# Patient Record
Sex: MALE | Race: WHITE | NOT HISPANIC OR LATINO | Employment: OTHER | URBAN - METROPOLITAN AREA
[De-identification: names, ages, dates, MRNs, and addresses within clinical notes are randomized per-mention and may not be internally consistent; named-entity substitution may affect disease eponyms.]

---

## 2023-12-05 ENCOUNTER — HOSPITAL ENCOUNTER (OUTPATIENT)
Dept: RADIOLOGY | Facility: HOSPITAL | Age: 83
Discharge: HOME | End: 2023-12-05
Payer: MEDICARE

## 2023-12-05 ENCOUNTER — OFFICE VISIT (OUTPATIENT)
Dept: ORTHOPEDIC SURGERY | Facility: CLINIC | Age: 83
End: 2023-12-05
Payer: MEDICARE

## 2023-12-05 DIAGNOSIS — M25.562 ACUTE BILATERAL KNEE PAIN: ICD-10-CM

## 2023-12-05 DIAGNOSIS — M25.561 ACUTE BILATERAL KNEE PAIN: ICD-10-CM

## 2023-12-05 DIAGNOSIS — M25.562 ACUTE BILATERAL KNEE PAIN: Primary | ICD-10-CM

## 2023-12-05 DIAGNOSIS — M25.561 ACUTE BILATERAL KNEE PAIN: Primary | ICD-10-CM

## 2023-12-05 PROCEDURE — 73562 X-RAY EXAM OF KNEE 3: CPT | Mod: RT,FY

## 2023-12-05 PROCEDURE — 73562 X-RAY EXAM OF KNEE 3: CPT | Mod: RIGHT SIDE | Performed by: RADIOLOGY

## 2023-12-05 PROCEDURE — 1126F AMNT PAIN NOTED NONE PRSNT: CPT | Performed by: ORTHOPAEDIC SURGERY

## 2023-12-05 PROCEDURE — 1036F TOBACCO NON-USER: CPT | Performed by: ORTHOPAEDIC SURGERY

## 2023-12-05 PROCEDURE — 73560 X-RAY EXAM OF KNEE 1 OR 2: CPT | Mod: RIGHT SIDE | Performed by: RADIOLOGY

## 2023-12-05 PROCEDURE — 1159F MED LIST DOCD IN RCRD: CPT | Performed by: ORTHOPAEDIC SURGERY

## 2023-12-05 PROCEDURE — 20610 DRAIN/INJ JOINT/BURSA W/O US: CPT | Performed by: ORTHOPAEDIC SURGERY

## 2023-12-05 PROCEDURE — 99214 OFFICE O/P EST MOD 30 MIN: CPT | Performed by: ORTHOPAEDIC SURGERY

## 2023-12-05 PROCEDURE — 1160F RVW MEDS BY RX/DR IN RCRD: CPT | Performed by: ORTHOPAEDIC SURGERY

## 2023-12-05 PROCEDURE — 73560 X-RAY EXAM OF KNEE 1 OR 2: CPT | Mod: LT,FY

## 2023-12-05 RX ORDER — TRIAMCINOLONE ACETONIDE 40 MG/ML
1 INJECTION, SUSPENSION INTRA-ARTICULAR; INTRAMUSCULAR
Status: COMPLETED | OUTPATIENT
Start: 2023-12-05 | End: 2023-12-05

## 2023-12-05 RX ADMIN — TRIAMCINOLONE ACETONIDE 1 ML: 40 INJECTION, SUSPENSION INTRA-ARTICULAR; INTRAMUSCULAR at 12:26

## 2023-12-05 NOTE — PROGRESS NOTES
This is a consultation from Dr. Babita Blackburn MD for   Chief Complaint   Patient presents with    Right Knee - Pain    Left Knee - Pain       This is a 83 y.o. male who presents for follow-up for his bilateral knees.  Patient has bilateral knee arthritis, I saw him about a year ago.  At that time to give cortisone injections, he states it worked about 3 to 4 months.  Since then he has had return of his symptoms exacerbation of his pain.  Sharp stabbing pain over the medial knee on both sides worse with walking.  He states he is slow to get around.  No numbness or tingling no fevers or chills no shooting pain down the leg    Physical Exam    There has been no interval change in this patient's past medical, surgical, medications, allergies, family history or social history since the most recent visit to a provider within our department. 14 point review of systems was performed, reviewed, and negative except for pertinent positives documented in the history of present illness.     Constitutional: well developed, well nourished male in no acute distress  Psychiatric: normal mood, appropriate affect  Eyes: sclera anicteric  HENT: normocephalic/atraumatic  CV: regular rate and rhythm   Respiratory: non labored breathing  Integumentary: no rash  Neurological: moves all extremities    Bilateral knee exam: skin intact no lacerations or abrations.  1+ effusion.  Tender medial joint line. negative log roll negative patellar grind. ROM 0-120. stable to varus and valgus stress at 0 and 30 degrees. negative lachman negative posterior drawer negative tevin. 5/5 ehl/fhl/gs/ta. silt s/s/sp/dp/t. 2+ dp/pt        Xrays were ordered by me, they were reviewed and independently interpreted by me today, they show severe degenerative disease in the bilateral patellofemoral compartments, moderate to severe joint space narrowing the medial compartment    L Inj/Asp: bilateral knee on 12/5/2023 12:26 PM  Indications: pain and joint  swelling  Details: 22 G needle, anterolateral approach  Medications (Right): 1 mL triamcinolone acetonide 40 mg/mL  Medications (Left): 1 mL triamcinolone acetonide 40 mg/mL    Discussion:  I discussed the conservative treatment options for knee osteoarthritis including but not limited to physical therapy, oral NSAIDS, activity and lifestyle modification, and corticosteroid injections. Pt has elected to undergo a cortisone injection today. I have explained the risk and benefits of an injection including the possibility of joint infection, bleeding, damage to cartilage, allergic reaction. Patient verbalized understanding and gave verbal consent wishes to proceed with a intra-articular cortisone injection for their knee.    Procedure:  After discussing the risk and benefits of the procedure, we proceeded with an intra-articular bilateral knee injection. We discussed the risks and benefits and potential morbidity related to the treatment, and to the prescription medication administered in the injection    With the patient's informed verbal consent, the bilateral knees were prepped in standard sterile fashion with Chlorhexidine. The skin was then anesthetized with ethyl chloride spray and cleaned again with Chlorhexidine. The bilateral knees were then apirated/injected with a prefilled 20-gauge syringe of 40 mg Kenalog + 4 ml Lidocaine using the lateral approach without complications.  The patient tolerated this well and felt immediate initial relief of symptoms. A bandaid was applied and the patient ambulated out of the clinic on ther own accord without difficulty. Patient was instructed to avoid physical activity for 24-48 hours to prevent the knees from swelling and may ice the knees as tolerated. Patient should contact the office if any signs of of infection appear: redness, fever, chills, drainage, swelling or warmth to the knees.  Pt understands that the injections can be repeated no sooner than 3  "months.      Procedure, treatment alternatives, risks and benefits explained, specific risks discussed. Consent was given by the patient. Immediately prior to procedure a time out was called to verify the correct patient, procedure, equipment, support staff and site/side marked as required. Patient was prepped and draped in the usual sterile fashion.             Impression/Plan: This is a 83 y.o. male with severe bilateral knee arthritis.  I had an in depth discussion with the patient regarding treatment options for arthritis and their relative risks and benefits. We reviewed surgical and nonsurgical option for treatment. Treatments include anti inflammatory medications, physical therapy, weight loss, activity modification, use of assistive devices, injection therapies. We discussed current prescriptions and risks and benefits of continuation of prescription medication as apporpriate. We discussed that arthritis is often progressive over time, an in end stage arthritis surgical interventions can be considered, including arthroplasty. All questions were answered and the patient voiced their understanding.  I will see him back as needed    BMI Readings from Last 1 Encounters:   07/05/22 33.20 kg/m²      Lab Results   Component Value Date    CREATININE 0.99 03/08/2022     Tobacco Use: Medium Risk (12/5/2023)    Patient History     Smoking Tobacco Use: Former     Smokeless Tobacco Use: Never     Passive Exposure: Not on file      Computed MELD 3.0 unavailable. Necessary lab results were not found in the last year.  Computed MELD-Na unavailable. Necessary lab results were not found in the last year.       Lab Results   Component Value Date    HGBA1C 5.9 03/16/2020     No results found for: \"STAPHMRSASCR\"  "

## 2023-12-19 DIAGNOSIS — E78.2 MIXED HYPERLIPIDEMIA: ICD-10-CM

## 2023-12-19 DIAGNOSIS — Z00.00 ENCOUNTER FOR GENERAL ADULT MEDICAL EXAMINATION WITHOUT ABNORMAL FINDINGS: ICD-10-CM

## 2023-12-19 RX ORDER — SIMVASTATIN 40 MG/1
40 TABLET, FILM COATED ORAL EVERY EVENING
Qty: 90 TABLET | Refills: 3 | Status: SHIPPED | OUTPATIENT
Start: 2023-12-19

## 2023-12-19 RX ORDER — METOPROLOL SUCCINATE 50 MG/1
50 TABLET, EXTENDED RELEASE ORAL DAILY
Qty: 90 TABLET | Refills: 3 | Status: SHIPPED | OUTPATIENT
Start: 2023-12-19

## 2023-12-19 RX ORDER — LISINOPRIL 10 MG/1
10 TABLET ORAL DAILY
Qty: 90 TABLET | Refills: 3 | Status: SHIPPED | OUTPATIENT
Start: 2023-12-19 | End: 2024-02-08 | Stop reason: HOSPADM

## 2023-12-20 DIAGNOSIS — R45.4 IRRITABILITY AND ANGER: ICD-10-CM

## 2023-12-20 RX ORDER — DULOXETIN HYDROCHLORIDE 60 MG/1
60 CAPSULE, DELAYED RELEASE ORAL DAILY
Qty: 90 CAPSULE | Refills: 3 | Status: SHIPPED | OUTPATIENT
Start: 2023-12-20

## 2023-12-20 RX ORDER — ESCITALOPRAM OXALATE 5 MG/1
5 TABLET ORAL NIGHTLY
Qty: 90 TABLET | Refills: 3 | Status: SHIPPED | OUTPATIENT
Start: 2023-12-20 | End: 2024-01-31 | Stop reason: SDUPTHER

## 2024-01-07 PROBLEM — H90.3 SENSORINEURAL HEARING LOSS (SNHL) OF BOTH EARS: Status: ACTIVE | Noted: 2024-01-07

## 2024-01-07 PROBLEM — I73.9 PVD (PERIPHERAL VASCULAR DISEASE) (CMS-HCC): Status: ACTIVE | Noted: 2024-01-07

## 2024-01-07 PROBLEM — S32.020A CLOSED COMPRESSION FRACTURE OF SECOND LUMBAR VERTEBRA (MULTI): Status: ACTIVE | Noted: 2024-01-07

## 2024-01-07 PROBLEM — D64.9 ANEMIA: Status: ACTIVE | Noted: 2024-01-07

## 2024-01-07 PROBLEM — E55.9 VITAMIN D DEFICIENCY: Status: ACTIVE | Noted: 2024-01-07

## 2024-01-07 PROBLEM — Z00.00 HEALTH CARE MAINTENANCE: Status: ACTIVE | Noted: 2024-01-07

## 2024-01-07 PROBLEM — E53.8 B12 DEFICIENCY: Status: ACTIVE | Noted: 2024-01-07

## 2024-01-07 PROBLEM — R26.89 IMPAIRMENT OF BALANCE: Status: ACTIVE | Noted: 2024-01-07

## 2024-01-07 PROBLEM — I10 PRIMARY HYPERTENSION: Status: ACTIVE | Noted: 2024-01-07

## 2024-01-07 PROBLEM — R41.89 COGNITIVE IMPAIRMENT: Status: ACTIVE | Noted: 2024-01-07

## 2024-01-07 PROBLEM — M79.609 PAIN IN EXTREMITY AT MULTIPLE SITES: Status: ACTIVE | Noted: 2024-01-07

## 2024-01-07 PROBLEM — I25.110 CORONARY ARTERY DISEASE INVOLVING NATIVE CORONARY ARTERY OF NATIVE HEART WITH UNSTABLE ANGINA PECTORIS (MULTI): Status: ACTIVE | Noted: 2024-01-07

## 2024-01-07 PROBLEM — R45.4 IRRITABILITY AND ANGER: Status: ACTIVE | Noted: 2024-01-07

## 2024-01-07 NOTE — PROGRESS NOTES
"Subjective   Mr. Thao is 83 y.o. year old male and here for f/u of gait abnormality, ostearthritis in knees and hands. here with wife mandy     Last visit 7/5/22  Per pt discussion/summary:   \"1. Dr. Blackburn will order  home care for home physical therapy  - do the exercises regularly   2. see a podiatrist for nail clippings  3. referral to ear nose and throat  - for hearing and ear evaluation   4. follow up in 4 months\"     Saw ortho 10/4/22: had bilateral steroid injection into knees    Saw ortho again 12/5/23  \"Impression/Plan: This is a 83 y.o. male with severe bilateral knee arthritis.  I had an in depth discussion with the patient regarding treatment options for arthritis and their relative risks and benefits. We reviewed surgical and nonsurgical option for treatment. Treatments include anti inflammatory medications, physical therapy, weight loss, activity modification, use of assistive devices, injection therapies. We discussed current prescriptions and risks and benefits of continuation of prescription medication as apporpriate. We discussed that arthritis is often progressive over time, an in end stage arthritis surgical interventions can be considered, including arthroplasty. All questions were answered and the patient voiced their understanding.  I will see him back as needed \"    HPI     Per patient and mandy (obtained in addition due to cognitive impairment)  - both his knees hurt. \"Art has settled in for good\"  - got steroid injections again into knees 12/3/23   - down 4-5 lbs in past 1-2 years. Down 25lbs in almost 3 years.   - using cane. Thinks he is not ready for walker  - has had falls. Last was she was attacked by rototiller. Slit up his leg. It has healed. Went to wound clinic. This was last spring  - pt says no falls since then. But wife says she has heard him thud   - has hard time coming up and down stairs per wife  - stays in his room and doesn't come downstairs often. No exercise.   - " "comes down once every couple of days to have dinner.   - eats cookies and crackers mostly  - taking tylenol 500mg- 5 tabs per day. 3 in morning and 2 at night  - tramadol is gone.   - no dizziness and lightheadedness  - picks a fight with mandy every afternoon. Gets more angry only with mandy.   - pt says he hasn't been taking the escitalopram. Thought I took him off of it  - wife has been taking donepezil. It has given her her life back. Thinking more clearly.  - has some short term forgetfulness. Says he  can't tell me what he ate for breakfast yesterday.   - has bugs upstairs. Pt says they are real. He tries killing them with his cane. Wife says they are not real. That they are not real. Pt insists it is not a hallucination   - 5 months ago did have hallucinations. Saw a woodchuck coming up through the floor. But when went up to it, wasn't real  - no alcohol   - not repeating  - forgets names or words some but not a lot  - not motivated to do things  - no problem with electronics  - sometimes trouble sleeping   - having pain in muscles below both shoulders     Home environment: lives in house in [facility] [place] with wife     Alcohol: no  Smoking: no      Is dependent or requires assistance in the following BADL: none   Is dependent or requires assistance in the following Instrumental ADL: stopped driving. managing own meds. wife does cooking and cleaning and laundry. Wife does bills      History of Abuse/Neglect/exploitation: no      Living Will: will discuss at next visit  Durable Power of  of Health Care: will discuss at next visit    Medications reviewed and reconciled.     Objective   /69   Pulse 83   Temp 36.3 °C (97.4 °F) (Tympanic)   Resp 18   Ht 1.689 m (5' 6.5\")   Wt 88.5 kg (195 lb)   BMI 31.00 kg/m²     Physical Exam  Constitutional: No Acute Distress; hair long and yellow at bottom of strands. Mild body odor. Clothes slightly yellow    Eyes: PERRLA  Ears: clear. Mostly normal TMs  ENT: " Pharynx clear, neck supple  Lymphatic: No anterior cervical, supraclavicular adenopathy  Cardiovascular: RRR, +S1, S2, no murmurs appreciated, physiologic JVD.  Extremities: no cyanosis, clubbing. Trace-1+ edema. Pedal pulses decreased bilaterally. Toes blueish and cold. Skin tight. Nails dystrophic and slightly long  Respiratory: clear without rales, rhonchi or wheezes  Gastrointestinal: +BS, soft, nontender  Genitourinary: not examined  Musculoskeletal: unremarkable  Integumentary: skin warm, no tenting, no remarkable lesions  Neurological: non-focal deficit. Get-up-and-go:  pushed to stand. Somewhat slow gait. Reduced step length Gait: stable with straight cane  Psychiatric: affect full         Component  Ref Range & Units 1 yr ago  (3/8/22) 2 yr ago  (8/13/21) 2 yr ago  (8/3/21) 3 yr ago  (5/26/20) 3 yr ago  (5/8/20) 3 yr ago  (3/16/20) 4 yr ago  (5/7/19)   WBC  4.4 - 11.3 x10E9/L 11.2 10.6 8.7 10.9 13.3 High  12.0 High  8.4   Hemoglobin  13.5 - 17.5 g/dL 13.1 Low  13.8 13.8 11.5 Low  12.2 Low  13.2 Low  14.7   Hematocrit  41.0 - 52.0 % 42.5 41.5 43.6 36.8 Low  38.3 Low  41.4 45.5   MCV  80 - 100 fL 107 High  106 High  108 High  109 High  104 High  103 High  108 High    Platelets  150 - 450 x10E9/L 413 305 353 407 346 397 248     Component  Ref Range & Units 1 yr ago  (3/8/22) 2 yr ago  (8/13/21) 2 yr ago  (8/3/21) 3 yr ago  (9/10/20) 3 yr ago  (5/26/20) 3 yr ago  (5/8/20) 3 yr ago  (3/16/20)   Glucose  74 - 99 mg/dL 89 84 103 High  81 107 High  99 146 High    Sodium  136 - 145 mmol/L 139 138 139 138 134 Low  139 140   Potassium  3.5 - 5.3 mmol/L 4.8 4.8 CM 4.4 4.5 4.3 4.0 3.9   Chloride  98 - 107 mmol/L 106 106 106 104 102 105 107   Bicarbonate  21 - 32 mmol/L 23 24 25 26 23 26 25   Anion Gap  10 - 20 mmol/L 15 13 12 13 13 12 12   Urea Nitrogen  6 - 23 mg/dL 32 High  32 High  25 High  17 21 25 High  21   Creatinine  0.50 - 1.30 mg/dL 0.99 1.27 0.94 0.88 1.04 1.69 High  0.95   GFR MALE  >90 mL/min/1.73m2 77       "   Calcium  8.6 - 10.6 mg/dL 8.8 8.9 R 9.1 9.1 R 9.0 8.5 Low           Lab Results   Component Value Date    TSH 0.93 03/08/2022    TSH 0.86 09/10/2020    TSH 1.01 05/08/2020     No results found for: \"FREET4\"  Lab Results   Component Value Date    DEGXZSMZ13 >2000 (A) 05/07/2019    BRAAXOAA17 1,234 (H) 10/10/2018    ZNEZAJZJ03 285 08/08/2018     Lab Results   Component Value Date    HGBA1C 5.9 03/16/2020     Lab Results   Component Value Date    VITD25 36 08/03/2021     Component  Ref Range & Units 3 yr ago 4 yr ago 5 yr ago   Cholesterol  0 - 199 mg/dL 147 150  CM   HDL  mg/dL 34.5 Abnormal  36.6 Abnormal  CM 30.3 Abnormal  CM   LDL  0 - 99 mg/dL 94 94 CM 86 CM   VLDL  0 - 40 mg/dL 19 20 21   Triglycerides  0 - 149 mg/dL 95 99  CM     Xray knees 12/6/23  \"IMPRESSION:  Advanced osteoarthritis bilateral knees worst medially, slightly progressed from prior study.\"    Xray lumbar spine 6/8/22  \"IMPRESSION:  Compression fracture of L2 appears similar to the prior. Moderate facet disease and mild spondylosis in the lower lumbar spine.\"    ECHO 2020  \"CONCLUSIONS:   1. The left ventricular systolic function is low normal with a 55% estimated ejection fraction.   2. Spectral Doppler shows an impaired relaxation pattern of left ventricular diastolic filling.   3. Mild aortic valve stenosis.   4. There is mild tricuspid regurgitation.   5. The estimated pulmonary artery pressure is mildly elevated with the RVSP at 38.9 mmHg.   6. The inferior vena cava appears to be of normal size.\"    PVRs 2021: CONCLUSIONS:  Right Lower PVR: No evidence of arterial occlusive disease in the right lower extremity at rest. Decreased digital perfusion noted. Biphasic flow is noted in the right posterior tibial artery and right dorsalis pedis artery. Triphasic flow is noted in the right common femoral artery and right popliteal artery. Difference in brachial pressures noted. Toe pressures per Dr. Escobedo.  Left Lower PVR: No " evidence of arterial occlusive disease in the left lower extremity at rest. Left pressures of >220 mmHg suggest no compressibility of vessels and may make absolute Segmental Limb Pressures (SLP) unreliable. Normal digital perfusion noted.    Assessment/Plan   1. irritability and anger  2. cognitive impairment  Pt's wife has been reporting irritability and anger for past few years. She noted improvement when duloxetine was increased to 60mg in may 2021. but reported increased hostility towards her in 7/2022. We added escitalopram 5mg daily. He then didn't return for f/up until now. And his wife notes that pt has continued to be angry and hostile just towards her, usually every evening. Also he stays in his room for few days at a time because its hard for him to come down the stairs. Does have apathy. And they both report short term forgetfulness. He states he is not taking the escitalopram. Will restart this today at 5mg daily. Also of note,  he scored 26/30 on MoCA in 8/2020. will repeat the MoCA at the next visit. And we will do blood work today.     3. Hearing loss  Wife feels pt's hearing loss is playing a role with cognitive and mood issues. hasn't seen audiology for hearing aid adjustment in [facility]. Have referred him multiple times in past. In 7/2022, he said he wanted to see ENT about having surgery done in his left ear.  This didn't happen and he didn't mention it today. of note evaluated in 2020 for cochlear implant and was told not a candidate. 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 also may need left hearing aid adjusted. will refer him back to audiology again for L hearing aid ajustment     4. pain multiple sites  5. balance impairment  6. L2 compression fracture  - pain has been a problem for awhile. He used to be on tramadol up to 4 times per day but has weaned off of this. Pain in his knees has worsened. Likely contributing to and by his decreased mobility. Having trouble going down the stairs in his house and  so is remaining in his room for days at a time. Taking only tylenol 1500mg in morning and 1000mg at night. Advised changing this to 1000mg TID and starting voltaren gel and lidocaine patches to his knees. Also will refer for PT and discussed the importance of increased activity and exercise.      7. obesity   - weight is down 30+ lbs compared to 5 years ago but is stable compared to 1.5 years ago. Often doesn't go downstairs for dinner. Instead eats cookies and crackers all day. Discussed the impact of his weight and also sweets and unbalanced diet on his pain issues. Recommend to try to eat more balanced diet.  Will monitor      8. HTN  9. coronary arteriosclerosis  10. PVD (suspected)   bps have been relatively low in the office in past. denies dizziness, lightheadedness. is on metoprolol ER 50mg and lisinopril 10mg. will continue this regimen for now. also on statin and aspirin. Bp again today is low normal. May need  to reduce the dose of lisinopril. Will check labs today. of note, PVRs in 9/2020 were negative, however last visit and today again his feet were cool and dusky. may need to repeat this test in near future. he does have very mild edema of lower legs. did see Dr. Nash from vascular medicine in 10/2020. was wrapping his legs. but stopped this. Skin today is very dry. Advised applying moisturizing eczema cream to his legs twice daily. will monitor     11. anemia   found to have mild anemia in may/june 2020 with hgb 11.5. hemoccult was negative at that time. it did improve to the 13s in 2021 and early 2022. Will recheck cbc today.       12. B12 deficiency  b12 was low normal in upper 200s in past. then was on 1000mcg daily and eventually was supratherapeutic. And he stopped it for awhile. Now says he is on a low dose. Will recheck b12 level     13. Vitamin D deficiency  - d level in 8/2021 was 36. is on D3 1000 units daily. will recheck D level      14. health maintenance  - both pt and wife expressed  reservation about the covid vaccines in past. We did not discuss vaccines today. Will discuss at next visit.   Of note, pt had notable odor today. It seemed to be coming from his clothes which were slightly yellow in areas. Wife notes that he bathes/showers about 1x per year. Did shower last night in preparation for the visit. Recommended today that he try to bathe at least once weekly.     15. nail problem   toenails are again dystrophic. Have advised him multiple times in past to podiatry but this wasn't done.      f/u visit in 2-3 months with cognitive test           Babita Blackburn MD

## 2024-01-09 ENCOUNTER — OFFICE VISIT (OUTPATIENT)
Dept: GERIATRIC MEDICINE | Facility: CLINIC | Age: 84
End: 2024-01-09
Payer: MEDICARE

## 2024-01-09 VITALS
RESPIRATION RATE: 18 BRPM | SYSTOLIC BLOOD PRESSURE: 101 MMHG | DIASTOLIC BLOOD PRESSURE: 69 MMHG | TEMPERATURE: 97.4 F | BODY MASS INDEX: 30.61 KG/M2 | HEART RATE: 83 BPM | WEIGHT: 195 LBS | HEIGHT: 67 IN

## 2024-01-09 DIAGNOSIS — E55.9 VITAMIN D DEFICIENCY: ICD-10-CM

## 2024-01-09 DIAGNOSIS — M79.609 PAIN IN EXTREMITY AT MULTIPLE SITES: ICD-10-CM

## 2024-01-09 DIAGNOSIS — L85.3 DRY SKIN DERMATITIS: ICD-10-CM

## 2024-01-09 DIAGNOSIS — S32.020D CLOSED COMPRESSION FRACTURE OF L2 LUMBAR VERTEBRA WITH ROUTINE HEALING, SUBSEQUENT ENCOUNTER: ICD-10-CM

## 2024-01-09 DIAGNOSIS — E53.8 B12 DEFICIENCY: ICD-10-CM

## 2024-01-09 DIAGNOSIS — R26.89 IMPAIRMENT OF BALANCE: ICD-10-CM

## 2024-01-09 DIAGNOSIS — I10 PRIMARY HYPERTENSION: ICD-10-CM

## 2024-01-09 DIAGNOSIS — D64.9 ANEMIA, UNSPECIFIED TYPE: ICD-10-CM

## 2024-01-09 DIAGNOSIS — I25.110 CORONARY ARTERY DISEASE INVOLVING NATIVE CORONARY ARTERY OF NATIVE HEART WITH UNSTABLE ANGINA PECTORIS (MULTI): ICD-10-CM

## 2024-01-09 DIAGNOSIS — H90.3 SENSORINEURAL HEARING LOSS (SNHL) OF BOTH EARS: ICD-10-CM

## 2024-01-09 DIAGNOSIS — R73.03 PREDIABETES: ICD-10-CM

## 2024-01-09 DIAGNOSIS — R41.89 COGNITIVE IMPAIRMENT: ICD-10-CM

## 2024-01-09 DIAGNOSIS — L60.9 NAIL PROBLEM: ICD-10-CM

## 2024-01-09 DIAGNOSIS — R45.4 IRRITABILITY AND ANGER: Primary | ICD-10-CM

## 2024-01-09 DIAGNOSIS — Z00.00 HEALTH CARE MAINTENANCE: ICD-10-CM

## 2024-01-09 DIAGNOSIS — I73.9 PVD (PERIPHERAL VASCULAR DISEASE) (CMS-HCC): ICD-10-CM

## 2024-01-09 PROCEDURE — 99215 OFFICE O/P EST HI 40 MIN: CPT | Performed by: INTERNAL MEDICINE

## 2024-01-09 PROCEDURE — 1159F MED LIST DOCD IN RCRD: CPT | Performed by: INTERNAL MEDICINE

## 2024-01-09 PROCEDURE — 1160F RVW MEDS BY RX/DR IN RCRD: CPT | Performed by: INTERNAL MEDICINE

## 2024-01-09 PROCEDURE — 1036F TOBACCO NON-USER: CPT | Performed by: INTERNAL MEDICINE

## 2024-01-09 PROCEDURE — 3074F SYST BP LT 130 MM HG: CPT | Performed by: INTERNAL MEDICINE

## 2024-01-09 PROCEDURE — 3078F DIAST BP <80 MM HG: CPT | Performed by: INTERNAL MEDICINE

## 2024-01-09 PROCEDURE — 1126F AMNT PAIN NOTED NONE PRSNT: CPT | Performed by: INTERNAL MEDICINE

## 2024-01-09 RX ORDER — CYANOCOBALAMIN (VITAMIN B-12) 250 MCG
250 TABLET ORAL DAILY
Status: ON HOLD | COMMUNITY

## 2024-01-09 RX ORDER — ASCORBIC ACID 500 MG
500 TABLET ORAL DAILY
Status: ON HOLD | COMMUNITY

## 2024-01-09 RX ORDER — VIT C/E/ZN/COPPR/LUTEIN/ZEAXAN 250MG-90MG
25 CAPSULE ORAL DAILY
Status: ON HOLD | COMMUNITY

## 2024-01-09 ASSESSMENT — PATIENT HEALTH QUESTIONNAIRE - PHQ9: 2. FEELING DOWN, DEPRESSED OR HOPELESS: NOT AT ALL

## 2024-01-09 ASSESSMENT — PAIN SCALES - GENERAL: PAINLEVEL: 0-NO PAIN

## 2024-01-09 ASSESSMENT — ENCOUNTER SYMPTOMS
LOSS OF SENSATION IN FEET: 0
OCCASIONAL FEELINGS OF UNSTEADINESS: 1
DEPRESSION: 0

## 2024-01-09 NOTE — PATIENT INSTRUCTIONS
Takes tylenol 500mg   - 2 tabs in morning in the morning, 2 tabs in afternoon and 2 tabs at night     2. Try using voltaren gel 1%  - 4 grams to each knee at least twice per day     3. Put lidocaine patch on each knee in the morning, take off at night     4. Increase exercise   - try to do physical therapy    5. Restart the escitalopram (lexapro) 5mg   -1 tab daily  - this is to help with mood   - take at bedtime. If it makes you more awake at night, then take it in morning    6. We justin blood today to check kidneys, blood counts, thyroid, vitamin D, cholesterol, b12     7. Follow up visit with Dr. Blackburn in 6 months     8. Try to bathe once per week     9. Apply moisturizing cream, like eucerin eczema once daily to legs, feet, and hands     10. Regular exercise and physical therapy will help with your shoulders

## 2024-01-09 NOTE — Clinical Note
After thinking more about this case, I think he should come back sooner in 1-2 months to follow up on medication use, his mood, and his arthritis mobility issues. And I would like to repeat a MoCA. Can we schedule this for him with one of the Nps, if I'm not available?

## 2024-01-31 RX ORDER — ACETAMINOPHEN 500 MG
1000 TABLET ORAL EVERY 8 HOURS
Qty: 180 TABLET | Refills: 3 | Status: SHIPPED | OUTPATIENT
Start: 2024-01-31 | End: 2024-02-08 | Stop reason: HOSPADM

## 2024-01-31 RX ORDER — ESCITALOPRAM OXALATE 5 MG/1
5 TABLET ORAL NIGHTLY
Qty: 90 TABLET | Refills: 3 | Status: ON HOLD | OUTPATIENT
Start: 2024-01-31

## 2024-01-31 RX ORDER — COLLOIDAL OATMEAL 1 %
CREAM (GRAM) TOPICAL
Qty: 206 G | Refills: 3 | Status: SHIPPED | OUTPATIENT
Start: 2024-01-31 | End: 2024-02-08 | Stop reason: HOSPADM

## 2024-01-31 RX ORDER — LIDOCAINE 560 MG/1
2 PATCH PERCUTANEOUS; TOPICAL; TRANSDERMAL DAILY
Qty: 60 PATCH | Refills: 3 | Status: SHIPPED | OUTPATIENT
Start: 2024-01-31 | End: 2024-02-08 | Stop reason: HOSPADM

## 2024-02-02 ENCOUNTER — APPOINTMENT (OUTPATIENT)
Dept: CARDIOLOGY | Facility: HOSPITAL | Age: 84
DRG: 564 | End: 2024-02-02
Payer: MEDICARE

## 2024-02-02 ENCOUNTER — APPOINTMENT (OUTPATIENT)
Dept: RADIOLOGY | Facility: HOSPITAL | Age: 84
DRG: 564 | End: 2024-02-02
Payer: MEDICARE

## 2024-02-02 ENCOUNTER — HOSPITAL ENCOUNTER (INPATIENT)
Facility: HOSPITAL | Age: 84
LOS: 6 days | Discharge: NURSING, REHAB OR LONG-TERM CARE | DRG: 564 | End: 2024-02-08
Attending: EMERGENCY MEDICINE | Admitting: FAMILY MEDICINE
Payer: MEDICARE

## 2024-02-02 DIAGNOSIS — R06.02 SHORTNESS OF BREATH: ICD-10-CM

## 2024-02-02 DIAGNOSIS — R79.89 ELEVATED TROPONIN: ICD-10-CM

## 2024-02-02 DIAGNOSIS — M79.609 PAIN IN EXTREMITY AT MULTIPLE SITES: ICD-10-CM

## 2024-02-02 DIAGNOSIS — I48.91 NEW ONSET ATRIAL FIBRILLATION (MULTI): ICD-10-CM

## 2024-02-02 DIAGNOSIS — T79.6XXA TRAUMATIC RHABDOMYOLYSIS, INITIAL ENCOUNTER (CMS-HCC): Primary | ICD-10-CM

## 2024-02-02 DIAGNOSIS — J18.9 COMMUNITY ACQUIRED PNEUMONIA, BILATERAL: ICD-10-CM

## 2024-02-02 DIAGNOSIS — D72.829 LEUKOCYTOSIS, UNSPECIFIED TYPE: ICD-10-CM

## 2024-02-02 DIAGNOSIS — R79.89 LACTATE BLOOD INCREASE: ICD-10-CM

## 2024-02-02 LAB
ALBUMIN SERPL BCP-MCNC: 3.2 G/DL (ref 3.4–5)
ALP SERPL-CCNC: 127 U/L (ref 33–136)
ALT SERPL W P-5'-P-CCNC: 34 U/L (ref 10–52)
ANION GAP BLDV CALCULATED.4IONS-SCNC: 14 MMOL/L (ref 10–25)
ANION GAP SERPL CALC-SCNC: 20 MMOL/L (ref 10–20)
AST SERPL W P-5'-P-CCNC: 89 U/L (ref 9–39)
BASE EXCESS BLDV CALC-SCNC: -1.1 MMOL/L (ref -2–3)
BASOPHILS # BLD AUTO: 0.05 X10*3/UL (ref 0–0.1)
BASOPHILS NFR BLD AUTO: 0.3 %
BILIRUB SERPL-MCNC: 0.5 MG/DL (ref 0–1.2)
BODY TEMPERATURE: ABNORMAL
BUN SERPL-MCNC: 49 MG/DL (ref 6–23)
CA-I BLDV-SCNC: 1.15 MMOL/L (ref 1.1–1.33)
CALCIUM SERPL-MCNC: 8.8 MG/DL (ref 8.6–10.3)
CARDIAC TROPONIN I PNL SERPL HS: 23 NG/L (ref 0–20)
CARDIAC TROPONIN I PNL SERPL HS: 27 NG/L (ref 0–20)
CHLORIDE BLDV-SCNC: 100 MMOL/L (ref 98–107)
CHLORIDE SERPL-SCNC: 99 MMOL/L (ref 98–107)
CK SERPL-CCNC: 3109 U/L (ref 0–325)
CO2 SERPL-SCNC: 23 MMOL/L (ref 21–32)
CREAT SERPL-MCNC: 1.3 MG/DL (ref 0.5–1.3)
EGFRCR SERPLBLD CKD-EPI 2021: 55 ML/MIN/1.73M*2
EOSINOPHIL # BLD AUTO: 0 X10*3/UL (ref 0–0.4)
EOSINOPHIL NFR BLD AUTO: 0 %
ERYTHROCYTE [DISTWIDTH] IN BLOOD BY AUTOMATED COUNT: 12.6 % (ref 11.5–14.5)
FLUAV RNA RESP QL NAA+PROBE: NOT DETECTED
FLUBV RNA RESP QL NAA+PROBE: NOT DETECTED
GLUCOSE BLDV-MCNC: 107 MG/DL (ref 74–99)
GLUCOSE SERPL-MCNC: 101 MG/DL (ref 74–99)
HCO3 BLDV-SCNC: 24.3 MMOL/L (ref 22–26)
HCT VFR BLD AUTO: 40.6 % (ref 41–52)
HCT VFR BLD EST: 55 % (ref 41–52)
HGB BLD-MCNC: 13.6 G/DL (ref 13.5–17.5)
HGB BLDV-MCNC: 18.2 G/DL (ref 13.5–17.5)
IMM GRANULOCYTES # BLD AUTO: 0.23 X10*3/UL (ref 0–0.5)
IMM GRANULOCYTES NFR BLD AUTO: 1.2 % (ref 0–0.9)
INHALED O2 CONCENTRATION: 21 %
LACTATE BLDV-SCNC: 3.9 MMOL/L (ref 0.4–2)
LACTATE SERPL-SCNC: 1 MMOL/L (ref 0.4–2)
LACTATE SERPL-SCNC: 2.6 MMOL/L (ref 0.4–2)
LYMPHOCYTES # BLD AUTO: 1.13 X10*3/UL (ref 0.8–3)
LYMPHOCYTES NFR BLD AUTO: 6 %
MCH RBC QN AUTO: 32.9 PG (ref 26–34)
MCHC RBC AUTO-ENTMCNC: 33.5 G/DL (ref 32–36)
MCV RBC AUTO: 98 FL (ref 80–100)
MONOCYTES # BLD AUTO: 0.98 X10*3/UL (ref 0.05–0.8)
MONOCYTES NFR BLD AUTO: 5.2 %
NEUTROPHILS # BLD AUTO: 16.55 X10*3/UL (ref 1.6–5.5)
NEUTROPHILS NFR BLD AUTO: 87.3 %
NRBC BLD-RTO: 0 /100 WBCS (ref 0–0)
OXYHGB MFR BLDV: 49.3 % (ref 45–75)
PCO2 BLDV: 42 MM HG (ref 41–51)
PH BLDV: 7.37 PH (ref 7.33–7.43)
PLATELET # BLD AUTO: 610 X10*3/UL (ref 150–450)
PO2 BLDV: 40 MM HG (ref 35–45)
POTASSIUM BLDV-SCNC: 4.2 MMOL/L (ref 3.5–5.3)
POTASSIUM SERPL-SCNC: 5.2 MMOL/L (ref 3.5–5.3)
PROT SERPL-MCNC: 7.4 G/DL (ref 6.4–8.2)
RBC # BLD AUTO: 4.14 X10*6/UL (ref 4.5–5.9)
RSV RNA RESP QL NAA+PROBE: NOT DETECTED
SAO2 % BLDV: 51 % (ref 45–75)
SARS-COV-2 RNA RESP QL NAA+PROBE: NOT DETECTED
SODIUM BLDV-SCNC: 134 MMOL/L (ref 136–145)
SODIUM SERPL-SCNC: 137 MMOL/L (ref 136–145)
WBC # BLD AUTO: 18.9 X10*3/UL (ref 4.4–11.3)

## 2024-02-02 PROCEDURE — 2500000004 HC RX 250 GENERAL PHARMACY W/ HCPCS (ALT 636 FOR OP/ED): Performed by: FAMILY MEDICINE

## 2024-02-02 PROCEDURE — 82550 ASSAY OF CK (CPK): CPT | Performed by: NURSE PRACTITIONER

## 2024-02-02 PROCEDURE — 71045 X-RAY EXAM CHEST 1 VIEW: CPT | Mod: FR

## 2024-02-02 PROCEDURE — 96367 TX/PROPH/DG ADDL SEQ IV INF: CPT

## 2024-02-02 PROCEDURE — 36415 COLL VENOUS BLD VENIPUNCTURE: CPT | Performed by: NURSE PRACTITIONER

## 2024-02-02 PROCEDURE — 71045 X-RAY EXAM CHEST 1 VIEW: CPT | Mod: FOREIGN READ | Performed by: RADIOLOGY

## 2024-02-02 PROCEDURE — 83605 ASSAY OF LACTIC ACID: CPT | Performed by: NURSE PRACTITIONER

## 2024-02-02 PROCEDURE — 74177 CT ABD & PELVIS W/CONTRAST: CPT | Mod: FOREIGN READ | Performed by: RADIOLOGY

## 2024-02-02 PROCEDURE — 73564 X-RAY EXAM KNEE 4 OR MORE: CPT | Mod: LT

## 2024-02-02 PROCEDURE — 70450 CT HEAD/BRAIN W/O DYE: CPT

## 2024-02-02 PROCEDURE — 73502 X-RAY EXAM HIP UNI 2-3 VIEWS: CPT | Mod: LEFT SIDE | Performed by: RADIOLOGY

## 2024-02-02 PROCEDURE — 2500000005 HC RX 250 GENERAL PHARMACY W/O HCPCS: Performed by: FAMILY MEDICINE

## 2024-02-02 PROCEDURE — 99223 1ST HOSP IP/OBS HIGH 75: CPT | Performed by: FAMILY MEDICINE

## 2024-02-02 PROCEDURE — 93005 ELECTROCARDIOGRAM TRACING: CPT

## 2024-02-02 PROCEDURE — 2500000002 HC RX 250 W HCPCS SELF ADMINISTERED DRUGS (ALT 637 FOR MEDICARE OP, ALT 636 FOR OP/ED): Mod: MUE | Performed by: FAMILY MEDICINE

## 2024-02-02 PROCEDURE — 70450 CT HEAD/BRAIN W/O DYE: CPT | Performed by: RADIOLOGY

## 2024-02-02 PROCEDURE — 71260 CT THORAX DX C+: CPT | Mod: FOREIGN READ | Performed by: RADIOLOGY

## 2024-02-02 PROCEDURE — 72125 CT NECK SPINE W/O DYE: CPT | Performed by: RADIOLOGY

## 2024-02-02 PROCEDURE — 73564 X-RAY EXAM KNEE 4 OR MORE: CPT | Mod: LEFT SIDE | Performed by: RADIOLOGY

## 2024-02-02 PROCEDURE — 84132 ASSAY OF SERUM POTASSIUM: CPT | Performed by: NURSE PRACTITIONER

## 2024-02-02 PROCEDURE — 94667 MNPJ CHEST WALL 1ST: CPT

## 2024-02-02 PROCEDURE — 96365 THER/PROPH/DIAG IV INF INIT: CPT

## 2024-02-02 PROCEDURE — 94664 DEMO&/EVAL PT USE INHALER: CPT

## 2024-02-02 PROCEDURE — 84484 ASSAY OF TROPONIN QUANT: CPT | Performed by: NURSE PRACTITIONER

## 2024-02-02 PROCEDURE — 2500000004 HC RX 250 GENERAL PHARMACY W/ HCPCS (ALT 636 FOR OP/ED): Performed by: NURSE PRACTITIONER

## 2024-02-02 PROCEDURE — 84145 PROCALCITONIN (PCT): CPT | Mod: GEALAB | Performed by: FAMILY MEDICINE

## 2024-02-02 PROCEDURE — 2550000001 HC RX 255 CONTRASTS: Performed by: NURSE PRACTITIONER

## 2024-02-02 PROCEDURE — 99285 EMERGENCY DEPT VISIT HI MDM: CPT | Performed by: EMERGENCY MEDICINE

## 2024-02-02 PROCEDURE — 84132 ASSAY OF SERUM POTASSIUM: CPT | Mod: 91 | Performed by: NURSE PRACTITIONER

## 2024-02-02 PROCEDURE — 94640 AIRWAY INHALATION TREATMENT: CPT

## 2024-02-02 PROCEDURE — 72125 CT NECK SPINE W/O DYE: CPT

## 2024-02-02 PROCEDURE — 73502 X-RAY EXAM HIP UNI 2-3 VIEWS: CPT | Mod: LT

## 2024-02-02 PROCEDURE — 87040 BLOOD CULTURE FOR BACTERIA: CPT | Mod: GEALAB,91 | Performed by: NURSE PRACTITIONER

## 2024-02-02 PROCEDURE — 71260 CT THORAX DX C+: CPT

## 2024-02-02 PROCEDURE — 2500000004 HC RX 250 GENERAL PHARMACY W/ HCPCS (ALT 636 FOR OP/ED): Performed by: STUDENT IN AN ORGANIZED HEALTH CARE EDUCATION/TRAINING PROGRAM

## 2024-02-02 PROCEDURE — 1200000002 HC GENERAL ROOM WITH TELEMETRY DAILY

## 2024-02-02 PROCEDURE — 85025 COMPLETE CBC W/AUTO DIFF WBC: CPT | Performed by: NURSE PRACTITIONER

## 2024-02-02 PROCEDURE — 87637 SARSCOV2&INF A&B&RSV AMP PRB: CPT | Performed by: NURSE PRACTITIONER

## 2024-02-02 RX ORDER — HEPARIN SODIUM 10000 [USP'U]/100ML
0-4500 INJECTION, SOLUTION INTRAVENOUS CONTINUOUS
Status: DISPENSED | OUTPATIENT
Start: 2024-02-02 | End: 2024-02-05

## 2024-02-02 RX ORDER — HEPARIN SODIUM 5000 [USP'U]/ML
80 INJECTION, SOLUTION INTRAVENOUS; SUBCUTANEOUS ONCE
Status: COMPLETED | OUTPATIENT
Start: 2024-02-02 | End: 2024-02-02

## 2024-02-02 RX ORDER — ALBUTEROL SULFATE 0.83 MG/ML
3 SOLUTION RESPIRATORY (INHALATION) EVERY 4 HOURS PRN
Status: DISCONTINUED | OUTPATIENT
Start: 2024-02-02 | End: 2024-02-02

## 2024-02-02 RX ORDER — ALBUTEROL SULFATE 0.83 MG/ML
3 SOLUTION RESPIRATORY (INHALATION) EVERY 2 HOUR PRN
Status: DISCONTINUED | OUTPATIENT
Start: 2024-02-02 | End: 2024-02-08 | Stop reason: HOSPADM

## 2024-02-02 RX ORDER — IPRATROPIUM BROMIDE AND ALBUTEROL SULFATE 2.5; .5 MG/3ML; MG/3ML
3 SOLUTION RESPIRATORY (INHALATION)
Status: DISCONTINUED | OUTPATIENT
Start: 2024-02-02 | End: 2024-02-02

## 2024-02-02 RX ORDER — CEFTRIAXONE 2 G/50ML
2 INJECTION, SOLUTION INTRAVENOUS EVERY 24 HOURS
Status: DISCONTINUED | OUTPATIENT
Start: 2024-02-03 | End: 2024-02-08 | Stop reason: HOSPADM

## 2024-02-02 RX ORDER — SODIUM CHLORIDE 9 MG/ML
125 INJECTION, SOLUTION INTRAVENOUS CONTINUOUS
Status: DISCONTINUED | OUTPATIENT
Start: 2024-02-02 | End: 2024-02-04

## 2024-02-02 RX ORDER — ESCITALOPRAM OXALATE 10 MG/1
5 TABLET ORAL NIGHTLY
Status: DISCONTINUED | OUTPATIENT
Start: 2024-02-02 | End: 2024-02-08 | Stop reason: HOSPADM

## 2024-02-02 RX ORDER — DULOXETIN HYDROCHLORIDE 60 MG/1
60 CAPSULE, DELAYED RELEASE ORAL DAILY
Status: DISCONTINUED | OUTPATIENT
Start: 2024-02-02 | End: 2024-02-08 | Stop reason: HOSPADM

## 2024-02-02 RX ORDER — ACETAMINOPHEN 325 MG/1
1000 TABLET ORAL EVERY 8 HOURS
Status: DISCONTINUED | OUTPATIENT
Start: 2024-02-02 | End: 2024-02-08 | Stop reason: HOSPADM

## 2024-02-02 RX ORDER — HEPARIN SODIUM 5000 [USP'U]/ML
3000-6000 INJECTION, SOLUTION INTRAVENOUS; SUBCUTANEOUS EVERY 4 HOURS PRN
Status: DISCONTINUED | OUTPATIENT
Start: 2024-02-02 | End: 2024-02-06

## 2024-02-02 RX ORDER — CEFTRIAXONE 1 G/50ML
1 INJECTION, SOLUTION INTRAVENOUS ONCE
Status: COMPLETED | OUTPATIENT
Start: 2024-02-02 | End: 2024-02-02

## 2024-02-02 RX ORDER — IPRATROPIUM BROMIDE AND ALBUTEROL SULFATE 2.5; .5 MG/3ML; MG/3ML
3 SOLUTION RESPIRATORY (INHALATION)
Status: DISCONTINUED | OUTPATIENT
Start: 2024-02-02 | End: 2024-02-08 | Stop reason: HOSPADM

## 2024-02-02 RX ORDER — METOPROLOL SUCCINATE 50 MG/1
50 TABLET, EXTENDED RELEASE ORAL DAILY
Status: DISCONTINUED | OUTPATIENT
Start: 2024-02-02 | End: 2024-02-08 | Stop reason: HOSPADM

## 2024-02-02 RX ADMIN — IPRATROPIUM BROMIDE AND ALBUTEROL SULFATE 3 ML: 2.5; .5 SOLUTION RESPIRATORY (INHALATION) at 21:30

## 2024-02-02 RX ADMIN — Medication 4 L/MIN: at 21:21

## 2024-02-02 RX ADMIN — CEFTRIAXONE SODIUM 1 G: 1 INJECTION, SOLUTION INTRAVENOUS at 17:11

## 2024-02-02 RX ADMIN — IOHEXOL 75 ML: 350 INJECTION, SOLUTION INTRAVENOUS at 15:39

## 2024-02-02 RX ADMIN — SODIUM CHLORIDE, POTASSIUM CHLORIDE, SODIUM LACTATE AND CALCIUM CHLORIDE 1000 ML: 600; 310; 30; 20 INJECTION, SOLUTION INTRAVENOUS at 17:11

## 2024-02-02 RX ADMIN — AZITHROMYCIN MONOHYDRATE 500 MG: 500 INJECTION, POWDER, LYOPHILIZED, FOR SOLUTION INTRAVENOUS at 17:46

## 2024-02-02 RX ADMIN — HEPARIN SODIUM 15 UNITS/HR: 10000 INJECTION, SOLUTION INTRAVENOUS at 21:12

## 2024-02-02 RX ADMIN — SODIUM CHLORIDE 125 ML/HR: 9 INJECTION, SOLUTION INTRAVENOUS at 21:11

## 2024-02-02 RX ADMIN — HEPARIN SODIUM 7000 UNITS: 5000 INJECTION INTRAVENOUS; SUBCUTANEOUS at 21:16

## 2024-02-02 RX ADMIN — VANCOMYCIN HYDROCHLORIDE 1.25 G: 10 INJECTION, POWDER, LYOPHILIZED, FOR SOLUTION INTRAVENOUS at 22:30

## 2024-02-02 RX ADMIN — SODIUM CHLORIDE 1000 ML: 9 INJECTION, SOLUTION INTRAVENOUS at 14:22

## 2024-02-02 SDOH — SOCIAL STABILITY: SOCIAL INSECURITY: WERE YOU ABLE TO COMPLETE ALL THE BEHAVIORAL HEALTH SCREENINGS?: NO

## 2024-02-02 SDOH — SOCIAL STABILITY: SOCIAL INSECURITY: ABUSE: ADULT

## 2024-02-02 SDOH — SOCIAL STABILITY: SOCIAL INSECURITY: HAS ANYONE EVER THREATENED TO HURT YOUR FAMILY OR YOUR PETS?: UNABLE TO ASSESS

## 2024-02-02 SDOH — SOCIAL STABILITY: SOCIAL INSECURITY: DOES ANYONE TRY TO KEEP YOU FROM HAVING/CONTACTING OTHER FRIENDS OR DOING THINGS OUTSIDE YOUR HOME?: UNABLE TO ASSESS

## 2024-02-02 SDOH — SOCIAL STABILITY: SOCIAL INSECURITY: ARE YOU OR HAVE YOU BEEN THREATENED OR ABUSED PHYSICALLY, EMOTIONALLY, OR SEXUALLY BY ANYONE?: UNABLE TO ASSESS

## 2024-02-02 SDOH — SOCIAL STABILITY: SOCIAL INSECURITY: DO YOU FEEL ANYONE HAS EXPLOITED OR TAKEN ADVANTAGE OF YOU FINANCIALLY OR OF YOUR PERSONAL PROPERTY?: UNABLE TO ASSESS

## 2024-02-02 SDOH — SOCIAL STABILITY: SOCIAL INSECURITY: ARE THERE ANY APPARENT SIGNS OF INJURIES/BEHAVIORS THAT COULD BE RELATED TO ABUSE/NEGLECT?: UNABLE TO ASSESS

## 2024-02-02 SDOH — SOCIAL STABILITY: SOCIAL INSECURITY: DO YOU FEEL UNSAFE GOING BACK TO THE PLACE WHERE YOU ARE LIVING?: UNABLE TO ASSESS

## 2024-02-02 SDOH — SOCIAL STABILITY: SOCIAL INSECURITY: HAVE YOU HAD THOUGHTS OF HARMING ANYONE ELSE?: NO

## 2024-02-02 ASSESSMENT — ACTIVITIES OF DAILY LIVING (ADL)
JUDGMENT_ADEQUATE_SAFELY_COMPLETE_DAILY_ACTIVITIES: UNABLE TO ASSESS
HEARING - LEFT EAR: FUNCTIONAL
ADEQUATE_TO_COMPLETE_ADL: UNABLE TO ASSESS
FEEDING YOURSELF: NEEDS ASSISTANCE
GROOMING: NEEDS ASSISTANCE
PATIENT'S MEMORY ADEQUATE TO SAFELY COMPLETE DAILY ACTIVITIES?: UNABLE TO ASSESS
WALKS IN HOME: NEEDS ASSISTANCE
TOILETING: NEEDS ASSISTANCE
DRESSING YOURSELF: NEEDS ASSISTANCE
HEARING - RIGHT EAR: FUNCTIONAL
LACK_OF_TRANSPORTATION: PATIENT DECLINED
BATHING: NEEDS ASSISTANCE

## 2024-02-02 ASSESSMENT — PAIN SCALES - GENERAL
PAINLEVEL_OUTOF10: 0 - NO PAIN
PAINLEVEL_OUTOF10: 0 - NO PAIN

## 2024-02-02 ASSESSMENT — LIFESTYLE VARIABLES
HAVE YOU EVER FELT YOU SHOULD CUT DOWN ON YOUR DRINKING: NO
EVER HAD A DRINK FIRST THING IN THE MORNING TO STEADY YOUR NERVES TO GET RID OF A HANGOVER: NO
HAVE PEOPLE ANNOYED YOU BY CRITICIZING YOUR DRINKING: NO
SKIP TO QUESTIONS 9-10: 1
HOW OFTEN DO YOU HAVE A DRINK CONTAINING ALCOHOL: NEVER
AUDIT-C TOTAL SCORE: 0
HOW MANY STANDARD DRINKS CONTAINING ALCOHOL DO YOU HAVE ON A TYPICAL DAY: PATIENT DOES NOT DRINK
HOW OFTEN DO YOU HAVE 6 OR MORE DRINKS ON ONE OCCASION: NEVER
AUDIT-C TOTAL SCORE: 0
EVER FELT BAD OR GUILTY ABOUT YOUR DRINKING: NO

## 2024-02-02 ASSESSMENT — COLUMBIA-SUICIDE SEVERITY RATING SCALE - C-SSRS
2. HAVE YOU ACTUALLY HAD ANY THOUGHTS OF KILLING YOURSELF?: NO
1. IN THE PAST MONTH, HAVE YOU WISHED YOU WERE DEAD OR WISHED YOU COULD GO TO SLEEP AND NOT WAKE UP?: NO
6. HAVE YOU EVER DONE ANYTHING, STARTED TO DO ANYTHING, OR PREPARED TO DO ANYTHING TO END YOUR LIFE?: NO

## 2024-02-02 ASSESSMENT — PAIN - FUNCTIONAL ASSESSMENT
PAIN_FUNCTIONAL_ASSESSMENT: 0-10
PAIN_FUNCTIONAL_ASSESSMENT: 0-10

## 2024-02-02 ASSESSMENT — PATIENT HEALTH QUESTIONNAIRE - PHQ9
1. LITTLE INTEREST OR PLEASURE IN DOING THINGS: NOT AT ALL
SUM OF ALL RESPONSES TO PHQ9 QUESTIONS 1 & 2: 0
2. FEELING DOWN, DEPRESSED OR HOPELESS: NOT AT ALL

## 2024-02-02 ASSESSMENT — COGNITIVE AND FUNCTIONAL STATUS - GENERAL: PATIENT BASELINE BEDBOUND: UNABLE TO ASSESS AT THIS TIME

## 2024-02-02 NOTE — ED PROVIDER NOTES
HPI   Chief Complaint   Patient presents with   • Weakness, Gen       83-year-old male who has advanced dementia was found down on the ground.  Wife told EMS that he had been on the ground for a week.  He had fallen and he is endorsing left hip and left knee pain.  He endorses weakness.  He can respond to questions and answer questions appropriately but he has difficulty with his hearing.  Wife lives with patient but I do not believe she can properly care for patient.  His vital signs are stable but he is slightly hypotensive and he was hypoxic with a pulse ox of 90% when EMS arrived.  He is denying chest pain.  He is denying dyspnea.  He is denying nausea or vomiting.  Patient appears to be unkempt.  When you lift both his arms they drop immediately to the cot and patient endorses weakness.  Patient is falling asleep during history taking.  He appears to be very weak.      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   • Edema, unspecified 11/26/2019    Dependent edema   • Encounter for immunization 04/08/2016    Need for Zostavax administration   • Pain in left knee 08/08/2018    Left knee pain   • Personal history of other diseases of the nervous system and sense organs 09/18/2019    History of cataract   • Personal history of other drug therapy 04/08/2016    History of pneumococcal vaccination   • Tinea pedis 10/10/2018    Tinea pedis of both feet     Past Surgical History:   Procedure Laterality Date   • CARDIAC CATHETERIZATION  05/14/2018    Cardiac Cath Procedure Summary   • INNER EAR SURGERY  01/06/2016    Inner Ear Surgery   • OTHER SURGICAL HISTORY  03/16/2020    Cataract surgery     No family history on file.  Social History     Tobacco Use   •  Smoking status: Former     Types: Cigarettes   • Smokeless tobacco: Never   Vaping Use   • Vaping Use: Never used   Substance Use Topics   • Alcohol use: Not Currently   • Drug use: Never       Physical Exam   ED Triage Vitals [02/02/24 1351]   Temperature Heart Rate Respirations BP   36.3 °C (97.3 °F) 98 20 107/80      Pulse Ox Temp Source Heart Rate Source Patient Position   (!) 93 % Temporal Monitor Sitting      BP Location FiO2 (%)     Right arm --       Physical Exam  Constitutional:       Appearance: He is ill-appearing.   HENT:      Head: Normocephalic and atraumatic.      Right Ear: Tympanic membrane normal.      Left Ear: Tympanic membrane normal.      Nose: Nose normal.      Mouth/Throat:      Mouth: Mucous membranes are dry.   Eyes:      Extraocular Movements: Extraocular movements intact.      Pupils: Pupils are equal, round, and reactive to light.   Cardiovascular:      Rate and Rhythm: Normal rate and regular rhythm.      Pulses: Normal pulses.      Heart sounds: Normal heart sounds.   Pulmonary:      Effort: Pulmonary effort is normal.      Breath sounds: Normal breath sounds.   Abdominal:      General: Abdomen is flat.      Palpations: Abdomen is soft.   Musculoskeletal:         General: Tenderness present.      Cervical back: Normal range of motion.      Comments: Limited range of motion of left hip and knee and weakness of upper extremities were both arms just dropped to the cot when I lift his arms.   Skin:     General: Skin is warm and dry.      Capillary Refill: Capillary refill takes less than 2 seconds.   Neurological:      Mental Status: He is alert. Mental status is at baseline.      Sensory: No sensory deficit.      Motor: Weakness present.   Psychiatric:      Comments: Patient appears very weak and is falling asleep during history taking and review of systems and physical exam         ED Course & MDM   ED Course as of 02/02/24 1833   Fri Feb 02, 2024   1435 ED resident attestation:  83-year-old male with history of dementia who presents with weakness.  Patient is himself is alert and oriented x 0 at baseline so entire history obtained by wife at bedside.  Patient has reportedly become more weak, he has had several episodes where he has laid down and been unable to get up on his own.  She also reports his mental status has declined.  She denies any other obvious symptoms such as cough, fever, nausea or vomiting.  On exam, patient is satting low 90s on room air otherwise vitals within normal limits, he is moving all 4 extremities symmetrically though appears globally weak, his skin is pale and mottled with some abdominal guarding but he denies pain.  He has a stage I sacral decubitus wound as well as chronic appearing bilateral lower extremity venous stasis ulcers.  Given overall decline in function we will proceed with CT pan scan imaging in addition to basic labs, troponin, EKG.  Anticipate admission for PT OT at the least as well as likely placement given wife unable to care for patient. [LINDA]   1500 WBC(!): 18.9 [LINDA]   1500 Lactate(!): 2.6 [LINDA]   1500 Troponin I, High Sensitivity(!): 27 [LINDA]   1501 Blood Urea Nitrogen(!): 49  Prerenal EMILIANO [LINDA]   1511 Creatine Kinase(!): 3,109  IVF administered [LINDA]   1557 XR hip left with pelvis when performed 2 or 3 views [WS]   1604 XR hip left with pelvis when performed 2 or 3 views  Atraumatic left hip, left knee, pelvis [LINDA]   1607 CT head wo IV contrast  General atrophy [LINDA]   1618 CT cervical spine wo IV contrast  Atraumatic c-spine [LINDA]   1753 Troponin I, High Sensitivity(!): 23  Downtrending [LINDA]      ED Course User Index  [LINDA] Bobby Duncan DO  [WS] Fredi Cavazos, APRN-CNP         Diagnoses as of 02/02/24 1833   Traumatic rhabdomyolysis, initial encounter (CMS/HCC)   Elevated troponin   Lactate blood increase   Leukocytosis, unspecified type   Community acquired pneumonia, bilateral   New onset atrial fibrillation (CMS/HCC)       Medical Decision  Making  A CT was ordered because patient was on the ground for a week.  A CT head and neck was ordered with patient being on ground.  Due to the limited range of motion and pain on the left hip hip and knee were ordered.  Chest x-ray was ordered.  EKG was ordered with concern for weakness and this could be a myocardial event.  Basic labs were ordered as well.  I staffed with senior resident and attending.  EKG from EMS looks like possible atrial for but it could be artifact.  Present EKG is atrial for but I looked at his prior 3 EKGs all the way back to 2007 and patient has never had atrial fibs.  He has had sinus bradycardia before this was discussed with hospital service at this time due to the fall and it being a slow A-fib which could be due to patient being dehydrated in rhabdomyolysis, and on the ground for a week and it could clear with fluids.  I did not start patient on anticoagulant medication.  We have normal blood pressure now 123/82 when he was 97/52.  This is after only 1 L normal saline.  We are awaiting a follow-up lactate and follow-up troponin which were both ordered.  He received a second liter of fluid as well.  His CK was 3109 which confirmed a concern for rhabdomyolysis.  Flu was negative.  COVID was negative.  RSV was negative.  Metabolic panel showed dehydration with a BUN of 49 and a GFR 55 with a creatinine was only 1.30.  CBC had severe leukocytosis with white count of 18.9 this could be from laying on the ground for a week but also concerning for pneumonia on chest x-ray.  Venous blood gas had a normal pH and a normal bicarb.  Troponin follow-up was 27.  Lactate was 2.6.  CT head showed generalized cerebral and cerebellar atrophy but no acute findings.  CT chest abdomen pelvis was concerning for bilateral infiltrate.  This was reported to hospital service.  The x-ray of the hip showed no acute osseous abnormality.  X-ray of the chest showed right upper lobe segmental atelectasis and  questionable mediastinal adenopathy.  The x-ray of the knee showed no acute osseous abnormality.  Patient was admitted to hospital service on telemetry and seen and staffed with attending.  He appears to be improving with fluid resuscitation.    Amount and/or Complexity of Data Reviewed  Labs: ordered.  Radiology: ordered.  ECG/medicine tests: ordered and independent interpretation performed.     Details: EKG is atrial fibs with premature ventricular aberrantly conducted complexes.  80 bpm.  Normal axis.  No ST elevation or depression.  Interpreted by attending and myself.        Procedure  Procedures     Fredi Cavazos, MOSHE-CNP  02/02/24 2486

## 2024-02-02 NOTE — H&P
"History Of Present Illness  Chao Thao is a 83 y.o. male with a history of hypertension and coronary artery disease presents to the emergency room because he could not get off the floor.  The patient is a poor historian and only follows some commands.  History was obtained in discussion with the ER provider as well as in further review of the chart.  As recorded the patient is brought in by ambulance due to generalized weakness.  Per EMS the patient fell at home 1 week prior to arrival to the emergency room and has been on the ground since.  The EMS reported \"the wife figured he to get up on his own eventually \".  The ER reports the patient wife reported \"he slid off and falls asleep on the floor and wherever he is, he is… This time he just could not get up and with the confusion I could not change him… And I was like that that, I cannot care for him anymore\"         Past Medical History  He has a past medical history of Alcohol abuse, in remission (04/08/2016), Drug abuse (CMS/[facility]), Edema, unspecified (11/26/2019), Encounter for immunization (04/08/2016), Pain in left knee (08/08/2018), Personal history of other diseases of the nervous system and sense organs (09/18/2019), Personal history of other drug therapy (04/08/2016), and Tinea pedis (10/10/2018).    Surgical History  He has a past surgical history that includes Inner ear surgery (01/06/2016); Other surgical history (03/16/2020); and Cardiac catheterization (05/14/2018).     Social History  He reports that he has quit smoking. His smoking use included cigarettes. He has never used smokeless tobacco. He reports that he does not currently use alcohol. He reports that he does not use drugs.    Family History  No family history on file.     Allergies  Patient has no known allergies.    Review of Systems   Reason unable to perform ROS: Impaired mental status, no family present.        Physical Exam  Constitutional:       General: He is not in acute " distress.     Appearance: He is ill-appearing. He is not toxic-appearing or diaphoretic.   HENT:      Head: Normocephalic.      Nose: Nose normal.      Mouth/Throat:      Mouth: Mucous membranes are dry.      Pharynx: Oropharynx is clear.   Eyes:      Conjunctiva/sclera: Conjunctivae normal.      Pupils: Pupils are equal, round, and reactive to light.   Cardiovascular:      Rate and Rhythm: Normal rate. Rhythm irregular.      Pulses: Normal pulses.      Heart sounds: Normal heart sounds.   Pulmonary:      Effort: Pulmonary effort is normal.      Breath sounds: Rhonchi present.   Abdominal:      General: Abdomen is flat. There is no distension.      Palpations: Abdomen is soft. There is no mass.      Tenderness: There is no abdominal tenderness. There is no guarding.      Hernia: No hernia is present.   Musculoskeletal:      Cervical back: Normal range of motion.   Skin:     Comments: Erythema/stasis dermatitis changes present bilateral lower extremities, right lower extremity with increasing erythema and redness from mid calf to ankle   Neurological:      Mental Status: He is alert.      Comments: Follows some commands, moves all extremities, confused, alert   Psychiatric:         Mood and Affect: Mood normal.          Last Recorded Vitals  /75   Pulse 72   Temp 36.3 °C (97.3 °F) (Temporal)   Resp 20   Wt 88.5 kg (195 lb)   SpO2 94%        Assessment/Plan   Principal Problem:    Traumatic rhabdomyolysis, initial encounter (CMS/[facility])      Chao Thao is a 83 y.o. male with a history of hypertension and coronary artery disease presents to the emergency room because he could not get off the floor and is found to have pneumonia, new onset atrial fibrillation, rhabdomyolysis and acute kidney injury.    Acute metabolic encephalopathy due to pneumonia, dehydration  CT with right basilar infiltrates  Continue Rocephin azithromycin started in the emergency room  Follow-up with blood cultures  Continue Anita  every 6 hours and albuterol as needed  Continue Tylenol as needed for fever    New onset atrial fibrillation  Rate controlled, continue home dose of metoprolol  Start heparin drip  Follow-up with TSH  Consult cardiology  May not be a candidate for anticoagulation    Rhabdomyolysis  Continue normal saline at 125 cc an hour  Repeat creatinine kinase in a.m.    Elevated transaminases  Suspect is related to above  Treat as mentioned above  Repeat level in a.m.    Elevated troponin due to demand ischemia  Likely due to above  EKG shows no concerns for ischemia or infarction    Acute kidney injury  Serum current baseline appears to be around 1  Continue IV fluids as above  Suspect prerenal azotemia  Repeat level in a.m.    Age-indeterminate L2 compression fracture  Patient denies any pain    Fall  Will consult physical therapy and Occupational Therapy    Hypertension  Holding lisinopril due to acute kidney injury  Continue metoprolol  Monitor vitals    Mood disorder  Depression versus anxiety  Continue Cymbalta and Lexapro as prescribed    Hyperlipidemia  Holding Zocor due to elevated transaminases    DVT prophylaxis  With heparin drip    CODE STATUS  Will assume full code  Patient is currently confused and I was unable  get of hold of family despite multiple attempts      Zack Rao, DO

## 2024-02-02 NOTE — ED NOTES
"Pt's spouse reports pt did not actually fall. Pt's spouse states \"he slithers off and falls asleep on the floor and wherever he is, is where he is... This time he just couldn't get up and with the confusion I couldn't change him... I was like that's it, I can't care for him anymore.\"      Davi Warren RN  02/02/24 0264    "

## 2024-02-02 NOTE — ED TRIAGE NOTES
"Pt BIBA from home with c/o generalized weakness. Per EMS, pt fell at home 1 week PTA today and has been on the ground since. EMS reports \"the wife figured he'd get up on his own eventually\". Pt has hx of dementia and is a poor historian. Pt unable to answer questioning. Pt does respond to verbal stimuli, however responses are inappropriate for questions asked. Pt with discoloration to bilateral lower legs. Vitals braydon at this time. Pt in no obvious distress.   "

## 2024-02-03 ENCOUNTER — APPOINTMENT (OUTPATIENT)
Dept: CARDIOLOGY | Facility: HOSPITAL | Age: 84
DRG: 564 | End: 2024-02-03
Payer: MEDICARE

## 2024-02-03 LAB
ALBUMIN SERPL BCP-MCNC: 2.6 G/DL (ref 3.4–5)
ALP SERPL-CCNC: 101 U/L (ref 33–136)
ALT SERPL W P-5'-P-CCNC: 33 U/L (ref 10–52)
ANION GAP SERPL CALC-SCNC: 11 MMOL/L (ref 10–20)
AST SERPL W P-5'-P-CCNC: 60 U/L (ref 9–39)
BILIRUB SERPL-MCNC: 0.3 MG/DL (ref 0–1.2)
BNP SERPL-MCNC: 56 PG/ML (ref 0–99)
BUN SERPL-MCNC: 37 MG/DL (ref 6–23)
CALCIUM SERPL-MCNC: 7.9 MG/DL (ref 8.6–10.3)
CHLORIDE SERPL-SCNC: 104 MMOL/L (ref 98–107)
CK SERPL-CCNC: 1217 U/L (ref 0–325)
CO2 SERPL-SCNC: 26 MMOL/L (ref 21–32)
CREAT SERPL-MCNC: 0.95 MG/DL (ref 0.5–1.3)
EGFRCR SERPLBLD CKD-EPI 2021: 80 ML/MIN/1.73M*2
ERYTHROCYTE [DISTWIDTH] IN BLOOD BY AUTOMATED COUNT: 12.8 % (ref 11.5–14.5)
GLUCOSE SERPL-MCNC: 99 MG/DL (ref 74–99)
HCT VFR BLD AUTO: 33.9 % (ref 41–52)
HGB BLD-MCNC: 11.1 G/DL (ref 13.5–17.5)
MCH RBC QN AUTO: 32.7 PG (ref 26–34)
MCHC RBC AUTO-ENTMCNC: 32.7 G/DL (ref 32–36)
MCV RBC AUTO: 100 FL (ref 80–100)
NRBC BLD-RTO: 0 /100 WBCS (ref 0–0)
PLATELET # BLD AUTO: 453 X10*3/UL (ref 150–450)
POTASSIUM SERPL-SCNC: 3.9 MMOL/L (ref 3.5–5.3)
PROCALCITONIN SERPL-MCNC: 0.35 NG/ML
PROT SERPL-MCNC: 5.6 G/DL (ref 6.4–8.2)
RBC # BLD AUTO: 3.39 X10*6/UL (ref 4.5–5.9)
SODIUM SERPL-SCNC: 137 MMOL/L (ref 136–145)
TSH SERPL-ACNC: 0.67 MIU/L (ref 0.44–3.98)
UFH PPP CHRO-ACNC: 0.5 IU/ML
UFH PPP CHRO-ACNC: 0.5 IU/ML
UFH PPP CHRO-ACNC: 0.8 IU/ML
WBC # BLD AUTO: 13 X10*3/UL (ref 4.4–11.3)

## 2024-02-03 PROCEDURE — 93005 ELECTROCARDIOGRAM TRACING: CPT

## 2024-02-03 PROCEDURE — 36415 COLL VENOUS BLD VENIPUNCTURE: CPT | Performed by: FAMILY MEDICINE

## 2024-02-03 PROCEDURE — 99233 SBSQ HOSP IP/OBS HIGH 50: CPT | Performed by: FAMILY MEDICINE

## 2024-02-03 PROCEDURE — 94668 MNPJ CHEST WALL SBSQ: CPT

## 2024-02-03 PROCEDURE — 82550 ASSAY OF CK (CPK): CPT | Performed by: FAMILY MEDICINE

## 2024-02-03 PROCEDURE — 83880 ASSAY OF NATRIURETIC PEPTIDE: CPT

## 2024-02-03 PROCEDURE — 2500000005 HC RX 250 GENERAL PHARMACY W/O HCPCS: Performed by: FAMILY MEDICINE

## 2024-02-03 PROCEDURE — 84443 ASSAY THYROID STIM HORMONE: CPT | Performed by: FAMILY MEDICINE

## 2024-02-03 PROCEDURE — 80053 COMPREHEN METABOLIC PANEL: CPT | Performed by: FAMILY MEDICINE

## 2024-02-03 PROCEDURE — 2500000001 HC RX 250 WO HCPCS SELF ADMINISTERED DRUGS (ALT 637 FOR MEDICARE OP): Performed by: FAMILY MEDICINE

## 2024-02-03 PROCEDURE — 85027 COMPLETE CBC AUTOMATED: CPT | Performed by: FAMILY MEDICINE

## 2024-02-03 PROCEDURE — 2500000002 HC RX 250 W HCPCS SELF ADMINISTERED DRUGS (ALT 637 FOR MEDICARE OP, ALT 636 FOR OP/ED): Mod: MUE | Performed by: FAMILY MEDICINE

## 2024-02-03 PROCEDURE — 97165 OT EVAL LOW COMPLEX 30 MIN: CPT | Mod: GO

## 2024-02-03 PROCEDURE — 2500000004 HC RX 250 GENERAL PHARMACY W/ HCPCS (ALT 636 FOR OP/ED): Performed by: FAMILY MEDICINE

## 2024-02-03 PROCEDURE — 99222 1ST HOSP IP/OBS MODERATE 55: CPT

## 2024-02-03 PROCEDURE — 94640 AIRWAY INHALATION TREATMENT: CPT | Mod: MUE

## 2024-02-03 PROCEDURE — 85520 HEPARIN ASSAY: CPT | Performed by: FAMILY MEDICINE

## 2024-02-03 PROCEDURE — 1200000002 HC GENERAL ROOM WITH TELEMETRY DAILY

## 2024-02-03 RX ADMIN — SODIUM CHLORIDE 125 ML/HR: 9 INJECTION, SOLUTION INTRAVENOUS at 06:05

## 2024-02-03 RX ADMIN — Medication 3 L/MIN: at 19:59

## 2024-02-03 RX ADMIN — AZITHROMYCIN MONOHYDRATE 500 MG: 500 INJECTION, POWDER, LYOPHILIZED, FOR SOLUTION INTRAVENOUS at 17:51

## 2024-02-03 RX ADMIN — CEFTRIAXONE SODIUM 2 G: 2 INJECTION, SOLUTION INTRAVENOUS at 16:49

## 2024-02-03 RX ADMIN — IPRATROPIUM BROMIDE AND ALBUTEROL SULFATE 3 ML: 2.5; .5 SOLUTION RESPIRATORY (INHALATION) at 19:59

## 2024-02-03 RX ADMIN — IPRATROPIUM BROMIDE AND ALBUTEROL SULFATE 3 ML: 2.5; .5 SOLUTION RESPIRATORY (INHALATION) at 08:04

## 2024-02-03 RX ADMIN — IPRATROPIUM BROMIDE AND ALBUTEROL SULFATE 3 ML: 2.5; .5 SOLUTION RESPIRATORY (INHALATION) at 14:09

## 2024-02-03 RX ADMIN — Medication 4 L/MIN: at 08:04

## 2024-02-03 RX ADMIN — ACETAMINOPHEN 975 MG: 325 TABLET ORAL at 12:44

## 2024-02-03 RX ADMIN — DULOXETINE HYDROCHLORIDE 60 MG: 60 CAPSULE, DELAYED RELEASE ORAL at 10:23

## 2024-02-03 RX ADMIN — ACETAMINOPHEN 975 MG: 325 TABLET ORAL at 20:48

## 2024-02-03 RX ADMIN — HEPARIN SODIUM 1300 UNITS/HR: 10000 INJECTION, SOLUTION INTRAVENOUS at 14:03

## 2024-02-03 RX ADMIN — Medication 3 L/MIN: at 14:09

## 2024-02-03 RX ADMIN — VANCOMYCIN HYDROCHLORIDE 1.25 G: 10 INJECTION, POWDER, LYOPHILIZED, FOR SOLUTION INTRAVENOUS at 22:46

## 2024-02-03 RX ADMIN — ESCITALOPRAM OXALATE 5 MG: 10 TABLET ORAL at 20:48

## 2024-02-03 RX ADMIN — SODIUM CHLORIDE 125 ML/HR: 9 INJECTION, SOLUTION INTRAVENOUS at 14:03

## 2024-02-03 ASSESSMENT — COGNITIVE AND FUNCTIONAL STATUS - GENERAL
TOILETING: TOTAL
PERSONAL GROOMING: A LOT
STANDING UP FROM CHAIR USING ARMS: A LOT
MOVING FROM LYING ON BACK TO SITTING ON SIDE OF FLAT BED WITH BEDRAILS: A LOT
TURNING FROM BACK TO SIDE WHILE IN FLAT BAD: A LOT
DAILY ACTIVITIY SCORE: 12
EATING MEALS: A LITTLE
MOVING TO AND FROM BED TO CHAIR: A LOT
WALKING IN HOSPITAL ROOM: TOTAL
MOVING TO AND FROM BED TO CHAIR: TOTAL
HELP NEEDED FOR BATHING: A LOT
HELP NEEDED FOR BATHING: A LOT
TURNING FROM BACK TO SIDE WHILE IN FLAT BAD: A LOT
PERSONAL GROOMING: A LITTLE
HELP NEEDED FOR BATHING: A LOT
EATING MEALS: A LOT
DRESSING REGULAR LOWER BODY CLOTHING: A LOT
MOBILITY SCORE: 8
DAILY ACTIVITIY SCORE: 14
WALKING IN HOSPITAL ROOM: TOTAL
TOILETING: A LOT
DRESSING REGULAR LOWER BODY CLOTHING: TOTAL
MOBILITY SCORE: 12
PERSONAL GROOMING: A LOT
MOVING TO AND FROM BED TO CHAIR: A LOT
DAILY ACTIVITIY SCORE: 14
DRESSING REGULAR UPPER BODY CLOTHING: A LOT
CLIMB 3 TO 5 STEPS WITH RAILING: TOTAL
TURNING FROM BACK TO SIDE WHILE IN FLAT BAD: A LOT
DRESSING REGULAR UPPER BODY CLOTHING: A LITTLE
DRESSING REGULAR LOWER BODY CLOTHING: TOTAL
WALKING IN HOSPITAL ROOM: TOTAL
DRESSING REGULAR UPPER BODY CLOTHING: A LITTLE
HELP NEEDED FOR BATHING: A LOT
MOBILITY SCORE: 10
DAILY ACTIVITIY SCORE: 13
CLIMB 3 TO 5 STEPS WITH RAILING: TOTAL
TOILETING: TOTAL
DRESSING REGULAR UPPER BODY CLOTHING: A LITTLE
CLIMB 3 TO 5 STEPS WITH RAILING: TOTAL
STANDING UP FROM CHAIR USING ARMS: TOTAL
DRESSING REGULAR LOWER BODY CLOTHING: TOTAL
PERSONAL GROOMING: A LITTLE
MOVING FROM LYING ON BACK TO SITTING ON SIDE OF FLAT BED WITH BEDRAILS: A LOT
TOILETING: A LOT
STANDING UP FROM CHAIR USING ARMS: A LOT

## 2024-02-03 ASSESSMENT — ACTIVITIES OF DAILY LIVING (ADL)
ADL_ASSISTANCE: INDEPENDENT
BATHING_ASSISTANCE: MODERATE

## 2024-02-03 ASSESSMENT — PAIN - FUNCTIONAL ASSESSMENT
PAIN_FUNCTIONAL_ASSESSMENT: 0-10

## 2024-02-03 ASSESSMENT — PAIN SCALES - GENERAL
PAINLEVEL_OUTOF10: 0 - NO PAIN

## 2024-02-03 NOTE — SIGNIFICANT EVENT
Able to contact the wife later in the evening.  Despite what is recorded in the H&P the wife states she has been caring for him.  He was not on the ground for an entire week.  She reports on Sunday he was in his usual state of health was able to come downstairs and visit with visitors.  However Monday until day of admission the patient has remained in his room.  The wife has been providing him 3 mL of day.  Reports his physical activity is limited but does report that he was transferring from bed to chair and working with his electronics.  She reports that she has a pact with her  that they would not bring each other to the hospital which is why she delayed bringing him to the hospital.  She reports over the past 6 months the patient has worn a diaper however is able to change it himself.  On Wednesday and Thursday the wife had to change his diaper.  The wife also reports hallucinations seeing bugs and go present do not exist.  The wife advised he is DNAR/DNI

## 2024-02-03 NOTE — PROGRESS NOTES
Occupational Therapy    Evaluation    Patient Name: Chao Thao  MRN: [id number]  Today's Date: 2/3/2024  Time Calculation  Start Time: 0946  Stop Time: 1010  Time Calculation (min): 24 min    Assessment  IP OT Assessment  OT Assessment: Pt presents with impaired cognition, decreased endurance, decreased ADL performance, decreased functional mobility. Continued skilled OT recommended to maximize pt safety and independence to return to PLOF  Prognosis: Good  Barriers to Discharge: None  Evaluation/Treatment Tolerance: Patient limited by fatigue  Medical Staff Made Aware: Yes  End of Session Communication: Bedside nurse  End of Session Patient Position: Bed, 3 rail up, Alarm on  Plan:  Treatment Interventions: ADL retraining, Functional transfer training, UE strengthening/ROM, Endurance training, Cognitive reorientation  OT Frequency: 3 times per week  OT Discharge Recommendations: Moderate intensity level of continued care  OT Recommended Transfer Status: Assist of 1  OT - OK to Discharge: Yes (per OT POC)    Subjective   Current Problem:  1. Traumatic rhabdomyolysis, initial encounter (CMS/HCC)        2. Elevated troponin        3. Lactate blood increase        4. Leukocytosis, unspecified type        5. Community acquired pneumonia, bilateral        6. New onset atrial fibrillation (CMS/HCC)          General:  General  Reason for Referral: 84 y/o male referred to OT for AMS, inability to get off floor, fall, impaired mobility, impaired, ADL  Referred By: Zack Rao DO  Past Medical History Relevant to Rehab: hypertension and coronary artery disease  Prior to Session Communication: Bedside nurse  Patient Position Received: Bed, 3 rail up, Alarm on  General Comment: Pt pleasantly confused but friendly, cooperative with therapy. Pt very [place] and is poor historian. Ext catheter, 4L O2 in place  Precautions:  Medical Precautions: Fall precautions, Oxygen therapy device and L/min (4L O2)    Pain:  Pain  "Assessment  Pain Assessment: 0-10  Pain Score: 0 - No pain    Objective   Cognition:  Overall Cognitive Status: Impaired (Pt is able to follow simple directions)  Orientation Level: Disoriented to time, Disoriented to situation, Disoriented to place  Memory: Exceptions to WFL    Home Living:  Type of Home: House  Lives With: Spouse  Home Adaptive Equipment: Walker rolling or standard  Home Layout: Two level, Bed/bath upstairs  Home Living Comments: Pt poor historian, unable to obtain all information d/t current cognitive limitations   Prior Function:  Level of [place]: Independent with ADLs and functional transfers, Independent with homemaking with ambulation  Receives Help From: Family (spouse)  ADL Assistance: Independent  Ambulatory Assistance: Independent (with WW)  Prior Function Comments: Pt poor historian, unable to obtain all information d/t current cognitive limitations    ADL:  Eating Assistance: Independent  Grooming Assistance: Minimal  Bathing Assistance: Moderate  UE Dressing Assistance: Minimal  LE Dressing Assistance: Maximal  Toileting Assistance with Device: Total  Functional Assistance: Maximal  ADL Comments: ADL performance anticipated d/t current clinical presentation  Activity Tolerance:  Endurance: Tolerates less than 10 min exercise, no significant change in vital signs  Bed Mobility/Transfers: Bed Mobility  Bed Mobility: Yes  Bed Mobility 1  Bed Mobility 1: Supine to sitting  Level of Assistance 1: Minimum assistance  Bed Mobility Comments 1: elevated HOB, assist with BLE and trunk, increased time and exertion as pt SOB after completing  Bed Mobility 2  Bed Mobility  2: Sitting to supine  Level of Assistance 2: Moderate assistance    Transfers  Transfer: No (Pt refused, stating \"my legs haven't woken up enough for that\")    Sitting Balance:  Static Sitting Balance  Static Sitting-Balance Support: Feet supported, Bilateral upper extremity supported  Static Sitting-Level of Assistance: " Contact guard  Dynamic Sitting Balance  Dynamic Sitting-Balance Support: Feet supported, No upper extremity supported  Dynamic Sitting-Balance: Lateral lean, Forward lean  Dynamic Sitting-Comments: Min A during UE ROM, LOB x1    Strength:  Strength Comments: RUE grossly 4/5, LUE grossly 4+/5    Hand Function:  Hand Function  Gross Grasp: Functional  Extremities: RUE   RUE : Within Functional Limits and LUE   LUE: Within Functional Limits      Outcome Measures: [facility] Daily Activity  Putting on and taking off regular lower body clothing: Total  Bathing (including washing, rinsing, drying): A lot  Putting on and taking off regular upper body clothing: A little  Toileting, which includes using toilet, bedpan or urinal: Total  Taking care of personal grooming such as brushing teeth: A little  Eating Meals: None  Daily Activity - Total Score: 14      Education Documentation  Body Mechanics, taught by Bobby Ramirez OT at 2/3/2024 10:35 AM.  Learner: Patient  Readiness: Acceptance  Method: Explanation  Response: Needs Reinforcement    Precautions, taught by Bobby Ramirez OT at 2/3/2024 10:35 AM.  Learner: Patient  Readiness: Acceptance  Method: Explanation  Response: Needs Reinforcement    ADL Training, taught by Bobby Ramirez OT at 2/3/2024 10:35 AM.  Learner: Patient  Readiness: Acceptance  Method: Explanation  Response: Needs Reinforcement    Education Comments  No comments found.      Goals:   Encounter Problems       Encounter Problems (Active)       OT Goals       Pt will demo ADL routine and meaningful daily activities using modifications as needed  (Progressing)       Start:  02/03/24    Expected End:  02/17/24            Pt will increase endurance to tolerate 15min of OOB activity with no more than 1 rest break in order to increase ability to engage in ADL completion.  (Progressing)       Start:  02/03/24    Expected End:  02/17/24            Pt will tolerate 10min stand during functional task  completion with no more than 1 rest break in order to increase endurance for functional task completion.  (Progressing)       Start:  02/03/24    Expected End:  02/17/24            Pt will demo increased functional mobility to tolerate tasks necessary to complete ADL routine.  (Progressing)       Start:  02/03/24    Expected End:  02/17/24

## 2024-02-03 NOTE — CONSULTS
Consults  History Of Present Illness:    Chao Thao is a 83 y.o. male hypertension and coronary artery disease presents to the emergency room because he could not get off the floor.    History was obtained from patient who is a poor historian and EMR .  As noted the patient was brought in by ambulance due to generalized weakness. Wife called 911 because she was unable to get him off the floor. When EMS arrived he was on the floor, unsure about the amount of time he was on the floor for. It was recorded patient was on the floor for one week, in a later note they spoke with the wife who says it was not a week. She reports on Sunday he was in his usual state of health was able to come downstairs and visit with visitors. However Monday until day of admission the patient has remained in his room.     Admits to some shortness of breath, denies ever having chest pain, heart palpations, dizziness or orthopnea.     Review of Systems   Constitutional: Negative.   HENT: Negative.     Eyes: Negative.    Cardiovascular: Negative.    Respiratory: SOB  Endocrine: Negative.    Skin: Negative.    Musculoskeletal: Negative.    Neurological: Negative.    Psychiatric/Behavioral: Negative.     Allergic/Immunologic: Negative.    All other systems reviewed and are negative.      ED course:   EKG aFib   IV antibiotics started fir pneumonia, IV fluids ordered for rhabdomyolysis   1500 WBC(!): 18.9 [LINDA]   1500 Lactate(!): 2.6 [LINDA]   1500 Troponin I, High Sensitivity(!): 27 [LINDA]   1501 Blood Urea Nitrogen(!): 49  Prerenal EMILIANO [LINDA]   1511 Creatine Kinase(!): 3,109  IVF administered [LINDA]   1557 XR hip left with pelvis when performed 2 or 3 views [WS]   1604 XR hip left with pelvis when performed 2 or 3 views  Atraumatic left hip, left knee, pelvis [LINDA]   1607 CT head wo IV contrast  General atrophy [LINDA]   1618 CT cervical spine wo IV contrast  Atraumatic c-spine [LINDA]   1753 Troponin I, High Sensitivity(!): 23  Downtrending [LINDA]              Last Recorded Vitals:  Vitals:    02/02/24 2130 02/03/24 0517 02/03/24 0804 02/03/24 1023   BP:  101/56  99/53   BP Location:  Right arm     Patient Position:  Lying     Pulse:  76  87   Resp:  20     Temp:  36.2 °C (97.2 °F)     TempSrc:  Tympanic     SpO2: 96% 97% 90%    Weight:       Height:           Last Labs:  CBC - 2/3/2024:  6:44 AM  13.0 11.1 453    33.9      CMP - 2/3/2024:  8:43 AM  7.9 5.6 60 --- 0.3   _ 2.6 33 101      PTT - No results in last year.  _   _ _     Troponin I, High Sensitivity   Date/Time Value Ref Range Status   02/02/2024 04:55 PM 23 (H) 0 - 20 ng/L Final   02/02/2024 02:19 PM 27 (H) 0 - 20 ng/L Final     BNP   Date/Time Value Ref Range Status   02/03/2024 06:44 AM 56 0 - 99 pg/mL Final   09/10/2020 11:33 AM 46 0 - 99 pg/mL Final     Comment:     .  <100 pg/mL - Heart failure unlikely  100-299 pg/mL - Intermediate probability of acute heart  .               failure exacerbation. Correlate with clinical  .               context and patient history.    >=300 pg/mL - Heart Failure likely. Correlate with clinical  .               context and patient history.  BNP testing is performed using different testing   methodology at [facility] than at other   [facility] hospitals. Direct result comparisons should   only be made within the same method.     03/16/2020 12:08 PM 37 0 - 99 pg/mL Final     Comment:     .  <100 pg/mL - Heart failure unlikely  100-299 pg/mL - Intermediate probability of acute heart  .               failure exacerbation. Correlate with clinical  .               context and patient history.    >=300 pg/mL - Heart Failure likely. Correlate with clinical  .               context and patient history.   Biotin interference may cause falsely decreased results.   Patients taking a Biotin dose of up to 5 mg/day should   refrain from taking Biotin for 24 hours before sample   collection. Providers may contact their local laboratory   for further information.        Hemoglobin A1C   Date/Time Value Ref Range Status   03/16/2020 12:08 PM 5.9 % Final     Comment:          Diagnosis of Diabetes-Adults   Non-Diabetic: < or = 5.6%   Increased risk for developing diabetes: 5.7-6.4%   Diagnostic of diabetes: > or = 6.5%  .       Monitoring of Diabetes                Age (y)     Therapeutic Goal (%)   Adults:          >18           <7.0   Pediatrics:    13-18           <7.5                   7-12           <8.0                   0- 6            7.5-8.5   American Diabetes Association. Diabetes Care 33(S1), Jan 2010.       VLDL   Date/Time Value Ref Range Status   03/16/2020 12:08 PM 19 0 - 40 mg/dL Final   05/07/2019 11:06 AM 20 0 - 40 mg/dL Final   08/08/2018 11:42 AM 21 0 - 40 mg/dL Final      Last I/O:  I/O last 3 completed shifts:  In: 2677.6 (31.7 mL/kg) [I.V.:1177.6 (13.9 mL/kg); IV Piggyback:1500]  Out: 350 (4.1 mL/kg) [Urine:350 (0.1 mL/kg/hr)]  Weight: 84.5 kg     Past Cardiology Tests (Last 3 Years):  EKG:  No results found for this or any previous visit from the past 1095 days.    Echo: 10/6/2020   1. The left ventricular systolic function is low normal with a 55% estimated ejection fraction.   2. Spectral Doppler shows an impaired relaxation pattern of left ventricular diastolic filling.   3. Mild aortic valve stenosis.   4. There is mild tricuspid regurgitation.   5. The estimated pulmonary artery pressure is mildly elevated with the RVSP at 38.9 mmHg.   6. The inferior vena cava appears to be of normal size.    Cath:  No results found for this or any previous visit from the past 1095 days.    Stress Test:  No results found for this or any previous visit from the past 1095 days.    Cardiac Imaging:  No results found for this or any previous visit from the past 1095 days.      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    Past Surgical History:  He has a past surgical history that includes Inner ear surgery (01/06/2016); Other surgical history (03/16/2020); and Cardiac catheterization (05/14/2018).      Social History:  He reports that he has quit smoking. His smoking use included cigarettes. He has never used smokeless tobacco. He reports that he does not currently use alcohol. He reports that he does not use drugs.    Family History:  No family history on file.     Allergies:  Patient has no known allergies.    Inpatient Medications:  Scheduled medications   Medication Dose Route Frequency    acetaminophen  975 mg oral q8h    azithromycin  500 mg intravenous q24h    cefTRIAXone  2 g intravenous q24h    DULoxetine  60 mg oral Daily    escitalopram  5 mg oral Nightly    ipratropium-albuteroL  3 mL nebulization q6h while awake    metoprolol succinate XL  50 mg oral Daily    vancomycin  1,250 mg intravenous q24h     PRN medications   Medication    albuterol    heparin    oxygen     Continuous Medications   Medication Dose Last Rate    heparin  0-4,500 Units/hr 1,300 Units/hr (02/03/24 0626)    sodium chloride 0.9%  125 mL/hr 125 mL/hr (02/03/24 0610)     Outpatient Medications:  Current Outpatient Medications   Medication Instructions    acetaminophen (TYLENOL EXTRA STRENGTH) 1,000 mg, oral, Every 8 hours    ascorbic acid (VITAMIN C) 500 mg, oral, Daily    cholecalciferol (VITAMIN D-3) 25 mcg, oral, Daily    colloidal oatmeaL (Eucerin Eczema Relief) 1 % cream Apply to legs twice daily    cyanocobalamin (VITAMIN B-12) 250 mcg, oral, Daily    DULoxetine (CYMBALTA) 60 mg, oral, Daily    escitalopram (LEXAPRO) 5 mg, oral, Nightly    lidocaine (Salonpas, lidocaine,) 4 % patch 2 patches, transdermal, Daily, Remove & discard patch within 12  hours or as directed by MD.    lisinopril 10 mg, oral, Daily    metoprolol succinate XL (TOPROL-XL) 50 mg, oral, Daily    simvastatin (ZOCOR) 40 mg, oral, Every evening    zinc acetate 50 mg (zinc) capsule 1 capsule, oral, Daily       Physical Exam  Constitutional:       General: He is not in acute distress.  HENT:      Head: Normocephalic.      Nose: Nose normal.      Mouth/Throat:      Mouth: Mucous membranes are dry.      Pharynx: Oropharynx is clear.   Eyes:      Conjunctiva/sclera: Conjunctivae normal.      Pupils: Pupils are equal, round, and reactive to light.   Cardiovascular:      Rate and Rhythm: Normal rate.      Pulses: Normal pulses.      Heart sounds: Normal heart sounds.   Pulmonary:      Effort: Pulmonary effort is normal.      Breath sounds: Rhonchi present.   Abdominal:      General: Abdomen is flat. There is no distension.      Palpations: Abdomen is soft. There is no mass.      Tenderness: There is no abdominal tenderness. There is no guarding.      Hernia: No hernia is present.   Musculoskeletal:      Cervical back: Normal range of motion.   Skin:     Comments: Erythema/stasis dermatitis changes present bilateral lower extremities, right lower extremity with increasing erythema and redness from mid calf to ankle   Neurological:     General: No focal deficit present.      Mental Status: He is alert and oriented to person, place, and time. He was able to tell me his name, where he was, year and month.   Psychiatric:         Mood and Affect: Mood normal.         Behavior: Behavior normal.     Echo 10/6/2020   1. The left ventricular systolic function is low normal with a 55% estimated ejection fraction.   2. Spectral Doppler shows an impaired relaxation pattern of left ventricular diastolic filling.   3. Mild aortic valve stenosis.   4. There is mild tricuspid regurgitation.   5. The estimated pulmonary artery pressure is mildly elevated with the RVSP at 38.9 mmHg.   6. The inferior vena cava  appears to be of normal size.    [id number] Score  Age >= 75: 2   Sex Male: 0   CHF History No: 0   HTN Yes: 1   Stroke/TIA/Thromboembolism No: 0   Vascular Dz: CAD/PAD/Aortic Plaque Yes: 1   DM No: 0   Total Score 4         Assessment/Plan   Chao Thao is a 83 y.o. male who is a poor historian, he has a past medical history of hypertension, HLD and CAD? noted but he does not recall ever having a cath or stress test. He was admitted for pneumonia and rhabdomyolysis.    The patient appears euvolemic and hemodynamically stable on exam. I do not see any concerning signs of acute decompensated heart failure, BNP 56, no history of heart failure noted in EMR. Blood pressure and heart rate are well controlled. Troponin slightly elevated 27, 23, which is not a cause for concern as it is likely related to elevated CK and a prolonged period of time spent on the ground. EKG this morning showing aFib, rate controlled, he is asymptomatic, could be from acute illness but unknown time in aFib (EKG in ED showed afib), regardless discussion about anticoagulation with patient and wife should happen.     #new aFib, rate controlled     Plan:  -Echocardiogram to evaluate for structural and functional abnormalities  -Continue heparin drip for now    --Will need to discuss risks and benefits with patient and wife regarding anticoagulation given notes stating she finds him on the ground a lot      --[id number] Score 4  -Ok to continue home medications      --Home meds per EMR are: Metoprolol 50mg, 10mg Lisinopril and 40mg Simvastatin     --I see Lisinopril is not ordered, ok to hold off for now, assuming holding because BP is currently well      controlled, will defer to the primary team  -Orthostatic blood pressure    --Treat with fluids if indicated  -D-dimer added on, unknown time on the floor    --If elevated, consider r/o DVT in RLE and CT PE if indicated   -Consider social work consult        MOSHE Woodard-CNP

## 2024-02-03 NOTE — PROGRESS NOTES
Chao Thao is a 83 y.o. male on day 1 of admission presenting with Traumatic rhabdomyolysis, initial encounter (CMS/LTAC, located within [facility]).      Subjective   Patient has significantly improved.  The patient is found resting comfortably with no acute complaints.       Objective     Last Recorded Vitals  /56 (BP Location: Right arm, Patient Position: Lying)   Pulse 76   Temp 36.2 °C (97.2 °F) (Tympanic)   Resp 20   Wt 84.5 kg (186 lb 4.6 oz)   SpO2 90%   Intake/Output last 3 Shifts:    Intake/Output Summary (Last 24 hours) at 2/3/2024 0814  Last data filed at 2/3/2024 0610  Gross per 24 hour   Intake 2677.62 ml   Output 350 ml   Net 2327.62 ml       Admission Weight  Weight: 88.5 kg (195 lb) (02/02/24 1351)    Daily Weight  02/02/24 : 84.5 kg (186 lb 4.6 oz)    Image Results  CT chest abdomen pelvis w IV contrast  Narrative: STUDY:  CT Chest, Abdomen, and Pelvis with IV Contrast; 2/2/2024 at 3:45 PM.  INDICATION:  Found on the ground for one full week.  Skin is mottled and patient  appears very weak.  COMPARISON:  XR chest 2/2/2024, 5/8/2020; XR left hip 2/2/2024.  ACCESSION NUMBER(S):  [id number]  ORDERING CLINICIAN:  RASHAD HARP  TECHNIQUE:  CT of the chest, abdomen, and pelvis was performed.  Contiguous axial  images were obtained at 3 mm slice thickness through the chest,  abdomen, and pelvis.  Coronal and sagittal reconstructions at 3 mm  slice thickness were performed.  Omnipaque 350 75 mL was administered  intravenously.    FINDINGS:  CHEST:  MEDIASTINUM:  The heart is normal in size without pericardial effusion.  Central  vascular structures opacify normally.  The thoracic aorta is mildly  ectatic with mural calcifications but there is no aneurysm or  dissection.  LUNGS/PLEURA:  There is no significant pleural effusion.  There is no evidence of  pneumothorax.  Small cystic changes are seen in both lower lobes and  there is some patchy interstitial infiltrate in the posterior right  lung base.  There is a  relatively thick band of atelectasis or  scarring in the posterior right upper lobe..  The airways are patent.  LYMPH NODES:  Thoracic lymph nodes are not enlarged.  ABDOMEN:     LIVER:  No hepatomegaly.  Smooth surface contour.  Normal attenuation.     BILE DUCTS:  No intrahepatic or extrahepatic biliary ductal dilatation.     GALLBLADDER:  The gallbladder shows dependent sludge or small stones but there is no  evidence of cholecystitis .  STOMACH:  No abnormalities identified.     PANCREAS:  No masses or ductal dilatation.     SPLEEN:  No splenomegaly or focal splenic lesion.     ADRENAL GLANDS:  No thickening or nodules.     KIDNEYS AND URETERS:  Kidneys are normal in size and location.  No renal or ureteral  calculi.  There are small cysts in the kidneys bilaterally but they  are otherwise unremarkable.     PELVIS:     BLADDER:  No abnormalities identified.     REPRODUCTIVE ORGANS:  No abnormalities identified.     BOWEL:  The small bowel is unremarkable and the appendix is normal.  There are  a few diverticula in the left colon but there is no evidence of  diverticulitis.     VESSELS:  No abnormalities identified.  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  There is no evidence of  stenosis.      PERITONEUM/RETROPERITONEUM/LYMPH NODES:  No free fluid.  No pneumoperitoneum.  No lymphadenopathy.     ABDOMINAL WALL:  No abnormalities identified.  SOFT TISSUES:   There is a small amount of subcutaneous edema lateral and posterior to  the right hip but no abscess is seen here and there are no additional  soft tissue abnormalities.     BONES:  There is about 40% loss of height in the superior endplate of L2.  The  age of this compression is uncertain but there are no CT findings to  suggest that this is definitely acute.  Disc space narrowing and a  vacuum disc is seen at L5-S1.  Impression: In the chest there  are small cystic changes in both lower lobes, some  patchy interstitial infiltrate in the posterior right base and  relatively thick band of atelectasis or scarring in the posterior  right upper lobe.  No significant alveolar consolidation is seen and  there is no evidence of pneumothorax.  No rib or thoracic vertebral  fractures are visible..  In the abdomen and pelvis there is sludge or small stones in the  gallbladder but there is no evidence of cholecystitis.  There is an approximately 30% loss of height in the superior endplate  of L2 of uncertain age.  There is some subcutaneous edema posterior lateral to the right hip  but no additional additional subcutaneous abnormalities are  appreciated.  Signed by Bobby Benton MD  CT cervical spine wo IV contrast  Narrative: Interpreted By:  Fidelina Horowitz,   STUDY:  CT CERVICAL SPINE WO IV CONTRAST; ;  2/2/2024 3:45 pm      INDICATION:  Signs/Symptoms:found on the ground.      COMPARISON:  09/07/2015      ACCESSION NUMBER(S):  [id number]      ORDERING CLINICIAN:  RASHAD HARP      TECHNIQUE:  0 axial images of the cervical spine were obtained. Sagittal and  coronal reconstructions were generated.      FINDINGS:  The patient is scoliotic. There is straightening of the normal  cervical lordosis.      There is no fracture or bony destruction. The prevertebral soft  tissues are unremarkable.      There is facet hypertrophy. There is vertebral body endplate spurring  C6-C7. There is disc space narrowing at C6-C7.      The spine is similar to the prior exam.      Impression: No obvious fracture. Moderate degenerative change.      Carotid artery calcifications.          MACRO:  None      Signed by: Fidelina Horowitz 2/2/2024 4:06 PM  Dictation workstation:   [id number]  CT head wo IV contrast  Narrative: Interpreted By:  Fidelina Horowitz,   STUDY:  CT HEAD WO IV CONTRAST; ;  2/2/2024 3:45 pm      INDICATION:  Signs/Symptoms:found on the ground.      COMPARISON:  05/08/2020       ACCESSION NUMBER(S):  [id number]      ORDERING CLINICIAN:  RASHAD HARP      TECHNIQUE:  Serial axial images of the head were obtained without intravenous  contrast. Sagittal and coronal reconstructions were generated      FINDINGS:  The ventricles are midline and minimally prominent.      There is suggestion of bilateral hygromas.      There are no acute parenchymal abnormalities.      There is no hemorrhage or extra-axial fluid.      There is a small left scalp hematoma. There is no obvious skull  fracture.      The visualized portions of the paranasal sinuses are unremarkable.  Mastoid air cells are not well pneumatized.      COMPARISON OF FINDINGS:  The brain is similar to the prior exam.      Impression: Generalized cerebral and cerebellar atrophy. No acute findings.          MACRO:  none      Signed by: Fidelina Horowitz 2/2/2024 4:04 PM  Dictation workstation:   [id number]  XR knee left 4+ views  Narrative: STUDY:  Knee Radiographs; 2/2/24 at 3:04 PM  INDICATION:  Left knee pain, found on ground.  COMPARISON:  Left knee XR 12/5/23.  ACCESSION NUMBER(S):  [id number]  ORDERING CLINICIAN:  RASHAD HARP  TECHNIQUE:  Four view(s) of the left knee.  FINDINGS:    There is no displaced fracture.  There are degenerative changes most  pronounced in the lateral and patellofemoral compartment with joint  space loss and osteophytes.  There is fullness in the suprapatellar  bursa consistent with a joint effusion.  Impression: No acute osseous abnormalities.  Degenerative changes of the left knee with joint effusion.  Signed by Andre Emmanuel MD  XR chest 1 view  Narrative: STUDY:  Chest Radiograph;  2/2/24 at 3:04 PM  INDICATION:  Weakness.  COMPARISON:  Chest XR 5/8/20  ACCESSION NUMBER(S):  [id number]  ORDERING CLINICIAN:  RASHAD HARP  TECHNIQUE:  Frontal chest was obtained at 1501 hours.  FINDINGS:  CARDIOMEDIASTINAL SILHOUETTE:  The heart is normal for technique.  There is widening of the  mediastinum.   This may be related to technique but adenopathy cannot  be excluded.     LUNGS:  There is linear infiltrate along the minor fissure consistent with  subsegmental atelectasis in the right upper lobe.     ABDOMEN:  No remarkable upper abdominal findings.     BONES:  No acute osseous changes.  Impression: Right upper lobe subsegmental atelectasis.  Questionable mediastinal adenopathy.  Signed by Andre Emmanuel MD  XR hip left with pelvis when performed 2 or 3 views  Narrative: STUDY:  Hip Radiographs; 2/2/2024 3:04 PM  INDICATION:  Left hip pain post fall.  Found on ground.  COMPARISON:  None Available.  ACCESSION NUMBER(S):  [id number]  ORDERING CLINICIAN:  RASHAD HARP  TECHNIQUE:  Three view(s) of the left hip.  FINDINGS:    There are degenerative changes lower lumbosacral spine.  There is no  displaced fracture.  The alignment is anatomic.  There is a foreign  body overlying the right lower pelvis.  This is most likely on the  patient.  Impression: No acute osseous abnormalities.  Signed by Andre Emmanuel MD      Physical Exam  Constitutional:       Appearance: Normal appearance.   HENT:      Head: Normocephalic.      Nose: Nose normal.      Mouth/Throat:      Mouth: Mucous membranes are moist.   Eyes:      Pupils: Pupils are equal, round, and reactive to light.   Cardiovascular:      Rate and Rhythm: Normal rate and regular rhythm.      Pulses: Normal pulses.   Pulmonary:      Effort: Pulmonary effort is normal.   Abdominal:      General: Abdomen is flat.   Musculoskeletal:         General: Normal range of motion.   Skin:     General: Skin is warm.   Neurological:      Mental Status: He is alert and oriented to person, place, and time.                Assessment/Plan      Chao Thao is a 83 y.o. male with a history of hypertension and coronary artery disease presents to the emergency room due to weakness is found to have pneumonia, new onset atrial fibrillation, rhabdomyolysis and acute kidney injury.     Acute  metabolic encephalopathy due to pneumonia and dehydration  Flu A, B, RSV and corona not detected  CT with right basilar infiltrates  Encephalopathy has resolved  Continue Rocephin (Day2) and azithromycin (day 2/3)  Blood cultures in progress  Continue DuoNebs every 6 hours and albuterol as needed  Continue Tylenol as needed for fever     New onset atrial fibrillation  Rate controlled, continue home dose of metoprolol  Continue heparin drip  Follow-up with TSH  Consult cardiology  May not be a candidate for anticoagulation     Rhabdomyolysis  Continue normal saline at 125 cc an hour  Repeat creatinine kinase improving  Repeat CK in AM      Elevated transaminases  Suspect is related to above  improving  Repeat level in a.m.     Elevated troponin due to demand ischemia  Likely due to above  EKG shows no concerns for ischemia or infarction     Acute kidney injury  Serum current baseline appears to be around 1  Continue IV fluids as above  Suspect prerenal azotemia  Repeat level in a.m.     Age-indeterminate L2 compression fracture  Patient denies any pain     Fall  Will consult physical therapy and Occupational Therapy     Hypertension  Holding lisinopril due to acute kidney injury  Continue metoprolol  Monitor vitals     Mood disorder  Depression versus anxiety  Continue Cymbalta and Lexapro as prescribed     Hyperlipidemia  Holding Zocor due to elevated transaminases     DVT prophylaxis  With heparin drip     CODE STATUS  DNAR/DNI as discussed with his wife      Zack RAMANDEEP Rao, DO

## 2024-02-03 NOTE — PROGRESS NOTES
02/03/24 0845   Discharge Planning   Living Arrangements Spouse/significant other   Support Systems Spouse/significant other   Assistance Needed A&Ox2, uses a walker or cane, frequent falls at home   Type of Residence Private residence   Home or Post Acute Services Post acute facilities (Rehab/SNF/etc)   Type of Post Acute Facility Services Skilled nursing   Patient expects to be discharged to: new SNF   Does the patient need discharge transport arranged? Yes   RoundTrip coordination needed? Yes   Has discharge transport been arranged? No   Patient Choice   Provider Choice list and CMS website (https://medicare.gov/care-compare#search) for post-acute Quality and Resource Measure Data were provided and reviewed with: Family     02/03/2024 0846: PT/OT evals pending. Anticipate patient will need SNF. Call placed to patient's wife to discuss. Patient's wife stated he has to go to [facility] if insurance will cover it. Explained that [facility] will send referral to see if [facility] can accept and that he would need a precert.

## 2024-02-03 NOTE — CARE PLAN
The patient's goals for the shift include  patient will be more alert by the end of this shift.     The clinical goals for the shift include to rest comfortably      Problem: Pain  Goal: My pain/discomfort is manageable  Outcome: Progressing     Problem: Safety  Goal: Patient will be injury free during hospitalization  Outcome: Progressing     Problem: Safety  Goal: I will remain free of falls  Outcome: Progressing     Problem: Skin  Goal: Decreased wound size/increased tissue granulation at next dressing change  Outcome: Progressing  Flowsheets (Taken 2/2/2024 2356)  Decreased wound size/increased tissue granulation at next dressing change: Promote sleep for wound healing     Problem: Skin  Goal: Participates in plan/prevention/treatment measures  Outcome: Progressing  Flowsheets (Taken 2/2/2024 2350)  Participates in plan/prevention/treatment measures:   Elevate heels   Increase activity/out of bed for meals

## 2024-02-03 NOTE — PROGRESS NOTES
"Vancomycin Dosing by Pharmacy- INITIAL    Chao Thao is a 83 y.o. year old male who Pharmacy has been consulted for vancomycin dosing for cellulitis, skin and soft tissue. Based on the patient's indication and renal status this patient will be dosed based on a goal AUC of 400-600.     Renal function is currently stable.    Visit Vitals  /75 (BP Location: Left arm, Patient Position: Lying)   Pulse 86   Temp 36.2 °C (97.2 °F) (Tympanic)   Resp 25        Lab Results   Component Value Date    CREATININE 1.30 02/02/2024    CREATININE 0.99 03/08/2022    CREATININE 1.27 08/13/2021    CREATININE 0.94 08/03/2021    CREATININE 0.88 09/10/2020        Patient weight is No results found for: \"PTWEIGHT\"    No results found for: \"CULTURE\"     No intake/output data recorded.  [unfilled]    Lab Results   Component Value Date    PATIENTTEMP  02/02/2024      Comment:      NOTE: Patient Results are Not Corrected for Temperature          Assessment/Plan     Patient will not be given a loading dose.  Will initiate vancomycin maintenance,  1.25g  mg every 24 hours.    This dosing regimen is predicted by [employer] to result in the following pharmacokinetic parameters:  currently not working.  1.25g q24h. Based on experience    Follow-up level will be ordered on 2/4 at am labs unless clinically indicated sooner.  Will continue to monitor renal function daily while on vancomycin and order serum creatinine at least every 48 hours if not already ordered.  Follow for continued vancomycin needs, clinical response, and signs/symptoms of toxicity.       William Fong, PharmD       "

## 2024-02-04 LAB
ALBUMIN SERPL BCP-MCNC: 2.5 G/DL (ref 3.4–5)
ALP SERPL-CCNC: 102 U/L (ref 33–136)
ALT SERPL W P-5'-P-CCNC: 33 U/L (ref 10–52)
ANION GAP SERPL CALC-SCNC: 14 MMOL/L (ref 10–20)
AST SERPL W P-5'-P-CCNC: 39 U/L (ref 9–39)
BILIRUB SERPL-MCNC: 0.3 MG/DL (ref 0–1.2)
BUN SERPL-MCNC: 27 MG/DL (ref 6–23)
CALCIUM SERPL-MCNC: 7.5 MG/DL (ref 8.6–10.3)
CHLORIDE SERPL-SCNC: 108 MMOL/L (ref 98–107)
CK SERPL-CCNC: 514 U/L (ref 0–325)
CO2 SERPL-SCNC: 21 MMOL/L (ref 21–32)
CREAT SERPL-MCNC: 0.79 MG/DL (ref 0.5–1.3)
D DIMER PPP FEU-MCNC: 721 NG/ML FEU
EGFRCR SERPLBLD CKD-EPI 2021: 89 ML/MIN/1.73M*2
ERYTHROCYTE [DISTWIDTH] IN BLOOD BY AUTOMATED COUNT: 12.8 % (ref 11.5–14.5)
GLUCOSE SERPL-MCNC: 91 MG/DL (ref 74–99)
HCT VFR BLD AUTO: 35 % (ref 41–52)
HGB BLD-MCNC: 11.1 G/DL (ref 13.5–17.5)
MCH RBC QN AUTO: 32.9 PG (ref 26–34)
MCHC RBC AUTO-ENTMCNC: 31.7 G/DL (ref 32–36)
MCV RBC AUTO: 104 FL (ref 80–100)
NRBC BLD-RTO: 0 /100 WBCS (ref 0–0)
PLATELET # BLD AUTO: 417 X10*3/UL (ref 150–450)
POTASSIUM SERPL-SCNC: 3.6 MMOL/L (ref 3.5–5.3)
PROT SERPL-MCNC: 5.5 G/DL (ref 6.4–8.2)
RBC # BLD AUTO: 3.37 X10*6/UL (ref 4.5–5.9)
SODIUM SERPL-SCNC: 139 MMOL/L (ref 136–145)
UFH PPP CHRO-ACNC: 0.5 IU/ML
VANCOMYCIN SERPL-MCNC: 14.5 UG/ML (ref 5–20)
WBC # BLD AUTO: 15.3 X10*3/UL (ref 4.4–11.3)

## 2024-02-04 PROCEDURE — 80053 COMPREHEN METABOLIC PANEL: CPT | Performed by: FAMILY MEDICINE

## 2024-02-04 PROCEDURE — 1200000002 HC GENERAL ROOM WITH TELEMETRY DAILY

## 2024-02-04 PROCEDURE — 2500000001 HC RX 250 WO HCPCS SELF ADMINISTERED DRUGS (ALT 637 FOR MEDICARE OP): Performed by: FAMILY MEDICINE

## 2024-02-04 PROCEDURE — 85027 COMPLETE CBC AUTOMATED: CPT | Performed by: FAMILY MEDICINE

## 2024-02-04 PROCEDURE — 2500000002 HC RX 250 W HCPCS SELF ADMINISTERED DRUGS (ALT 637 FOR MEDICARE OP, ALT 636 FOR OP/ED): Mod: MUE | Performed by: FAMILY MEDICINE

## 2024-02-04 PROCEDURE — 36415 COLL VENOUS BLD VENIPUNCTURE: CPT

## 2024-02-04 PROCEDURE — 85379 FIBRIN DEGRADATION QUANT: CPT

## 2024-02-04 PROCEDURE — 2500000004 HC RX 250 GENERAL PHARMACY W/ HCPCS (ALT 636 FOR OP/ED): Performed by: FAMILY MEDICINE

## 2024-02-04 PROCEDURE — 97530 THERAPEUTIC ACTIVITIES: CPT | Mod: GP

## 2024-02-04 PROCEDURE — 99233 SBSQ HOSP IP/OBS HIGH 50: CPT | Performed by: FAMILY MEDICINE

## 2024-02-04 PROCEDURE — 94668 MNPJ CHEST WALL SBSQ: CPT

## 2024-02-04 PROCEDURE — 97162 PT EVAL MOD COMPLEX 30 MIN: CPT | Mod: GP

## 2024-02-04 PROCEDURE — 85520 HEPARIN ASSAY: CPT | Performed by: FAMILY MEDICINE

## 2024-02-04 PROCEDURE — 94640 AIRWAY INHALATION TREATMENT: CPT | Mod: MUE

## 2024-02-04 PROCEDURE — 80202 ASSAY OF VANCOMYCIN: CPT | Performed by: FAMILY MEDICINE

## 2024-02-04 PROCEDURE — 2500000005 HC RX 250 GENERAL PHARMACY W/O HCPCS: Performed by: FAMILY MEDICINE

## 2024-02-04 PROCEDURE — 82550 ASSAY OF CK (CPK): CPT | Performed by: FAMILY MEDICINE

## 2024-02-04 PROCEDURE — 99233 SBSQ HOSP IP/OBS HIGH 50: CPT

## 2024-02-04 RX ORDER — SIMVASTATIN 20 MG/1
20 TABLET, FILM COATED ORAL NIGHTLY
Status: DISCONTINUED | OUTPATIENT
Start: 2024-02-04 | End: 2024-02-08 | Stop reason: HOSPADM

## 2024-02-04 RX ADMIN — CEFTRIAXONE SODIUM 2 G: 2 INJECTION, SOLUTION INTRAVENOUS at 16:22

## 2024-02-04 RX ADMIN — SIMVASTATIN 20 MG: 20 TABLET, FILM COATED ORAL at 22:42

## 2024-02-04 RX ADMIN — AZITHROMYCIN MONOHYDRATE 500 MG: 500 INJECTION, POWDER, LYOPHILIZED, FOR SOLUTION INTRAVENOUS at 16:36

## 2024-02-04 RX ADMIN — HEPARIN SODIUM 1300 UNITS/HR: 10000 INJECTION, SOLUTION INTRAVENOUS at 10:43

## 2024-02-04 RX ADMIN — ACETAMINOPHEN 975 MG: 325 TABLET ORAL at 22:43

## 2024-02-04 RX ADMIN — DULOXETINE HYDROCHLORIDE 60 MG: 60 CAPSULE, DELAYED RELEASE ORAL at 09:18

## 2024-02-04 RX ADMIN — SODIUM CHLORIDE 125 ML/HR: 9 INJECTION, SOLUTION INTRAVENOUS at 01:59

## 2024-02-04 RX ADMIN — IPRATROPIUM BROMIDE AND ALBUTEROL SULFATE 3 ML: 2.5; .5 SOLUTION RESPIRATORY (INHALATION) at 19:24

## 2024-02-04 RX ADMIN — Medication 2 L/MIN: at 19:24

## 2024-02-04 RX ADMIN — VANCOMYCIN HYDROCHLORIDE 1500 MG: 10 INJECTION, POWDER, LYOPHILIZED, FOR SOLUTION INTRAVENOUS at 17:01

## 2024-02-04 RX ADMIN — Medication 2 L/MIN: at 07:55

## 2024-02-04 RX ADMIN — ESCITALOPRAM OXALATE 5 MG: 10 TABLET ORAL at 22:42

## 2024-02-04 RX ADMIN — IPRATROPIUM BROMIDE AND ALBUTEROL SULFATE 3 ML: 2.5; .5 SOLUTION RESPIRATORY (INHALATION) at 07:55

## 2024-02-04 RX ADMIN — IPRATROPIUM BROMIDE AND ALBUTEROL SULFATE 3 ML: 2.5; .5 SOLUTION RESPIRATORY (INHALATION) at 13:59

## 2024-02-04 ASSESSMENT — COGNITIVE AND FUNCTIONAL STATUS - GENERAL
MOVING TO AND FROM BED TO CHAIR: A LOT
DAILY ACTIVITIY SCORE: 14
MOBILITY SCORE: 10
MOVING TO AND FROM BED TO CHAIR: TOTAL
CLIMB 3 TO 5 STEPS WITH RAILING: TOTAL
STANDING UP FROM CHAIR USING ARMS: A LOT
MOBILITY SCORE: 9
DRESSING REGULAR LOWER BODY CLOTHING: TOTAL
STANDING UP FROM CHAIR USING ARMS: A LOT
TOILETING: A LOT
WALKING IN HOSPITAL ROOM: TOTAL
TURNING FROM BACK TO SIDE WHILE IN FLAT BAD: A LOT
HELP NEEDED FOR BATHING: A LOT
WALKING IN HOSPITAL ROOM: TOTAL
MOVING FROM LYING ON BACK TO SITTING ON SIDE OF FLAT BED WITH BEDRAILS: A LOT
PERSONAL GROOMING: A LITTLE
TURNING FROM BACK TO SIDE WHILE IN FLAT BAD: A LOT
CLIMB 3 TO 5 STEPS WITH RAILING: TOTAL
MOVING FROM LYING ON BACK TO SITTING ON SIDE OF FLAT BED WITH BEDRAILS: A LOT
DRESSING REGULAR UPPER BODY CLOTHING: A LOT

## 2024-02-04 ASSESSMENT — PAIN - FUNCTIONAL ASSESSMENT
PAIN_FUNCTIONAL_ASSESSMENT: 0-10
PAIN_FUNCTIONAL_ASSESSMENT: 0-10

## 2024-02-04 ASSESSMENT — ACTIVITIES OF DAILY LIVING (ADL): ADL_ASSISTANCE: INDEPENDENT

## 2024-02-04 ASSESSMENT — PAIN SCALES - GENERAL
PAINLEVEL_OUTOF10: 0 - NO PAIN
PAINLEVEL_OUTOF10: 0 - NO PAIN

## 2024-02-04 NOTE — PROGRESS NOTES
"Vancomycin Dosing by Pharmacy- FOLLOW UP    Chao Thao is a 83 y.o. year old male who Pharmacy has been consulted for vancomycin dosing for cellulitis, skin and soft tissue. Based on the patient's indication and renal status this patient is being dosed based on a goal AUC of 400-600.     Renal function is currently improving.    Current vancomycin dose: 1250 mg given every 24 hours    Estimated vancomycin AUC on current dose: 366 mg/L.hr     Visit Vitals  BP 99/66   Pulse 78   Temp 36.7 °C (98.1 °F) (Temporal)   Resp 20        Lab Results   Component Value Date    CREATININE 0.79 02/04/2024    CREATININE 0.95 02/03/2024    CREATININE 1.30 02/02/2024    CREATININE 0.99 03/08/2022    CREATININE 1.27 08/13/2021    CREATININE 0.94 08/03/2021    CREATININE 0.88 09/10/2020        Patient weight is 84.5 kg    No results found for: \"CULTURE\"     I/O last 3 completed shifts:  In: 4060.1 (48 mL/kg) [P.O.:480; I.V.:2780.1 (32.9 mL/kg); IV Piggyback:800]  Out: 1250 (14.8 mL/kg) [Urine:1250 (0.4 mL/kg/hr)]  Weight: 84.5 kg   [unfilled]    Lab Results   Component Value Date    PATIENTTEMP  02/02/2024      Comment:      NOTE: Patient Results are Not Corrected for Temperature        Assessment/Plan    Below goal AUC. Orders placed for new vancomcyin regimen of 1500 every 24 hours to begin at 2/4.     This dosing regimen is predicted by [employer] to result in the following pharmacokinetic parameters:  Regimen: 1500 mg IV every 24 hours.  Start time: 22:46 on 02/04/2024  Exposure target: AUC24 (range)400-600 mg/L.hr   AUC24,ss: 436 mg/L.hr  Probability of AUC24 > 400: 67 %  Ctrough,ss: 11.7 mg/L  Probability of Ctrough,ss > 20: 5 %  Probability of nephrotoxicity (Lodise VITO 2009): 7 %      The next level will be obtained on 2/6 at 600. May be obtained sooner if clinically indicated.   Will continue to monitor renal function daily while on vancomycin and order serum creatinine at least every 48 hours if not already " ordered.  Follow for continued vancomycin needs, clinical response, and signs/symptoms of toxicity.       Scarlet Helms, RPh

## 2024-02-04 NOTE — PROGRESS NOTES
Chao Thao is a 83 y.o. male on day 2 of admission presenting with Traumatic rhabdomyolysis, initial encounter (CMS/[facility]).      Subjective   No acute events overnight.       Objective     Last Recorded Vitals  /64 (BP Location: Left arm, Patient Position: Sitting)   Pulse 74   Temp 36.7 °C (98.1 °F) (Temporal)   Resp 20   Wt 84.5 kg (186 lb 4.6 oz)   SpO2 93%   Intake/Output last 3 Shifts:    Intake/Output Summary (Last 24 hours) at 2/4/2024 0714  Last data filed at 2/4/2024 0500  Gross per 24 hour   Intake 2632.48 ml   Output 900 ml   Net 1732.48 ml         Admission Weight  Weight: 88.5 kg (195 lb) (02/02/24 1351)    Daily Weight  02/02/24 : 84.5 kg (186 lb 4.6 oz)    Image Results  CT chest abdomen pelvis w IV contrast  Narrative: STUDY:  CT Chest, Abdomen, and Pelvis with IV Contrast; 2/2/2024 at 3:45 PM.  INDICATION:  Found on the ground for one full week.  Skin is mottled and patient  appears very weak.  COMPARISON:  XR chest 2/2/2024, 5/8/2020; XR left hip 2/2/2024.  ACCESSION NUMBER(S):  [id number]  ORDERING CLINICIAN:  RASHAD HARP  TECHNIQUE:  CT of the chest, abdomen, and pelvis was performed.  Contiguous axial  images were obtained at 3 mm slice thickness through the chest,  abdomen, and pelvis.  Coronal and sagittal reconstructions at 3 mm  slice thickness were performed.  Omnipaque 350 75 mL was administered  intravenously.    FINDINGS:  CHEST:  MEDIASTINUM:  The heart is normal in size without pericardial effusion.  Central  vascular structures opacify normally.  The thoracic aorta is mildly  ectatic with mural calcifications but there is no aneurysm or  dissection.  LUNGS/PLEURA:  There is no significant pleural effusion.  There is no evidence of  pneumothorax.  Small cystic changes are seen in both lower lobes and  there is some patchy interstitial infiltrate in the posterior right  lung base.  There is a relatively thick band of atelectasis or  scarring in the posterior right  upper lobe..  The airways are patent.  LYMPH NODES:  Thoracic lymph nodes are not enlarged.  ABDOMEN:     LIVER:  No hepatomegaly.  Smooth surface contour.  Normal attenuation.     BILE DUCTS:  No intrahepatic or extrahepatic biliary ductal dilatation.     GALLBLADDER:  The gallbladder shows dependent sludge or small stones but there is no  evidence of cholecystitis .  STOMACH:  No abnormalities identified.     PANCREAS:  No masses or ductal dilatation.     SPLEEN:  No splenomegaly or focal splenic lesion.     ADRENAL GLANDS:  No thickening or nodules.     KIDNEYS AND URETERS:  Kidneys are normal in size and location.  No renal or ureteral  calculi.  There are small cysts in the kidneys bilaterally but they  are otherwise unremarkable.     PELVIS:     BLADDER:  No abnormalities identified.     REPRODUCTIVE ORGANS:  No abnormalities identified.     BOWEL:  The small bowel is unremarkable and the appendix is normal.  There are  a few diverticula in the left colon but there is no evidence of  diverticulitis.     VESSELS:  No abnormalities identified.  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  There is no evidence of  stenosis.      PERITONEUM/RETROPERITONEUM/LYMPH NODES:  No free fluid.  No pneumoperitoneum.  No lymphadenopathy.     ABDOMINAL WALL:  No abnormalities identified.  SOFT TISSUES:   There is a small amount of subcutaneous edema lateral and posterior to  the right hip but no abscess is seen here and there are no additional  soft tissue abnormalities.     BONES:  There is about 40% loss of height in the superior endplate of L2.  The  age of this compression is uncertain but there are no CT findings to  suggest that this is definitely acute.  Disc space narrowing and a  vacuum disc is seen at L5-S1.  Impression: In the chest there are small cystic changes in both lower lobes, some  patchy interstitial  infiltrate in the posterior right base and  relatively thick band of atelectasis or scarring in the posterior  right upper lobe.  No significant alveolar consolidation is seen and  there is no evidence of pneumothorax.  No rib or thoracic vertebral  fractures are visible..  In the abdomen and pelvis there is sludge or small stones in the  gallbladder but there is no evidence of cholecystitis.  There is an approximately 30% loss of height in the superior endplate  of L2 of uncertain age.  There is some subcutaneous edema posterior lateral to the right hip  but no additional additional subcutaneous abnormalities are  appreciated.  Signed by Bobby Benton MD  CT cervical spine wo IV contrast  Narrative: Interpreted By:  Fidelina Horowitz,   STUDY:  CT CERVICAL SPINE WO IV CONTRAST; ;  2/2/2024 3:45 pm      INDICATION:  Signs/Symptoms:found on the ground.      COMPARISON:  09/07/2015      ACCESSION NUMBER(S):  [id number]      ORDERING CLINICIAN:  RASHAD HARP      TECHNIQUE:  0 axial images of the cervical spine were obtained. Sagittal and  coronal reconstructions were generated.      FINDINGS:  The patient is scoliotic. There is straightening of the normal  cervical lordosis.      There is no fracture or bony destruction. The prevertebral soft  tissues are unremarkable.      There is facet hypertrophy. There is vertebral body endplate spurring  C6-C7. There is disc space narrowing at C6-C7.      The spine is similar to the prior exam.      Impression: No obvious fracture. Moderate degenerative change.      Carotid artery calcifications.          MACRO:  None      Signed by: Fidelina Horowitz 2/2/2024 4:06 PM  Dictation workstation:   [id number]  CT head wo IV contrast  Narrative: Interpreted By:  Fidelina Horowitz,   STUDY:  CT HEAD WO IV CONTRAST; ;  2/2/2024 3:45 pm      INDICATION:  Signs/Symptoms:found on the ground.      COMPARISON:  05/08/2020      ACCESSION NUMBER(S):  [id number]      ORDERING CLINICIAN:  RASHAD  KATIUSKA      TECHNIQUE:  Serial axial images of the head were obtained without intravenous  contrast. Sagittal and coronal reconstructions were generated      FINDINGS:  The ventricles are midline and minimally prominent.      There is suggestion of bilateral hygromas.      There are no acute parenchymal abnormalities.      There is no hemorrhage or extra-axial fluid.      There is a small left scalp hematoma. There is no obvious skull  fracture.      The visualized portions of the paranasal sinuses are unremarkable.  Mastoid air cells are not well pneumatized.      COMPARISON OF FINDINGS:  The brain is similar to the prior exam.      Impression: Generalized cerebral and cerebellar atrophy. No acute findings.          MACRO:  none      Signed by: Fidelina Horowitz 2/2/2024 4:04 PM  Dictation workstation:   [id number]  XR knee left 4+ views  Narrative: STUDY:  Knee Radiographs; 2/2/24 at 3:04 PM  INDICATION:  Left knee pain, found on ground.  COMPARISON:  Left knee XR 12/5/23.  ACCESSION NUMBER(S):  [id number]  ORDERING CLINICIAN:  RASHAD HARP  TECHNIQUE:  Four view(s) of the left knee.  FINDINGS:    There is no displaced fracture.  There are degenerative changes most  pronounced in the lateral and patellofemoral compartment with joint  space loss and osteophytes.  There is fullness in the suprapatellar  bursa consistent with a joint effusion.  Impression: No acute osseous abnormalities.  Degenerative changes of the left knee with joint effusion.  Signed by Andre Emmanuel MD  XR chest 1 view  Narrative: STUDY:  Chest Radiograph;  2/2/24 at 3:04 PM  INDICATION:  Weakness.  COMPARISON:  Chest XR 5/8/20  ACCESSION NUMBER(S):  [id number]  ORDERING CLINICIAN:  RASHAD HARP  TECHNIQUE:  Frontal chest was obtained at 1501 hours.  FINDINGS:  CARDIOMEDIASTINAL SILHOUETTE:  The heart is normal for technique.  There is widening of the  mediastinum.  This may be related to technique but adenopathy cannot  be excluded.      LUNGS:  There is linear infiltrate along the minor fissure consistent with  subsegmental atelectasis in the right upper lobe.     ABDOMEN:  No remarkable upper abdominal findings.     BONES:  No acute osseous changes.  Impression: Right upper lobe subsegmental atelectasis.  Questionable mediastinal adenopathy.  Signed by Andre Emmanuel MD  XR hip left with pelvis when performed 2 or 3 views  Narrative: STUDY:  Hip Radiographs; 2/2/2024 3:04 PM  INDICATION:  Left hip pain post fall.  Found on ground.  COMPARISON:  None Available.  ACCESSION NUMBER(S):  [id number]  ORDERING CLINICIAN:  RASHAD HARP  TECHNIQUE:  Three view(s) of the left hip.  FINDINGS:    There are degenerative changes lower lumbosacral spine.  There is no  displaced fracture.  The alignment is anatomic.  There is a foreign  body overlying the right lower pelvis.  This is most likely on the  patient.  Impression: No acute osseous abnormalities.  Signed by Andre Emmanuel MD      Physical Exam  Constitutional:       Appearance: Normal appearance.   HENT:      Head: Normocephalic.      Nose: Nose normal.      Mouth/Throat:      Mouth: Mucous membranes are moist.   Eyes:      Pupils: Pupils are equal, round, and reactive to light.   Cardiovascular:      Rate and Rhythm: Normal rate and regular rhythm.      Pulses: Normal pulses.   Pulmonary:      Effort: Pulmonary effort is normal.   Abdominal:      General: Abdomen is flat.   Musculoskeletal:         General: Normal range of motion.   Skin:     General: Skin is warm.   Neurological:      Mental Status: He is alert and oriented to person, place, and time.              Assessment/Plan      Chao Thao is a 83 y.o. male with a history of hypertension and coronary artery disease presents to the emergency room due to weakness is found to have pneumonia, new onset atrial fibrillation, rhabdomyolysis and acute kidney injury.     Acute metabolic encephalopathy due to pneumonia and dehydration  Flu A, B, RSV  and corona not detected  CT with right basilar infiltrates  Encephalopathy has resolved  Procalcitonin 0.35, Continue Rocephin (Day3) and azithromycin (day 3/3)  Blood cultures shows no growth to date  Continue DuoNebs every 6 hours and albuterol as needed  Continue Tylenol as needed for fever     New onset atrial fibrillation  Rate controlled, continue home dose of metoprolol  Continue heparin drip  TSH 0.67  Cardiology following  May not be a candidate for anticoagulation     Rhabdomyolysis  DC IVF  Creatinine kinase improving    Elevated transaminases  Suspect is related to above  improving  Repeat level in a.m.     Elevated troponin due to demand ischemia  Likely due to above  EKG shows no concerns for ischemia or infarction     Acute kidney injury  Serum current baseline appears to be around 1  resolved     Age-indeterminate L2 compression fracture  Patient denies any pain     Fall  Will consult physical therapy and Occupational Therapy     Hypertension  Holding lisinopril due to acute kidney injury  Blood pressure remains low  Will continue to hold lisinopril     Mood disorder  Depression versus anxiety  Continue Cymbalta and Lexapro as prescribed     Hyperlipidemia  Transaminases have improved we will restart Zocor     DVT prophylaxis  With heparin drip     CODE STATUS  Full code as discussed with patient    With patient's permission I further discussed his case with his wife         Zack SABILLON Rao, DO

## 2024-02-04 NOTE — PROGRESS NOTES
"Physical Therapy    Physical Therapy Evaluation & Treatment    Patient Name: Chao Thao  MRN: [id number]  Today's Date: 2/4/2024   Time Calculation  Start Time: 0910  Stop Time: 1000  Time Calculation (min): 50 min    Assessment/Plan   PT Assessment  PT Assessment Results: Decreased strength, Decreased range of motion, Decreased endurance, Impaired balance, Decreased mobility, Decreased cognition, Impaired judgement, Impaired hearing, Decreased skin integrity  Rehab Prognosis: Fair  Evaluation/Treatment Tolerance: Patient limited by fatigue  Medical Staff Made Aware: Yes  Barriers to Participation: Ability to acquire knowledge, Comorbidities, Housing layout  End of Session Communication: Bedside nurse, PCT/NA/CTA  End of Session Patient Position: Bed, 3 rail up, Alarm on   IP OR SWING BED PT PLAN  Inpatient or Swing Bed: Inpatient  PT Plan  Treatment/Interventions: Bed mobility, Transfer training, Gait training, Balance training, Neuromuscular re-education, Strengthening, Endurance training, Range of motion, Therapeutic exercise, Therapeutic activity, Positioning, Postural re-education  PT Plan: Skilled PT  PT Frequency: 3 times per week  PT Discharge Recommendations: Moderate intensity level of continued care, 24 hr supervision due to cognition  Equipment Recommended upon Discharge: Wheeled walker  PT - OK to Discharge: Yes (Per PT POC)      Subjective     General Visit Information:  General  Reason for Referral: Impaired mobility; 82 y/o male admitted for AMS, generalized weakness and fall at home.  Past Medical History Relevant to Rehab: Advanced dementia, [place], HTN, CAD, tinea pedis B feet, cardiac cath.  Prior to Session Communication: Bedside nurse, PCT/NA/CTA  Patient Position Received: Bed, 3 rail up, Alarm on  General Comment: Cleared per nursing this date.   Pt pleasantly confused, cooperative and agreeable to participate in therapy.  Pt is a poor historian and very [place].   Pt reporting \"I need cleaned up\" " at onset of session.  Assisted nursing staff with mobility for clean up and bed linen change. (C)  Home Living:  Home Living  Type of Home: House  Lives With: Spouse  Home Adaptive Equipment: Walker rolling or standard  Home Layout: Two level, Bed/bath upstairs  Home Living Comments:  (Pt is a poor historian.   Unabel to obtain all information regarding home set up and PLOF secondary to cognitive impairments.)  Prior Level of Function:  Prior Function Per Pt/Caregiver Report  Level of [place]: Independent with ADLs and functional transfers, Needs assistance with ADLs  Receives Help From: Family (spouse)  ADL Assistance: Independent  Homemaking Assistance: Needs assistance  Prior Function Comments: Pt is a poor historian, limited information obtained.  Precautions:  Precautions  Medical Precautions: Fall precautions, Oxygen therapy device and L/min ((2 L O2 via NC))       Objective   Pain:  Pain Assessment  Pain Assessment: 0-10  Pain Score: 0 - No pain  Cognition:  Cognition  Overall Cognitive Status: Impaired at baseline  Orientation Level: Disoriented to situation (Able to report location and year.)  Following Commands: Follows one step commands with increased time (Able to follow simple commands, increased time needed for processing and increased cues.)    General Assessments:  General Observation  General Observation:  (B waffle boots and bandages intact B lower legs/ feet secondary to wounds.)    Activity Tolerance  Endurance: Tolerates less than 10 min exercise, no significant change in vital signs     Strength  Strength Comments:  (Generalized weakness B LE evident with mobility performance.  Grossly >=3-/5 B LE.)  Functional Assessments:  Bed Mobility  Bed Mobility: Yes  Bed Mobility 1  Bed Mobility 1: Rolling right, Rolling left  Level of Assistance 1: Maximum assistance, Moderate verbal cues  Bed Mobility Comments 1:  (Completed multiple times for completion of self care and linen change as pt was  very soiled and had had a BM.  Use of bedrail with cues for sequencing and technique to increase active participation.)  Bed Mobility 2  Bed Mobility  2: Scooting  Level of Assistance 2: Maximum assistance (x 2)  Bed Mobility Comments 2:  (Completed for improved positioning in bed for pressure relief and comfort.)  Bed Mobility 3  Bed Mobility 3:  (Supine <> partial long sitting)  Level of Assistance 3: Dependent  Bed Mobility Comments 3: Attempted once pt was cleaned up; however, pt too fatigued and unable to tolerate at this time.    Transfers  Transfer: No (Pt unable to safely tolerate at this time due to increased fatigue and generalized weakness.)     Extremity/Trunk Assessments:  RLE   RLE : Within Functional Limits  LLE   LLE : Within Functional Limits  Treatments:  Bed Mobility  Bed Mobility: Yes  Bed Mobility 1  Bed Mobility 1: Rolling right, Rolling left  Level of Assistance 1: Maximum assistance, Moderate verbal cues  Bed Mobility Comments 1:  (Completed multiple times for completion of self care and linen change as pt was very soiled and had had a BM.  Use of bedrail with cues for sequencing and technique to increase active participation.)  Bed Mobility 2  Bed Mobility  2: Scooting  Level of Assistance 2: Maximum assistance (x 2)  Bed Mobility Comments 2:  (Completed for improved positioning in bed for pressure relief and comfort.)  Bed Mobility 3  Bed Mobility 3:  (Supine <> partial long sitting)  Level of Assistance 3: Dependent  Bed Mobility Comments 3: Attempted once pt was cleaned up; however, pt too fatigued and unable to tolerate at this time.    Transfers  Transfer: No (Pt unable to safely tolerate at this time due to increased fatigue and generalized weakness.)  Outcome Measures:  [facility] Basic Mobility  Turning from your back to your side while in a flat bed without using bedrails: A lot  Moving from lying on your back to sitting on the side of a flat bed without using bedrails: A lot  Moving to  and from bed to chair (including a wheelchair): Total  Standing up from a chair using your arms (e.g. wheelchair or bedside chair): A lot  To walk in hospital room: Total  Climbing 3-5 steps with railing: Total  Basic Mobility - Total Score: 9    Encounter Problems       Encounter Problems (Active)       PT Problem       Pt will demonstrate min A x 1 with completion of bed mobility activities.   (Progressing)       Start:  02/04/24    Expected End:  02/18/24            Pt will complete sit <> stand transfers and stand pivot with bed <> chair with LRD at mod A x 1.   (Progressing)       Start:  02/04/24    Expected End:  02/18/24            Pt will tolerate 20 reps of appropriate therapeutic exercises for general ROM, LE strength and endurance, balance and postural stability to return to baseline LOF with mobility.   (Progressing)       Start:  02/04/24    Expected End:  02/18/24            Pt will demonstrate >= FAIR static/ dynamic sitting and standing balance with LRD in order to reduce falls risk and increase safety with functional activities.   (Progressing)       Start:  02/04/24    Expected End:  02/18/24            Pt will tolerate 15 minutes of light PT activity with maintaining SpO2 >90% and without increased complaints of fatigue/ SOB in order to return to baseline activity level.   (Progressing)       Start:  02/04/24    Expected End:  02/18/24                   Education Documentation  Precautions, taught by Yudi Mott, PT at 2/4/2024 11:39 AM.  Learner: Patient  Readiness: Acceptance  Method: Explanation, Demonstration  Response: Needs Reinforcement    Body Mechanics, taught by Yudi Mott, PT at 2/4/2024 11:39 AM.  Learner: Patient  Readiness: Acceptance  Method: Explanation, Demonstration  Response: Needs Reinforcement    Mobility Training, taught by Yudi Mott, PT at 2/4/2024 11:39 AM.  Learner: Patient  Readiness: Acceptance  Method: Explanation, Demonstration  Response: Needs  Reinforcement    Education Comments  Patient educated and instructed on importance of continued mobilization as tolerated with staff assistance, up with staff assistance, use of call bell and general LE exercises while in bed to prevent further deconditioning and weakness.

## 2024-02-04 NOTE — PROGRESS NOTES
Subjective Data:  Sitting up in bed, wife at bedside. States feeling well this morning. Denies chest pain, shortness of breath, heart palpations, dizziness or orthopnea.          Objective Data:  Last Recorded Vitals:  Vitals:    02/03/24 1944 02/04/24 0519 02/04/24 0755 02/04/24 0922   BP: 103/58 109/64  99/66   BP Location: Left arm Left arm     Patient Position: Sitting Sitting     Pulse: 89 74  78   Resp: 20 20     Temp: 36.6 °C (97.9 °F) 36.7 °C (98.1 °F)     TempSrc: Tympanic Temporal     SpO2: 93% 93% 96%    Weight:       Height:           Last Labs:  CBC - 2/4/2024:  7:39 AM  15.3 11.1 417    35.0      CMP - 2/4/2024:  7:39 AM  7.5 5.5 39 --- 0.3   _ 2.5 33 102      PTT - No results in last year.  _   _ _     TROPHS   Date/Time Value Ref Range Status   02/02/2024 04:55 PM 23 0 - 20 ng/L Final   02/02/2024 02:19 PM 27 0 - 20 ng/L Final     BNP   Date/Time Value Ref Range Status   02/03/2024 06:44 AM 56 0 - 99 pg/mL Final   09/10/2020 11:33 AM 46 0 - 99 pg/mL Final     Comment:     .  <100 pg/mL - Heart failure unlikely  100-299 pg/mL - Intermediate probability of acute heart  .               failure exacerbation. Correlate with clinical  .               context and patient history.    >=300 pg/mL - Heart Failure likely. Correlate with clinical  .               context and patient history.  BNP testing is performed using different testing   methodology at [facility] than at other   [facility]. Direct result comparisons should   only be made within the same method.     03/16/2020 12:08 PM 37 0 - 99 pg/mL Final     Comment:     .  <100 pg/mL - Heart failure unlikely  100-299 pg/mL - Intermediate probability of acute heart  .               failure exacerbation. Correlate with clinical  .               context and patient history.    >=300 pg/mL - Heart Failure likely. Correlate with clinical  .               context and patient history.   Biotin interference may cause falsely decreased  results.   Patients taking a Biotin dose of up to 5 mg/day should   refrain from taking Biotin for 24 hours before sample   collection. Providers may contact their local laboratory   for further information.       HGBA1C   Date/Time Value Ref Range Status   03/16/2020 12:08 PM 5.9 % Final     Comment:          Diagnosis of Diabetes-Adults   Non-Diabetic: < or = 5.6%   Increased risk for developing diabetes: 5.7-6.4%   Diagnostic of diabetes: > or = 6.5%  .       Monitoring of Diabetes                Age (y)     Therapeutic Goal (%)   Adults:          >18           <7.0   Pediatrics:    13-18           <7.5                   7-12           <8.0                   0- 6            7.5-8.5   American Diabetes Association. Diabetes Care 33(S1), Jan 2010.       VLDL   Date/Time Value Ref Range Status   03/16/2020 12:08 PM 19 0 - 40 mg/dL Final   05/07/2019 11:06 AM 20 0 - 40 mg/dL Final   08/08/2018 11:42 AM 21 0 - 40 mg/dL Final      Last I/O:  I/O last 3 completed shifts:  In: 4060.1 (48 mL/kg) [P.O.:480; I.V.:2780.1 (32.9 mL/kg); IV Piggyback:800]  Out: 1250 (14.8 mL/kg) [Urine:1250 (0.4 mL/kg/hr)]  Weight: 84.5 kg     Past Cardiology Tests (Last 3 Years):  EKG:  No results found for this or any previous visit from the past 1095 days.      Echo: 10/6/2020   1. The left ventricular systolic function is low normal with a 55% estimated ejection fraction.   2. Spectral Doppler shows an impaired relaxation pattern of left ventricular diastolic filling.   3. Mild aortic valve stenosis.   4. There is mild tricuspid regurgitation.   5. The estimated pulmonary artery pressure is mildly elevated with the RVSP at 38.9 mmHg.   6. The inferior vena cava appears to be of normal size.    Cath:  No results found for this or any previous visit from the past 1095 days.    Stress Test:  No results found for this or any previous visit from the past 1095 days.    Cardiac Imaging:  No results found for this or any previous visit from the  past 1095 days.      Inpatient Medications:  Scheduled medications   Medication Dose Route Frequency    acetaminophen  975 mg oral q8h    azithromycin  500 mg intravenous q24h    cefTRIAXone  2 g intravenous q24h    DULoxetine  60 mg oral Daily    escitalopram  5 mg oral Nightly    ipratropium-albuteroL  3 mL nebulization q6h while awake    metoprolol succinate XL  50 mg oral Daily    vancomycin  1,500 mg intravenous q24h     PRN medications   Medication    albuterol    heparin    oxygen     Continuous Medications   Medication Dose Last Rate    heparin  0-4,500 Units/hr 1,300 Units/hr (02/04/24 1043)    sodium chloride 0.9%  125 mL/hr 125 mL/hr (02/04/24 0159)       Physical Exam  Vitals reviewed.   HENT:      Head: Normocephalic.      Nose: Nose normal.   Eyes:      Pupils: Pupils are equal, round, and reactive to light.   Cardiovascular:      Rate and Rhythm: Normal rate and regular rhythm.      Tele aFib, 70s-80s  Pulmonary:      Effort: Pulmonary effort is normal.      Breath sounds: diminished    Abdominal:      General: Abdomen is flat.      Palpations: Abdomen is soft.   Musculoskeletal:         General: Normal range of motion.      Cervical back: Normal range of motion.   Skin:     General: Skin is warm and dry.   Neurological:      General: No focal deficit present.      Mental Status: He is alert and oriented to person, place, and time.   Psychiatric:         Mood and Affect: Mood normal.         Behavior: Behavior normal.     Echo 10/6/2020   1. The left ventricular systolic function is low normal with a 55% estimated ejection fraction.   2. Spectral Doppler shows an impaired relaxation pattern of left ventricular diastolic filling.   3. Mild aortic valve stenosis.   4. There is mild tricuspid regurgitation.   5. The estimated pulmonary artery pressure is mildly elevated with the RVSP at 38.9 mmHg.   6. The inferior vena cava appears to be of normal size.     [id number] Score  Age >= 75: 2   Sex Male: 0    CHF History No: 0   HTN Yes: 1   Stroke/TIA/Thromboembolism No: 0   Vascular Dz: CAD/PAD/Aortic Plaque Yes: 1   DM No: 0   Total Score 4        Assessment/Plan   Chao Thao is a 83 y.o. male who is a poor historian, he has a past medical history of hypertension, HLD and CAD (per wife he had a POBA 10+ years ago and follows with a cardiologist in [place]). He was admitted for pneumonia and rhabdomyolysis.     The patient appears dry and hemodynamically stable on exam. I do not see any concerning signs of acute decompensated heart failure, BNP 56, no history of heart failure noted in EMR. Blood pressure and heart rate are well controlled. Troponin slightly elevated 27, 23, which is not a cause for concern as it is likely related to elevated CK and a prolonged period of time spent on the ground. EKG showing aFib, rate controlled, he is asymptomatic, could be from acute illness but unknown time in aFib (EKG in ED showed afib).     Patient and wife are agreeable for anticoagulation, I went over his high risk for bleeding with frequent falls and they wanted to proceed. He says he doesn't fall often and gets around with a cane and walker, up and down stairs, he is A&O x4 for me, wife states other wise and says he falls once a week. Dr. Mahoney would like the final decision to be made after a PT eval and another discussion, and deferring cardioversion seeing he is asymptomatic and rate controlled.      #new aFib, rate controlled      Plan:  -Echocardiogram to evaluate for structural and functional abnormalities  -Continue heparin drip for now    --Will need to re-discuss risks and benefits with patient and wife regarding anticoagulation after PT   eval    --[id number] Score 4  -Ok to continue home medications      --Home meds per EMR are: Metoprolol 50mg, 10mg Lisinopril and 40mg Simvastatin     --I see Lisinopril is not ordered, ok to hold off for now, assuming holding because BP is currently well       controlled, will defer to the primary team  -Orthostatic blood pressure done yesterday, lying 113/60 HR 74, sitting 100/61 HR 89    --Consider treating with fluids considering inability to get BP and HR with standing   -Consider social work consult       MOSHE Woodard-CNP

## 2024-02-04 NOTE — CARE PLAN
The patient's goals for the shift include  no complain of SOB during this shift.    The clinical goals for the shift include to rest comfortably      Problem: Pain  Goal: My pain/discomfort is manageable  Outcome: Progressing     Problem: Safety  Goal: Patient will be injury free during hospitalization  Outcome: Progressing     Problem: Safety  Goal: I will remain free of falls  Outcome: Progressing     Problem: Daily Care  Goal: Daily care needs are met  Outcome: Progressing     Problem: Psychosocial Needs  Goal: Demonstrates ability to cope with hospitalization/illness  Outcome: Progressing     Problem: Skin  Goal: Decreased wound size/increased tissue granulation at next dressing change  Outcome: Progressing  Flowsheets (Taken 2/3/2024 1948)  Decreased wound size/increased tissue granulation at next dressing change: Promote sleep for wound healing     Problem: Skin  Goal: Participates in plan/prevention/treatment measures  Outcome: Progressing  Flowsheets (Taken 2/3/2024 1948)  Participates in plan/prevention/treatment measures:   Elevate heels   Increase activity/out of bed for meals     Problem: Skin  Goal: Prevent/manage excess moisture  Outcome: Progressing

## 2024-02-05 ENCOUNTER — APPOINTMENT (OUTPATIENT)
Dept: CARDIOLOGY | Facility: HOSPITAL | Age: 84
DRG: 564 | End: 2024-02-05
Payer: MEDICARE

## 2024-02-05 DIAGNOSIS — I73.9 PAD (PERIPHERAL ARTERY DISEASE) (CMS-HCC): Primary | ICD-10-CM

## 2024-02-05 PROBLEM — T79.6XXA TRAUMATIC RHABDOMYOLYSIS, INITIAL ENCOUNTER (CMS-HCC): Status: RESOLVED | Noted: 2024-02-02 | Resolved: 2024-02-05

## 2024-02-05 LAB
ALBUMIN SERPL BCP-MCNC: 2.3 G/DL (ref 3.4–5)
ANION GAP SERPL CALC-SCNC: 13 MMOL/L (ref 10–20)
BUN SERPL-MCNC: 19 MG/DL (ref 6–23)
CALCIUM SERPL-MCNC: 7.5 MG/DL (ref 8.6–10.3)
CHLORIDE SERPL-SCNC: 104 MMOL/L (ref 98–107)
CO2 SERPL-SCNC: 23 MMOL/L (ref 21–32)
CREAT SERPL-MCNC: 0.8 MG/DL (ref 0.5–1.3)
EGFRCR SERPLBLD CKD-EPI 2021: 89 ML/MIN/1.73M*2
GLUCOSE SERPL-MCNC: 95 MG/DL (ref 74–99)
PHOSPHATE SERPL-MCNC: 3.1 MG/DL (ref 2.5–4.9)
POTASSIUM SERPL-SCNC: 3.6 MMOL/L (ref 3.5–5.3)
SODIUM SERPL-SCNC: 136 MMOL/L (ref 136–145)

## 2024-02-05 PROCEDURE — 1200000002 HC GENERAL ROOM WITH TELEMETRY DAILY

## 2024-02-05 PROCEDURE — 94640 AIRWAY INHALATION TREATMENT: CPT

## 2024-02-05 PROCEDURE — 93306 TTE W/DOPPLER COMPLETE: CPT

## 2024-02-05 PROCEDURE — 2500000001 HC RX 250 WO HCPCS SELF ADMINISTERED DRUGS (ALT 637 FOR MEDICARE OP): Performed by: FAMILY MEDICINE

## 2024-02-05 PROCEDURE — 2500000004 HC RX 250 GENERAL PHARMACY W/ HCPCS (ALT 636 FOR OP/ED): Mod: MUE | Performed by: FAMILY MEDICINE

## 2024-02-05 PROCEDURE — 80069 RENAL FUNCTION PANEL: CPT | Performed by: FAMILY MEDICINE

## 2024-02-05 PROCEDURE — 2500000002 HC RX 250 W HCPCS SELF ADMINISTERED DRUGS (ALT 637 FOR MEDICARE OP, ALT 636 FOR OP/ED): Performed by: FAMILY MEDICINE

## 2024-02-05 PROCEDURE — 99232 SBSQ HOSP IP/OBS MODERATE 35: CPT | Performed by: INTERNAL MEDICINE

## 2024-02-05 PROCEDURE — 99233 SBSQ HOSP IP/OBS HIGH 50: CPT | Performed by: FAMILY MEDICINE

## 2024-02-05 PROCEDURE — 99232 SBSQ HOSP IP/OBS MODERATE 35: CPT | Performed by: PHYSICIAN ASSISTANT

## 2024-02-05 PROCEDURE — 93306 TTE W/DOPPLER COMPLETE: CPT | Performed by: INTERNAL MEDICINE

## 2024-02-05 PROCEDURE — 97535 SELF CARE MNGMENT TRAINING: CPT | Mod: GO,CO

## 2024-02-05 PROCEDURE — 2500000005 HC RX 250 GENERAL PHARMACY W/O HCPCS: Performed by: FAMILY MEDICINE

## 2024-02-05 PROCEDURE — 2500000004 HC RX 250 GENERAL PHARMACY W/ HCPCS (ALT 636 FOR OP/ED)

## 2024-02-05 RX ORDER — EAR PLUGS
1 EACH OTIC (EAR) 2 TIMES DAILY
Status: DISCONTINUED | OUTPATIENT
Start: 2024-02-05 | End: 2024-02-08 | Stop reason: HOSPADM

## 2024-02-05 RX ADMIN — APIXABAN 5 MG: 5 TABLET, FILM COATED ORAL at 17:36

## 2024-02-05 RX ADMIN — CEFTRIAXONE SODIUM 2 G: 2 INJECTION, SOLUTION INTRAVENOUS at 17:36

## 2024-02-05 RX ADMIN — IPRATROPIUM BROMIDE AND ALBUTEROL SULFATE 3 ML: 2.5; .5 SOLUTION RESPIRATORY (INHALATION) at 09:05

## 2024-02-05 RX ADMIN — METOPROLOL SUCCINATE 50 MG: 50 TABLET, EXTENDED RELEASE ORAL at 09:53

## 2024-02-05 RX ADMIN — Medication 2 L/MIN: at 09:05

## 2024-02-05 RX ADMIN — SIMVASTATIN 20 MG: 20 TABLET, FILM COATED ORAL at 20:41

## 2024-02-05 RX ADMIN — DULOXETINE HYDROCHLORIDE 60 MG: 60 CAPSULE, DELAYED RELEASE ORAL at 09:53

## 2024-02-05 RX ADMIN — ACETAMINOPHEN 975 MG: 325 TABLET ORAL at 20:41

## 2024-02-05 RX ADMIN — VANCOMYCIN HYDROCHLORIDE 1500 MG: 10 INJECTION, POWDER, LYOPHILIZED, FOR SOLUTION INTRAVENOUS at 12:11

## 2024-02-05 RX ADMIN — Medication 1 APPLICATION: at 20:42

## 2024-02-05 RX ADMIN — ESCITALOPRAM OXALATE 5 MG: 10 TABLET ORAL at 20:41

## 2024-02-05 RX ADMIN — HEPARIN SODIUM 1300 UNITS/HR: 10000 INJECTION, SOLUTION INTRAVENOUS at 06:50

## 2024-02-05 RX ADMIN — PERFLUTREN 2 ML OF DILUTION: 6.52 INJECTION, SUSPENSION INTRAVENOUS at 15:05

## 2024-02-05 ASSESSMENT — PAIN SCALES - GENERAL
PAINLEVEL_OUTOF10: 0 - NO PAIN

## 2024-02-05 ASSESSMENT — COGNITIVE AND FUNCTIONAL STATUS - GENERAL
DRESSING REGULAR UPPER BODY CLOTHING: A LOT
WALKING IN HOSPITAL ROOM: TOTAL
TURNING FROM BACK TO SIDE WHILE IN FLAT BAD: A LOT
TOILETING: A LOT
DRESSING REGULAR LOWER BODY CLOTHING: TOTAL
MOVING FROM LYING ON BACK TO SITTING ON SIDE OF FLAT BED WITH BEDRAILS: A LOT
MOVING TO AND FROM BED TO CHAIR: A LOT
TOILETING: TOTAL
DAILY ACTIVITIY SCORE: 12
TURNING FROM BACK TO SIDE WHILE IN FLAT BAD: A LOT
MOVING FROM LYING ON BACK TO SITTING ON SIDE OF FLAT BED WITH BEDRAILS: A LOT
DRESSING REGULAR LOWER BODY CLOTHING: TOTAL
PERSONAL GROOMING: A LOT
DAILY ACTIVITIY SCORE: 13
PERSONAL GROOMING: A LOT
WALKING IN HOSPITAL ROOM: TOTAL
DRESSING REGULAR LOWER BODY CLOTHING: TOTAL
MOBILITY SCORE: 10
PERSONAL GROOMING: A LOT
TOILETING: TOTAL
HELP NEEDED FOR BATHING: A LOT
HELP NEEDED FOR BATHING: A LOT
CLIMB 3 TO 5 STEPS WITH RAILING: TOTAL
STANDING UP FROM CHAIR USING ARMS: A LOT
DRESSING REGULAR UPPER BODY CLOTHING: TOTAL
DRESSING REGULAR UPPER BODY CLOTHING: A LOT
MOVING TO AND FROM BED TO CHAIR: A LOT
STANDING UP FROM CHAIR USING ARMS: A LOT
HELP NEEDED FOR BATHING: A LOT
DAILY ACTIVITIY SCORE: 11

## 2024-02-05 ASSESSMENT — PAIN - FUNCTIONAL ASSESSMENT
PAIN_FUNCTIONAL_ASSESSMENT: 0-10
PAIN_FUNCTIONAL_ASSESSMENT: 0-10

## 2024-02-05 ASSESSMENT — ACTIVITIES OF DAILY LIVING (ADL): HOME_MANAGEMENT_TIME_ENTRY: 17

## 2024-02-05 NOTE — PROGRESS NOTES
02/05/24 1355   Discharge Planning   Living Arrangements Spouse/significant other   Support Systems Spouse/significant other   Assistance Needed A&Ox2, uses a walker or cane, frequent falls at home   Type of Residence Private residence   Home or Post Acute Services Post acute facilities (Rehab/SNF/etc)   Type of Post Acute Facility Services Skilled nursing   Patient expects to be discharged to: [facility], [place] pending   Does the patient need discharge transport arranged? Yes   RoundTrip coordination needed? Yes   Has discharge transport been arranged? No     02/05/2024 1356: [facility] is able to accept the patient. Precert team started precert today. Call placed to patient's wife, no answer,  left.     02/05/2024 1426: Patient's wife updated at the bedside.     02/06/2024 0826: Precert obtained yesterday evening, auth good till 0057 on 2/8.

## 2024-02-05 NOTE — PROGRESS NOTES
Chao Thao is a 83 y.o. male on day 3 of admission presenting with Traumatic rhabdomyolysis, initial encounter (CMS/[facility]).      Subjective   No acute events overnight.       Objective     Last Recorded Vitals  /70   Pulse 70   Temp 36.2 °C (97.2 °F) (Temporal)   Resp 18   Wt 84.5 kg (186 lb 4.6 oz)   SpO2 92%   Intake/Output last 3 Shifts:    Intake/Output Summary (Last 24 hours) at 2/5/2024 1426  Last data filed at 2/5/2024 0820  Gross per 24 hour   Intake 2620.43 ml   Output 400 ml   Net 2220.43 ml         Admission Weight  Weight: 88.5 kg (195 lb) (02/02/24 1351)    Daily Weight  02/02/24 : 84.5 kg (186 lb 4.6 oz)    Image Results  CT chest abdomen pelvis w IV contrast  Narrative: STUDY:  CT Chest, Abdomen, and Pelvis with IV Contrast; 2/2/2024 at 3:45 PM.  INDICATION:  Found on the ground for one full week.  Skin is mottled and patient  appears very weak.  COMPARISON:  XR chest 2/2/2024, 5/8/2020; XR left hip 2/2/2024.  ACCESSION NUMBER(S):  [id number]  ORDERING CLINICIAN:  RASHAD HARP  TECHNIQUE:  CT of the chest, abdomen, and pelvis was performed.  Contiguous axial  images were obtained at 3 mm slice thickness through the chest,  abdomen, and pelvis.  Coronal and sagittal reconstructions at 3 mm  slice thickness were performed.  Omnipaque 350 75 mL was administered  intravenously.    FINDINGS:  CHEST:  MEDIASTINUM:  The heart is normal in size without pericardial effusion.  Central  vascular structures opacify normally.  The thoracic aorta is mildly  ectatic with mural calcifications but there is no aneurysm or  dissection.  LUNGS/PLEURA:  There is no significant pleural effusion.  There is no evidence of  pneumothorax.  Small cystic changes are seen in both lower lobes and  there is some patchy interstitial infiltrate in the posterior right  lung base.  There is a relatively thick band of atelectasis or  scarring in the posterior right upper lobe..  The airways are patent.  LYMPH  NODES:  Thoracic lymph nodes are not enlarged.  ABDOMEN:     LIVER:  No hepatomegaly.  Smooth surface contour.  Normal attenuation.     BILE DUCTS:  No intrahepatic or extrahepatic biliary ductal dilatation.     GALLBLADDER:  The gallbladder shows dependent sludge or small stones but there is no  evidence of cholecystitis .  STOMACH:  No abnormalities identified.     PANCREAS:  No masses or ductal dilatation.     SPLEEN:  No splenomegaly or focal splenic lesion.     ADRENAL GLANDS:  No thickening or nodules.     KIDNEYS AND URETERS:  Kidneys are normal in size and location.  No renal or ureteral  calculi.  There are small cysts in the kidneys bilaterally but they  are otherwise unremarkable.     PELVIS:     BLADDER:  No abnormalities identified.     REPRODUCTIVE ORGANS:  No abnormalities identified.     BOWEL:  The small bowel is unremarkable and the appendix is normal.  There are  a few diverticula in the left colon but there is no evidence of  diverticulitis.     VESSELS:  No abnormalities identified.  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  There is no evidence of  stenosis.      PERITONEUM/RETROPERITONEUM/LYMPH NODES:  No free fluid.  No pneumoperitoneum.  No lymphadenopathy.     ABDOMINAL WALL:  No abnormalities identified.  SOFT TISSUES:   There is a small amount of subcutaneous edema lateral and posterior to  the right hip but no abscess is seen here and there are no additional  soft tissue abnormalities.     BONES:  There is about 40% loss of height in the superior endplate of L2.  The  age of this compression is uncertain but there are no CT findings to  suggest that this is definitely acute.  Disc space narrowing and a  vacuum disc is seen at L5-S1.  Impression: In the chest there are small cystic changes in both lower lobes, some  patchy interstitial infiltrate in the posterior right base  and  relatively thick band of atelectasis or scarring in the posterior  right upper lobe.  No significant alveolar consolidation is seen and  there is no evidence of pneumothorax.  No rib or thoracic vertebral  fractures are visible..  In the abdomen and pelvis there is sludge or small stones in the  gallbladder but there is no evidence of cholecystitis.  There is an approximately 30% loss of height in the superior endplate  of L2 of uncertain age.  There is some subcutaneous edema posterior lateral to the right hip  but no additional additional subcutaneous abnormalities are  appreciated.  Signed by Bobby Benton MD  CT cervical spine wo IV contrast  Narrative: Interpreted By:  Fidelina Horowitz,   STUDY:  CT CERVICAL SPINE WO IV CONTRAST; ;  2/2/2024 3:45 pm      INDICATION:  Signs/Symptoms:found on the ground.      COMPARISON:  09/07/2015      ACCESSION NUMBER(S):  [id number]      ORDERING CLINICIAN:  RASHAD HARP      TECHNIQUE:  0 axial images of the cervical spine were obtained. Sagittal and  coronal reconstructions were generated.      FINDINGS:  The patient is scoliotic. There is straightening of the normal  cervical lordosis.      There is no fracture or bony destruction. The prevertebral soft  tissues are unremarkable.      There is facet hypertrophy. There is vertebral body endplate spurring  C6-C7. There is disc space narrowing at C6-C7.      The spine is similar to the prior exam.      Impression: No obvious fracture. Moderate degenerative change.      Carotid artery calcifications.          MACRO:  None      Signed by: Fidelina Horowitz 2/2/2024 4:06 PM  Dictation workstation:   [id number]  CT head wo IV contrast  Narrative: Interpreted By:  Fidelina Horowitz,   STUDY:  CT HEAD WO IV CONTRAST; ;  2/2/2024 3:45 pm      INDICATION:  Signs/Symptoms:found on the ground.      COMPARISON:  05/08/2020      ACCESSION NUMBER(S):  [id number]      ORDERING CLINICIAN:  RASHAD HARP      TECHNIQUE:  Serial axial images  of the head were obtained without intravenous  contrast. Sagittal and coronal reconstructions were generated      FINDINGS:  The ventricles are midline and minimally prominent.      There is suggestion of bilateral hygromas.      There are no acute parenchymal abnormalities.      There is no hemorrhage or extra-axial fluid.      There is a small left scalp hematoma. There is no obvious skull  fracture.      The visualized portions of the paranasal sinuses are unremarkable.  Mastoid air cells are not well pneumatized.      COMPARISON OF FINDINGS:  The brain is similar to the prior exam.      Impression: Generalized cerebral and cerebellar atrophy. No acute findings.          MACRO:  none      Signed by: Fidelina Horowitz 2/2/2024 4:04 PM  Dictation workstation:   [id number]  XR knee left 4+ views  Narrative: STUDY:  Knee Radiographs; 2/2/24 at 3:04 PM  INDICATION:  Left knee pain, found on ground.  COMPARISON:  Left knee XR 12/5/23.  ACCESSION NUMBER(S):  [id number]  ORDERING CLINICIAN:  RASHAD HARP  TECHNIQUE:  Four view(s) of the left knee.  FINDINGS:    There is no displaced fracture.  There are degenerative changes most  pronounced in the lateral and patellofemoral compartment with joint  space loss and osteophytes.  There is fullness in the suprapatellar  bursa consistent with a joint effusion.  Impression: No acute osseous abnormalities.  Degenerative changes of the left knee with joint effusion.  Signed by Andre Emmanuel MD  XR chest 1 view  Narrative: STUDY:  Chest Radiograph;  2/2/24 at 3:04 PM  INDICATION:  Weakness.  COMPARISON:  Chest XR 5/8/20  ACCESSION NUMBER(S):  [id number]  ORDERING CLINICIAN:  RASHAD HARP  TECHNIQUE:  Frontal chest was obtained at 1501 hours.  FINDINGS:  CARDIOMEDIASTINAL SILHOUETTE:  The heart is normal for technique.  There is widening of the  mediastinum.  This may be related to technique but adenopathy cannot  be excluded.     LUNGS:  There is linear infiltrate along the  minor fissure consistent with  subsegmental atelectasis in the right upper lobe.     ABDOMEN:  No remarkable upper abdominal findings.     BONES:  No acute osseous changes.  Impression: Right upper lobe subsegmental atelectasis.  Questionable mediastinal adenopathy.  Signed by Andre Emmanuel MD  XR hip left with pelvis when performed 2 or 3 views  Narrative: STUDY:  Hip Radiographs; 2/2/2024 3:04 PM  INDICATION:  Left hip pain post fall.  Found on ground.  COMPARISON:  None Available.  ACCESSION NUMBER(S):  [id number]  ORDERING CLINICIAN:  RASHAD HARP  TECHNIQUE:  Three view(s) of the left hip.  FINDINGS:    There are degenerative changes lower lumbosacral spine.  There is no  displaced fracture.  The alignment is anatomic.  There is a foreign  body overlying the right lower pelvis.  This is most likely on the  patient.  Impression: No acute osseous abnormalities.  Signed by Andre Emmanuel MD      Physical Exam  Constitutional:       Appearance: Normal appearance.   HENT:      Head: Normocephalic.      Nose: Nose normal.      Mouth/Throat:      Mouth: Mucous membranes are moist.   Eyes:      Pupils: Pupils are equal, round, and reactive to light.   Cardiovascular:      Rate and Rhythm: Normal rate and regular rhythm.      Pulses: Normal pulses.   Pulmonary:      Effort: Pulmonary effort is normal.   Abdominal:      General: Abdomen is flat.   Musculoskeletal:         General: Normal range of motion.   Skin:     General: Skin is warm.   Neurological:      Mental Status: He is alert and oriented to person, place, and time.                Assessment/Plan      Chao Thao is a 83 y.o. male with a history of hypertension and coronary artery disease presents to the emergency room due to weakness is found to have pneumonia, new onset atrial fibrillation, rhabdomyolysis and acute kidney injury.     Acute metabolic encephalopathy due to pneumonia and dehydration  Flu A, B, RSV and corona not detected  CT with right  basilar infiltrates  Encephalopathy has resolved  Procalcitonin 0.35, Continue Rocephin (Day 4) and completed azithromycin (Day 4/7 of anitbiotics)  Blood cultures shows no growth to date  Continue DuoNebs every 6 hours and albuterol as needed  Continue Tylenol as needed for fever     New onset atrial fibrillation  Rate controlled, continue home dose of metoprolol  TSH 0.67  Cardiology  advised to transition to DOAC  Will discontinue heparin 1800 and start Eliquis     Rhabdomyolysis  DC IVF  Creatinine kinase improved    Elevated transaminases  Suspect is related to above  improving  Repeat level in a.m.     Elevated troponin due to demand ischemia  Likely due to above  EKG shows no concerns for ischemia or infarction     Peripheral arterial disease  To see vascular surgery as an outpatient    Acute kidney injury  Serum current baseline appears to be around 1  resolved     Age-indeterminate L2 compression fracture  Patient denies any pain     Fall  Will consult physical therapy and Occupational Therapy     Hypertension  Holding lisinopril due to acute kidney injury  Blood pressure remains low  Will continue to hold lisinopril     Mood disorder  Depression versus anxiety  Continue Cymbalta and Lexapro as prescribed     Hyperlipidemia  Transaminases have improved we will restart Zocor     DVT prophylaxis  With heparin drip     CODE STATUS  Full code as discussed with patient    Disposition  Patient is medically stable for discharge awaiting pre-CERT from [facility]        Zack Rao DO

## 2024-02-05 NOTE — PROGRESS NOTES
"Subjective Data:  Patient is resting in bed comfortably. States that he feels \"great\".  Denies any chest pain, chest pressure, palpitations, dizziness, cough, shortness of breath, orthopnea, edema.      Overnight Events:    No acute events reported overnight.     Objective Data:  Last Recorded Vitals:  Vitals:    02/04/24 1300 02/04/24 1359 02/04/24 2249 02/05/24 0549   BP: 101/54  110/59 120/59   BP Location: Left arm  Left arm Left arm   Patient Position: Sitting  Sitting Lying   Pulse: 106  85 70   Resp: 16  16 18   Temp: 36.1 °C (97 °F)  36.3 °C (97.3 °F) 36.2 °C (97.2 °F)   TempSrc: Temporal  Temporal Temporal   SpO2: 97% 90% 92% 92%   Weight:       Height:           Last Labs:  CBC - 2/4/2024:  7:39 AM  15.3 11.1 417    35.0      CMP - 2/5/2024:  6:11 AM  7.5 5.5 39 --- 0.3   3.1 2.3 33 102      PTT - No results in last year.  _   _ _     TROPHS   Date/Time Value Ref Range Status   02/02/2024 04:55 PM 23 0 - 20 ng/L Final   02/02/2024 02:19 PM 27 0 - 20 ng/L Final     BNP   Date/Time Value Ref Range Status   02/03/2024 06:44 AM 56 0 - 99 pg/mL Final   09/10/2020 11:33 AM 46 0 - 99 pg/mL Final     Comment:     .  <100 pg/mL - Heart failure unlikely  100-299 pg/mL - Intermediate probability of acute heart  .               failure exacerbation. Correlate with clinical  .               context and patient history.    >=300 pg/mL - Heart Failure likely. Correlate with clinical  .               context and patient history.  BNP testing is performed using different testing   methodology at [facility] than at other   [facility] hospitals. Direct result comparisons should   only be made within the same method.     03/16/2020 12:08 PM 37 0 - 99 pg/mL Final     Comment:     .  <100 pg/mL - Heart failure unlikely  100-299 pg/mL - Intermediate probability of acute heart  .               failure exacerbation. Correlate with clinical  .               context and patient history.    >=300 pg/mL - Heart Failure " "likely. Correlate with clinical  .               context and patient history.   Biotin interference may cause falsely decreased results.   Patients taking a Biotin dose of up to 5 mg/day should   refrain from taking Biotin for 24 hours before sample   collection. Providers may contact their local laboratory   for further information.       HGBA1C   Date/Time Value Ref Range Status   2020 12:08 PM 5.9 % Final     Comment:          Diagnosis of Diabetes-Adults   Non-Diabetic: < or = 5.6%   Increased risk for developing diabetes: 5.7-6.4%   Diagnostic of diabetes: > or = 6.5%  .       Monitoring of Diabetes                Age (y)     Therapeutic Goal (%)   Adults:          >18           <7.0   Pediatrics:    13-18           <7.5                   7-12           <8.0                   0- 6            7.5-8.5   American Diabetes Association. Diabetes Care 33(S1), 2010.       VLDL   Date/Time Value Ref Range Status   2020 12:08 PM 19 0 - 40 mg/dL Final   2019 11:06 AM 20 0 - 40 mg/dL Final   2018 11:42 AM 21 0 - 40 mg/dL Final      Last I/O:  I/O last 3 completed shifts:  In: 4939.1 (58.5 mL/kg) [P.O.:660; I.V.:3249.1 (38.5 mL/kg); IV Piggyback:1030]  Out: 1900 (22.5 mL/kg) [Urine:1900 (0.6 mL/kg/hr)]  Weight: 84.5 kg     Past Cardiology Tests (Last 3 Years):  EK2024: Afib, 80 bpm. With single PVC.     Echo:  Transthoracic Echocardiogram (10/06/2020):   CONCLUSIONS:   1. The left ventricular systolic function is low normal with a 55% estimated ejection fraction.   2. Spectral Doppler shows an impaired relaxation pattern of left ventricular diastolic filling.   3. Mild aortic valve stenosis.   4. There is mild tricuspid regurgitation.   5. The estimated pulmonary artery pressure is mildly elevated with the RVSP at 38.9 mmHg.   6. The inferior vena cava appears to be of normal size.    Ejection Fractions:  No results found for: \"EF\"    Cath:  No results found for this or any previous " visit from the past 1095 days.    Stress Test:  No results found for this or any previous visit from the past 1095 days.    Imaging:  Xray L Hip w/ Pelvis (02/02/2024):   FINDINGS:  There are degenerative changes lower lumbosacral spine.  There is no displaced fracture.  The alignment is anatomic.  There is a foreign body overlying the right lower pelvis.  This is most likely on the patient.    CXR (02/02/2024):   IMPRESSION: Right upper lobe subsegmental atelectasis. Questionable mediastinal adenopathy.    L Knee 4 View (02/02/2024):   IMPRESSION: No acute osseous abnormalities. Degenerative changes of the left knee with joint effusion.    CT Head w/o IV Contrast (02/02/2024):   IMPRESSION: Generalized cerebral and cerebellar atrophy. No acute findings.    CT Cervical Spine w/o contrast (02/02/2024):   IMPRESSION: No obvious fracture. Moderate degenerative change. Carotid artery calcifications.    CT Chest, Abd, Pelvis (02/02/2024):   IMPRESSION:  -In the chest there are small cystic changes in both lower lobes, some patchy interstitial infiltrate in the posterior right base and relatively thick band of atelectasis or scarring in the posterior right upper lobe.  No significant alveolar consolidation is seen and there is no evidence of pneumothorax.  No rib or thoracic vertebral fractures are visible.  -In the abdomen and pelvis there is sludge or small stones in the gallbladder but there is no evidence of cholecystitis.  -There is an approximately 30% loss of height in the superior endplate of L2 of uncertain age.  -There is some subcutaneous edema posterior lateral to the right hip but no additional additional subcutaneous abnormalities are appreciated.    Inpatient Medications:  Scheduled medications   Medication Dose Route Frequency    acetaminophen  975 mg oral q8h    cefTRIAXone  2 g intravenous q24h    DULoxetine  60 mg oral Daily    escitalopram  5 mg oral Nightly    ipratropium-albuteroL  3 mL nebulization  q6h while awake    metoprolol succinate XL  50 mg oral Daily    simvastatin  20 mg oral Nightly    vancomycin  1,500 mg intravenous q24h     PRN medications   Medication    albuterol    heparin    oxygen     Continuous Medications   Medication Dose Last Rate    heparin  0-4,500 Units/hr 1,300 Units/hr (02/05/24 0650)       Physical Exam:  Physical Exam  Constitutional:       Appearance: Normal appearance.   HENT:      Head: Normocephalic.      Nose: Nose normal.   Neck:      Comments: No JVD  Cardiovascular:      Rate and Rhythm: Normal rate. Rhythm irregular.      Comments: Tele: Afib (99 bpm) singe episode of VT (6 beats nonsustained).     Pulmonary:      Effort: Pulmonary effort is normal.      Breath sounds: Normal breath sounds.   Abdominal:      Palpations: Abdomen is soft.   Musculoskeletal:         General: Normal range of motion.      Comments: B/l LE with bandages/wraps d/t ulceration. Thick, flaky toe nails, unmaintained. Warm.   Skin:     General: Skin is warm.   Neurological:      General: No focal deficit present.      Mental Status: He is alert.   Psychiatric:         Mood and Affect: Mood normal.         Behavior: Behavior normal.       [id number] Score  Age >= 75: 2   Sex Male: 0   CHF History No: 0   HTN Yes: 1   Stroke/TIA/Thromboembolism No: 0   Vascular Dz: CAD/PAD/Aortic Plaque Yes: 1   DM No: 0   Total Score 4          Assessment/Plan   Chao quiñonez is an 82 yo male with a PMH of HTN, HLD, CAD s/p PTCA 10y ago (per wife w/ cardiologist in [place], have been unable to verify). Who was found down for unknown time, May have been up to 7 days and was admitted for Pneumonia and Rhabdomyolysis. Cardiology consulted for new onset atrial fibrillation. On admission patient appeared clinically dry, EKG with rate controlled Afib. Extensive discussion by previous cardiology provide regarding anticoagulation given high fall risk. Patient would prefer to be anticoagulated. Cardioversion deferred as  patient is asymptomatic and rate controlled. Additionally patient with hx of PAD, frequent falls. Unable to further describe events leading to falls, may have some claudication?     #Atrial fibrillation (new onset), rate controlled   #HTN  #HLD  #Hx of CAD w/ possible PTCA >10y ago    -Patient would ideally be anticoagulated given [id number] 4, however significant falls history with concern for increased bleeding risk  -Patient currently slated to go to skilled nursing at discharge therefore would transition heparin gtt for DOAC with repeat discussion at follow up for ongoing anticoagulation risk management  -Continue Metoprolol, Simvastatin   -Resume ASA 81mg daily  -Echo recommended  -Will refer patient to vascular outpatient for ongoing mgmt of PAD w/ wounds.   -Will follow         Peripheral IV 02/02/24 20 G Left Antecubital (Active)   Site Assessment Clean;Dry;Intact 02/04/24 2100   Dressing Status Clean;Dry 02/04/24 2100   Number of days: 3       Peripheral IV 02/02/24 22 G Left Forearm (Active)   Site Assessment Clean;Dry;Intact 02/04/24 2100   Dressing Status Clean;Dry 02/04/24 2100   Number of days: 3       Code Status:  Full Code    I spent  minutes in the professional and overall care of this patient.        RIO Paredes-DAVONTE    Patient seen, discussed and examined with RIO Wilkerson, above is representative of my medical decision making.    Magdaleno Yan MD

## 2024-02-05 NOTE — PROGRESS NOTES
"Occupational Therapy    Occupational Therapy Treatment    Name: Chao Thao  MRN: [id number]  : 1940  Date: 24  Time Calculation  Start Time: 1248  Stop Time: 1305  Time Calculation (min): 17 min    Assessment:  OT Assessment: Pt presents with impaired cognition, decreased endurance, decreased ADL performance, decreased functional mobility. Continued skilled OT recommended to maximize pt safety and independence to return to PLOF  Prognosis: Good  Barriers to Discharge: None  Evaluation/Treatment Tolerance: Patient limited by fatigue  Medical Staff Made Aware: Yes  End of Session Communication: Bedside nurse, PCT/NA/CTA  End of Session Patient Position: Bed, 3 rail up, Alarm on  Plan:  Treatment Interventions: ADL retraining, Functional transfer training, Endurance training, Patient/family training, Compensatory technique education  OT Frequency: 3 times per week  OT Discharge Recommendations: Moderate intensity level of continued care  Equipment Recommended upon Discharge: Wheeled walker  OT Recommended Transfer Status: Assist of 1  OT - OK to Discharge: Yes (As per OT POC)    Subjective   Previous Visit Info:  OT Last Visit  OT Received On: 24  General:  General  Reason for Referral: Impaired mobility; 82 y/o male admitted for AMS, generalized weakness and fall at home.  Referred By: Zack Rao DO  Past Medical History Relevant to Rehab: Advanced dementia, [place], HTN, CAD, tinea pedis B feet, cardiac cath.  Family/Caregiver Present: No  Prior to Session Communication: Bedside nurse, PCT/NA/CTA  Patient Position Received: Bed, 3 rail up, Alarm on  Preferred Learning Style: verbal  General Comment: Cleared per nursing this date.   Pt remains pleasantly confused, cooperative and agreeable to participate in therapy.  Pt very [place].   When pt initially adressed reported, \"I need cleaned up\" found to be sitting in large loose stool. Pt engaged in bed mobility and toileting clean-up with " sgnificantly increased time required to complete tasks.  Precautions:     Vitals:     Pain Assessment:  Pain Assessment  Pain Assessment: 0-10  Pain Score: 0 - No pain     Objective   Activities of Daily Living: Toileting  Toileting Level of Assistance: Dependent  Where Assessed: Bed level  Toileting Comments:  (Pt performed less than 15% of effort required in course of task execution for clean-up from incontinent BM. Single step cues required for patient participation.)     Bed Mobility/Transfers: Bed Mobility  Bed Mobility: Yes  Bed Mobility 1  Bed Mobility 1: Rolling right, Rolling left  Level of Assistance 1: Maximum assistance  Bed Mobility Comments 1: Significantly increased time on task with processing latency and need for repeated directions.  Bed Mobility 2  Bed Mobility  2: Scooting  Level of Assistance 2: Dependent (With patient gripping siderails and use of bed tilt function)  Bed Mobility 3  Bed Mobility 3: Supine sitting, Sitting to supine  Level of Assistance 3: Dependent (Assist of 2 including assist with legs and trunk.)    Outcome Measures:  [facility] Daily Activity  Putting on and taking off regular lower body clothing: Total  Bathing (including washing, rinsing, drying): A lot  Putting on and taking off regular upper body clothing: A lot  Toileting, which includes using toilet, bedpan or urinal: Total  Taking care of personal grooming such as brushing teeth: A lot  Eating Meals: None  Daily Activity - Total Score: 12    Education Documentation  Body Mechanics, taught by JADA Stephens at 2/5/2024  1:26 PM.  Learner: Patient  Readiness: Acceptance  Method: Explanation, Demonstration  Response: No Evidence of Learning, Needs Reinforcement  Comment: Pt compliant to instruction, though does not evedence ability to carry over instruction at this time.    Precautions, taught by JADA Stephens at 2/5/2024  1:26 PM.  Learner: Patient  Readiness: Acceptance  Method: Explanation, Demonstration  Response:  No Evidence of Learning, Needs Reinforcement  Comment: Pt compliant to instruction, though does not evedence ability to carry over instruction at this time.    ADL Training, taught by JADA Stephens at 2/5/2024  1:26 PM.  Learner: Patient  Readiness: Acceptance  Method: Explanation, Demonstration  Response: No Evidence of Learning, Needs Reinforcement  Comment: Pt compliant to instruction, though does not evedence ability to carry over instruction at this time.    Education Comments  Repetition of instruction required.      Goals:  Encounter Problems       Encounter Problems (Active)       OT Goals       Pt will demo ADL routine and meaningful daily activities using modifications as needed  (Progressing)       Start:  02/03/24    Expected End:  02/17/24            Pt will increase endurance to tolerate 15min of OOB activity with no more than 1 rest break in order to increase ability to engage in ADL completion.  (Progressing)       Start:  02/03/24    Expected End:  02/17/24            Pt will tolerate 10min stand during functional task completion with no more than 1 rest break in order to increase endurance for functional task completion.  (Progressing)       Start:  02/03/24    Expected End:  02/17/24            Pt will demo increased functional mobility to tolerate tasks necessary to complete ADL routine.  (Progressing)       Start:  02/03/24    Expected End:  02/17/24

## 2024-02-06 LAB
AORTIC VALVE MEAN GRADIENT: 4.9 MMHG
AORTIC VALVE PEAK VELOCITY: 1.64 M/S
AV PEAK GRADIENT: 10.7 MMHG
AVA (PEAK VEL): 1.35 CM2
AVA (VTI): 1.51 CM2
EJECTION FRACTION APICAL 4 CHAMBER: 56.9
ERYTHROCYTE [DISTWIDTH] IN BLOOD BY AUTOMATED COUNT: 12.6 % (ref 11.5–14.5)
HCT VFR BLD AUTO: 32.4 % (ref 41–52)
HGB BLD-MCNC: 10.5 G/DL (ref 13.5–17.5)
LEFT VENTRICULAR OUTFLOW TRACT DIAMETER: 1.97 CM
MCH RBC QN AUTO: 32.8 PG (ref 26–34)
MCHC RBC AUTO-ENTMCNC: 32.4 G/DL (ref 32–36)
MCV RBC AUTO: 101 FL (ref 80–100)
MITRAL VALVE E/A RATIO: 0.73
NRBC BLD-RTO: 0 /100 WBCS (ref 0–0)
PLATELET # BLD AUTO: 420 X10*3/UL (ref 150–450)
RBC # BLD AUTO: 3.2 X10*6/UL (ref 4.5–5.9)
RIGHT VENTRICLE PEAK SYSTOLIC PRESSURE: 41.4 MMHG
UFH PPP CHRO-ACNC: 0.7 IU/ML
VANCOMYCIN SERPL-MCNC: 13.5 UG/ML (ref 5–20)
WBC # BLD AUTO: 11.5 X10*3/UL (ref 4.4–11.3)

## 2024-02-06 PROCEDURE — 2500000002 HC RX 250 W HCPCS SELF ADMINISTERED DRUGS (ALT 637 FOR MEDICARE OP, ALT 636 FOR OP/ED): Performed by: FAMILY MEDICINE

## 2024-02-06 PROCEDURE — 1200000002 HC GENERAL ROOM WITH TELEMETRY DAILY

## 2024-02-06 PROCEDURE — 85520 HEPARIN ASSAY: CPT | Performed by: FAMILY MEDICINE

## 2024-02-06 PROCEDURE — 2500000004 HC RX 250 GENERAL PHARMACY W/ HCPCS (ALT 636 FOR OP/ED): Performed by: FAMILY MEDICINE

## 2024-02-06 PROCEDURE — 2500000005 HC RX 250 GENERAL PHARMACY W/O HCPCS: Performed by: FAMILY MEDICINE

## 2024-02-06 PROCEDURE — 36415 COLL VENOUS BLD VENIPUNCTURE: CPT | Performed by: FAMILY MEDICINE

## 2024-02-06 PROCEDURE — 85027 COMPLETE CBC AUTOMATED: CPT | Performed by: FAMILY MEDICINE

## 2024-02-06 PROCEDURE — 99232 SBSQ HOSP IP/OBS MODERATE 35: CPT | Performed by: INTERNAL MEDICINE

## 2024-02-06 PROCEDURE — 97110 THERAPEUTIC EXERCISES: CPT | Mod: GP

## 2024-02-06 PROCEDURE — 97116 GAIT TRAINING THERAPY: CPT | Mod: GP

## 2024-02-06 PROCEDURE — 2500000001 HC RX 250 WO HCPCS SELF ADMINISTERED DRUGS (ALT 637 FOR MEDICARE OP): Performed by: FAMILY MEDICINE

## 2024-02-06 PROCEDURE — 80202 ASSAY OF VANCOMYCIN: CPT | Performed by: FAMILY MEDICINE

## 2024-02-06 PROCEDURE — 94640 AIRWAY INHALATION TREATMENT: CPT | Mod: MUE

## 2024-02-06 PROCEDURE — 99233 SBSQ HOSP IP/OBS HIGH 50: CPT | Performed by: INTERNAL MEDICINE

## 2024-02-06 RX ADMIN — IPRATROPIUM BROMIDE AND ALBUTEROL SULFATE 3 ML: 2.5; .5 SOLUTION RESPIRATORY (INHALATION) at 19:55

## 2024-02-06 RX ADMIN — CEFTRIAXONE SODIUM 2 G: 2 INJECTION, SOLUTION INTRAVENOUS at 17:26

## 2024-02-06 RX ADMIN — IPRATROPIUM BROMIDE AND ALBUTEROL SULFATE 3 ML: 2.5; .5 SOLUTION RESPIRATORY (INHALATION) at 13:48

## 2024-02-06 RX ADMIN — ESCITALOPRAM OXALATE 5 MG: 10 TABLET ORAL at 20:40

## 2024-02-06 RX ADMIN — DULOXETINE HYDROCHLORIDE 60 MG: 60 CAPSULE, DELAYED RELEASE ORAL at 09:08

## 2024-02-06 RX ADMIN — METOPROLOL SUCCINATE 50 MG: 50 TABLET, EXTENDED RELEASE ORAL at 09:07

## 2024-02-06 RX ADMIN — APIXABAN 5 MG: 5 TABLET, FILM COATED ORAL at 17:26

## 2024-02-06 RX ADMIN — Medication 1 APPLICATION: at 09:10

## 2024-02-06 RX ADMIN — SIMVASTATIN 20 MG: 20 TABLET, FILM COATED ORAL at 20:39

## 2024-02-06 RX ADMIN — VANCOMYCIN HYDROCHLORIDE 1500 MG: 10 INJECTION, POWDER, LYOPHILIZED, FOR SOLUTION INTRAVENOUS at 12:11

## 2024-02-06 RX ADMIN — ACETAMINOPHEN 975 MG: 325 TABLET ORAL at 04:22

## 2024-02-06 RX ADMIN — ACETAMINOPHEN 975 MG: 325 TABLET ORAL at 12:10

## 2024-02-06 RX ADMIN — ACETAMINOPHEN 975 MG: 325 TABLET ORAL at 20:39

## 2024-02-06 RX ADMIN — Medication 21 PERCENT: at 07:33

## 2024-02-06 RX ADMIN — APIXABAN 5 MG: 5 TABLET, FILM COATED ORAL at 05:10

## 2024-02-06 RX ADMIN — Medication 1 APPLICATION: at 20:41

## 2024-02-06 RX ADMIN — IPRATROPIUM BROMIDE AND ALBUTEROL SULFATE 3 ML: 2.5; .5 SOLUTION RESPIRATORY (INHALATION) at 07:30

## 2024-02-06 ASSESSMENT — COGNITIVE AND FUNCTIONAL STATUS - GENERAL
DRESSING REGULAR LOWER BODY CLOTHING: A LOT
DRESSING REGULAR UPPER BODY CLOTHING: A LITTLE
CLIMB 3 TO 5 STEPS WITH RAILING: A LOT
HELP NEEDED FOR BATHING: A LOT
DAILY ACTIVITIY SCORE: 16
WALKING IN HOSPITAL ROOM: A LOT
MOBILITY SCORE: 14
MOVING TO AND FROM BED TO CHAIR: A LOT
DAILY ACTIVITIY SCORE: 16
MOVING FROM LYING ON BACK TO SITTING ON SIDE OF FLAT BED WITH BEDRAILS: A LITTLE
HELP NEEDED FOR BATHING: A LOT
TURNING FROM BACK TO SIDE WHILE IN FLAT BAD: A LITTLE
CLIMB 3 TO 5 STEPS WITH RAILING: A LOT
TURNING FROM BACK TO SIDE WHILE IN FLAT BAD: A LITTLE
MOBILITY SCORE: 14
MOVING FROM LYING ON BACK TO SITTING ON SIDE OF FLAT BED WITH BEDRAILS: A LITTLE
STANDING UP FROM CHAIR USING ARMS: A LOT
DRESSING REGULAR UPPER BODY CLOTHING: A LITTLE
MOVING TO AND FROM BED TO CHAIR: A LOT
STANDING UP FROM CHAIR USING ARMS: A LOT
TURNING FROM BACK TO SIDE WHILE IN FLAT BAD: A LITTLE
TOILETING: A LOT
WALKING IN HOSPITAL ROOM: A LOT
STANDING UP FROM CHAIR USING ARMS: A LOT
MOVING TO AND FROM BED TO CHAIR: A LOT
PERSONAL GROOMING: A LITTLE
MOVING FROM LYING ON BACK TO SITTING ON SIDE OF FLAT BED WITH BEDRAILS: A LITTLE
CLIMB 3 TO 5 STEPS WITH RAILING: A LOT
MOBILITY SCORE: 14
PERSONAL GROOMING: A LITTLE
TOILETING: A LOT
DRESSING REGULAR LOWER BODY CLOTHING: A LOT
WALKING IN HOSPITAL ROOM: A LOT

## 2024-02-06 ASSESSMENT — PAIN SCALES - GENERAL
PAINLEVEL_OUTOF10: 0 - NO PAIN
PAINLEVEL_OUTOF10: 0 - NO PAIN
PAINLEVEL_OUTOF10: 3

## 2024-02-06 ASSESSMENT — PAIN - FUNCTIONAL ASSESSMENT
PAIN_FUNCTIONAL_ASSESSMENT: 0-10

## 2024-02-06 NOTE — NURSING NOTE
Patient seen today for concerns of wound on scrotum, wife at bedside.  Scrotum red with epidermal erosion from possible scratch, unsure of etiology. Redness noted between legs rt incontinence of B & B.  Bed linen and gown change, stool soft brown, external catheter replaced.  Updated wife, answered questions. Obtained orders.

## 2024-02-06 NOTE — PROGRESS NOTES
Subjective Data:  Patient reports that he does not feel short of breath at all today for the first day. He states that he is hopeful to get up and moving with therapy today. Denies any chest pain or pressure. States that he is eating/drinking well. Denies any palpitations.     Overnight Events:    No acute events overnight.      Objective Data:  Last Recorded Vitals:  Vitals:    02/05/24 0953 02/05/24 2120 02/06/24 0418 02/06/24 0733   BP: 129/70 127/76 132/69    BP Location:  Left arm Left arm    Patient Position:  Lying Lying    Pulse:  75 62    Resp:  18 18    Temp:  36.7 °C (98.1 °F) 36 °C (96.8 °F)    TempSrc:  Temporal Temporal    SpO2:  97% 94% 94%   Weight:       Height:           Last Labs:  CBC - 2/6/2024:  5:34 AM  11.5 10.5 420    32.4      CMP - 2/5/2024:  6:11 AM  7.5 5.5 39 --- 0.3   3.1 2.3 33 102      PTT - No results in last year.  _   _ _     TROPHS   Date/Time Value Ref Range Status   02/02/2024 04:55 PM 23 0 - 20 ng/L Final   02/02/2024 02:19 PM 27 0 - 20 ng/L Final     BNP   Date/Time Value Ref Range Status   02/03/2024 06:44 AM 56 0 - 99 pg/mL Final   09/10/2020 11:33 AM 46 0 - 99 pg/mL Final     Comment:     .  <100 pg/mL - Heart failure unlikely  100-299 pg/mL - Intermediate probability of acute heart  .               failure exacerbation. Correlate with clinical  .               context and patient history.    >=300 pg/mL - Heart Failure likely. Correlate with clinical  .               context and patient history.  BNP testing is performed using different testing   methodology at [facility] than at other   [facility] hospitals. Direct result comparisons should   only be made within the same method.     03/16/2020 12:08 PM 37 0 - 99 pg/mL Final     Comment:     .  <100 pg/mL - Heart failure unlikely  100-299 pg/mL - Intermediate probability of acute heart  .               failure exacerbation. Correlate with clinical  .               context and patient history.    >=300 pg/mL -  Heart Failure likely. Correlate with clinical  .               context and patient history.   Biotin interference may cause falsely decreased results.   Patients taking a Biotin dose of up to 5 mg/day should   refrain from taking Biotin for 24 hours before sample   collection. Providers may contact their local laboratory   for further information.       HGBA1C   Date/Time Value Ref Range Status   03/16/2020 12:08 PM 5.9 % Final     Comment:          Diagnosis of Diabetes-Adults   Non-Diabetic: < or = 5.6%   Increased risk for developing diabetes: 5.7-6.4%   Diagnostic of diabetes: > or = 6.5%  .       Monitoring of Diabetes                Age (y)     Therapeutic Goal (%)   Adults:          >18           <7.0   Pediatrics:    13-18           <7.5                   7-12           <8.0                   0- 6            7.5-8.5   American Diabetes Association. Diabetes Care 33(S1), Jan 2010.       VLDL   Date/Time Value Ref Range Status   03/16/2020 12:08 PM 19 0 - 40 mg/dL Final   05/07/2019 11:06 AM 20 0 - 40 mg/dL Final   08/08/2018 11:42 AM 21 0 - 40 mg/dL Final      Last I/O:  I/O last 3 completed shifts:  In: 180 (2.1 mL/kg) [P.O.:180]  Out: 700 (8.3 mL/kg) [Urine:700 (0.2 mL/kg/hr)]  Weight: 84.5 kg     Past Cardiology Tests (Last 3 Years):  EKG:  EKG 02/02/2024 (14:00) Afib, 80 bpm with PVC  EKG 02/03/2024 (12:53) Afib, 100 bpm w/ PVC     Echo:  Transthoracic Echo (TTE) Complete 02/05/2024:  CONCLUSIONS:   1. Left ventricular systolic function is mildly decreased with a 50% estimated ejection fraction.   2. Spectral Doppler shows an impaired relaxation pattern of left ventricular diastolic filling.   3. Moderately enlarged right ventricle.   4. The estimated pulmonary artery pressure is mildly elevated with the RVSP at 41.4 mmHg.   5. Technical difficulty of study prohibits definitive assessment of valvular structures.    Transthoracic Echocardiogram (10/06/2020):   CONCLUSIONS:   1. The left ventricular  "systolic function is low normal with a 55% estimated ejection fraction.   2. Spectral Doppler shows an impaired relaxation pattern of left ventricular diastolic filling.   3. Mild aortic valve stenosis.   4. There is mild tricuspid regurgitation.   5. The estimated pulmonary artery pressure is mildly elevated with the RVSP at 38.9 mmHg.   6. The inferior vena cava appears to be of normal size.    Ejection Fractions:  No results found for: \"EF\"  Cath:  No results found for this or any previous visit from the past 1095 days.    Stress Test:  No results found for this or any previous visit from the past 1095 days.    Cardiac Imaging:  No results found for this or any previous visit from the past 1095 days.      Inpatient Medications:  Scheduled medications   Medication Dose Route Frequency    acetaminophen  975 mg oral q8h    apixaban  5 mg oral q12h    cefTRIAXone  2 g intravenous q24h    DULoxetine  60 mg oral Daily    escitalopram  5 mg oral Nightly    ipratropium-albuteroL  3 mL nebulization q6h while awake    metoprolol succinate XL  50 mg oral Daily    simvastatin  20 mg oral Nightly    vancomycin  1,500 mg intravenous q24h    zinc oxide  1 Application Topical BID     PRN medications   Medication    albuterol    heparin    oxygen     Continuous Medications   Medication Dose Last Rate       Physical Exam:  Physical Exam  Constitutional:       Appearance: Normal appearance.   HENT:      Head: Normocephalic.      Nose: Nose normal.      Mouth/Throat:      Mouth: Mucous membranes are moist.   Eyes:      Conjunctiva/sclera: Conjunctivae normal.   Neck:      Comments: No JVD  Cardiovascular:      Rate and Rhythm: Normal rate. Rhythm irregular.      Heart sounds: No murmur heard.     Comments: Tele: Afib 75 bpm.     Pulmonary:      Effort: Pulmonary effort is normal.      Breath sounds: Normal breath sounds.   Abdominal:      Palpations: Abdomen is soft.   Skin:     General: Skin is warm and dry.   Neurological:      " General: No focal deficit present.      Mental Status: He is alert. Mental status is at baseline.   Psychiatric:         Mood and Affect: Mood normal.         Behavior: Behavior normal.            Assessment/Plan   Chao quiñonez is an 82 yo male with a PMH of HTN, HLD, CAD s/p PTCA 10y ago (per wife w/ cardiologist in [place], have been unable to verify). Who was found down for unknown time, May have been up to 7 days and was admitted for Pneumonia and Rhabdomyolysis. Cardiology consulted for new onset atrial fibrillation. On admission patient appeared clinically dry, EKG with rate controlled Afib. Extensive discussion by previous cardiology provide regarding anticoagulation given high fall risk. Patient would prefer to be anticoagulated. Cardioversion deferred as patient is asymptomatic and rate controlled. Additionally patient with hx of PAD, frequent falls. Unable to further describe events leading to falls, may have some claudication?      #Atrial fibrillation (new onset), rate controlled   #HTN  #HLD  #Hx of CAD w/ possible PTCA >10y ago     -Patient would ideally be anticoagulated given [id number], however significant falls history with concern for increased bleeding risk. Patient currently slated to go to skilled nursing at discharge therefore would transition heparin gtt for DOAC with repeat discussion at follow up for ongoing anticoagulation risk management, ASA  -Continue Metoprolol, Simvastatin, ASA  -Echo reviewed with low normal EF (EF 50%), impaired diastolic dysfunction.   -Will refer patient to vascular outpatient for ongoing mgmt of PAD w/ wounds.   -Ok to d/c per CV standpoint, has follow up scheduled.     Peripheral IV 02/02/24 20 G Left Antecubital (Active)   Site Assessment Clean;Dry;Intact 02/05/24 2041   Dressing Status Clean;Dry 02/05/24 2041   Number of days: 4       Peripheral IV 02/02/24 22 G Left Forearm (Active)   Site Assessment Clean;Dry;Intact 02/05/24 2041   Dressing Status  Clean;Dry 02/05/24 2041   Number of days: 4       Code Status:  Full Code    I spent  minutes in the professional and overall care of this patient.        RIO Paredes-DAVONTE    Patient seen, discussed and examined with RIO Wilkerson, above is representative of my medical decision making.    Magdaleno Yan MD

## 2024-02-06 NOTE — NURSING NOTE
0732 Assumed care of pt.     0907 Medications given and assessment completed on pt. Stable condition. No complaints of pain or discomfort.     1149 Dressings completed on bilateral legs as ordered. Zinc cream applied to sacrum and scrotum.     1752 Pt remains free of pain or discomfort. Stable condition.    negative detailed exam

## 2024-02-06 NOTE — CONSULTS
Vancomycin Dosing by Pharmacy- Cessation of Therapy    Consult to pharmacy for vancomycin dosing has been discontinued by the prescriber, pharmacy will sign off at this time.    Please call pharmacy if there are further questions or re-enter a consult if vancomycin is resumed.     Jayce Ross, JerryD

## 2024-02-06 NOTE — PROGRESS NOTES
Krishna Thao is a 83 y.o. male on day 4 of admission presenting with Traumatic rhabdomyolysis, initial encounter (CMS/[facility]).      Subjective   Doing ok. Enjoying the candy that was brought for him. Sleeping ok.        Objective     Last Recorded Vitals  /65 (BP Location: Right arm, Patient Position: Lying)   Pulse 63   Temp 36.4 °C (97.5 °F) (Temporal)   Resp 18   Wt 84.5 kg (186 lb 4.6 oz)   SpO2 94%   Intake/Output last 3 Shifts:    Intake/Output Summary (Last 24 hours) at 2/6/2024 1417  Last data filed at 2/6/2024 0947  Gross per 24 hour   Intake 360 ml   Output 300 ml   Net 60 ml       Admission Weight  Weight: 88.5 kg (195 lb) (02/02/24 1351)    Daily Weight  02/02/24 : 84.5 kg (186 lb 4.6 oz)    Image Results  Transthoracic Echo (TTE) [facility], [address] [place] [address]                Tel [phone] and Fax [phone]    TRANSTHORACIC ECHOCARDIOGRAM REPORT       Patient Name:      KRISHNA THAO       Reading Physician:    [id number] Magdaleno Yan MD  Study Date:        2/5/2024             Ordering Provider:    [id number] ARGELIA PEREZ  MRN/PID:           [id number]             Fellow:  Accession#:        [id number]         Nurse:                Kathi Turner RN  Date of Birth/Age: 1940 / 83 years Sonographer:          Kathi Thomas RDCS  Gender:            M                    Additional Staff:  Height:            167.64 cm            Admit Date:           2/2/2024  Weight:            84.37 kg             Admission Status:     Inpatient -                                                                Routine  BSA:               1.94 m2              Encounter#:           [id number]                                           Department Location:  [facility] Non                                                                Invasive  Blood Pressure: 120 /59 mmHg    Study Type:    TRANSTHORACIC ECHO (TTE) COMPLETE  Diagnosis/ICD: Elevated Troponin-R79.89; Shortness of breath-R06.02; Unspecified                 atrial fibrillation-I48.91  Indication:    elevated troponin, , Atrial Fibrillation, Dyspnea  CPT Code:      Echo Complete w Full Doppler-[id number]    Patient History:  Pertinent History: HTN and CAD.    Study Detail: The following Echo studies were performed: 2D, M-Mode, Doppler and                color flow. Technically challenging study due to poor acoustic                windows and prominent lung artifact. Definity used as a contrast                agent for endocardial border definition. Total contrast used for                this procedure was 1 mL via IV push.       PHYSICIAN INTERPRETATION:  Left Ventricle: The left ventricular systolic function is mildly decreased, with an estimated ejection fraction of 50%. There are no regional wall motion abnormalities. The left ventricular cavity size is normal. Spectral Doppler shows an impaired relaxation pattern of left ventricular diastolic filling.  Left Atrium: The left atrium was not well visualized.  Right Ventricle: The right ventricle is moderately enlarged. There is normal right ventricular global systolic function.  Right Atrium: The right atrium was not well visualized.  Aortic Valve: The aortic valve was not well visualized. Aortic valve regurgitation was not assessed. The peak instantaneous gradient of the aortic valve is 10.7 mmHg. The mean gradient of the aortic valve is 4.9 mmHg.  Mitral Valve: The mitral valve was not well visualized. Mitral valve regurgitation was not assessed.  Tricuspid Valve: The tricuspid valve is structurally normal. There is mild tricuspid regurgitation.  Pulmonic Valve: The pulmonic valve is not well visualized. The  pulmonic valve regurgitation was not well visualized.  Pericardium: There is no pericardial effusion noted.  Aorta: The aortic root is normal.  Pulmonary Artery: The tricuspid regurgitant velocity is 2.80 m/s, and with an estimated right atrial pressure of 10 mmHg, the estimated pulmonary artery pressure is mildly elevated with the RVSP at 41.4 mmHg.  Systemic Veins: The inferior vena cava was not well visualized.       CONCLUSIONS:   1. Left ventricular systolic function is mildly decreased with a 50% estimated ejection fraction.   2. Spectral Doppler shows an impaired relaxation pattern of left ventricular diastolic filling.   3. Moderately enlarged right ventricle.   4. The estimated pulmonary artery pressure is mildly elevated with the RVSP at 41.4 mmHg.   5. Technical difficulty of study prohibits definitive assessment of valvular structures.    QUANTITATIVE DATA SUMMARY:  LV SYSTOLIC FUNCTION BY 2D PLANIMETRY (MOD):                      Normal Ranges:  EF-A4C View: 56.9 % (>=55%)    LV DIASTOLIC FUNCTION:                         Normal Ranges:  MV Peak E: 0.77 m/s    (0.7-1.2 m/s)  MV Peak A: 1.06 m/s    (0.42-0.7 m/s)  E/A Ratio: 0.73        (1.0-2.2)  MV A Dur:  126.87 msec    MITRAL VALVE:                  Normal Ranges:  MV DT: 233 msec (150-240msec)    AORTIC VALVE:                                     Normal Ranges:  AoV Vmax:                1.64 m/s  (<=1.7m/s)  AoV Peak PG:             10.7 mmHg (<20mmHg)  AoV Mean P.9 mmHg  (1.7-11.5mmHg)  LVOT Max [place]ner:            0.73 m/s  (<=1.1m/s)  AoV VTI:                 31.98 cm  (18-25cm)  LVOT VTI:                15.89 cm  LVOT Diameter:           1.97 cm   (1.8-2.4cm)  AoV Area, VTI:           1.51 cm2  (2.5-5.5cm2)  AoV Area,Vmax:           1.35 cm2  (2.5-4.5cm2)  AoV Dimensionless Index: 0.50    TRICUSPID VALVE/RVSP:                              Normal Ranges:  Peak TR Velocity: 2.80 m/s  RV Syst Pressure: 41.4 mmHg (< 30mmHg)       [id number]  Magdaleno Yan MD  Electronically signed on 2/6/2024 at 8:31:48 AM       ** Final **    No current facility-administered medications on file prior to encounter.     Current Outpatient Medications on File Prior to Encounter   Medication Sig Dispense Refill    acetaminophen (Tylenol Extra Strength) 500 mg tablet Take 2 tablets (1,000 mg) by mouth every 8 hours. 180 tablet 3    ascorbic acid (Vitamin C) 500 mg tablet Take 1 tablet (500 mg) by mouth once daily.      cholecalciferol (Vitamin D-3) 25 MCG (1000 UT) capsule Take 1 capsule (25 mcg) by mouth once daily.      colloidal oatmeaL (Eucerin Eczema Relief) 1 % cream Apply to legs twice daily 206 g 3    cyanocobalamin (Vitamin B-12) 250 mcg tablet Take 1 tablet (250 mcg) by mouth once daily.      DULoxetine (Cymbalta) 60 mg DR capsule TAKE 1 CAPSULE BY MOUTH EVERY DAY 90 capsule 3    escitalopram (Lexapro) 5 mg tablet Take 1 tablet (5 mg) by mouth once daily at bedtime. 90 tablet 3    lidocaine (Salonpas, lidocaine,) 4 % patch Place 2 patches over 12 hours on the skin once daily. Remove & discard patch within 12 hours or as directed by MD. 60 patch 3    lisinopril 10 mg tablet TAKE 1 TABLET BY MOUTH EVERY DAY 90 tablet 3    metoprolol succinate XL (Toprol-XL) 50 mg 24 hr tablet TAKE 1 TABLET BY MOUTH EVERY DAY 90 tablet 3    simvastatin (Zocor) 40 mg tablet TAKE 1 TABLET BY MOUTH EVERY DAY IN THE EVENING 90 tablet 3    zinc acetate 50 mg (zinc) capsule Take 1 capsule by mouth once daily.      [DISCONTINUED] escitalopram (Lexapro) 5 mg tablet TAKE 1 TABLET BY MOUTH EVERYDAY AT BEDTIME 90 tablet 3      Results for orders placed or performed during the hospital encounter of 02/02/24 (from the past 24 hour(s))   Transthoracic Echo (TTE) Complete   Result Value Ref Range    AV pk esteban 1.64 m/s    AV mn grad 4.9 mmHg    LVOT diam 1.97 cm    MV E/A ratio 0.73     RVSP 41.4 mmHg    Aortic Valve Area by Continuity of VTI 1.51 cm2    Aortic Valve Area by Continuity of Peak  Velocity 1.35 cm2    AV pk grad 10.7 mmHg    LV A4C EF 56.9    Vancomycin   Result Value Ref Range    Vancomycin 13.5 5.0 - 20.0 ug/mL   Heparin Assay   Result Value Ref Range    Heparin Unfractionated 0.7 See Comment Below for Therapeutic Ranges IU/mL   CBC   Result Value Ref Range    WBC 11.5 (H) 4.4 - 11.3 x10*3/uL    nRBC 0.0 0.0 - 0.0 /100 WBCs    RBC 3.20 (L) 4.50 - 5.90 x10*6/uL    Hemoglobin 10.5 (L) 13.5 - 17.5 g/dL    Hematocrit 32.4 (L) 41.0 - 52.0 %     (H) 80 - 100 fL    MCH 32.8 26.0 - 34.0 pg    MCHC 32.4 32.0 - 36.0 g/dL    RDW 12.6 11.5 - 14.5 %    Platelets 420 150 - 450 x10*3/uL        Physical Exam  Constitutional:       Appearance: Normal appearance.   HENT:      Head: Normocephalic.      Comments: Very hard of hearing     Nose: Nose normal.      Mouth/Throat:      Mouth: Mucous membranes are moist.   Eyes:      Extraocular Movements: Extraocular movements intact.      Pupils: Pupils are equal, round, and reactive to light.   Neck:      Comments: No jvd  Cardiovascular:      Comments: Irregular, controlled rate  Faint distal pulses  Pulmonary:      Effort: Pulmonary effort is normal.      Breath sounds: Normal breath sounds.   Abdominal:      General: Bowel sounds are normal.      Palpations: Abdomen is soft.   Musculoskeletal:      Comments: Both legs are wrapped.   Skin:     General: Skin is warm and dry.   Neurological:      Mental Status: He is alert.      Comments: Alert, [place]  Cranial nerves otherwise intact  Weakness, legs   Psychiatric:         Mood and Affect: Mood normal.         Behavior: Behavior normal.       Relevant Results      Assessment/Plan        Active Problems:  There are no active Hospital Problems.    Rhabdomyolysis, traumatic in nature. His cpk levels declined quickly. Will recheck in am.   Fall. He will require therapy, has been accepted at [facility]-stable, rate controlled. Also having fairly frequent PVC's. He has been cleared by cards to go   EMILIANO,  resolved  Encephalopathy, resolved  Hypertension, somewhat labile. Adjust meds as indicated  Tentative plan on dc tomorrow              Naima Wallace MD

## 2024-02-06 NOTE — PROGRESS NOTES
Physical Therapy    Physical Therapy Treatment    Patient Name: Chao Thao  MRN: [id number]  Today's Date: 2/6/2024  Time Calculation  Start Time: 1307  Stop Time: 1335  Time Calculation (min): 28 min       Assessment/Plan   PT Assessment  PT Assessment Results: Decreased strength, Decreased mobility, Decreased cognition (deonditioning)  Rehab Prognosis: Good  Medical Staff Made Aware: Yes  End of Session Communication: Bedside nurse  End of Session Patient Position: Bed, 3 rail up, Alarm on (Pt as visitors present with him)  PT Plan  Inpatient/Swing Bed or Outpatient: Inpatient  PT Plan  Treatment/Interventions: Bed mobility, Transfer training, Gait training, Balance training, Neuromuscular re-education, Strengthening, Endurance training, Range of motion, Therapeutic exercise, Therapeutic activity, Positioning, Postural re-education  PT Plan: Skilled PT  PT Frequency: 3 times per week  PT Discharge Recommendations: Moderate intensity level of continued care, 24 hr supervision due to cognition  Equipment Recommended upon Discharge: Wheeled walker  PT - OK to Discharge: Yes (per PT POC)      General Visit Information:   PT  Visit  PT Received On: 02/06/24  Response to Previous Treatment: Patient with no complaints from previous session.  General  Reason for Referral:  (82 yo male admitted 2' to [facility], generalized weakness, fall, impaired mobility)  Referred By:  (Dr. LETICIA Rao)  Prior to Session Communication: Bedside nurse  Patient Position Received: Bed, 3 rail up, Alarm on  General Comment:  (Pt cleared fro Pt. Pt is pleasant and agreeable to work with PT. external catheter, waffle boots, IV. Cntinue to recommend MODERATE follow up services)    Subjective   Precautions:     Vital Signs:       Objective   Pain:  Pain Assessment  Pain Assessment: 0-10  Pain Score: 0 - No pain  Cognition:  Cognition  Overall Cognitive Status: Impaired  Postural Control:  Static Sitting Balance  Static Sitting-Balance Support:  Bilateral upper extremity supported  Static Sitting-Level of Assistance: Close supervision  Static Standing Balance  Static Standing-Balance Support: Bilateral upper extremity supported (wheeled walker)  Static Standing-Level of Assistance: Minimum assistance, Moderate assistance (1 person)  Dynamic Standing Balance  Dynamic Standing-Balance Support: Bilateral upper extremity supported (wheeled walker)  Dynamic Standing-Comments:  (Moderate assist)  Extremity/Trunk Assessments:  RLE   RLE : Within Functional Limits  LLE   LLE : Within Functional Limits  Activity Tolerance:  Activity Tolerance  Endurance: Tolerates 10 - 20 min exercise with multiple rests  Treatments:  Therapeutic Exercise  Therapeutic Exercise Performed: Yes  Therapeutic Exercise Activity 1:  (Pt performed 5 sit<>stand fuctional strengthening trials from EOB to wheeled walker. Sitting; B heel/toe raises, B LAQ. B hip flexion and B resisted hip abd/add each x 20 reps with rest periods between sets)    Bed Mobility  Bed Mobility: Yes  Bed Mobility 1  Bed Mobility 1: Supine to sitting, Sitting to supine  Level of Assistance 1: Minimum assistance (1 person)    Ambulation/Gait Training  Ambulation/Gait Training Performed: Yes  Ambulation/Gait Training 1  Surface 1: Level tile  Device 1: Rolling walker  Assistance 1: Moderate assistance  Quality of Gait 1: Decreased step length  Comments/Distance (ft) 1:  (side stepping up and down the EOB)  Transfers  Transfer: Yes  Transfer 1  Transfer From 1: Bed to  Transfer to 1: Stand  Technique 1: Sit to stand, Stand to sit  Transfer Device 1:  (wheeled walker)  Transfer Level of Assistance 1: Minimum assistance, Moderate assistance (1 person)    Outcome Measures:  [facility] Basic Mobility  Turning from your back to your side while in a flat bed without using bedrails: A little  Moving from lying on your back to sitting on the side of a flat bed without using bedrails: A little  Moving to and from bed to chair  (including a wheelchair): A lot  Standing up from a chair using your arms (e.g. wheelchair or bedside chair): A lot  To walk in hospital room: A lot  Climbing 3-5 steps with railing: A lot  Basic Mobility - Total Score: 14    Education Documentation  No documentation found.  Education Comments  No comments found.        OP EDUCATION:       Encounter Problems       Encounter Problems (Active)       PT Problem       Pt will demonstrate min A x 1 with completion of bed mobility activities.   (Progressing)       Start:  02/04/24    Expected End:  02/18/24            Pt will complete sit <> stand transfers and stand pivot with bed <> chair with LRD at mod A x 1.   (Progressing)       Start:  02/04/24    Expected End:  02/18/24            Pt will tolerate 20 reps of appropriate therapeutic exercises for general ROM, LE strength and endurance, balance and postural stability to return to baseline LOF with mobility.   (Progressing)       Start:  02/04/24    Expected End:  02/18/24            Pt will demonstrate >= FAIR static/ dynamic sitting and standing balance with LRD in order to reduce falls risk and increase safety with functional activities.   (Progressing)       Start:  02/04/24    Expected End:  02/18/24            Pt will tolerate 15 minutes of light PT activity with maintaining SpO2 >90% and without increased complaints of fatigue/ SOB in order to return to baseline activity level.   (Progressing)       Start:  02/04/24    Expected End:  02/18/24

## 2024-02-06 NOTE — PROGRESS NOTES
"Vancomycin Dosing by Pharmacy- FOLLOW UP    Chao Thao is a 83 y.o. year old male who Pharmacy has been consulted for vancomycin dosing for cellulitis, skin and soft tissue. Based on the patient's indication and renal status this patient is being dosed based on a goal AUC of 400-600.     Renal function is currently stable.    Current vancomycin dose: 1500 mg given every 24 hours    Most recent random level: 13.5 mcg/mL on 2/6 at 0534hrs.     Visit Vitals  /69 (BP Location: Left arm, Patient Position: Lying)   Pulse 62   Temp 36 °C (96.8 °F) (Temporal)   Resp 18        Lab Results   Component Value Date    CREATININE 0.80 02/05/2024    CREATININE 0.79 02/04/2024    CREATININE 0.95 02/03/2024    CREATININE 1.30 02/02/2024        Patient weight is No results found for: \"PTWEIGHT\"    No results found for: \"CULTURE\"     I/O last 3 completed shifts:  In: 180 (2.1 mL/kg) [P.O.:180]  Out: 700 (8.3 mL/kg) [Urine:700 (0.2 mL/kg/hr)]  Weight: 84.5 kg   [unfilled]    Lab Results   Component Value Date    PATIENTTEMP  02/02/2024      Comment:      NOTE: Patient Results are Not Corrected for Temperature        Assessment/Plan    Within goal AUC range. Continue current vancomycin regimen.    This dosing regimen is predicted by [employer] to result in the following pharmacokinetic parameters:    \"Loading dose: N/A  Regimen: 1500 mg IV every 24 hours.  Start time: 12:11 on 02/06/2024  Exposure target: AUC24 (range)400-600 mg/L.hr   AUC24,ss: 410 mg/L.hr  Probability of AUC24 > 400: 58 %  Ctrough,ss: 10.2 mg/L  Probability of Ctrough,ss > 20: 0 %  Probability of nephrotoxicity (Lodise VITO 2009): 6 %\"      The next level will be obtained on 2/8/24 at 0500hrs. May be obtained sooner if clinically indicated.   Will continue to monitor renal function daily while on vancomycin and order serum creatinine at least every 48 hours if not already ordered.  Follow for continued vancomycin needs, clinical response, and signs/symptoms " of toxicity.       Jayce Ross, PharmD

## 2024-02-07 PROBLEM — R45.4 IRRITABILITY AND ANGER: Status: RESOLVED | Noted: 2024-01-07 | Resolved: 2024-02-07

## 2024-02-07 LAB
ANION GAP SERPL CALC-SCNC: 12 MMOL/L (ref 10–20)
APPEARANCE UR: ABNORMAL
BACTERIA BLD CULT: NORMAL
BACTERIA BLD CULT: NORMAL
BILIRUB UR STRIP.AUTO-MCNC: NEGATIVE MG/DL
BUN SERPL-MCNC: 18 MG/DL (ref 6–23)
CALCIUM SERPL-MCNC: 7.6 MG/DL (ref 8.6–10.3)
CHLORIDE SERPL-SCNC: 103 MMOL/L (ref 98–107)
CK SERPL-CCNC: 85 U/L (ref 0–325)
CO2 SERPL-SCNC: 26 MMOL/L (ref 21–32)
COLOR UR: YELLOW
CREAT SERPL-MCNC: 0.9 MG/DL (ref 0.5–1.3)
EGFRCR SERPLBLD CKD-EPI 2021: 86 ML/MIN/1.73M*2
ERYTHROCYTE [DISTWIDTH] IN BLOOD BY AUTOMATED COUNT: 12.7 % (ref 11.5–14.5)
GLUCOSE SERPL-MCNC: 96 MG/DL (ref 74–99)
GLUCOSE UR STRIP.AUTO-MCNC: NEGATIVE MG/DL
HCT VFR BLD AUTO: 31.9 % (ref 41–52)
HGB BLD-MCNC: 10.4 G/DL (ref 13.5–17.5)
KETONES UR STRIP.AUTO-MCNC: NEGATIVE MG/DL
LEUKOCYTE ESTERASE UR QL STRIP.AUTO: NEGATIVE
MCH RBC QN AUTO: 33.3 PG (ref 26–34)
MCHC RBC AUTO-ENTMCNC: 32.6 G/DL (ref 32–36)
MCV RBC AUTO: 102 FL (ref 80–100)
NITRITE UR QL STRIP.AUTO: NEGATIVE
NRBC BLD-RTO: 0 /100 WBCS (ref 0–0)
PH UR STRIP.AUTO: 5 [PH]
PLATELET # BLD AUTO: 417 X10*3/UL (ref 150–450)
POTASSIUM SERPL-SCNC: 3.7 MMOL/L (ref 3.5–5.3)
PROT UR STRIP.AUTO-MCNC: NEGATIVE MG/DL
Q ONSET: 222 MS
QRS COUNT: 13 BEATS
QRS DURATION: 92 MS
QT INTERVAL: 366 MS
QTC CALCULATION(BAZETT): 422 MS
QTC FREDERICIA: 403 MS
R AXIS: 58 DEGREES
RBC # BLD AUTO: 3.12 X10*6/UL (ref 4.5–5.9)
RBC # UR STRIP.AUTO: NEGATIVE /UL
SODIUM SERPL-SCNC: 137 MMOL/L (ref 136–145)
SP GR UR STRIP.AUTO: 1.02
T AXIS: 246 DEGREES
T OFFSET: 405 MS
UROBILINOGEN UR STRIP.AUTO-MCNC: <2 MG/DL
VENTRICULAR RATE: 80 BPM
WBC # BLD AUTO: 13 X10*3/UL (ref 4.4–11.3)

## 2024-02-07 PROCEDURE — 1200000002 HC GENERAL ROOM WITH TELEMETRY DAILY

## 2024-02-07 PROCEDURE — 94760 N-INVAS EAR/PLS OXIMETRY 1: CPT

## 2024-02-07 PROCEDURE — 99238 HOSP IP/OBS DSCHRG MGMT 30/<: CPT | Performed by: INTERNAL MEDICINE

## 2024-02-07 PROCEDURE — 2500000001 HC RX 250 WO HCPCS SELF ADMINISTERED DRUGS (ALT 637 FOR MEDICARE OP): Performed by: FAMILY MEDICINE

## 2024-02-07 PROCEDURE — 94668 MNPJ CHEST WALL SBSQ: CPT

## 2024-02-07 PROCEDURE — 97530 THERAPEUTIC ACTIVITIES: CPT | Mod: GO,CO

## 2024-02-07 PROCEDURE — 97535 SELF CARE MNGMENT TRAINING: CPT | Mod: GO,CO

## 2024-02-07 PROCEDURE — 36415 COLL VENOUS BLD VENIPUNCTURE: CPT | Performed by: FAMILY MEDICINE

## 2024-02-07 PROCEDURE — 82550 ASSAY OF CK (CPK): CPT | Performed by: INTERNAL MEDICINE

## 2024-02-07 PROCEDURE — 81003 URINALYSIS AUTO W/O SCOPE: CPT | Performed by: INTERNAL MEDICINE

## 2024-02-07 PROCEDURE — 2500000002 HC RX 250 W HCPCS SELF ADMINISTERED DRUGS (ALT 637 FOR MEDICARE OP, ALT 636 FOR OP/ED): Performed by: FAMILY MEDICINE

## 2024-02-07 PROCEDURE — 80048 BASIC METABOLIC PNL TOTAL CA: CPT | Performed by: INTERNAL MEDICINE

## 2024-02-07 PROCEDURE — 94640 AIRWAY INHALATION TREATMENT: CPT | Mod: MUE

## 2024-02-07 PROCEDURE — 2500000004 HC RX 250 GENERAL PHARMACY W/ HCPCS (ALT 636 FOR OP/ED): Performed by: FAMILY MEDICINE

## 2024-02-07 PROCEDURE — 85027 COMPLETE CBC AUTOMATED: CPT | Performed by: FAMILY MEDICINE

## 2024-02-07 RX ORDER — ACETAMINOPHEN 500 MG
1000 TABLET ORAL EVERY 8 HOURS
Status: ON HOLD
Start: 2024-02-07

## 2024-02-07 RX ORDER — EAR PLUGS
1 EACH OTIC (EAR) 2 TIMES DAILY
Status: ON HOLD
Start: 2024-02-07

## 2024-02-07 RX ADMIN — METOPROLOL SUCCINATE 50 MG: 50 TABLET, EXTENDED RELEASE ORAL at 09:00

## 2024-02-07 RX ADMIN — Medication 1 APPLICATION: at 09:26

## 2024-02-07 RX ADMIN — IPRATROPIUM BROMIDE AND ALBUTEROL SULFATE 3 ML: 2.5; .5 SOLUTION RESPIRATORY (INHALATION) at 19:56

## 2024-02-07 RX ADMIN — IPRATROPIUM BROMIDE AND ALBUTEROL SULFATE 3 ML: 2.5; .5 SOLUTION RESPIRATORY (INHALATION) at 07:24

## 2024-02-07 RX ADMIN — APIXABAN 5 MG: 5 TABLET, FILM COATED ORAL at 18:10

## 2024-02-07 RX ADMIN — SIMVASTATIN 20 MG: 20 TABLET, FILM COATED ORAL at 21:34

## 2024-02-07 RX ADMIN — DULOXETINE HYDROCHLORIDE 60 MG: 60 CAPSULE, DELAYED RELEASE ORAL at 08:33

## 2024-02-07 RX ADMIN — CEFTRIAXONE SODIUM 2 G: 2 INJECTION, SOLUTION INTRAVENOUS at 17:05

## 2024-02-07 RX ADMIN — APIXABAN 5 MG: 5 TABLET, FILM COATED ORAL at 05:19

## 2024-02-07 RX ADMIN — IPRATROPIUM BROMIDE AND ALBUTEROL SULFATE 3 ML: 2.5; .5 SOLUTION RESPIRATORY (INHALATION) at 14:07

## 2024-02-07 RX ADMIN — ACETAMINOPHEN 975 MG: 325 TABLET ORAL at 21:34

## 2024-02-07 RX ADMIN — ESCITALOPRAM OXALATE 5 MG: 10 TABLET ORAL at 21:34

## 2024-02-07 RX ADMIN — ACETAMINOPHEN 975 MG: 325 TABLET ORAL at 04:50

## 2024-02-07 RX ADMIN — Medication 1 APPLICATION: at 21:00

## 2024-02-07 ASSESSMENT — COGNITIVE AND FUNCTIONAL STATUS - GENERAL
DAILY ACTIVITIY SCORE: 16
TOILETING: A LOT
PERSONAL GROOMING: A LITTLE
DRESSING REGULAR UPPER BODY CLOTHING: A LITTLE
DRESSING REGULAR LOWER BODY CLOTHING: A LOT
HELP NEEDED FOR BATHING: A LOT

## 2024-02-07 ASSESSMENT — ACTIVITIES OF DAILY LIVING (ADL): HOME_MANAGEMENT_TIME_ENTRY: 14

## 2024-02-07 ASSESSMENT — PAIN - FUNCTIONAL ASSESSMENT: PAIN_FUNCTIONAL_ASSESSMENT: 0-10

## 2024-02-07 ASSESSMENT — PAIN SCALES - GENERAL: PAINLEVEL_OUTOF10: 0 - NO PAIN

## 2024-02-07 NOTE — DISCHARGE SUMMARY
Discharge Diagnosis  Traumatic rhabdomyolysis, initial encounter (CMS/[facility])    Issues Requiring Follow-Up  Discussion regarding long term anticoagulation    Discharge Meds     Your medication list        START taking these medications        Instructions Last Dose Given Next Dose Due   apixaban 5 mg tablet  Commonly known as: Eliquis      Take 1 tablet (5 mg) by mouth every 12 hours.       zinc oxide 40 % ointment ointment      Apply 1 Application topically 2 times a day.              CONTINUE taking these medications        Instructions Last Dose Given Next Dose Due   acetaminophen 500 mg tablet  Commonly known as: Tylenol      Take 2 tablets (1,000 mg) by mouth every 8 hours.       ascorbic acid 500 mg tablet  Commonly known as: Vitamin C           cholecalciferol 25 MCG (1000 UT) capsule  Commonly known as: Vitamin D-3           cyanocobalamin 250 mcg tablet  Commonly known as: Vitamin B-12           DULoxetine 60 mg DR capsule  Commonly known as: Cymbalta      TAKE 1 CAPSULE BY MOUTH EVERY DAY       escitalopram 5 mg tablet  Commonly known as: Lexapro      Take 1 tablet (5 mg) by mouth once daily at bedtime.       metoprolol succinate XL 50 mg 24 hr tablet  Commonly known as: Toprol-XL      TAKE 1 TABLET BY MOUTH EVERY DAY       simvastatin 40 mg tablet  Commonly known as: Zocor      TAKE 1 TABLET BY MOUTH EVERY DAY IN THE EVENING       zinc acetate 50 mg (zinc) capsule                  STOP taking these medications      Eucerin Eczema Relief 1 % cream  Generic drug: colloidal oatmeaL        lidocaine 4 % patch  Commonly known as: Salonpas (lidocaine)        lisinopril 10 mg tablet                  Where to Get Your Medications        Information about where to get these medications is not yet available    Ask your nurse or doctor about these medications  acetaminophen 500 mg tablet  apixaban 5 mg tablet  zinc oxide 40 % ointment ointment         Test Results Pending At Discharge  Pending Labs       Order Current  Status    Basic Metabolic Panel In process    Creatine Kinase In process            Hospital Course   Mr Thao is a very pleasant man, quite [place], who was admitted after being found on the floor by family, had been down for an unknown time. On evaluation he was found to be in rhabdo, have jong, and also encephalopathic. He was hydrated, renal function returned to normal, and cpk trended down quickly. He also was in a fib, prompting a cardiology consult. Rate is controlled, he was already on metop, which has been continued. Echo shows EF 50%, DD, enlarged RV with mild pulm hypertension. CT were negative for any fractures. He did have some RUL atelectasis vs infiltrate on cxr, and was treated with antibiotics for 5 days. His mentation cleared, returned to baseline. After calculating his CHADS VASC score to be 4, cards discussed anticoagulation, risk of falling etc, and he did want to be started on medication, thus eliquis started. Further discussions will need to be had. He is quite weak still, unable to ambulate well, and will be going to SNF from here. He is medically stable for discharge today    Pertinent Physical Exam At Time of Discharge  Physical Exam  He is alert, oriented. Very [place]. No jvd. Lungs are clear. Heart irregular, controlled rate. No edema. Abdomen is soft , no tenderness, bowel sounds present  Outpatient Follow-Up  Future Appointments   Date Time Provider Department Center   2/16/2024  1:00 PM [facility] RN 1 [id number] [facility]   2/16/2024  1:30 PM Milady Collins, APRN-CNP [id number] [facility]   2/27/2024  1:20 PM MD KYLER Trent1 [facility]   3/5/2024 11:00 AM Wilmer Rivas MD [id number] [facility]   3/8/2024  1:00 PM GEA VASC 2 GEAVSC GEKRYSTAL [place]    3/8/2024  2:20 PM Lavelle Hartmann MD [id number] [facility]   7/2/2024 11:00 AM Babita Blackburn MD [id number] [place]         Naima Wallace MD

## 2024-02-07 NOTE — NURSING NOTE
Late entry:  Patient seen in bed, [employer]  hat at bedside, discussed service, patient put hat on and saluted.  Denies pain, boosted up in bed, received visitor.  Message if needed.

## 2024-02-07 NOTE — PROGRESS NOTES
02/07/24 1102   Discharge Planning   Living Arrangements Spouse/significant other   Support Systems Spouse/significant other   Assistance Needed A&Ox2, uses a walker or cane, frequent falls at home   Type of Residence Private residence   Home or Post Acute Services Post acute facilities (Rehab/SNF/etc)   Type of Post Acute Facility Services Skilled nursing   Patient expects to be discharged to: [facility]   Does the patient need discharge transport arranged? Yes   RoundTrip coordination needed? Yes   Has discharge transport been arranged? No   What day is the transport expected? 02/07/24 2/7/2024 1048: Call placed to spouse Karissa to inform her of discharge arrangements to [facility]- Karissa concerned his mentation is not improved and she is refusing to allow him to be discharged until she speaks to a provider. Attending provider made aware. Will cancel transport until conflicts are able to be resolved.     2/7/2024 1435: Spoke to spouse at bedside to discuss discharge. Agreeable to discharge, explained that transport times are late at this point in the day- patient and spouse agreeable to early morning discharge on 2/8/2024. Provider aware.     2/7/2024 1511: Transport arranged for 2/8/2024 1000 with [facility].

## 2024-02-07 NOTE — PROGRESS NOTES
Occupational Therapy    Occupational Therapy Treatment    Name: Chao Thao  MRN: [id number]  : 1940  Date: 24  Time Calculation  Start Time: 933  Stop Time: 958  Time Calculation (min): 25 min    Assessment:  OT Assessment: Pt demonstrating improved though still impaired cognition, decreased endurance, decreased ADL performance, decreased functional mobility. Continued skilled OT recommended to maximize pt safety and independence to return to PLOF  Prognosis: Good  Barriers to Discharge: None  Evaluation/Treatment Tolerance: Patient limited by fatigue  Medical Staff Made Aware: Yes  End of Session Communication: Bedside nurse (Discussed improved activity tolerance and mentation.)  End of Session Patient Position: Bed, 3 rail up, Alarm on (Pt seated at EOB with all items including call bed in reach and bedside table positioned for reading magazines pt requested.)  Plan:  Treatment Interventions: ADL retraining, Functional transfer training, Endurance training, Patient/family training, Equipment evaluation/education, Compensatory technique education  OT Frequency: 3 times per week  OT Discharge Recommendations: Moderate intensity level of continued care  Equipment Recommended upon Discharge: Wheeled walker  OT Recommended Transfer Status: Assist of 1  OT - OK to Discharge: Yes (In accord with OT POC)    Subjective   Previous Visit Info:  OT Last Visit  OT Received On: 24  General:  General  Reason for Referral: Impaired mobility; 84 y/o male admitted for AMS, generalized weakness and fall at home.  Referred By: Zack Rao DO  Past Medical History Relevant to Rehab: Advanced dementia, [place], HTN, CAD, tinea pedis B feet, cardiac cath.  Family/Caregiver Present: No  Prior to Session Communication: Bedside nurse, PCT/NA/CTA  Patient Position Received: Bed, 3 rail up, Alarm on  Preferred Learning Style: verbal  General Comment: Cleared per nursing this date.   Pt remains pleasantly confused,  cooperative and agreeable to participate in therapy.  Pt very [place].   Pt more easily directed and engaged this am. Improved direction following.  Precautions:  Falls Risk     Pain Assessment:  Pain Assessment  Pain Assessment: 0-10  Pain Score: 0 - No pain     Objective   Activities of Daily Living: UE Dressing  UE Dressing Level of Assistance: Minimum assistance  UE Dressing Where Assessed: Edge of bed  UE Dressing Comments: Pt able to follow multistep direction and assist with threadeding arms into sleeves of garment this am.    LE Dressing  LE Dressing: Yes  LE Dressing Adaptive Equipment: Reacher, Sock aide  Sock Level of Assistance: Maximum assistance  LE Dressing Where Assessed: Edge of bed  LE Dressing Comments: Single step cues for sequence, technique and problem solving required.     Bed Mobility/Transfers: Bed Mobility  Bed Mobility: Yes  Bed Mobility 1  Bed Mobility 1: Rolling right, Rolling left  Level of Assistance 1: Moderate verbal cues  Bed Mobility 2  Bed Mobility  2: Scooting  Level of Assistance 2: Moderate assistance (With patient gripping siderails and use of bed tilt function)  Bed Mobility 3  Bed Mobility 3: Supine sitting  Level of Assistance 3: Moderate assistance (Assist of 2 including assist with legs and trunk.)  Bed Mobility Comments 3: USing bed rail, HOB ~30 degrees.    Transfers  Transfer: Yes  Transfer 1  Transfer From 1: Bed to  Transfer to 1: Stand  Technique 1: Sit to stand, Stand to sit  Transfer Device 1: Walker  Transfer Level of Assistance 1: Minimum assistance, Moderate assistance  Trials/Comments 1: x3 Verbal and tactile cues for safe hand placement in transitions required.    Ambulation/Gait Training:  Ambulation/Gait Training  Ambulation/Gait Training Performed: Yes  Ambulation/Gait Training 1  Surface 1: Level tile  Device 1: Rolling walker  Assistance 1: Minimum assistance  Quality of Gait 1: Narrow base of support  Comments/Distance (ft) 1: Sidestepping left and right  along EOB. Single step cues for device and limb advancement.    Outcome Measures:  [facility] Daily Activity  Putting on and taking off regular lower body clothing: A lot  Bathing (including washing, rinsing, drying): A lot  Putting on and taking off regular upper body clothing: A little  Toileting, which includes using toilet, bedpan or urinal: A lot  Taking care of personal grooming such as brushing teeth: A little  Eating Meals: None  Daily Activity - Total Score: 16    Education Documentation  Body Mechanics, taught by JADA Stephens at 2/7/2024 10:39 AM.  Learner: Patient  Readiness: Acceptance  Method: Explanation, Demonstration  Response: Verbalizes Understanding, Demonstrated Understanding, Needs Reinforcement  Comment: Pt requires repetition d/t dementia for basic education uptake.    Precautions, taught by JADA Stephens at 2/7/2024 10:39 AM.  Learner: Patient  Readiness: Acceptance  Method: Explanation, Demonstration  Response: Verbalizes Understanding, Demonstrated Understanding, Needs Reinforcement  Comment: Pt requires repetition d/t dementia for basic education uptake.    ADL Training, taught by JADA Stephens at 2/7/2024 10:39 AM.  Learner: Patient  Readiness: Acceptance  Method: Explanation, Demonstration  Response: Verbalizes Understanding, Demonstrated Understanding, Needs Reinforcement  Comment: Pt requires repetition d/t dementia for basic education uptake.    Education Comments  Pt compliant to education provided over course of session..    Goals:  Encounter Problems       Encounter Problems (Active)       OT Goals       Pt will demo ADL routine and meaningful daily activities using modifications as needed  (Progressing)       Start:  02/03/24    Expected End:  02/17/24            Pt will increase endurance to tolerate 15min of OOB activity with no more than 1 rest break in order to increase ability to engage in ADL completion.  (Progressing)       Start:  02/03/24    Expected End:  02/17/24             Pt will tolerate 10min stand during functional task completion with no more than 1 rest break in order to increase endurance for functional task completion.  (Progressing)       Start:  02/03/24    Expected End:  02/17/24            Pt will demo increased functional mobility to tolerate tasks necessary to complete ADL routine.  (Progressing)       Start:  02/03/24    Expected End:  02/17/24

## 2024-02-08 VITALS
RESPIRATION RATE: 18 BRPM | WEIGHT: 186.29 LBS | HEIGHT: 67 IN | SYSTOLIC BLOOD PRESSURE: 116 MMHG | OXYGEN SATURATION: 94 % | HEART RATE: 68 BPM | BODY MASS INDEX: 29.24 KG/M2 | TEMPERATURE: 97.3 F | DIASTOLIC BLOOD PRESSURE: 69 MMHG

## 2024-02-08 LAB
ERYTHROCYTE [DISTWIDTH] IN BLOOD BY AUTOMATED COUNT: 12.8 % (ref 11.5–14.5)
HCT VFR BLD AUTO: 32.5 % (ref 41–52)
HGB BLD-MCNC: 10.3 G/DL (ref 13.5–17.5)
MCH RBC QN AUTO: 33 PG (ref 26–34)
MCHC RBC AUTO-ENTMCNC: 31.7 G/DL (ref 32–36)
MCV RBC AUTO: 104 FL (ref 80–100)
NRBC BLD-RTO: 0 /100 WBCS (ref 0–0)
PLATELET # BLD AUTO: 411 X10*3/UL (ref 150–450)
RBC # BLD AUTO: 3.12 X10*6/UL (ref 4.5–5.9)
VANCOMYCIN SERPL-MCNC: 6.5 UG/ML (ref 5–20)
WBC # BLD AUTO: 13.5 X10*3/UL (ref 4.4–11.3)

## 2024-02-08 PROCEDURE — 85027 COMPLETE CBC AUTOMATED: CPT | Performed by: FAMILY MEDICINE

## 2024-02-08 PROCEDURE — 97530 THERAPEUTIC ACTIVITIES: CPT | Mod: GO,CO

## 2024-02-08 PROCEDURE — 2500000001 HC RX 250 WO HCPCS SELF ADMINISTERED DRUGS (ALT 637 FOR MEDICARE OP): Performed by: FAMILY MEDICINE

## 2024-02-08 PROCEDURE — 94640 AIRWAY INHALATION TREATMENT: CPT

## 2024-02-08 PROCEDURE — 36415 COLL VENOUS BLD VENIPUNCTURE: CPT | Performed by: FAMILY MEDICINE

## 2024-02-08 PROCEDURE — 80202 ASSAY OF VANCOMYCIN: CPT | Performed by: FAMILY MEDICINE

## 2024-02-08 PROCEDURE — 97535 SELF CARE MNGMENT TRAINING: CPT | Mod: GO,CO

## 2024-02-08 PROCEDURE — 2500000002 HC RX 250 W HCPCS SELF ADMINISTERED DRUGS (ALT 637 FOR MEDICARE OP, ALT 636 FOR OP/ED): Performed by: FAMILY MEDICINE

## 2024-02-08 RX ADMIN — METOPROLOL SUCCINATE 50 MG: 50 TABLET, EXTENDED RELEASE ORAL at 09:30

## 2024-02-08 RX ADMIN — Medication 1 APPLICATION: at 09:31

## 2024-02-08 RX ADMIN — IPRATROPIUM BROMIDE AND ALBUTEROL SULFATE 3 ML: 2.5; .5 SOLUTION RESPIRATORY (INHALATION) at 07:50

## 2024-02-08 RX ADMIN — APIXABAN 5 MG: 5 TABLET, FILM COATED ORAL at 06:39

## 2024-02-08 RX ADMIN — DULOXETINE HYDROCHLORIDE 60 MG: 60 CAPSULE, DELAYED RELEASE ORAL at 09:30

## 2024-02-08 ASSESSMENT — PAIN SCALES - GENERAL: PAINLEVEL_OUTOF10: 0 - NO PAIN

## 2024-02-08 ASSESSMENT — COGNITIVE AND FUNCTIONAL STATUS - GENERAL
STANDING UP FROM CHAIR USING ARMS: A LITTLE
WALKING IN HOSPITAL ROOM: A LOT
MOBILITY SCORE: 16
TURNING FROM BACK TO SIDE WHILE IN FLAT BAD: A LITTLE
TOILETING: A LOT
MOVING TO AND FROM BED TO CHAIR: A LITTLE
DRESSING REGULAR LOWER BODY CLOTHING: A LOT
DAILY ACTIVITIY SCORE: 16
MOVING FROM LYING ON BACK TO SITTING ON SIDE OF FLAT BED WITH BEDRAILS: A LITTLE
DRESSING REGULAR UPPER BODY CLOTHING: A LITTLE
HELP NEEDED FOR BATHING: A LOT
PERSONAL GROOMING: A LITTLE
CLIMB 3 TO 5 STEPS WITH RAILING: A LOT

## 2024-02-08 ASSESSMENT — PAIN - FUNCTIONAL ASSESSMENT: PAIN_FUNCTIONAL_ASSESSMENT: 0-10

## 2024-02-08 ASSESSMENT — ACTIVITIES OF DAILY LIVING (ADL): HOME_MANAGEMENT_TIME_ENTRY: 14

## 2024-02-08 NOTE — PROGRESS NOTES
Occupational Therapy    Occupational Therapy Treatment    Name: Chao Thao  MRN: [id number]  : 1940  Date: 24  Time Calculation  Start Time: 933  Stop Time: 958  Time Calculation (min): 25 min    Assessment:  OT Assessment: Pt demonstrating improved though still impaired cognition, decreased endurance, decreased ADL performance, decreased functional mobility. Continued skilled OT recommended to maximize pt safety and independence to return to PLOF  Prognosis: Good  Barriers to Discharge: None  Evaluation/Treatment Tolerance: Patient tolerated treatment well  Medical Staff Made Aware: Yes  End of Session Communication: Bedside nurse (Discussed improved activity tolerance and mentation.)  End of Session Patient Position: Up in chair, Alarm on (Pt seated at EOB with all items including call bed in reach and bedside table positioned for reading magazines pt requested.)  Plan:  Treatment Interventions: ADL retraining, Endurance training, Functional transfer training, Patient/family training, Compensatory technique education, Equipment evaluation/education  OT Frequency: 3 times per week  OT Discharge Recommendations: Moderate intensity level of continued care  Equipment Recommended upon Discharge: Wheeled walker  OT Recommended Transfer Status: Assist of 1  OT - OK to Discharge: Yes (In accord with OT POC.)    Subjective   Previous Visit Info:  OT Last Visit  OT Received On: 24  General:  General  Reason for Referral: Impaired mobility; 82 y/o male admitted for AMS, generalized weakness and fall at home.  Referred By: Zack Rao DO  Past Medical History Relevant to Rehab: Advanced dementia, [place], HTN, CAD, tinea pedis B feet, cardiac cath.  Family/Caregiver Present: No  Prior to Session Communication: Bedside nurse, PCT/NA/CTA  Patient Position Received: Bed, 3 rail up, Alarm on  Preferred Learning Style: verbal  General Comment: Cleared per nursing this date.   Pt pleasantly confused,  cooperative and agreeable to participate in therapy.  Pt very [place] without hearing aides available.  Precautions:  Medical Precautions: Fall precautions  Vitals:     Pain Assessment:  Pain Assessment  Pain Assessment: 0-10  Pain Score: 0 - No pain     Objective   Activities of Daily Living: UE Dressing  UE Dressing Level of Assistance: Minimum assistance  UE Dressing Where Assessed: Edge of bed  UE Dressing Comments: Pt able to follow multistep direction and assist with threadeding arms into sleeves of garment this am.    LE Dressing  LE Dressing: Yes  LE Dressing Adaptive Equipment: Reacher, Sock aide  Sock Level of Assistance: Maximum assistance  LE Dressing Where Assessed: Edge of bed  LE Dressing Comments: Pants, brief and socks donned with single step cues and direction for technique and problem solving task.    Functional Standing Tolerance:     Bed Mobility/Transfers: Bed Mobility  Bed Mobility: Yes  Bed Mobility 1  Bed Mobility 1: Rolling right, Rolling left  Level of Assistance 1: Moderate assistance, Moderate verbal cues  Bed Mobility 2  Bed Mobility  2: Scooting  Level of Assistance 2: Moderate assistance (With patient gripping siderails and use of bed tilt function)  Bed Mobility 3  Bed Mobility 3: Supine sitting  Level of Assistance 3: Moderate assistance (Assist of 1 with legs and trunk.)  Bed Mobility Comments 3: Using bedrail and draw.    Transfers  Transfer: Yes  Transfer 1  Transfer From 1: Bed to  Transfer to 1: Stand  Technique 1: Sit to stand, Stand to sit  Transfer Device 1: Walker  Transfer Level of Assistance 1: Minimum assistance  Trials/Comments 1: Verbal and tactile cues for safe hand placement.  Transfers 2  Transfer From 2: Bed to  Transfer to 2: Chair with arms  Technique 2: Stand pivot  Transfer Device 2: Walker  Transfer Level of Assistance 2: Minimum assistance  Trials/Comments 2: Tactile and visual cues for lining up with surface and safe hand placement required.      Ambulation/Gait  Training:  Ambulation/Gait Training  Ambulation/Gait Training Performed: Yes  Ambulation/Gait Training 1  Surface 1: Level tile  Device 1: Rolling walker  Assistance 1: Minimum assistance  Quality of Gait 1: Narrow base of support  Comments/Distance (ft) 1: Simple step around pivot `8 steps to bedside recliner    Outcome Measures:  [facility] Daily Activity  Putting on and taking off regular lower body clothing: A lot  Bathing (including washing, rinsing, drying): A lot  Putting on and taking off regular upper body clothing: A little  Toileting, which includes using toilet, bedpan or urinal: A lot  Taking care of personal grooming such as brushing teeth: A little  Eating Meals: None  Daily Activity - Total Score: 16    Education Documentation  Body Mechanics, taught by JADA Stephens at 2/8/2024  9:25 AM.  Learner: Patient  Readiness: Acceptance  Method: Explanation, Demonstration  Response: Verbalizes Understanding, Demonstrated Understanding, Needs Reinforcement  Comment: Reinforcement and repetition needed for uptake d/t dementia and [place]    Precautions, taught by JADA Stephens at 2/8/2024  9:25 AM.  Learner: Patient  Readiness: Acceptance  Method: Explanation, Demonstration  Response: Verbalizes Understanding, Demonstrated Understanding, Needs Reinforcement  Comment: Reinforcement and repetition needed for uptake d/t dementia and [place]    ADL Training, taught by JADA Stephens at 2/8/2024  9:25 AM.  Learner: Patient  Readiness: Acceptance  Method: Explanation, Demonstration  Response: Verbalizes Understanding, Demonstrated Understanding, Needs Reinforcement  Comment: Reinforcement and repetition needed for uptake d/t dementia and [place]    Education Comments  Pt requires repetition of activity and repeated instruction for uptake d/t dementia and communication difficulties.      Goals:  Encounter Problems       Encounter Problems (Active)       OT Goals       Pt will demo ADL routine and meaningful daily activities  using modifications as needed  (Progressing)       Start:  02/03/24    Expected End:  02/17/24            Pt will increase endurance to tolerate 15min of OOB activity with no more than 1 rest break in order to increase ability to engage in ADL completion.  (Progressing)       Start:  02/03/24    Expected End:  02/17/24            Pt will tolerate 10min stand during functional task completion with no more than 1 rest break in order to increase endurance for functional task completion.  (Progressing)       Start:  02/03/24    Expected End:  02/17/24            Pt will demo increased functional mobility to tolerate tasks necessary to complete ADL routine.  (Progressing)       Start:  02/03/24    Expected End:  02/17/24

## 2024-02-08 NOTE — NURSING NOTE
0732 Pt resting in bed eating breakfast    0935 Pt up to chair watching tv. POX is 88% on RA, RN placed 2L O2 back on and pt now at 94%     1111 pt's dressings were changed per orders    1115 Report called to [facility] to Luciana ORO    1202 pt left in stable condition to facility, tele removed and IV taken out

## 2024-02-08 NOTE — PROGRESS NOTES
02/08/24 0958   Discharge Planning   Living Arrangements Spouse/significant other   Support Systems Spouse/significant other   Assistance Needed A&Ox2, uses a walker or cane, frequent falls at home   Type of Residence Private residence   Home or Post Acute Services Post acute facilities (Rehab/SNF/etc)   Type of Post Acute Facility Services Skilled nursing   Patient expects to be discharged to: [facility]   Does the patient need discharge transport arranged? Yes   RoundTrip coordination needed? Yes   Has discharge transport been arranged? Yes   What day is the transport expected? 02/08/24   What time is the transport expected? 1000     2/8/2024 0830: Spouse Karissa and bedside nurse made aware of discharge arrangements for 10am to [facility]. Report number [phone] given to nurse Bray for report.

## 2024-02-12 LAB
ATRIAL RATE: 72 BPM
P AXIS: 83 DEGREES
Q ONSET: 217 MS
QRS COUNT: 16 BEATS
QRS DURATION: 96 MS
QT INTERVAL: 388 MS
QTC CALCULATION(BAZETT): 500 MS
QTC FREDERICIA: 460 MS
R AXIS: 40 DEGREES
T AXIS: -88 DEGREES
T OFFSET: 411 MS
VENTRICULAR RATE: 100 BPM

## 2024-02-12 NOTE — DOCUMENTATION CLARIFICATION NOTE
PATIENT:               KRISHNA CHEN  ACCT #:                  [id number]  MRN:                       [id number]  :                       1940  ADMIT DATE:       2024 1:32 PM  DISCH DATE:        2024 12:12 PM  RESPONDING PROVIDER #:        [id number]          PROVIDER RESPONSE TEXT:    Acute Kidney Injury is ruled out    CDI QUERY TEXT:    UH_CV EMILIANO      Instruction:    Based on your assessment of the patient and the clinical information, please provide the requested documentation by clicking on the appropriate radio button and enter any additional information if prompted.    Question: Please clarify the diagnosis of Acute Kidney Injury    When answering this query, please exercise your independent professional judgment. The fact that a question is being asked, does not imply that any particular answer is desired or expected.    The patient's clinical indicators include:  Clinical Information: 84 y/o male with a BMI of 39.693 with dementia was found on the ground. Per wife the patient was down for a week. He had fallen and says he has  left hip and left knee pain. Of note he also has a history of HTN and CAD    Documented Diagnosis:  Acute kidney injury is note in the H and P 24 Rao    Clinical Indicators and Documentation:  -Vital Signs:  Per VS 24  /80, 97/52, and 123/82  Pulse 98, 76, and 72  RR 20, 20, and 18    -Baseline Creatinine: Assumed current low of 0.80  -SCr lab values: Per Labs 24 through 24  Creatinine 1.30, 0.95, 0.79, and 0.80    -Urine Output: Urine output2/2/24 x 2, 2/3/24 x2, 2/4/24 x2, 2/5/24 x2, and 2/6/24 x3    -Electrolyte lab values: Per Labs 24 through 24  Chloride 99, 104, 108, and 104  Calcium 8.8, 7.9, 7.5, and 7.5    -Nephrology consult: NA    Treatment: Repeat labs and supportive care    Risk Factors: 84 y/o who was found down and now has Rhabdomyolysis  Options provided:  -- Acute Kidney Injury is ruled out  -- Acute Kidney Injury ruled  in as evidenced by, Please specify additional information below  -- Other - I will add my own diagnosis  -- Refer to Clinical Documentation Reviewer    Query created by: Minh Mcconnell on 2/7/2024 7:16 AM      Electronically signed by:  BHANU FELICIANO MD 2/12/2024 12:37 PM

## 2024-02-16 ENCOUNTER — APPOINTMENT (OUTPATIENT)
Dept: GERIATRIC MEDICINE | Facility: CLINIC | Age: 84
End: 2024-02-16
Payer: MEDICARE

## 2024-02-21 ENCOUNTER — NURSING HOME VISIT (OUTPATIENT)
Dept: POST ACUTE CARE | Facility: EXTERNAL LOCATION | Age: 84
End: 2024-02-21
Payer: MEDICARE

## 2024-02-21 DIAGNOSIS — D64.9 ANEMIA, UNSPECIFIED TYPE: ICD-10-CM

## 2024-02-21 DIAGNOSIS — R41.89 COGNITIVE IMPAIRMENT: Primary | ICD-10-CM

## 2024-02-21 DIAGNOSIS — I25.110 CORONARY ARTERY DISEASE INVOLVING NATIVE CORONARY ARTERY OF NATIVE HEART WITH UNSTABLE ANGINA PECTORIS (MULTI): ICD-10-CM

## 2024-02-21 DIAGNOSIS — I73.9 PVD (PERIPHERAL VASCULAR DISEASE) (CMS-HCC): ICD-10-CM

## 2024-02-21 DIAGNOSIS — I10 PRIMARY HYPERTENSION: ICD-10-CM

## 2024-02-21 PROCEDURE — 99305 1ST NF CARE MODERATE MDM 35: CPT | Performed by: FAMILY MEDICINE

## 2024-02-21 ASSESSMENT — ENCOUNTER SYMPTOMS
NAUSEA: 0
DIARRHEA: 0
CONSTIPATION: 0
FREQUENCY: 0
CHEST TIGHTNESS: 0
COUGH: 0
PALPITATIONS: 0
SORE THROAT: 0
DYSURIA: 0
ABDOMINAL PAIN: 0
VOMITING: 0
ARTHRALGIAS: 0
NERVOUS/ANXIOUS: 0
DIZZINESS: 0
FATIGUE: 1
DYSPHORIC MOOD: 0
TREMORS: 0
FEVER: 0
HEADACHES: 0
SHORTNESS OF BREATH: 0

## 2024-02-21 NOTE — PROGRESS NOTES
Subjective   Patient ID: Chao Thao is a 83 y.o. male who is acute skilled care being seen and evaluated for multiple medical problems.    HPI Mr Thao is a very pleasant man, quite [place], who was admitted to hospital after being found on the floor by family, had been down for an unknown time. On evaluation he was found to be in rhabdo, have jong, and also encephalopathic. He was hydrated, renal function returned to normal, and cpk trended down quickly. He also was in a fib, prompting a cardiology consult. Rate is controlled, he was already on metop, which has been continued. Echo shows EF 50%, DD, enlarged RV with mild pulm hypertension. CT were negative for any fractures. He did have some RUL atelectasis vs infiltrate on cxr, and was treated with antibiotics for 5 days. His mentation cleared, returned to baseline. After calculating his CHADS VASC score to be 4, cards discussed anticoagulation, risk of falling etc, and he did want to be started on medication, thus eliquis started.  Labs done during his stay here showed initially a drop in his hemoglobin to 10.3 which then subsequently improved to 11.7.  Recheck CBC is pending today.  White count has been trending downward from 14 to 13-10 with normal kidney function and liver function noted.       Review of Systems   Constitutional:  Positive for fatigue. Negative for fever.   HENT:  Negative for congestion and sore throat.    Eyes:  Negative for visual disturbance.   Respiratory:  Negative for cough, chest tightness and shortness of breath.    Cardiovascular:  Negative for chest pain, palpitations and leg swelling.   Gastrointestinal:  Negative for abdominal pain, constipation, diarrhea, nausea and vomiting.   Genitourinary:  Negative for dysuria, frequency and urgency.   Musculoskeletal:  Positive for gait problem. Negative for arthralgias.   Skin:  Negative for rash.   Neurological:  Negative for dizziness, tremors, syncope and headaches.    Psychiatric/Behavioral:  Negative for dysphoric mood. The patient is not nervous/anxious.        Objective   There were no vitals taken for this visit.    Physical Exam  Vitals (Weight 189 blood pressure 100/70 temp 97.1 pulse 90 pulse ox 96% on room air) reviewed.   Constitutional:       General: He is not in acute distress.     Appearance: Normal appearance.   HENT:      Head: Atraumatic.      Nose: Nose normal.      Mouth/Throat:      Mouth: Mucous membranes are dry.      Pharynx: Oropharynx is clear.   Eyes:      General: No scleral icterus.     Conjunctiva/sclera: Conjunctivae normal.   Cardiovascular:      Rate and Rhythm: Normal rate and regular rhythm.      Heart sounds:      No gallop.   Pulmonary:      Effort: Pulmonary effort is normal.      Breath sounds: Normal breath sounds. No wheezing.   Abdominal:      General: There is no distension.      Palpations: There is no mass.      Tenderness: There is no abdominal tenderness. There is no rebound.   Musculoskeletal:         General: Swelling present.      Cervical back: Normal range of motion.   Lymphadenopathy:      Cervical: No cervical adenopathy.   Skin:     General: Skin is warm.      Coloration: Skin is not jaundiced.   Neurological:      General: No focal deficit present.         Assessment/Plan   Problem List Items Addressed This Visit             ICD-10-CM    Cognitive impairment - Primary R41.89    Primary hypertension I10    Coronary artery disease involving native coronary artery of native heart with unstable angina pectoris (CMS/HCC) I25.110    PVD (peripheral vascular disease) (CMS/HCC) I73.9    Anemia D64.9   Recheck CBC next week, may need to be done with primary care as he is set to discharge here shortly     Goals    None

## 2024-02-21 NOTE — LETTER
Patient: Chao Thao  : 1940    Encounter Date: 2024    Subjective  Patient ID: Chao Thao is a 83 y.o. male who is acute skilled care being seen and evaluated for multiple medical problems.    HPI Mr Thao is a very pleasant man, quite [place], who was admitted to hospital after being found on the floor by family, had been down for an unknown time. On evaluation he was found to be in rhabdo, have jong, and also encephalopathic. He was hydrated, renal function returned to normal, and cpk trended down quickly. He also was in a fib, prompting a cardiology consult. Rate is controlled, he was already on metop, which has been continued. Echo shows EF 50%, DD, enlarged RV with mild pulm hypertension. CT were negative for any fractures. He did have some RUL atelectasis vs infiltrate on cxr, and was treated with antibiotics for 5 days. His mentation cleared, returned to baseline. After calculating his CHADS VASC score to be 4, cards discussed anticoagulation, risk of falling etc, and he did want to be started on medication, thus eliquis started.  Labs done during his stay here showed initially a drop in his hemoglobin to 10.3 which then subsequently improved to 11.7.  Recheck CBC is pending today.  White count has been trending downward from 14 to 13-10 with normal kidney function and liver function noted.       Review of Systems   Constitutional:  Positive for fatigue. Negative for fever.   HENT:  Negative for congestion and sore throat.    Eyes:  Negative for visual disturbance.   Respiratory:  Negative for cough, chest tightness and shortness of breath.    Cardiovascular:  Negative for chest pain, palpitations and leg swelling.   Gastrointestinal:  Negative for abdominal pain, constipation, diarrhea, nausea and vomiting.   Genitourinary:  Negative for dysuria, frequency and urgency.   Musculoskeletal:  Positive for gait problem. Negative for arthralgias.   Skin:  Negative for rash.   Neurological:   Negative for dizziness, tremors, syncope and headaches.   Psychiatric/Behavioral:  Negative for dysphoric mood. The patient is not nervous/anxious.        Objective  There were no vitals taken for this visit.    Physical Exam  Vitals (Weight 189 blood pressure 100/70 temp 97.1 pulse 90 pulse ox 96% on room air) reviewed.   Constitutional:       General: He is not in acute distress.     Appearance: Normal appearance.   HENT:      Head: Atraumatic.      Nose: Nose normal.      Mouth/Throat:      Mouth: Mucous membranes are dry.      Pharynx: Oropharynx is clear.   Eyes:      General: No scleral icterus.     Conjunctiva/sclera: Conjunctivae normal.   Cardiovascular:      Rate and Rhythm: Normal rate and regular rhythm.      Heart sounds:      No gallop.   Pulmonary:      Effort: Pulmonary effort is normal.      Breath sounds: Normal breath sounds. No wheezing.   Abdominal:      General: There is no distension.      Palpations: There is no mass.      Tenderness: There is no abdominal tenderness. There is no rebound.   Musculoskeletal:         General: Swelling present.      Cervical back: Normal range of motion.   Lymphadenopathy:      Cervical: No cervical adenopathy.   Skin:     General: Skin is warm.      Coloration: Skin is not jaundiced.   Neurological:      General: No focal deficit present.         Assessment/Plan  Problem List Items Addressed This Visit             ICD-10-CM    Cognitive impairment - Primary R41.89    Primary hypertension I10    Coronary artery disease involving native coronary artery of native heart with unstable angina pectoris (CMS/HCC) I25.110    PVD (peripheral vascular disease) (CMS/HCC) I73.9    Anemia D64.9   Recheck CBC next week, may need to be done with primary care as he is set to discharge here shortly     Goals    None           Electronically Signed By: Beverly Torres MD   2/21/24  3:46 PM

## 2024-02-23 ENCOUNTER — APPOINTMENT (OUTPATIENT)
Dept: GERIATRIC MEDICINE | Facility: CLINIC | Age: 84
End: 2024-02-23
Payer: MEDICARE

## 2024-02-27 ENCOUNTER — APPOINTMENT (OUTPATIENT)
Dept: CARDIOLOGY | Facility: HOSPITAL | Age: 84
End: 2024-02-27
Payer: MEDICARE

## 2024-02-29 ENCOUNTER — APPOINTMENT (OUTPATIENT)
Dept: VASCULAR MEDICINE | Facility: HOSPITAL | Age: 84
End: 2024-02-29
Payer: MEDICARE

## 2024-03-05 ENCOUNTER — OFFICE VISIT (OUTPATIENT)
Dept: ORTHOPEDIC SURGERY | Facility: CLINIC | Age: 84
End: 2024-03-05
Payer: MEDICARE

## 2024-03-05 ENCOUNTER — OFFICE VISIT (OUTPATIENT)
Dept: CARDIOLOGY | Facility: HOSPITAL | Age: 84
End: 2024-03-05
Payer: MEDICARE

## 2024-03-05 VITALS — SYSTOLIC BLOOD PRESSURE: 60 MMHG | DIASTOLIC BLOOD PRESSURE: 39 MMHG | OXYGEN SATURATION: 94 % | HEART RATE: 81 BPM

## 2024-03-05 DIAGNOSIS — M25.561 ACUTE BILATERAL KNEE PAIN: Primary | ICD-10-CM

## 2024-03-05 DIAGNOSIS — M17.12 ARTHRITIS OF LEFT KNEE: ICD-10-CM

## 2024-03-05 DIAGNOSIS — I48.20 CHRONIC ATRIAL FIBRILLATION (MULTI): ICD-10-CM

## 2024-03-05 DIAGNOSIS — I10 PRIMARY HYPERTENSION: ICD-10-CM

## 2024-03-05 DIAGNOSIS — I25.110 CORONARY ARTERY DISEASE INVOLVING NATIVE CORONARY ARTERY OF NATIVE HEART WITH UNSTABLE ANGINA PECTORIS (MULTI): Primary | ICD-10-CM

## 2024-03-05 DIAGNOSIS — M25.562 ACUTE BILATERAL KNEE PAIN: Primary | ICD-10-CM

## 2024-03-05 PROCEDURE — 1160F RVW MEDS BY RX/DR IN RCRD: CPT | Performed by: STUDENT IN AN ORGANIZED HEALTH CARE EDUCATION/TRAINING PROGRAM

## 2024-03-05 PROCEDURE — 1159F MED LIST DOCD IN RCRD: CPT | Performed by: ORTHOPAEDIC SURGERY

## 2024-03-05 PROCEDURE — 1111F DSCHRG MED/CURRENT MED MERGE: CPT | Performed by: STUDENT IN AN ORGANIZED HEALTH CARE EDUCATION/TRAINING PROGRAM

## 2024-03-05 PROCEDURE — 1126F AMNT PAIN NOTED NONE PRSNT: CPT | Performed by: STUDENT IN AN ORGANIZED HEALTH CARE EDUCATION/TRAINING PROGRAM

## 2024-03-05 PROCEDURE — 1036F TOBACCO NON-USER: CPT | Performed by: ORTHOPAEDIC SURGERY

## 2024-03-05 PROCEDURE — 3074F SYST BP LT 130 MM HG: CPT | Performed by: STUDENT IN AN ORGANIZED HEALTH CARE EDUCATION/TRAINING PROGRAM

## 2024-03-05 PROCEDURE — 1111F DSCHRG MED/CURRENT MED MERGE: CPT | Performed by: ORTHOPAEDIC SURGERY

## 2024-03-05 PROCEDURE — 1160F RVW MEDS BY RX/DR IN RCRD: CPT | Performed by: ORTHOPAEDIC SURGERY

## 2024-03-05 PROCEDURE — 20610 DRAIN/INJ JOINT/BURSA W/O US: CPT | Performed by: ORTHOPAEDIC SURGERY

## 2024-03-05 PROCEDURE — 1159F MED LIST DOCD IN RCRD: CPT | Performed by: STUDENT IN AN ORGANIZED HEALTH CARE EDUCATION/TRAINING PROGRAM

## 2024-03-05 PROCEDURE — 1126F AMNT PAIN NOTED NONE PRSNT: CPT | Performed by: ORTHOPAEDIC SURGERY

## 2024-03-05 PROCEDURE — 99214 OFFICE O/P EST MOD 30 MIN: CPT | Performed by: ORTHOPAEDIC SURGERY

## 2024-03-05 PROCEDURE — 99215 OFFICE O/P EST HI 40 MIN: CPT | Performed by: STUDENT IN AN ORGANIZED HEALTH CARE EDUCATION/TRAINING PROGRAM

## 2024-03-05 PROCEDURE — 3078F DIAST BP <80 MM HG: CPT | Performed by: STUDENT IN AN ORGANIZED HEALTH CARE EDUCATION/TRAINING PROGRAM

## 2024-03-05 PROCEDURE — 1036F TOBACCO NON-USER: CPT | Performed by: STUDENT IN AN ORGANIZED HEALTH CARE EDUCATION/TRAINING PROGRAM

## 2024-03-05 RX ORDER — TRIAMCINOLONE ACETONIDE 40 MG/ML
1 INJECTION, SUSPENSION INTRA-ARTICULAR; INTRAMUSCULAR
Status: COMPLETED | OUTPATIENT
Start: 2024-03-05 | End: 2024-03-05

## 2024-03-05 RX ORDER — HYALURONATE SODIUM, STABILIZED 60 MG/3 ML
SYRINGE (ML) INTRAARTICULAR
Qty: 6 ML | Refills: 0 | Status: ON HOLD | OUTPATIENT
Start: 2024-03-05 | End: 2024-06-11 | Stop reason: CLARIF

## 2024-03-05 RX ORDER — LISINOPRIL 10 MG/1
5 TABLET ORAL DAILY
Status: ON HOLD | COMMUNITY

## 2024-03-05 RX ADMIN — TRIAMCINOLONE ACETONIDE 1 ML: 40 INJECTION, SUSPENSION INTRA-ARTICULAR; INTRAMUSCULAR at 12:26

## 2024-03-05 NOTE — PROGRESS NOTES
Primary Care Physician: Babita Blackburn MD   Date of Visit: 03/05/2024  2:00 PM EST  Type of Visit: post dc follow up       Chief Complaint:  No chief complaint on file.       HPI  Chao Thao 84 y.o. male with a PMH of HTN, HLD, CAD s/p PTCA 10y, recurrent falls. Who was found down for unknown time, May have been up to 7 days and was admitted for Pneumonia and Rhabdomyolysis, newly diagnosed afib, rate controlled and was started on eliqius. TTE with low normal EF, imparted relaxation and RVSP 41 mmHg here today for post DC follow up     He feels good  No chest pain   No dizziness or syncope  He is back to his normal  He is able to walk better now but limited due to arthritis     He has chronic afib but has not been on a blood thinner prior to hospitalization     Not on aspirin as well only baby aspirin  BP usually good, takes metoprolol and lisinopril     He follows with Dr. Murcia    They want to switch care       Review of Systems   Review of Systems   12 points review of systems are negative expect for the above    Social History:  Social History     Socioeconomic History    Marital status:      Spouse name: Not on file    Number of children: Not on file    Years of education: Not on file    Highest education level: Not on file   Occupational History    Not on file   Tobacco Use    Smoking status: Former     Types: Cigarettes    Smokeless tobacco: Never   Vaping Use    Vaping Use: Never used   Substance and Sexual Activity    Alcohol use: Not Currently    Drug use: Never    Sexual activity: Not on file   Other Topics Concern    Not on file   Social History Narrative    Not on file     Social Determinants of Health     Financial Resource Strain: Patient Declined (2/2/2024)    Overall Financial Resource Strain (CARDIA)     Difficulty of Paying Living Expenses: Patient declined   Food Insecurity: Not on file   Transportation Needs: Patient Declined (2/2/2024)    PRAPARE - Transportation     Lack of  Transportation (Medical): Patient declined     Lack of Transportation (Non-Medical): Patient declined   Physical Activity: Not on file   Stress: Not on file   Social Connections: Not on file   Intimate Partner Violence: Not on file   Housing Stability: Patient Declined (2/2/2024)    Housing Stability Vital Sign     Unable to Pay for Housing in the Last Year: Patient declined     Number of Places Lived in the Last Year: 1     Unstable Housing in the Last Year: Patient declined        Past Medical History:  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    History of alcohol abuse    Drug abuse (CMS/HCC)     in remission    Edema, unspecified 11/26/2019    Dependent edema    Encounter for immunization 04/08/2016    Need for Zostavax administration    Pain in left knee 08/08/2018    Left knee pain    Personal history of other diseases of the nervous system and sense organs 09/18/2019    History of cataract    Personal history of other drug therapy 04/08/2016    History of pneumococcal vaccination    Tinea pedis 10/10/2018    Tinea pedis of both feet       Past Surgical History:  Past Surgical History:   Procedure Laterality Date    CARDIAC CATHETERIZATION  05/14/2018    Cardiac Cath Procedure Summary    INNER EAR SURGERY  01/06/2016    Inner Ear Surgery    OTHER SURGICAL HISTORY  03/16/2020    Cataract surgery       Family History:  No family history on file.     Objective:       2/6/2024     7:55 PM 2/7/2024     4:55 AM 2/7/2024     9:00 AM 2/7/2024     3:08 PM 2/7/2024     9:00 PM 2/8/2024     5:58 AM 2/8/2024     9:29 AM   Vitals   Systolic 107 117 113 114 124 110 116   Diastolic 63 66 58 66 65 61 69   Heart Rate 77 92 68 85 65 67 68   Temp 36.9 °C (98.4 °F) 36.9 °C (98.4 °F)  36.4 °C (97.5 °F) 37.3 °C (99.1 °F) 36.8 °C (98.2 °F) 36.3 °C (97.3 °F)   Resp 20 22    20 18      Constitutional:       Appearance: Healthy appearance. Not in distress.   Neck:      Vascular: No JVR. JVD  normal.   Pulmonary:      Effort: Pulmonary effort is normal.      Breath sounds: Normal breath sounds. No wheezing. No rhonchi. No rales.   Chest:      Chest wall: Not tender to palpatation.   Cardiovascular:      PMI at left midclavicular line. Normal rate. Regular rhythm. Normal S1. Normal S2.       Murmurs: There is no murmur.      No gallop.  No click. No rub.   Pulses:     Intact distal pulses.   Edema:     Peripheral edema absent.   Abdominal:      General: Bowel sounds are normal.      Palpations: Abdomen is soft.      Tenderness: There is no abdominal tenderness.   Musculoskeletal: Normal range of motion.         General: No tenderness.   Skin:     General: Skin is warm and dry.   Neurological:      General: No focal deficit present.      Mental Status: Alert and oriented to person, place and time.     Allergies:  No Known Allergies    Medications:  Current Outpatient Medications   Medication Instructions    acetaminophen (TYLENOL) 1,000 mg, oral, Every 8 hours    apixaban (ELIQUIS) 5 mg, oral, Every 12 hours    ascorbic acid (VITAMIN C) 500 mg, oral, Daily    cholecalciferol (VITAMIN D-3) 25 mcg, oral, Daily    cyanocobalamin (VITAMIN B-12) 250 mcg, oral, Daily    DULoxetine (CYMBALTA) 60 mg, oral, Daily    escitalopram (LEXAPRO) 5 mg, oral, Nightly    metoprolol succinate XL (TOPROL-XL) 50 mg, oral, Daily    simvastatin (ZOCOR) 40 mg, oral, Every evening    sodium hyaluronate (Durolane) 60 mg/3 mL injection INJECTION 3ML, ONE DOSE INTO BOTH JOINTS ONE TIME    zinc acetate 50 mg (zinc) capsule 1 capsule, oral, Daily    zinc oxide 40 % ointment ointment 1 Application, Topical, 2 times daily        Labs and Imaging:     Lab Results   Component Value Date    WBC 10.6 02/21/2024    HGB 11.0 (L) 02/21/2024    HCT 34.1 (L) 02/21/2024     (H) 02/21/2024    CHOL 147 03/16/2020    TRIG 95 03/16/2020    HDL 34.5 (A) 03/16/2020    ALT 33 02/14/2024    AST 36 02/14/2024     02/14/2024    K 4.5 02/14/2024      02/14/2024    CREATININE 0.70 02/14/2024    BUN 17 02/14/2024    CO2 26 02/14/2024    TSH 0.67 02/03/2024    INR 1.2 (H) 05/08/2020    HGBA1C 5.9 03/16/2020         Echocardiogram:   Echocardiogram     [facility], [address] [address]  Tel [phone] and Fax [phone]    TRANSTHORACIC ECHOCARDIOGRAM REPORT      Patient Name:     KRISHNA CHEN Reading Physician:   [id number] Magdaleno Yan MD  Study Date:       10/6/2020      Referring Physician: Rachel Nash MD  MRN/PID:          [id number]       PCP:  Accession/Order#: [id number]   Department Location: [facility]  YOB: 1940      Fellow:  Gender:           M              Nurse:  Admit Date:                      Sonographer:         Chani Livingston  Admission Status: Outpatient     Additional Staff:  Height:           170.18 cm      CC Report to:  Weight:           93.90 kg       Study Type:          Echocardiogram  BSA:              2.05 m2  Blood Pressure: 127 /70 mmHg    Diagnosis/ICD: R60.1-Generalized edema  Indication:    Leg swelling  Procedure/CPT: Echo Complete w Full [id number]    Patient History:  Smoker:            Former.  Pertinent History: LE Edema, HTN, CAD and Hyperlipidemia. H/O Cardiac cath.    Study Detail: The following Echo studies were performed: 2D, M-Mode, Doppler and  color flow. Technically challenging study due to prominent lung  artifact. The patient was awake.      PHYSICIAN INTERPRETATION:  Left Ventricle: The left ventricular systolic function is low normal, with an estimated ejection fraction of 55%. There are no regional wall motion abnormalities. The left ventricular cavity size is normal. Spectral Doppler shows an impaired relaxation pattern of left ventricular diastolic filling.  Left Atrium: The left atrium is mildly dilated.  Right Ventricle: The right ventricle is normal in size. There is normal right ventricular global systolic  function.  Right Atrium: The right atrium is normal in size.  Aortic Valve: The aortic valve is trileaflet. There is evidence of mild aortic valve stenosis.  There is no evidence of aortic valve regurgitation. The peak instantaneous gradient of the aortic valve is 11.2 mmHg. The mean gradient of the aortic valve is 6.0 mmHg.  Mitral Valve: The mitral valve is normal in structure. There is trace mitral valve regurgitation.  Tricuspid Valve: The tricuspid valve is structurally normal. There is mild tricuspid regurgitation.  Pulmonic Valve: The pulmonic valve is not well visualized. The pulmonic valve regurgitation was not well visualized.  Pericardium: There is no pericardial effusion noted.  Aorta: The aortic root is normal.  Pulmonary Artery: The tricuspid regurgitant velocity is 2.69 m/s, and with an estimated right atrial pressure of 10 mmHg, the estimated pulmonary artery pressure is mildly elevated with the RVSP at 38.9 mmHg.  Systemic Veins: The inferior vena cava appears to be of normal size.      CONCLUSIONS:  1. The left ventricular systolic function is low normal with a 55% estimated ejection fraction.  2. Spectral Doppler shows an impaired relaxation pattern of left ventricular diastolic filling.  3. Mild aortic valve stenosis.  4. There is mild tricuspid regurgitation.  5. The estimated pulmonary artery pressure is mildly elevated with the RVSP at 38.9 mmHg.  6. The inferior vena cava appears to be of normal size.    QUANTITATIVE DATA SUMMARY:  2D MEASUREMENTS:  Normal Ranges:  Ao Root d:     3.20 cm   (2.0-3.7cm)  IVSd:          0.94 cm   (0.6-1.1cm)  LVPWd:         0.85 cm   (0.6-1.1cm)  LVIDd:         5.76 cm   (3.9-5.9cm)  LVIDs:         3.65 cm  LV Mass Index: 96.7 g/m2  LV % FS        36.6 %    LA VOLUME:  Normal Ranges:  LA Vol A4C:        59.8 ml    (22+/-6mL/m2)  LA Vol A2C:        6.1 ml  LA Vol BP:         58.6 ml  LA Vol Index A4C:  29.1ml/m2  LA Vol Index A2C:  2.9 ml/m2  LA Vol Index BP:    28.5 ml/m2  LA Area A4C:       19.4 cm2  LA Area A2C:       19.0 cm2  LA Major Axis A4C: 5.4 cm  LA Major Axis A2C: 50.7 cm  LA Volume Index:   26.0 ml/m2  LA Vol A4C:        53.7 ml  LA Vol A2C:        50.7 ml    RA VOLUME BY A/L METHOD:  Normal Ranges:  RA Area A4C: 15.7 cm2    M-MODE MEASUREMENTS:  Normal Ranges:  Ao Root: 2.90 cm (2.0-3.7cm)  LAs:     4.00 cm (2.7-4.0cm)    AORTA MEASUREMENTS:  Normal Ranges:  Ao Sinus, d: 3.20 cm (2.1-3.5cm)  Asc Ao, d:   3.70 cm (2.1-3.4cm)    LV SYSTOLIC FUNCTION BY 2D PLANIMETRY (MOD):  Normal Ranges:  EF-A4C View: 59.3 % (>55%)  EF-A2C View: 62.0 %  EF-Biplane:  59.6 %    LV DIASTOLIC FUNCTION:  Normal Ranges:  MV Peak E:        0.82 m/s    (0.7-1.2 m/s)  MV Peak A:        1.05 m/s    (0.42-0.7 m/s)  E/A Ratio:        0.78        (1.0-2.2)  MV e'             0.10 m/s    (>8.0)  MV lateral e'     0.10 m/s  MV medial e'      0.08 m/s  MV A Dur:         116.00 msec  E/e' Ratio:       8.17        (<8.0)  PulmV Sys Abner:    48.50 cm/s  PulmV Carrasco Abner:   35.20 cm/s  PulmV S/D Abner:    1.40  PulmV A Revs Abner: 31.50 cm/s  PulmV A Revs Dur: 114.00 msec    MITRAL VALVE:  Normal Ranges:  MV DT: 214 msec (150-240msec)    AORTIC VALVE:  Normal Ranges:  AoV Vmax:                1.67 m/s  (<1.7m/s)  AoV Peak P.2 mmHg (<20mmHg)  AoV Mean P.0 mmHg  (1.7-11.5mmHg)  LVOT Max Abner:            1.06 m/s  (<1.1m/s)  AoV VTI:                 37.40 cm  (18-25cm)  LVOT VTI:                26.30 cm  LVOT Diameter:           2.10 cm   (1.8-2.4cm)  AoV Area, VTI:           2.44 cm2  (2.5-5.5cm2)  AoV Area,Vmax:           2.20 cm2  (2.5-4.5cm2)  AoV Dimensionless Index: 0.70    RIGHT VENTRICLE:  RV 1   3.86 cm  RV 2   2.65 cm  RV 3   8.02 cm  TAPSE: 25.9 mm  RV s'  0.13 m/s    TRICUSPID VALVE/RVSP:  Normal Ranges:  Peak TR Velocity: 2.69 m/s  RV Syst Pressure: 38.9 mmHg (< 30mmHg)  IVC Diam:         1.85 cm    PULMONIC VALVE:  Normal Ranges:  PV Max Abner: 1.1 m/s   (0.6-0.9m/s)  PV Max P.0 mmHg    Pulmonary Veins:  PulmV A Revs Dur: 114.00 msec  PulmV A Revs Abner: 31.50 cm/s  PulmV Carrasco Abner:   35.20 cm/s  PulmV S/D Abner:    1.40  PulmV Sys Abner:    48.50 cm/s      [id number] Magdaleno Yan MD  Electronically signed on 10/7/2020 at 1:18:05 PM         Final     Stress Testing: No results found for this or any previous visit from the past 1825 days.    Cardiac Catheterization: No results found for this or any previous visit from the past 1825 days.    Cardiac Scoring: No results found for this or any previous visit from the past 1825 days.    AAA : No results found for this or any previous visit from the past 5 days.    OTHER: No results found for this or any previous visit from the past 1825 days.      The ASCVD Risk score (Erica RASHEED, et al., 2019) failed to calculate for the following reasons:    The 2019 ASCVD risk score is only valid for ages 40 to 79    The patient has a prior MI or stroke diagnosis     Assessment/Plan:   No diagnosis found.   Chao Thao 84 y.o. male with a PMH of HTN, HLD, CAD s/p PTCA 10y, recurrent falls. Who was found down for unknown time, May have been up to 7 days and was admitted for Pneumonia and Rhabdomyolysis, chronic long standing afib, rate controlled and was started on eliqius. TTE with low normal EF, imparted relaxation and RVSP 41 mmHg    He is asymptomatic   Limited functional status     Plan  Continue statin  Continue eliquis   Continue metoprolol   I asked him to cut lisinopril into half   Follow up with vascular as prior scheduled     Time Spent: I spent 40 minutes reviewing medical testing, obtaining medical history and counselling and educating on diagnosis and documenting clinical encounter.         ____________________________________________________________  Stephen Mahoney MD   of Medicine  Division of Cardiovascular Medicine   [facility]  [facility]

## 2024-03-05 NOTE — PROGRESS NOTES
This is a consultation from Dr. Babita Blackburn MD for   Chief Complaint   Patient presents with    Left Knee - Pain    Right Knee - Pain       This is a 84 y.o. male who presents for follow-up for his bilateral knees.  Patient has bilateral knee arthritis, had cortisone injections about 3 months back.  They gave him good relief and significant improved the symptoms.  Over the last few weeks has had return of his symptoms exacerbation of his pain.  Sharp pain over the anterior knee on both sides worse with walking.  He had some difficulty lately and was admitted to the hospital for weakness and muscle wasting.  He is doing therapy for that now.    Physical Exam    There has been no interval change in this patient's past medical, surgical, medications, allergies, family history or social history since the most recent visit to a provider within our department. 14 point review of systems was performed, reviewed, and negative except for pertinent positives documented in the history of present illness.     Constitutional: well developed, well nourished male in no acute distress  Psychiatric: normal mood, appropriate affect  Eyes: sclera anicteric  HENT: normocephalic/atraumatic  CV: regular rate and rhythm   Respiratory: non labored breathing  Integumentary: no rash  Neurological: moves all extremities    Bilateral knee exam: skin intact no lacerations or abrations.  1+ effusion.  Tender lateral joint line. negative log roll negative patellar grind. ROM 0-120. stable to varus and valgus stress at 0 and 30 degrees. negative lachman negative posterior drawer negative tevin. 5/5 ehl/fhl/gs/ta. silt s/s/sp/dp/t. 2+ dp/pt            L Inj/Asp: bilateral knee on 3/5/2024 12:26 PM  Indications: pain and joint swelling  Details: 22 G needle, anterolateral approach  Medications (Right): 1 mL triamcinolone acetonide 40 mg/mL  Medications (Left): 1 mL triamcinolone acetonide 40 mg/mL    Discussion:  I discussed the conservative  treatment options for knee osteoarthritis including but not limited to physical therapy, oral NSAIDS, activity and lifestyle modification, and corticosteroid injections. Pt has elected to undergo a cortisone injection today. I have explained the risk and benefits of an injection including the possibility of joint infection, bleeding, damage to cartilage, allergic reaction. Patient verbalized understanding and gave verbal consent wishes to proceed with a intra-articular cortisone injection for their knee.    Procedure:  After discussing the risk and benefits of the procedure, we proceeded with an intra-articular bilateral knee injection. We discussed the risks and benefits and potential morbidity related to the treatment, and to the prescription medication administered in the injection    With the patient's informed verbal consent, the bilateral knees were prepped in standard sterile fashion with Chlorhexidine. The skin was then anesthetized with ethyl chloride spray and cleaned again with Chlorhexidine. The bilateral knees were then apirated/injected with a prefilled 20-gauge syringe of 40 mg Kenalog + 4 ml Lidocaine using the lateral approach without complications.  The patient tolerated this well and felt immediate initial relief of symptoms. A bandaid was applied and the patient ambulated out of the clinic on ther own accord without difficulty. Patient was instructed to avoid physical activity for 24-48 hours to prevent the knees from swelling and may ice the knees as tolerated. Patient should contact the office if any signs of of infection appear: redness, fever, chills, drainage, swelling or warmth to the knees.  Pt understands that the injections can be repeated no sooner than 3 months.      Procedure, treatment alternatives, risks and benefits explained, specific risks discussed. Consent was given by the patient. Immediately prior to procedure a time out was called to verify the correct patient, procedure,  "equipment, support staff and site/side marked as required. Patient was prepped and draped in the usual sterile fashion.             Impression/Plan: This is a 84 y.o. male with bilateral knee arthritis.  I had an in depth discussion with the patient regarding treatment options for arthritis and their relative risks and benefits. We reviewed surgical and nonsurgical option for treatment. Treatments include anti inflammatory medications, physical therapy, weight loss, activity modification, use of assistive devices, injection therapies. We discussed current prescriptions and risks and benefits of continuation of prescription medication as apporpriate. We discussed that arthritis is often progressive over time, an in end stage arthritis surgical interventions can be considered, including arthroplasty. All questions were answered and the patient voiced their understanding.  Will continue nonsurgical treatment, we will also get him set up with gel I will see him back on its available    BMI Readings from Last 1 Encounters:   02/02/24 29.62 kg/m²      Lab Results   Component Value Date    CREATININE 0.70 02/14/2024     Tobacco Use: Medium Risk (2/2/2024)    Patient History     Smoking Tobacco Use: Former     Smokeless Tobacco Use: Never     Passive Exposure: Not on file      Computed MELD 3.0 unavailable. Necessary lab results were not found in the last year.  Computed MELD-Na unavailable. Necessary lab results were not found in the last year.       Lab Results   Component Value Date    HGBA1C 5.9 03/16/2020     No results found for: \"STAPHMRSASCR\"  "

## 2024-03-07 ENCOUNTER — SPECIALTY PHARMACY (OUTPATIENT)
Dept: PHARMACY | Facility: CLINIC | Age: 84
End: 2024-03-07

## 2024-03-08 ENCOUNTER — APPOINTMENT (OUTPATIENT)
Dept: CARDIOLOGY | Facility: HOSPITAL | Age: 84
End: 2024-03-08
Payer: MEDICARE

## 2024-03-08 ENCOUNTER — APPOINTMENT (OUTPATIENT)
Dept: VASCULAR MEDICINE | Facility: HOSPITAL | Age: 84
End: 2024-03-08
Payer: MEDICARE

## 2024-04-02 ENCOUNTER — HOSPITAL ENCOUNTER (OUTPATIENT)
Dept: VASCULAR MEDICINE | Facility: HOSPITAL | Age: 84
Discharge: HOME | End: 2024-04-02
Payer: MEDICARE

## 2024-04-02 DIAGNOSIS — I73.9 PAD (PERIPHERAL ARTERY DISEASE) (CMS-HCC): ICD-10-CM

## 2024-04-02 PROCEDURE — 93922 UPR/L XTREMITY ART 2 LEVELS: CPT | Performed by: INTERNAL MEDICINE

## 2024-04-02 PROCEDURE — 93922 UPR/L XTREMITY ART 2 LEVELS: CPT

## 2024-04-05 ENCOUNTER — OFFICE VISIT (OUTPATIENT)
Dept: CARDIOLOGY | Facility: HOSPITAL | Age: 84
End: 2024-04-05
Payer: MEDICARE

## 2024-04-05 VITALS — DIASTOLIC BLOOD PRESSURE: 60 MMHG | SYSTOLIC BLOOD PRESSURE: 94 MMHG | HEART RATE: 80 BPM

## 2024-04-05 DIAGNOSIS — I73.9 PVD (PERIPHERAL VASCULAR DISEASE) (CMS-HCC): Primary | ICD-10-CM

## 2024-04-05 PROCEDURE — 3074F SYST BP LT 130 MM HG: CPT | Performed by: INTERNAL MEDICINE

## 2024-04-05 PROCEDURE — 1160F RVW MEDS BY RX/DR IN RCRD: CPT | Performed by: INTERNAL MEDICINE

## 2024-04-05 PROCEDURE — 3078F DIAST BP <80 MM HG: CPT | Performed by: INTERNAL MEDICINE

## 2024-04-05 PROCEDURE — 99213 OFFICE O/P EST LOW 20 MIN: CPT | Performed by: INTERNAL MEDICINE

## 2024-04-05 PROCEDURE — 1159F MED LIST DOCD IN RCRD: CPT | Performed by: INTERNAL MEDICINE

## 2024-04-11 ENCOUNTER — TELEPHONE (OUTPATIENT)
Dept: ORTHOPEDIC SURGERY | Facility: CLINIC | Age: 84
End: 2024-04-11
Payer: MEDICARE

## 2024-04-11 NOTE — TELEPHONE ENCOUNTER
Tried contacting patient. Memory full on voicemail. Patient approved for Durolane bilateral knee. Auth#[id number] effective 04/03/24-04/03/25

## 2024-04-12 DIAGNOSIS — I48.91 NEW ONSET ATRIAL FIBRILLATION (MULTI): ICD-10-CM

## 2024-04-14 DIAGNOSIS — I48.91 NEW ONSET ATRIAL FIBRILLATION (MULTI): ICD-10-CM

## 2024-04-14 RX ORDER — APIXABAN 5 MG/1
5 TABLET, FILM COATED ORAL 2 TIMES DAILY
Qty: 30 TABLET | Refills: 1 | OUTPATIENT
Start: 2024-04-14

## 2024-05-01 ENCOUNTER — OFFICE VISIT (OUTPATIENT)
Dept: ORTHOPEDIC SURGERY | Facility: CLINIC | Age: 84
End: 2024-05-01
Payer: MEDICARE

## 2024-05-01 DIAGNOSIS — M25.562 ACUTE BILATERAL KNEE PAIN: Primary | ICD-10-CM

## 2024-05-01 DIAGNOSIS — M17.0 ARTHRITIS OF BOTH KNEES: ICD-10-CM

## 2024-05-01 DIAGNOSIS — M25.561 ACUTE BILATERAL KNEE PAIN: Primary | ICD-10-CM

## 2024-05-01 PROCEDURE — 20610 DRAIN/INJ JOINT/BURSA W/O US: CPT

## 2024-05-01 PROCEDURE — 1160F RVW MEDS BY RX/DR IN RCRD: CPT

## 2024-05-01 PROCEDURE — 1159F MED LIST DOCD IN RCRD: CPT

## 2024-05-01 PROCEDURE — 1036F TOBACCO NON-USER: CPT

## 2024-05-01 NOTE — PROGRESS NOTES
HPI  Chao Thao is a 84 y.o. male  in office today for   Chief Complaint   Patient presents with    Left Knee - Arthritis     bilat knee durolane buy&bill    Right Knee - Arthritis   .  This is his first time trying gel injections.  Reports continued pain in both knees.    Physical Exam  Constitutional: well developed, well nourished male in no acute distress  Psychiatric: normal mood, appropriate affect  Eyes: sclera anicteric  HENT: normocephalic/atraumatic  CV: regular rate and rhythm   Respiratory: non labored breathing  Integumentary: no rash  Neurological: moves all extremities    The side: bilateral knee is without erythema, warmth, infection or rash.  No edema about the knee.    L Inj/Asp: bilateral knee on 5/1/2024 1:53 PM  Indications: pain  Details: 18 G needle, anterolateral approach  Medications (Right): 60 mg sodium hyaluronate 60 mg/3 mL  Medications (Left): 60 mg sodium hyaluronate 60 mg/3 mL  Outcome: tolerated well, no immediate complications  Procedure, treatment alternatives, risks and benefits explained, specific risks discussed. Consent was given by the patient. Immediately prior to procedure a time out was called to verify the correct patient, procedure, equipment, support staff and site/side marked as required. Patient was prepped and draped in the usual sterile fashion.           Assessment  Assessment: side: bilateral knee osteoarthritis    Plan  Plan:  The side: bilateral knee was injected per procedure note above.  Follow Up: Patient to follow up as needed if pain persists or gets worse.      All questions were answered for the patient prior to end of exam and patient addressed their understanding.    Susana Castelan PA-C  05/01/24

## 2024-05-30 NOTE — PROGRESS NOTES
Primary Care Physician: Babita Blackburn MD   Date of Visit: 04/05/2024  3:40 PM EDT  Type of Visit: New patient visit    Chief Complaint:  No chief complaint on file.       HPI  Chao A Thao 84 y.o. male who presents to [facility].    It appears he was diagnosed with peripheral arterial disease on a PVR the, at this point, reports that he is asymptomatic: Denies any claudication, rest pain, or nonhealing wounds.    Review of Systems   12 point review of systems was obtained in detail and is negative other than that noted above or below.     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    Chao has a past surgical history that includes Inner ear surgery (01/06/2016); Other surgical history (03/16/2020); and Cardiac catheterization (05/14/2018).    Social/Family History:  Chao reports that he has quit smoking. His smoking use included cigarettes. He has never used smokeless tobacco. He reports that he does not currently use alcohol. He reports that he does not use drugs.    Chao's family history is not on file.    Allergies:  No Known Allergies    Medications:  Current Outpatient Medications on File Prior to Visit   Medication Sig    acetaminophen (Tylenol) 500 mg tablet Take 2 tablets (1,000 mg) by mouth every 8 hours.    ascorbic acid (Vitamin C) 500 mg tablet Take 1 tablet (500 mg) by mouth once daily.    cholecalciferol (Vitamin D-3) 25 MCG (1000 UT) capsule Take 1 capsule (25 mcg) by mouth once daily.    cyanocobalamin (Vitamin B-12) 250 mcg tablet Take 1 tablet (250 mcg) by mouth once daily.    DULoxetine (Cymbalta) 60 mg DR capsule TAKE 1 CAPSULE  BY MOUTH EVERY DAY    escitalopram (Lexapro) 5 mg tablet Take 1 tablet (5 mg) by mouth once daily at bedtime.    lisinopril 10 mg tablet Take 0.5 tablets (5 mg) by mouth once daily.    metoprolol succinate XL (Toprol-XL) 50 mg 24 hr tablet TAKE 1 TABLET BY MOUTH EVERY DAY    simvastatin (Zocor) 40 mg tablet TAKE 1 TABLET BY MOUTH EVERY DAY IN THE EVENING    sodium hyaluronate (Durolane) 60 mg/3 mL injection INJECTION 3ML, ONE DOSE INTO BOTH JOINTS ONE TIME    zinc acetate 50 mg (zinc) capsule Take 1 capsule by mouth once daily.    zinc oxide 40 % ointment ointment Apply 1 Application topically 2 times a day.     No current facility-administered medications on file prior to visit.       Objective:   Vitals:    04/05/24 1547   BP: 94/60   Pulse: 80       S1-S2 normal, regular rate and rhythm  Clear to auscultation bilaterally    Labs and Imaging:      Latest Ref Rng & Units 3/8/2022    12:02 PM 2/2/2024     2:19 PM 2/3/2024     8:43 AM 2/4/2024     7:39 AM 2/5/2024     6:11 AM 2/7/2024     7:33 AM 2/14/2024     8:37 AM   Electrolytes   Na 136 - 145 mmol/L 139  137  137  139  136  137  137    K 3.5 - 5.3 mmol/L 4.8  5.2  3.9  3.6  3.6  3.7  4.5    Cl 98 - 107 mmol/L 106  99  104  108  104  103  103    CO2 21 - 32 mmol/L 23  23  26  21  23  26  26    Cr 0.50 - 1.30 mg/dL 0.99  1.30  0.95  0.79  0.80  0.90  0.70    Ca 8.6 - 10.3 mg/dL 8.8  8.8  7.9  7.5  7.5  7.6  7.9    Phos 2.5 - 4.9 mg/dL     3.1            Latest Ref Rng & Units 2/3/2024     6:44 AM 2/4/2024     7:39 AM 2/6/2024     5:34 AM 2/7/2024     7:33 AM 2/8/2024     7:00 AM 2/14/2024     8:37 AM 2/21/2024     5:45 AM   CBC   Hb 13.5 - 17.5 g/dL 11.1  11.1  10.5  10.4  10.3  11.7  11.0    Hct 41.0 - 52.0 % 33.9  35.0  32.4  31.9  32.5  38.7  34.1    MCV 80 - 100 fL 100  104  101  102  104  109  101    RDW 11.5 - 14.5 % 12.8  12.8  12.6  12.7  12.8  13.2  13.3          Latest Ref Rng & Units 5/26/2020     1:48 PM 9/10/2020    11:33 AM 8/13/2021    10:34 PM  2/2/2024     2:19 PM 2/3/2024     8:43 AM 2/4/2024     7:39 AM 2/14/2024     8:37 AM   Lipids   ALT 10 - 52 U/L 15  12  19  34  33  33  33    AST 9 - 39 U/L 15  18  17  89  60  39  36          Latest Ref Rng & Units 8/8/2018    11:42 AM 11/26/2019    11:17 AM 5/8/2020    12:57 PM 9/10/2020    11:33 AM 3/8/2022    12:02 PM 2/3/2024     8:43 AM   Thyroid   TSH 0.44 - 3.98 mIU/L 1.21  0.51  1.01  0.86  0.93  0.67           No data to display                 Lab Results   Component Value Date    HGBA1C 5.9 03/16/2020        The ASCVD Risk score (Erica RASHEED, et al., 2019) failed to calculate for the following reasons:    The 2019 ASCVD risk score is only valid for ages 40 to 79    The patient has a prior MI or stroke diagnosis     Echocardiogram: No results found for this or any previous visit.     Echocardiogram:   PHYSICIAN INTERPRETATION:  Left Ventricle: The left ventricular systolic function is low normal, with an estimated ejection fraction of 55%. There are no regional wall motion abnormalities. The left ventricular cavity size is normal. Spectral Doppler shows an impaired relaxation pattern of left ventricular diastolic filling.  Left Atrium: The left atrium is mildly dilated.  Right Ventricle: The right ventricle is normal in size. There is normal right ventricular global systolic function.  Right Atrium: The right atrium is normal in size.  Aortic Valve: The aortic valve is trileaflet. There is evidence of mild aortic valve stenosis.  There is no evidence of aortic valve regurgitation. The peak instantaneous gradient of the aortic valve is 11.2 mmHg. The mean gradient of the aortic valve is 6.0 mmHg.  Mitral Valve: The mitral valve is normal in structure. There is trace mitral valve regurgitation.  Tricuspid Valve: The tricuspid valve is structurally normal. There is mild tricuspid regurgitation.  Pulmonic Valve: The pulmonic valve is not well visualized. The pulmonic valve regurgitation was not well  visualized.  Pericardium: There is no pericardial effusion noted.  Aorta: The aortic root is normal.  Pulmonary Artery: The tricuspid regurgitant velocity is 2.69 m/s, and with an estimated right atrial pressure of 10 mmHg, the estimated pulmonary artery pressure is mildly elevated with the RVSP at 38.9 mmHg.  Systemic Veins: The inferior vena cava appears to be of normal size.      CONCLUSIONS:  1. The left ventricular systolic function is low normal with a 55% estimated ejection fraction.  2. Spectral Doppler shows an impaired relaxation pattern of left ventricular diastolic filling.  3. Mild aortic valve stenosis.  4. There is mild tricuspid regurgitation.  5. The estimated pulmonary artery pressure is mildly elevated with the RVSP at 38.9 mmHg.  6. The inferior vena cava appears to be of normal size.    Stress Testing: No results found for this or any previous visit from the past 1825 days.    Cardiac Catheterization: No results found for this or any previous visit from the past 1825 days.    Cardiac Scoring: No results found for this or any previous visit from the past 1825 days.    AAA : No results found for this or any previous visit from the past 1825 days.    OTHER: No results found for this or any previous visit from the past 1825 days.        Assessment/Plan:   No diagnosis found.     Chao Thao 84 y.o. male who presents establish care:    1.  Peripheral arterial disease    Interval worsening of TBI's from from 2021 to 2024 with both TBI's now being 0.    Reports significant knee pain with no significant claudication in his muscle groups.  Indeed, reports that he has knee joint injections significantly relieve his pain.    At the current time, do not see need for intervention.  For vascular rehabilitation, he declined.    Guideline directed medical therapy for peripheral arterial disease: Continue simvastatin 40 mg nightly.    Return to clinic in 1 year.      ____________________________________________________________  Lavelle Mary Kate, MD

## 2024-06-11 ENCOUNTER — APPOINTMENT (OUTPATIENT)
Dept: RADIOLOGY | Facility: HOSPITAL | Age: 84
End: 2024-06-11
Payer: MEDICARE

## 2024-06-11 ENCOUNTER — HOSPITAL ENCOUNTER (OUTPATIENT)
Facility: HOSPITAL | Age: 84
Setting detail: OBSERVATION
Discharge: NURSING, REHAB OR LONG-TERM CARE | End: 2024-06-13
Attending: STUDENT IN AN ORGANIZED HEALTH CARE EDUCATION/TRAINING PROGRAM | Admitting: STUDENT IN AN ORGANIZED HEALTH CARE EDUCATION/TRAINING PROGRAM
Payer: MEDICARE

## 2024-06-11 ENCOUNTER — APPOINTMENT (OUTPATIENT)
Dept: CARDIOLOGY | Facility: HOSPITAL | Age: 84
End: 2024-06-11
Payer: MEDICARE

## 2024-06-11 DIAGNOSIS — S22.41XA CLOSED FRACTURE OF MULTIPLE RIBS OF RIGHT SIDE, INITIAL ENCOUNTER: ICD-10-CM
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
## 2024-06-11 DIAGNOSIS — I48.20 CHRONIC ATRIAL FIBRILLATION (MULTI): ICD-10-CM

## 2024-06-11 DIAGNOSIS — D64.9 ANEMIA, UNSPECIFIED TYPE: ICD-10-CM

## 2024-06-11 DIAGNOSIS — I73.9 PVD (PERIPHERAL VASCULAR DISEASE) (CMS-HCC): ICD-10-CM

## 2024-06-11 DIAGNOSIS — I15.9 SECONDARY HYPERTENSION: ICD-10-CM

## 2024-06-11 LAB
ALBUMIN SERPL BCP-MCNC: 3.2 G/DL (ref 3.4–5)
ALP SERPL-CCNC: 116 U/L (ref 33–136)
ALT SERPL W P-5'-P-CCNC: 10 U/L (ref 10–52)
ANION GAP SERPL CALC-SCNC: 19 MMOL/L (ref 10–20)
APTT PPP: 30 SECONDS (ref 27–38)
AST SERPL W P-5'-P-CCNC: 18 U/L (ref 9–39)
BASOPHILS # BLD AUTO: 0.07 X10*3/UL (ref 0–0.1)
BASOPHILS NFR BLD AUTO: 0.3 %
BILIRUB SERPL-MCNC: 0.6 MG/DL (ref 0–1.2)
BUN SERPL-MCNC: 26 MG/DL (ref 6–23)
CALCIUM SERPL-MCNC: 8.5 MG/DL (ref 8.6–10.3)
CARDIAC TROPONIN I PNL SERPL HS: 19 NG/L (ref 0–20)
CHLORIDE SERPL-SCNC: 99 MMOL/L (ref 98–107)
CK SERPL-CCNC: 240 U/L (ref 0–325)
CO2 SERPL-SCNC: 19 MMOL/L (ref 21–32)
CREAT SERPL-MCNC: 0.91 MG/DL (ref 0.5–1.3)
EGFRCR SERPLBLD CKD-EPI 2021: 84 ML/MIN/1.73M*2
EOSINOPHIL # BLD AUTO: 0.02 X10*3/UL (ref 0–0.4)
EOSINOPHIL NFR BLD AUTO: 0.1 %
ERYTHROCYTE [DISTWIDTH] IN BLOOD BY AUTOMATED COUNT: 12.3 % (ref 11.5–14.5)
GLUCOSE SERPL-MCNC: 121 MG/DL (ref 74–99)
HCT VFR BLD AUTO: 32.9 % (ref 41–52)
HGB BLD-MCNC: 10.8 G/DL (ref 13.5–17.5)
IMM GRANULOCYTES # BLD AUTO: 0.17 X10*3/UL (ref 0–0.5)
IMM GRANULOCYTES NFR BLD AUTO: 0.8 % (ref 0–0.9)
INR PPP: 1.4 (ref 0.9–1.1)
LACTATE SERPL-SCNC: 1.9 MMOL/L (ref 0.4–2)
LYMPHOCYTES # BLD AUTO: 1.41 X10*3/UL (ref 0.8–3)
LYMPHOCYTES NFR BLD AUTO: 6.5 %
MCH RBC QN AUTO: 32.6 PG (ref 26–34)
MCHC RBC AUTO-ENTMCNC: 32.8 G/DL (ref 32–36)
MCV RBC AUTO: 99 FL (ref 80–100)
MONOCYTES # BLD AUTO: 1.2 X10*3/UL (ref 0.05–0.8)
MONOCYTES NFR BLD AUTO: 5.5 %
NEUTROPHILS # BLD AUTO: 18.9 X10*3/UL (ref 1.6–5.5)
NEUTROPHILS NFR BLD AUTO: 86.8 %
NRBC BLD-RTO: 0 /100 WBCS (ref 0–0)
PLATELET # BLD AUTO: 657 X10*3/UL (ref 150–450)
POTASSIUM SERPL-SCNC: 4 MMOL/L (ref 3.5–5.3)
PROT SERPL-MCNC: 6.9 G/DL (ref 6.4–8.2)
PROTHROMBIN TIME: 15.3 SECONDS (ref 9.8–12.8)
Q ONSET: 220 MS
QRS COUNT: 18 BEATS
QRS DURATION: 88 MS
QT INTERVAL: 352 MS
QTC CALCULATION(BAZETT): 474 MS
QTC FREDERICIA: 429 MS
R AXIS: 68 DEGREES
RBC # BLD AUTO: 3.31 X10*6/UL (ref 4.5–5.9)
SODIUM SERPL-SCNC: 133 MMOL/L (ref 136–145)
T AXIS: 224 DEGREES
T OFFSET: 396 MS
VENTRICULAR RATE: 109 BPM
WBC # BLD AUTO: 21.8 X10*3/UL (ref 4.4–11.3)

## 2024-06-11 PROCEDURE — 93005 ELECTROCARDIOGRAM TRACING: CPT

## 2024-06-11 PROCEDURE — 2500000005 HC RX 250 GENERAL PHARMACY W/O HCPCS: Performed by: STUDENT IN AN ORGANIZED HEALTH CARE EDUCATION/TRAINING PROGRAM

## 2024-06-11 PROCEDURE — 2500000001 HC RX 250 WO HCPCS SELF ADMINISTERED DRUGS (ALT 637 FOR MEDICARE OP): Performed by: STUDENT IN AN ORGANIZED HEALTH CARE EDUCATION/TRAINING PROGRAM

## 2024-06-11 PROCEDURE — 85610 PROTHROMBIN TIME: CPT | Performed by: STUDENT IN AN ORGANIZED HEALTH CARE EDUCATION/TRAINING PROGRAM

## 2024-06-11 PROCEDURE — 82550 ASSAY OF CK (CPK): CPT | Performed by: STUDENT IN AN ORGANIZED HEALTH CARE EDUCATION/TRAINING PROGRAM

## 2024-06-11 PROCEDURE — 71260 CT THORAX DX C+: CPT | Performed by: RADIOLOGY

## 2024-06-11 PROCEDURE — 84075 ASSAY ALKALINE PHOSPHATASE: CPT | Performed by: STUDENT IN AN ORGANIZED HEALTH CARE EDUCATION/TRAINING PROGRAM

## 2024-06-11 PROCEDURE — 84484 ASSAY OF TROPONIN QUANT: CPT | Performed by: STUDENT IN AN ORGANIZED HEALTH CARE EDUCATION/TRAINING PROGRAM

## 2024-06-11 PROCEDURE — 72125 CT NECK SPINE W/O DYE: CPT | Performed by: INTERNAL MEDICINE

## 2024-06-11 PROCEDURE — 2500000002 HC RX 250 W HCPCS SELF ADMINISTERED DRUGS (ALT 637 FOR MEDICARE OP, ALT 636 FOR OP/ED): Performed by: STUDENT IN AN ORGANIZED HEALTH CARE EDUCATION/TRAINING PROGRAM

## 2024-06-11 PROCEDURE — 90471 IMMUNIZATION ADMIN: CPT | Performed by: STUDENT IN AN ORGANIZED HEALTH CARE EDUCATION/TRAINING PROGRAM

## 2024-06-11 PROCEDURE — 85025 COMPLETE CBC W/AUTO DIFF WBC: CPT | Performed by: STUDENT IN AN ORGANIZED HEALTH CARE EDUCATION/TRAINING PROGRAM

## 2024-06-11 PROCEDURE — 96360 HYDRATION IV INFUSION INIT: CPT | Mod: 59

## 2024-06-11 PROCEDURE — 70450 CT HEAD/BRAIN W/O DYE: CPT

## 2024-06-11 PROCEDURE — 94760 N-INVAS EAR/PLS OXIMETRY 1: CPT | Mod: MUE

## 2024-06-11 PROCEDURE — 2550000001 HC RX 255 CONTRASTS: Performed by: STUDENT IN AN ORGANIZED HEALTH CARE EDUCATION/TRAINING PROGRAM

## 2024-06-11 PROCEDURE — G0378 HOSPITAL OBSERVATION PER HR: HCPCS

## 2024-06-11 PROCEDURE — 90715 TDAP VACCINE 7 YRS/> IM: CPT | Performed by: STUDENT IN AN ORGANIZED HEALTH CARE EDUCATION/TRAINING PROGRAM

## 2024-06-11 PROCEDURE — 74177 CT ABD & PELVIS W/CONTRAST: CPT

## 2024-06-11 PROCEDURE — 99223 1ST HOSP IP/OBS HIGH 75: CPT | Performed by: STUDENT IN AN ORGANIZED HEALTH CARE EDUCATION/TRAINING PROGRAM

## 2024-06-11 PROCEDURE — 70450 CT HEAD/BRAIN W/O DYE: CPT | Performed by: INTERNAL MEDICINE

## 2024-06-11 PROCEDURE — 36415 COLL VENOUS BLD VENIPUNCTURE: CPT | Performed by: STUDENT IN AN ORGANIZED HEALTH CARE EDUCATION/TRAINING PROGRAM

## 2024-06-11 PROCEDURE — 83605 ASSAY OF LACTIC ACID: CPT | Performed by: STUDENT IN AN ORGANIZED HEALTH CARE EDUCATION/TRAINING PROGRAM

## 2024-06-11 PROCEDURE — 99285 EMERGENCY DEPT VISIT HI MDM: CPT | Mod: 25

## 2024-06-11 PROCEDURE — 2500000004 HC RX 250 GENERAL PHARMACY W/ HCPCS (ALT 636 FOR OP/ED): Performed by: STUDENT IN AN ORGANIZED HEALTH CARE EDUCATION/TRAINING PROGRAM

## 2024-06-11 PROCEDURE — 74177 CT ABD & PELVIS W/CONTRAST: CPT | Performed by: RADIOLOGY

## 2024-06-11 PROCEDURE — 72125 CT NECK SPINE W/O DYE: CPT

## 2024-06-11 PROCEDURE — G0390 TRAUMA RESPONS W/HOSP CRITI: HCPCS

## 2024-06-11 RX ORDER — METOPROLOL SUCCINATE 50 MG/1
50 TABLET, EXTENDED RELEASE ORAL DAILY
Status: DISCONTINUED | OUTPATIENT
Start: 2024-06-12 | End: 2024-06-13 | Stop reason: HOSPADM

## 2024-06-11 RX ORDER — ONDANSETRON HYDROCHLORIDE 2 MG/ML
4 INJECTION, SOLUTION INTRAVENOUS ONCE
Status: DISCONTINUED | OUTPATIENT
Start: 2024-06-11 | End: 2024-06-11

## 2024-06-11 RX ORDER — SIMVASTATIN 20 MG/1
40 TABLET, FILM COATED ORAL EVERY EVENING
Status: DISCONTINUED | OUTPATIENT
Start: 2024-06-11 | End: 2024-06-13 | Stop reason: HOSPADM

## 2024-06-11 RX ORDER — ACETAMINOPHEN 500 MG
5 TABLET ORAL NIGHTLY PRN
Status: DISCONTINUED | OUTPATIENT
Start: 2024-06-11 | End: 2024-06-13 | Stop reason: HOSPADM

## 2024-06-11 RX ORDER — METOPROLOL TARTRATE 50 MG/1
50 TABLET ORAL ONCE
Status: COMPLETED | OUTPATIENT
Start: 2024-06-11 | End: 2024-06-11

## 2024-06-11 RX ORDER — FAMOTIDINE 20 MG/1
10 TABLET, FILM COATED ORAL 2 TIMES DAILY PRN
Status: DISCONTINUED | OUTPATIENT
Start: 2024-06-11 | End: 2024-06-13 | Stop reason: HOSPADM

## 2024-06-11 RX ORDER — DULOXETIN HYDROCHLORIDE 60 MG/1
60 CAPSULE, DELAYED RELEASE ORAL DAILY
Status: DISCONTINUED | OUTPATIENT
Start: 2024-06-12 | End: 2024-06-13 | Stop reason: HOSPADM

## 2024-06-11 RX ORDER — AMOXICILLIN 250 MG
2 CAPSULE ORAL NIGHTLY PRN
Status: DISCONTINUED | OUTPATIENT
Start: 2024-06-11 | End: 2024-06-13 | Stop reason: HOSPADM

## 2024-06-11 RX ORDER — LISINOPRIL 5 MG/1
5 TABLET ORAL DAILY
Status: DISCONTINUED | OUTPATIENT
Start: 2024-06-11 | End: 2024-06-13 | Stop reason: HOSPADM

## 2024-06-11 RX ORDER — ONDANSETRON HYDROCHLORIDE 2 MG/ML
4 INJECTION, SOLUTION INTRAVENOUS EVERY 6 HOURS PRN
Status: DISCONTINUED | OUTPATIENT
Start: 2024-06-11 | End: 2024-06-13 | Stop reason: HOSPADM

## 2024-06-11 RX ORDER — LIDOCAINE 560 MG/1
1 PATCH PERCUTANEOUS; TOPICAL; TRANSDERMAL DAILY
Status: DISCONTINUED | OUTPATIENT
Start: 2024-06-11 | End: 2024-06-13 | Stop reason: HOSPADM

## 2024-06-11 RX ORDER — EAR PLUGS
1 EACH OTIC (EAR) 2 TIMES DAILY
Status: DISCONTINUED | OUTPATIENT
Start: 2024-06-11 | End: 2024-06-13 | Stop reason: HOSPADM

## 2024-06-11 RX ADMIN — SIMVASTATIN 40 MG: 20 TABLET, FILM COATED ORAL at 20:27

## 2024-06-11 RX ADMIN — IOHEXOL 100 ML: 350 INJECTION, SOLUTION INTRAVENOUS at 09:24

## 2024-06-11 RX ADMIN — TETANUS TOXOID, REDUCED DIPHTHERIA TOXOID AND ACELLULAR PERTUSSIS VACCINE, ADSORBED 0.5 ML: 5; 2.5; 8; 8; 2.5 SUSPENSION INTRAMUSCULAR at 10:50

## 2024-06-11 RX ADMIN — METOPROLOL TARTRATE 50 MG: 50 TABLET, FILM COATED ORAL at 10:50

## 2024-06-11 RX ADMIN — SODIUM CHLORIDE, POTASSIUM CHLORIDE, SODIUM LACTATE AND CALCIUM CHLORIDE 1000 ML: 600; 310; 30; 20 INJECTION, SOLUTION INTRAVENOUS at 10:02

## 2024-06-11 RX ADMIN — Medication 1 APPLICATION: at 20:27

## 2024-06-11 RX ADMIN — LIDOCAINE 4% 1 PATCH: 40 PATCH TOPICAL at 11:42

## 2024-06-11 SDOH — SOCIAL STABILITY: SOCIAL INSECURITY: WERE YOU ABLE TO COMPLETE ALL THE BEHAVIORAL HEALTH SCREENINGS?: YES

## 2024-06-11 SDOH — SOCIAL STABILITY: SOCIAL INSECURITY: ARE THERE ANY APPARENT SIGNS OF INJURIES/BEHAVIORS THAT COULD BE RELATED TO ABUSE/NEGLECT?: YES

## 2024-06-11 SDOH — SOCIAL STABILITY: SOCIAL INSECURITY: DO YOU FEEL ANYONE HAS EXPLOITED OR TAKEN ADVANTAGE OF YOU FINANCIALLY OR OF YOUR PERSONAL PROPERTY?: NO

## 2024-06-11 SDOH — SOCIAL STABILITY: SOCIAL INSECURITY: HAS ANYONE EVER THREATENED TO HURT YOUR FAMILY OR YOUR PETS?: NO

## 2024-06-11 SDOH — SOCIAL STABILITY: SOCIAL INSECURITY: ABUSE: ADULT

## 2024-06-11 SDOH — SOCIAL STABILITY: SOCIAL INSECURITY: DOES ANYONE TRY TO KEEP YOU FROM HAVING/CONTACTING OTHER FRIENDS OR DOING THINGS OUTSIDE YOUR HOME?: NO

## 2024-06-11 SDOH — SOCIAL STABILITY: SOCIAL INSECURITY: HAVE YOU HAD ANY THOUGHTS OF HARMING ANYONE ELSE?: NO

## 2024-06-11 SDOH — SOCIAL STABILITY: SOCIAL INSECURITY: DO YOU FEEL UNSAFE GOING BACK TO THE PLACE WHERE YOU ARE LIVING?: NO

## 2024-06-11 SDOH — SOCIAL STABILITY: SOCIAL INSECURITY: ARE YOU OR HAVE YOU BEEN THREATENED OR ABUSED PHYSICALLY, EMOTIONALLY, OR SEXUALLY BY ANYONE?: NO

## 2024-06-11 SDOH — SOCIAL STABILITY: SOCIAL INSECURITY: HAVE YOU HAD THOUGHTS OF HARMING ANYONE ELSE?: NO

## 2024-06-11 ASSESSMENT — PATIENT HEALTH QUESTIONNAIRE - PHQ9
SUM OF ALL RESPONSES TO PHQ9 QUESTIONS 1 & 2: 0
2. FEELING DOWN, DEPRESSED OR HOPELESS: NOT AT ALL
1. LITTLE INTEREST OR PLEASURE IN DOING THINGS: NOT AT ALL

## 2024-06-11 ASSESSMENT — LIFESTYLE VARIABLES
SKIP TO QUESTIONS 9-10: 1
AUDIT-C TOTAL SCORE: 0
HAVE YOU EVER FELT YOU SHOULD CUT DOWN ON YOUR DRINKING: NO
AUDIT-C TOTAL SCORE: 0
TOTAL SCORE: 0
EVER HAD A DRINK FIRST THING IN THE MORNING TO STEADY YOUR NERVES TO GET RID OF A HANGOVER: NO
HOW MANY STANDARD DRINKS CONTAINING ALCOHOL DO YOU HAVE ON A TYPICAL DAY: PATIENT DOES NOT DRINK
HOW OFTEN DO YOU HAVE A DRINK CONTAINING ALCOHOL: NEVER
HAVE PEOPLE ANNOYED YOU BY CRITICIZING YOUR DRINKING: NO
EVER FELT BAD OR GUILTY ABOUT YOUR DRINKING: NO
HOW OFTEN DO YOU HAVE 6 OR MORE DRINKS ON ONE OCCASION: NEVER

## 2024-06-11 ASSESSMENT — COGNITIVE AND FUNCTIONAL STATUS - GENERAL
TOILETING: A LOT
STANDING UP FROM CHAIR USING ARMS: A LOT
DRESSING REGULAR LOWER BODY CLOTHING: A LOT
CLIMB 3 TO 5 STEPS WITH RAILING: TOTAL
PATIENT BASELINE BEDBOUND: NO
DRESSING REGULAR UPPER BODY CLOTHING: A LOT
MOBILITY SCORE: 11
TURNING FROM BACK TO SIDE WHILE IN FLAT BAD: A LOT
HELP NEEDED FOR BATHING: A LOT
DRESSING REGULAR LOWER BODY CLOTHING: A LOT
MOVING FROM LYING ON BACK TO SITTING ON SIDE OF FLAT BED WITH BEDRAILS: A LOT
MOVING TO AND FROM BED TO CHAIR: A LOT
HELP NEEDED FOR BATHING: A LOT
PERSONAL GROOMING: A LOT
DRESSING REGULAR UPPER BODY CLOTHING: A LOT
PERSONAL GROOMING: A LOT
DAILY ACTIVITIY SCORE: 14
CLIMB 3 TO 5 STEPS WITH RAILING: TOTAL
MOBILITY SCORE: 11
DAILY ACTIVITIY SCORE: 14
MOVING TO AND FROM BED TO CHAIR: A LOT
WALKING IN HOSPITAL ROOM: A LOT
WALKING IN HOSPITAL ROOM: A LOT
TOILETING: A LOT
STANDING UP FROM CHAIR USING ARMS: A LOT
TURNING FROM BACK TO SIDE WHILE IN FLAT BAD: A LOT
MOVING FROM LYING ON BACK TO SITTING ON SIDE OF FLAT BED WITH BEDRAILS: A LOT

## 2024-06-11 ASSESSMENT — COLUMBIA-SUICIDE SEVERITY RATING SCALE - C-SSRS
1. IN THE PAST MONTH, HAVE YOU WISHED YOU WERE DEAD OR WISHED YOU COULD GO TO SLEEP AND NOT WAKE UP?: NO
2. HAVE YOU ACTUALLY HAD ANY THOUGHTS OF KILLING YOURSELF?: NO
6. HAVE YOU EVER DONE ANYTHING, STARTED TO DO ANYTHING, OR PREPARED TO DO ANYTHING TO END YOUR LIFE?: NO

## 2024-06-11 ASSESSMENT — ACTIVITIES OF DAILY LIVING (ADL)
HEARING - LEFT EAR: DIFFICULTY WITH NOISE
JUDGMENT_ADEQUATE_SAFELY_COMPLETE_DAILY_ACTIVITIES: YES
LACK_OF_TRANSPORTATION: NO
FEEDING YOURSELF: NEEDS ASSISTANCE
HEARING - RIGHT EAR: DEAF
PATIENT'S MEMORY ADEQUATE TO SAFELY COMPLETE DAILY ACTIVITIES?: YES
GROOMING: NEEDS ASSISTANCE
ADEQUATE_TO_COMPLETE_ADL: YES
WALKS IN HOME: NEEDS ASSISTANCE
DRESSING YOURSELF: NEEDS ASSISTANCE
TOILETING: NEEDS ASSISTANCE
BATHING: NEEDS ASSISTANCE

## 2024-06-11 ASSESSMENT — PAIN SCALES - GENERAL
PAINLEVEL_OUTOF10: 0 - NO PAIN
PAINLEVEL_OUTOF10: 3

## 2024-06-11 ASSESSMENT — PAIN DESCRIPTION - LOCATION: LOCATION: RIB CAGE

## 2024-06-11 ASSESSMENT — PAIN DESCRIPTION - ORIENTATION: ORIENTATION: RIGHT

## 2024-06-11 ASSESSMENT — PAIN - FUNCTIONAL ASSESSMENT
PAIN_FUNCTIONAL_ASSESSMENT: 0-10
PAIN_FUNCTIONAL_ASSESSMENT: 0-10

## 2024-06-11 ASSESSMENT — PAIN DESCRIPTION - PAIN TYPE: TYPE: ACUTE PAIN

## 2024-06-11 NOTE — PROGRESS NOTES
06/11/24 1032   Discharge Planning   Living Arrangements Spouse/significant other   Support Systems Spouse/significant other   Assistance Needed A&OX3; independent with ADLs with walker and cane at times; doesn't drive; room air baseline and currently room air; patient on Eliquis prior to hospitalization   Type of Residence Private residence   Number of Stairs to Enter Residence 4   Number of Stairs Within Residence 14   Do you have animals or pets at home? Yes   Type of Animals or Pets 1 dog   Who is requesting discharge planning? Provider   Home or Post Acute Services Post acute facilities (Rehab/SNF/etc)   Type of Post Acute Facility Services Skilled nursing   Patient expects to be discharged to: Pending workup and PT OT Kevin, patient is open to [facility] (precert needed) vs home care   Does the patient need discharge transport arranged? Yes   RoundTrip coordination needed? Yes   Has discharge transport been arranged? No   Financial Resource Strain   How hard is it for you to pay for the very basics like food, housing, medical care, and heating? Not hard   Housing Stability   In the last 12 months, was there a time when you were not able to pay the mortgage or rent on time? N   In the last 12 months, how many places have you lived? 1   In the last 12 months, was there a time when you did not have a steady place to sleep or slept in a shelter (including now)? N   Transportation Needs   In the past 12 months, has lack of transportation kept you from medical appointments or from getting medications? no   In the past 12 months, has lack of transportation kept you from meetings, work, or from getting things needed for daily living? No     06/11/2024 1033am  Spoke with patient bedside in ED.

## 2024-06-11 NOTE — ED PROVIDER NOTES
CC: Fall (HIA)     HPI:  Patient is an 84-year-old male with a history of atrial fibrillation on anticoagulation who presents to the emergency department for a fall.  He states he slipped on something.  He denies chest pain, lightheadedness, dizziness or shortness of breath prior to falling.  He did hit his head and therefore EMS was called due to a fall on anticoagulation.  His only complaint is pain behind the right posterior back/ribs.  Denies difficulty breathing.  Denies nausea or vomiting.  Denies chest pain.  Alert and oriented with a GCS of 15.    Records Reviewed:  Recent available ED and inpatient notes reviewed in EMR.    PMHx/PSHx:  Per HPI.   - has a past medical history of Alcohol abuse, in remission, Drug abuse (Multi), Edema, unspecified, Encounter for immunization, Pain in left knee, Personal history of other diseases of the nervous system and sense organs, Personal history of other drug therapy, and Tinea pedis.  - has a past surgical history that includes Inner ear surgery (01/06/2016); Other surgical history (03/16/2020); and Cardiac catheterization (05/14/2018).  - has Cognitive impairment; Sensorineural hearing loss (SNHL) of both ears; Pain in extremity at multiple sites; Impairment of balance; Closed compression fracture of second lumbar vertebra (Multi); Primary hypertension; Coronary artery disease involving native coronary artery of native heart with unstable angina pectoris (Multi); PVD (peripheral vascular disease) (CMS-HCC); Anemia; B12 deficiency; Vitamin D deficiency; Health care maintenance; Chronic atrial fibrillation (Multi); and Fall, initial encounter on their problem list.    Medications:  Reviewed in EMR. See EMR for complete list of medications and doses.    Allergies:  Patient has no known allergies.    Social History:  - Tobacco:  reports that he has quit smoking. His smoking use included cigarettes. He has never used smokeless tobacco.   - Alcohol:  reports that he does not  currently use alcohol.   - Illicit Drugs:  reports no history of drug use.     ROS:  Per HPI.       ???????????????????????????????????????????????????????????????  Triage Vitals:  T 36.8 °C (98.2 °F)  HR (!) 121  /81  RR 20  O2 97 % None (Room air)    Physical Exam  ???????????????????????????????????????????????????????????????  GEN: no acute distress  HEAD: atraumatic  EYES: PERRL, EOMI  ENT: mmm  NECK: no c-spine tenderness   CVS/CHEST: Irregular, tachycardic  PULM: CTA b/l no wheezes, crackles, or rhonchi   GI: soft, NT/ND, no rebound or guarding   BACK: Right-sided posterior rib pain, no vertebral point tenderness  EXT: Abrasion to left elbow, no LE edema, 2+ periph pulses in bilat radial and DP   NEURO: Awake and alert, moving all extremities equally and spontaneously  SKIN: warm, dry  PSYCH: AAOx3 answers questions appropriately    Assessment and Plan:  84-year-old presents emergency department as an HIA.  He fell and hit his head.  He denies any predrawn symptoms.  However EMS reports severely poor living conditions.  Therefore labs were obtained to assess if patient requires admission.  He did have a marked leukocytosis.  CK added is unsure if history is liable regarding if there was a downtime as he was admitted back in February for a fall with a prolonged downtime.  CT of his head and C-spine negative.  He does have posterior rib pain but no T or L spinal tenderness.  Therefore CT of the chest abdomen pelvis obtained as he is on anticoagulation and does have rib pain.  Disposition pending workup at this time but anticipate admission. See ED course.    ED Course:  ED Course as of 06/11/24 2017 Tue Jun 11, 2024   0959 Significant leukocytosis however patient afebrile without sick symptoms.  He is here because he fell.  Will add CK and give a liter of IV fluids.  Will continue to reassess patient for need of antibiotics. [HD]   7406 EKG read by me reviewed by me is atrial fibrillation with a  rapid ventricular response at 109 bpm.  Normal axis.  Poor baseline however no obvious ST segment elevation or depression. [HD]   1017 No acute intracranial abnormality or calvarial fracture. No acute fracture or traumatic malalignment of the cervical spine. [HD]   1131 Acute right-sided 8th and 9th rib fractures. [HD]   1136 Patient has 2 rib fractures.  I-S and pain control ordered.  He does have a leukocytosis this is likely a stress response.  Given the poor living conditions in the setting of a fall with 2 rib fractures patient will be admitted to medicine and will require PT OT evaluation. [HD]      ED Course User Index  [HD] Constance Mcdonald DO         Diagnoses as of 06/11/24 2017   Fall, initial encounter   Closed fracture of multiple ribs of right side, initial encounter       Social Determinants Limiting Care:  Poor housing conditions    Disposition:  admitted    Constance Mcdonald DO      Procedures ? Smart[employer] last updated 6/11/2024 8:17 PM        Constance Mcdonald DO  06/11/24 2018

## 2024-06-11 NOTE — CARE PLAN
The patient's goals for the shift include  remain comfortable    The clinical goals for the shift include pt will be free from falls during shift      Problem: Pain  Goal: My pain/discomfort is manageable  Outcome: Progressing     Problem: Safety  Goal: Patient will be injury free during hospitalization  Outcome: Progressing     Problem: Safety  Goal: I will remain free of falls  Outcome: Progressing

## 2024-06-11 NOTE — H&P
[facility]  Department of Hospital Medicine    HISTORY AND PHYSICAL    Chief Complaint   Patient presents with    Fall     HIA        History Of Present Illness  Chao Thao is a 84 y.o. male with A-fib recently started on Eliquis, history of fall with rhabdomyolysis.  He presented to the ED via EMS after a fall at home.  He was unable to get up due to pain.  He underwent trauma protocol imaging given the anticoagulation, which was significant for acute right-sided eighth and ninth rib fractures, as well as a newly seen nonacute healing left 11th rib fracture.  Labs revealed leukocytosis and worsening of chronic thrombocytosis.  Placed in observation due to concern for home situation.    At time of admission, pain is controlled with the lidocaine patch.  He says that he has fractured the same ribs multiple times and they heal just fine.  Endorses mild pain with deep inspiration.  Denies any other pain.  Denies shortness of breath, cough, fevers, chills, change in appetite, dysuria, GI symptoms.  Per EMS note, there was a hoarding situation in the house.     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    Surgical History  He has a past surgical history that includes Inner ear surgery (01/06/2016); Other surgical history (03/16/2020); and Cardiac catheterization (05/14/2018).     Social History  He reports that he has quit smoking. His smoking use included cigarettes. He has never used smokeless tobacco. He reports that he does not currently use alcohol. He reports that he does not use drugs.    Family History  No family history on  file.     Allergies  Patient has no known allergies.    Review of systems  11-point ROS was performed and is negative except as noted in the HPI.     Physical Exam   CON: awake, alert, minimal distress;   EYES: conjunctiva wnl; PERRL; EOMI  ENMT: hearing intact; MMM;   NECK: symmetric; thyroid and cervical nodes wnl;   CV: S1 S2 - irregular with no m/g/r; no lower extremity edema; normal JVP; symmetric pulses  RESP: normal work of breathing; lungs CTAB;   GI: abdomen nontender; no organomegaly;   SKIN: Diffuse ecchymoses, lower extremity stasis dermatitis and abrasions, onychomycosis of all toenails  MSK: ROM wnl; digits wnl;   NEURO: language and speech wnl; sensation and motor function grossly intact   PSYCH: oriented to situation; affect normal;     Last Recorded Vitals  Blood pressure 97/60, pulse 69, temperature 36.3 °C (97.3 °F), temperature source Temporal, resp. rate 20, height 1.829 m (6'), weight 86.2 kg (190 lb 0.6 oz), SpO2 94%.    Relevant Results  Lab Results   Component Value Date    WBC 21.8 (H) 06/11/2024    HGB 10.8 (L) 06/11/2024    HCT 32.9 (L) 06/11/2024    MCV 99 06/11/2024     (H) 06/11/2024      Lab Results   Component Value Date    GLUCOSE 121 (H) 06/11/2024    CALCIUM 8.5 (L) 06/11/2024     (L) 06/11/2024    K 4.0 06/11/2024    CO2 19 (L) 06/11/2024    CL 99 06/11/2024    BUN 26 (H) 06/11/2024    CREATININE 0.91 06/11/2024        Scheduled medications:  lidocaine, 1 patch, transdermal, Daily      Continuous medications:     PRN medications:  PRN medications: famotidine, melatonin, ondansetron, oxygen, sennosides-docusate sodium        Assessment/Plan   Principal Problem:    Fall, initial encounter    Chao Thao is a 84 y.o. male with A-fib recently started on Eliquis, history of fall with rhabdomyolysis.  He presented to the ED via EMS after a fall at home.  He was unable to get up due to pain.  He underwent trauma protocol imaging given the anticoagulation, which was  significant for acute right-sided eighth and ninth rib fractures, as well as a newly seen nonacute healing left 11th rib fracture.  Labs revealed leukocytosis, hyponatremia, and worsening of chronic thrombocytosis.  Placed in observation due to concern for home situation.    Fall:  Acute rib fractures:  Wounds:  Concern for social situation:  -PT/OT  -Pain control  -Wound care  -Social work consulted    A-fib:  CAD:   -Hold Eliquis for now, resume in the next 1 to 2 days if no decompensation  -Continue metoprolol  -Continue statin    DVT ppx: SCDs    Dispo: obs          Damien Abreu MD    Patient Name:  Chao Thao   MRN:   [id number]   Room/Bed:  223/223

## 2024-06-11 NOTE — ED TRIAGE NOTES
Pt presented to the ED with c/o fall. EMS was called to home for a lift assist and when EMS arrived found pt on the floor. Pt states he had a mechanical fall and hit the back of his head on hardwood floor. Pt is on eliquis for afib. Pt denies LOC, HA, changes in vision or dizziness. EMS described home as hoarder condition and was found in only depends.

## 2024-06-12 ENCOUNTER — APPOINTMENT (OUTPATIENT)
Dept: RADIOLOGY | Facility: HOSPITAL | Age: 84
End: 2024-06-12
Payer: MEDICARE

## 2024-06-12 VITALS
OXYGEN SATURATION: 94 % | BODY MASS INDEX: 25.8 KG/M2 | SYSTOLIC BLOOD PRESSURE: 108 MMHG | HEIGHT: 72 IN | HEART RATE: 88 BPM | TEMPERATURE: 97.7 F | WEIGHT: 190.48 LBS | DIASTOLIC BLOOD PRESSURE: 57 MMHG | RESPIRATION RATE: 18 BRPM

## 2024-06-12 LAB
ANION GAP SERPL CALC-SCNC: 13 MMOL/L (ref 10–20)
APPEARANCE UR: ABNORMAL
BACTERIA #/AREA URNS AUTO: ABNORMAL /HPF
BILIRUB UR STRIP.AUTO-MCNC: NEGATIVE MG/DL
BUN SERPL-MCNC: 25 MG/DL (ref 6–23)
CALCIUM SERPL-MCNC: 7.7 MG/DL (ref 8.6–10.3)
CHLORIDE SERPL-SCNC: 102 MMOL/L (ref 98–107)
CO2 SERPL-SCNC: 24 MMOL/L (ref 21–32)
COLOR UR: YELLOW
CREAT SERPL-MCNC: 0.94 MG/DL (ref 0.5–1.3)
EGFRCR SERPLBLD CKD-EPI 2021: 81 ML/MIN/1.73M*2
ERYTHROCYTE [DISTWIDTH] IN BLOOD BY AUTOMATED COUNT: 12.4 % (ref 11.5–14.5)
ETHANOL SERPL-MCNC: <10 MG/DL
GLUCOSE SERPL-MCNC: 103 MG/DL (ref 74–99)
GLUCOSE UR STRIP.AUTO-MCNC: NORMAL MG/DL
HCT VFR BLD AUTO: 28 % (ref 41–52)
HGB BLD-MCNC: 9.2 G/DL (ref 13.5–17.5)
KETONES UR STRIP.AUTO-MCNC: NEGATIVE MG/DL
LEUKOCYTE ESTERASE UR QL STRIP.AUTO: NEGATIVE
MCH RBC QN AUTO: 33.1 PG (ref 26–34)
MCHC RBC AUTO-ENTMCNC: 32.9 G/DL (ref 32–36)
MCV RBC AUTO: 101 FL (ref 80–100)
NITRITE UR QL STRIP.AUTO: NEGATIVE
NRBC BLD-RTO: 0 /100 WBCS (ref 0–0)
PH UR STRIP.AUTO: 6 [PH]
PLATELET # BLD AUTO: 563 X10*3/UL (ref 150–450)
POTASSIUM SERPL-SCNC: 3.6 MMOL/L (ref 3.5–5.3)
PROT UR STRIP.AUTO-MCNC: ABNORMAL MG/DL
RBC # BLD AUTO: 2.78 X10*6/UL (ref 4.5–5.9)
RBC # UR STRIP.AUTO: NEGATIVE /UL
RBC #/AREA URNS AUTO: ABNORMAL /HPF
SODIUM SERPL-SCNC: 135 MMOL/L (ref 136–145)
SP GR UR STRIP.AUTO: 1.04
SQUAMOUS #/AREA URNS AUTO: ABNORMAL /HPF
URATE CRY #/AREA UR COMP ASSIST: ABNORMAL /HPF
UROBILINOGEN UR STRIP.AUTO-MCNC: NORMAL MG/DL
WBC # BLD AUTO: 13.7 X10*3/UL (ref 4.4–11.3)
WBC #/AREA URNS AUTO: ABNORMAL /HPF

## 2024-06-12 PROCEDURE — 2500000002 HC RX 250 W HCPCS SELF ADMINISTERED DRUGS (ALT 637 FOR MEDICARE OP, ALT 636 FOR OP/ED): Performed by: STUDENT IN AN ORGANIZED HEALTH CARE EDUCATION/TRAINING PROGRAM

## 2024-06-12 PROCEDURE — 82077 ASSAY SPEC XCP UR&BREATH IA: CPT | Performed by: STUDENT IN AN ORGANIZED HEALTH CARE EDUCATION/TRAINING PROGRAM

## 2024-06-12 PROCEDURE — 2500000001 HC RX 250 WO HCPCS SELF ADMINISTERED DRUGS (ALT 637 FOR MEDICARE OP): Performed by: PHYSICIAN ASSISTANT

## 2024-06-12 PROCEDURE — G0378 HOSPITAL OBSERVATION PER HR: HCPCS

## 2024-06-12 PROCEDURE — 81001 URINALYSIS AUTO W/SCOPE: CPT | Performed by: PHYSICIAN ASSISTANT

## 2024-06-12 PROCEDURE — 73630 X-RAY EXAM OF FOOT: CPT | Mod: LT

## 2024-06-12 PROCEDURE — 97165 OT EVAL LOW COMPLEX 30 MIN: CPT | Mod: GO

## 2024-06-12 PROCEDURE — 97161 PT EVAL LOW COMPLEX 20 MIN: CPT | Mod: GP

## 2024-06-12 PROCEDURE — 85027 COMPLETE CBC AUTOMATED: CPT | Performed by: STUDENT IN AN ORGANIZED HEALTH CARE EDUCATION/TRAINING PROGRAM

## 2024-06-12 PROCEDURE — 73630 X-RAY EXAM OF FOOT: CPT | Mod: LEFT SIDE | Performed by: STUDENT IN AN ORGANIZED HEALTH CARE EDUCATION/TRAINING PROGRAM

## 2024-06-12 PROCEDURE — 80048 BASIC METABOLIC PNL TOTAL CA: CPT | Performed by: STUDENT IN AN ORGANIZED HEALTH CARE EDUCATION/TRAINING PROGRAM

## 2024-06-12 PROCEDURE — 36415 COLL VENOUS BLD VENIPUNCTURE: CPT | Performed by: STUDENT IN AN ORGANIZED HEALTH CARE EDUCATION/TRAINING PROGRAM

## 2024-06-12 RX ORDER — LIDOCAINE 560 MG/1
1 PATCH PERCUTANEOUS; TOPICAL; TRANSDERMAL DAILY
Start: 2024-06-13

## 2024-06-12 RX ORDER — ASPIRIN 81 MG/1
81 TABLET ORAL DAILY
COMMUNITY

## 2024-06-12 RX ORDER — ASPIRIN 81 MG/1
81 TABLET ORAL DAILY
Status: DISCONTINUED | OUTPATIENT
Start: 2024-06-12 | End: 2024-06-13 | Stop reason: HOSPADM

## 2024-06-12 RX ORDER — LABETALOL HYDROCHLORIDE 5 MG/ML
10 INJECTION, SOLUTION INTRAVENOUS ONCE
Status: DISCONTINUED | OUTPATIENT
Start: 2024-06-12 | End: 2024-06-12

## 2024-06-12 RX ORDER — LISINOPRIL 10 MG/1
5 TABLET ORAL DAILY
Qty: 15 TABLET | Refills: 0 | Status: SHIPPED | OUTPATIENT
Start: 2024-06-12 | End: 2024-07-12

## 2024-06-12 RX ADMIN — Medication 1 APPLICATION: at 20:28

## 2024-06-12 RX ADMIN — METOPROLOL SUCCINATE 50 MG: 50 TABLET, EXTENDED RELEASE ORAL at 08:16

## 2024-06-12 RX ADMIN — DULOXETINE HYDROCHLORIDE 60 MG: 60 CAPSULE, DELAYED RELEASE ORAL at 08:16

## 2024-06-12 RX ADMIN — Medication 1 APPLICATION: at 08:18

## 2024-06-12 RX ADMIN — SIMVASTATIN 40 MG: 20 TABLET, FILM COATED ORAL at 20:28

## 2024-06-12 ASSESSMENT — COGNITIVE AND FUNCTIONAL STATUS - GENERAL
MOBILITY SCORE: 17
PERSONAL GROOMING: A LITTLE
DRESSING REGULAR UPPER BODY CLOTHING: A LITTLE
CLIMB 3 TO 5 STEPS WITH RAILING: A LOT
WALKING IN HOSPITAL ROOM: A LITTLE
DAILY ACTIVITIY SCORE: 16
DRESSING REGULAR LOWER BODY CLOTHING: A LOT
MOVING TO AND FROM BED TO CHAIR: A LITTLE
TURNING FROM BACK TO SIDE WHILE IN FLAT BAD: A LITTLE
MOVING FROM LYING ON BACK TO SITTING ON SIDE OF FLAT BED WITH BEDRAILS: A LITTLE
TOILETING: A LOT
HELP NEEDED FOR BATHING: A LOT
STANDING UP FROM CHAIR USING ARMS: A LITTLE

## 2024-06-12 ASSESSMENT — PAIN SCALES - GENERAL
PAINLEVEL_OUTOF10: 0 - NO PAIN

## 2024-06-12 ASSESSMENT — ACTIVITIES OF DAILY LIVING (ADL)
LACK_OF_TRANSPORTATION: NO
ADL_ASSISTANCE: INDEPENDENT
ADL_ASSISTANCE: INDEPENDENT

## 2024-06-12 ASSESSMENT — PAIN - FUNCTIONAL ASSESSMENT
PAIN_FUNCTIONAL_ASSESSMENT: 0-10

## 2024-06-12 NOTE — PROGRESS NOTES
"Chao Thao is a 84 y.o. male on day 0 of admission presenting with Fall, initial encounter.      Subjective   He says his rib pain is \"just fine.\"  He is tolerating diet and has no complaints.       Objective     Last Recorded Vitals  /72 (BP Location: Left arm, Patient Position: Lying)   Pulse 90   Temp 36.5 °C (97.7 °F) (Temporal)   Resp 18   Wt 86.4 kg (190 lb 7.6 oz)   SpO2 95%   Intake/Output last 3 Shifts:    Intake/Output Summary (Last 24 hours) at 6/12/2024 1033  Last data filed at 6/12/2024 0849  Gross per 24 hour   Intake 1119 ml   Output 300 ml   Net 819 ml       Admission Weight  Weight: 86.2 kg (190 lb 0.6 oz) (06/11/24 0858)    Daily Weight  06/12/24 : 86.4 kg (190 lb 7.6 oz)    Image Results  CT chest abdomen pelvis w IV contrast  Narrative: Interpreted By:  Zack Chavez,   STUDY:  CT CHEST ABDOMEN PELVIS W IV CONTRAST;  6/11/2024 9:21 am      INDICATION:  Signs/Symptoms:R rib pain, fall      COMPARISON:      February 2, 2024 CT chest abdomen and pelvis, and June 11, 2024 CT  cervical spine.      ACCESSION NUMBER(S):  [id number]      ORDERING CLINICIAN:  CHELLE DAVID      TECHNIQUE:  CT of the chest, abdomen, and pelvis was performed.  Contiguous axial images were obtained at  5 mm slice thickness  through the chest, and at  3 mm through the abdomen and pelvis.  Coronal and sagittal reconstructions at  3 mm slice thickness were  performed.  100 ml of contrast material Omnipaque 350 were administered  intravenously without immediate complication.      FINDINGS:  Likely technically adequate although image degradation related to  motion, and streak artifact from overlapping radiopaque structures  most pronounced at the thoracoabdominal junction region.      CHEST:      LUNG/PLEURA/LARGE AIRWAYS:  Compared with February 2, 2024 overall improved expansion and  aeration with minimal platelike atelectasis at the right base. No  evident contusion, edema, pleural effusion, new, " enlarging, or  suspicious nodule or pneumothorax. There is a similar probably benign  relatively flat smoothly marginated fissural nodule right mid chest  sagittal series 203, image 47 favoring intraparenchymal lymph node.  There is a similar flat juxtapleural nodule anterior right mid chest  measuring 7 mm series 204, image 220. VESSELS:  No traumatic aortic injury is appreciated within the limitations of  this non-EKG gated study.  The thoracic aorta is of normal course and  caliber.  Similar scattered atherosclerosis thoracic aorta and branch  vessels. Similar extensive coronary artery calcifications. No  aneurysm.      HEART:  The heart is normal in size.   There is no pericardial effusion.      MEDIASTINUM AND MICHELLE:  No pneumomediastinum, abnormal mediastinal fluid collection or  mediastinal hematoma are appreciated.  No mediastinal, hilar or  biaxillary adenopathy is present.  The esophagus is normal in course  and caliber.      CHEST WALL AND LOWER NECK:  There is an acute minimally displaced fracture right posterolateral  9th rib series 204, image 281. There is chronic minimal deformity  adjacent lateral 8th rib image 280.. There is acute nondisplaced  fracture right lateral 8th rib series 204, image 247. There is a  nonacute fracture with evidence of healing response involving the  left posterolateral 11th rib image 334. no suspicious osseous lesions  are identified.  The thoracic wall soft tissues are within normal  limits.      ABDOMEN:      LIVER:  No focal perfusion abnormality of the liver is appreciated to suggest  contusion or laceration. There is no subcapsular hematoma, no  perihepatic fluid collection.  Similar diffuse hypoattenuation liver  compatible with steatosis.      GALLBLADDER:  The gallbladder is nondistended containing tiny stones.      BILE DUCTS:  The intahepatic and extrahepatic bile ducts are not dilated.      PANCREAS:  The pancreas appears unremarkable.      SPLEEN:  No parenchymal  perfusion deficit of the spleen is appreciated to  suggest contusion or laceration. There is no subcapsular hematoma, no  perisplenic fluid collection. There are 2 rounded sharply marginated  hypodensities series 21, image 84 not well delineated on the prior CT  although likely present most likely incidental such as cysts although  too small to characterize.      ADRENAL GLANDS:  There is similar mild nodular thickening involving the adrenal glands  left-greater-than-right stability favors probably benign cause such  as adenomas. Findings include thickening left adrenal gland measuring  11 mm AP diameter series 7, image 99 9 mm right-side image 90.      KIDNEYS AND URETERS:  No parenchymal perfusion deficit is appreciated in bilateral kidneys  to suggest contusion or laceration. There is no subcapsular hematoma,  no perinephric fluid collection.  There are similar probably benign  bilateral renal hypodensities most compatible with cysts including  dominant 3 cm left-sided cysts. No calculi or hydronephrosis.      PELVIS:      BLADDER:  The urinary bladder appears within normal limits.      REPRODUCTIVE ORGANS:  Similar prostatomegaly measuring 5.2 cm transverse diameter.      BOWEL:  The stomach is unremarkable.  The small bowel is normal in caliber  without evidence of focal wall thickening or obstruction.  There is  no evidence of focal wall thickening or dilatation of the large bowel.      VESSELS:  Similar scattered atherosclerosis aorta and branch vessels. Similar  fusiform dilation infrarenal aorta measuring 2.7 cm AP diameter  sagittal series 203, image 85. There is also similar focal saccular  aneurysm right common iliac artery measuring 2.1 cm series 21, image  87. The saccular portion which extends posteriorly measures 18 x 9 mm  sagittal series 203, image 73. The principal vasculature of the  abdomen and pelvis is patent.      PERITONEUM/RETROPERITONEUM/LYMPH NODES:  There is no evidence of intra- or  retroperitoneal hematoma.  There is  no free or loculated fluid collection, no free intraperitoneal air.  No abdominopelvic lymphadenopathy is present. Similar distribution  and extent of pre-existing lymph nodes.      BONES AND ABDOMINAL WALL:  No evidence of acute fracture or dislocation of the included osseous  structures.  No suspicious osseous lesions are identified.  Similar  pre-existing moderate L2 compressive deformity and scattered  degenerative changes. Similar appearance of the body wall.      Impression: Acute right-sided 8th and 9th rib fractures.      Newly seen nonacute left 11th rib fracture with evidence of healing  response.      Improved pulmonary expansion and aeration.      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      Cholelithiasis.      Similar right-sided fissural and juxtapleural nodules including  dominant fissural nodule measuring 1 cm. Stability and morphology  favors probably benign cause. Per Fleischner criteria recommend 1  year follow-up noncontrast chest CT.      Similar adrenal nodules likely incidental such as adenomas although  nonspecific.      Similar small splenic hypodensities likely incidental such as cysts  although too small to characterize.      Similar liver steatosis.      Similar prostatomegaly.      MACRO:  Incidental Finding:  Multiple solid non-calcified pulmonary nodules  measuring up to greater than 8 mm.  (**-YCF-**)      Instructions:  Consider follow up non contrast chest CT at 3-6  months, then consider CT chest at 18-24 months. (Demar Clements et  al., Guidelines for management of incidental pulmonary nodules  detected on CT images: From the Fleischner Society 2017, Radiology.  2017 Jul;284 (1):228-243.) [email or web address]      Signed by: Zack Chavez 6/11/2024 10:42 AM  Dictation workstation:   [id number]  ECG 12 lead  Atrial fibrillation with rapid ventricular response with premature ventricular or  aberrantly conducted complexes  Low voltage QRS  ST & T wave abnormality, consider lateral ischemia  Abnormal ECG  When compared with ECG of 03-FEB-2024 12:53,  T wave inversion now evident in Anterolateral leads  CT cervical spine wo IV contrast, CT head W O contrast trauma protocol  Narrative: Interpreted By:  John Peoples,   STUDY:  CT HEAD W/O CONTRAST TRAUMA PROTOCOL; CT CERVICAL SPINE WO IV  CONTRAST;  6/11/2024 9:14 am; 6/11/2024 9:21 am      INDICATION:  Signs/Symptoms:HIA      COMPARISON:  CT head and cervical spine 02/02/2024      ACCESSION NUMBER(S):  [id number]; [id number]      ORDERING CLINICIAN:  CHELLE DAVID      TECHNIQUE:  Axial noncontrast CT images of head with coronal and sagittal  reconstructed images. Axial noncontrast CT images of the cervical  spine with coronal and sagittal reconstructed images.      FINDINGS:  CT head without contrast:  The ventricles and sulci are mildly prominent in a pattern that can  be seen with age-related parenchymal volume loss.      There is no evidence of acute intracranial hemorrhage or definite  acute cortical infarction. There are patchy areas of hypodense  attenuation within the subcortical and periventricular white matter.      There is no midline shift.      The visualized paranasal sinuses and mastoid air cells are clear.      No acute calvarial fracture is noted.      CERVICAL SPINE      Fractures: There is no evidence for an acute fracture of the cervical  spine.      Vertebral Alignment: There is similar minimal anterolisthesis of C4  upon C5. Vertebral body height and alignment are otherwise within  normal limits.      Craniocervical Junction: The odontoid process and craniocervical  junction are intact.      Vertebrae/Disc Spaces:  There is multilevel mild-to-moderate disc  space narrowing of the cervical spine, most prominent at C6-C7 with  endplate osteophytosis noted at this level. There is moderate  multilevel facet joint hypertrophy,  left-greater-than-right, most  prominent at C2-C3 to C4-C5.      Prevertebral/Paraspinal Soft Tissues: The prevertebral and paraspinal  soft tissues are unremarkable. The visualized pulmonary apices are  unremarkable.      Impression: No acute intracranial abnormality or calvarial fracture.      No acute fracture or traumatic malalignment of the cervical spine.      Multilevel spondylotic changes of the cervical spine as detailed  above, most prominent at C6-C7      Signed by: John Peoples 6/11/2024 10:04 AM  Dictation workstation:   [id number]      Physical Exam  Physical Exam  Gen: NAD  Eyes:  EOM intact  ENT: MMM  Neck: No JVD  Respiratory: CTAB, no wheezes/rhonchi  Cardiac: irregularly irregular  Abdomen: soft, NT, +BS  Extremities: no edema or cyanosis, lower extremity stasis dermatitis and abrasions, nontender  Neuro: No focal deficits, alert and oriented x 3  Psych:  appropriate mood and behavior    Assessment/Plan      Principal Problem:    Fall, initial encounter  Fall with acute rib fractures  -on room air  -pain is controlled  -wound care  -check UA  -continue to hold eliquis  -PT rec mod  -OT eval  -monitor h/h    Afib/CAD  -continue to hold eliquis  -metop with parameters as BP has been low  -statin    DVT prophy  -SCDs only  -hold lisinopril     Dispo:  stable, likely needs SNF and needs precert, do not anticipate dc today  D/w Dr. Lois Jeffers, PA-C

## 2024-06-12 NOTE — PROGRESS NOTES
06/12/24 1149   Discharge Planning   Living Arrangements Spouse/significant other   Support Systems Spouse/significant other   Assistance Needed Alert and oriented x 3; Independent with ADLs, Cane, Walker @ times; Doesn't drive; Falls frequently   Type of Residence Private residence   Number of Stairs to Enter Residence 4   Number of Stairs Within Residence 14   Do you have animals or pets at home? Yes   Type of Animals or Pets 1 dog   Who is requesting discharge planning? Provider   Home or Post Acute Services Post acute facilities (Rehab/SNF/etc)   Type of Post Acute Facility Services Skilled nursing   Patient expects to be discharged to: Patient seen and evaluated by PT/OT with recommendations for moderate intensity rehab, patient prefers to go to [facility], will need precert, referral sent, awaiting facility acceptence.   Does the patient need discharge transport arranged? Yes   RoundTrip coordination needed? Yes   Has discharge transport been arranged? No   Financial Resource Strain   How hard is it for you to pay for the very basics like food, housing, medical care, and heating? Not hard   Housing Stability   In the last 12 months, was there a time when you were not able to pay the mortgage or rent on time? N   In the last 12 months, how many places have you lived? 1   In the last 12 months, was there a time when you did not have a steady place to sleep or slept in a shelter (including now)? N   Transportation Needs   In the past 12 months, has lack of transportation kept you from medical appointments or from getting medications? no   In the past 12 months, has lack of transportation kept you from meetings, work, or from getting things needed for daily living? No   Patient Choice   Provider Choice list and CMS website (https://medicare.gov/care-compare#search) for post-acute Quality and Resource Measure Data were provided and reviewed with: Patient   Patient / Family choosing to utilize agency /  facility established prior to hospitalization No

## 2024-06-12 NOTE — CONSULTS
Inpatient consult to Podiatry  Consult performed by: Delilah Thompson DPM  Consult ordered by: Damien Abreu MD  Reason for consult: LEft 2nd toe ulceration        Reason For Consult  Left 2nd toe ulceration    History Of Present Illness  Chao Thao is a 84 y.o. male presenting with a wound to left 2nd toe.  Well known to me from [facility] for wound care.  He relates that he has previously seen Dr. Hartmann this year for his PAD but no intervention as he had no open wounds at the time.  Does not know how long wound has been present to left 2nd toe.     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    Surgical History  He has a past surgical history that includes Inner ear surgery (01/06/2016); Other surgical history (03/16/2020); and Cardiac catheterization (05/14/2018).     Social History  He reports that he has quit smoking. His smoking use included cigarettes. He has never used smokeless tobacco. He reports that he does not currently use alcohol. He reports that he does not use drugs.    Family History  No family history on file.     Allergies  Patient has no known allergies.    Review of Systems   +for above     Physical Exam  CON: awake, alert, minimal distress;   EYES: conjunctiva wnl; PERRL; EOMI  ENMT: hearing intact; MMM;   NECK: symmetric; thyroid and cervical nodes wnl;   CV: S1 S2 - irregular with no m/g/r; no lower extremity edema; normal JVP; symmetric pulses  RESP: normal work of breathing; lungs CTAB;   GI: abdomen nontender; no organomegaly;   SKIN: Diffuse ecchymoses, lower extremity stasis dermatitis and abrasions, onychomycosis of all  toenails;  non-palpable pedal pulses B/L;  eshcar to left 2nd toe PIPJ - no erythema or active drainage.  MSK: ROM wnl; digits wnl;   NEURO: language and speech wnl; sensation and motor function grossly intact   PSYCH: oriented to situation; affect normal;       Last Recorded Vitals  Blood pressure 114/72, pulse 90, temperature 36.5 °C (97.7 °F), temperature source Temporal, resp. rate 18, height 1.829 m (6'), weight 86.4 kg (190 lb 7.6 oz), SpO2 95%.    Relevant Results  Results for orders placed or performed during the hospital encounter of 06/11/24 (from the past 24 hour(s))   CBC   Result Value Ref Range    WBC 13.7 (H) 4.4 - 11.3 x10*3/uL    nRBC 0.0 0.0 - 0.0 /100 WBCs    RBC 2.78 (L) 4.50 - 5.90 x10*6/uL    Hemoglobin 9.2 (L) 13.5 - 17.5 g/dL    Hematocrit 28.0 (L) 41.0 - 52.0 %     (H) 80 - 100 fL    MCH 33.1 26.0 - 34.0 pg    MCHC 32.9 32.0 - 36.0 g/dL    RDW 12.4 11.5 - 14.5 %    Platelets 563 (H) 150 - 450 x10*3/uL   Ethanol   Result Value Ref Range    Alcohol <10 <=10 mg/dL   Basic Metabolic Panel   Result Value Ref Range    Glucose 103 (H) 74 - 99 mg/dL    Sodium 135 (L) 136 - 145 mmol/L    Potassium 3.6 3.5 - 5.3 mmol/L    Chloride 102 98 - 107 mmol/L    Bicarbonate 24 21 - 32 mmol/L    Anion Gap 13 10 - 20 mmol/L    Urea Nitrogen 25 (H) 6 - 23 mg/dL    Creatinine 0.94 0.50 - 1.30 mg/dL    eGFR 81 >60 mL/min/1.73m*2    Calcium 7.7 (L) 8.6 - 10.3 mg/dL   Urinalysis with Reflex Culture and Microscopic   Result Value Ref Range    Color, Urine Yellow Light-Yellow, Yellow, Dark-Yellow    Appearance, Urine Turbid (N) Clear    Specific Gravity, Urine 1.041 (N) 1.005 - 1.035    pH, Urine 6.0 5.0, 5.5, 6.0, 6.5, 7.0, 7.5, 8.0    Protein, Urine 30 (1+) (A) NEGATIVE, 10 (TRACE), 20 (TRACE) mg/dL    Glucose, Urine Normal Normal mg/dL    Blood, Urine NEGATIVE NEGATIVE    Ketones, Urine NEGATIVE NEGATIVE mg/dL    Bilirubin, Urine NEGATIVE NEGATIVE    Urobilinogen, Urine Normal Normal mg/dL    Nitrite, Urine  NEGATIVE NEGATIVE    Leukocyte Esterase, Urine NEGATIVE NEGATIVE   Urinalysis Microscopic   Result Value Ref Range    WBC, Urine 1-5 1-5, NONE /HPF    RBC, Urine 11-20 (A) NONE, 1-2, 3-5 /HPF    Squamous Epithelial Cells, Urine 1-9 (SPARSE) Reference range not established. /HPF    Bacteria, Urine 1+ (A) NONE SEEN /HPF    Uric Acid Crystals, Urine 1+ NONE, 1+ /HPF   No results found for the last 90 days.  XR foot left 3+ views    Result Date: 6/12/2024  Interpreted By:  María Wood, STUDY: XR FOOT LEFT 3+ VIEWS; ;  6/12/2024 11:34 am   INDICATION: Signs/Symptoms:fall with tender 2nd digit.   COMPARISON: None.   ACCESSION NUMBER(S): [id number]   ORDERING CLINICIAN: JENNIFER VINSON   FINDINGS: Three views of left foot were performed.   There is no acute fracture or dislocation. There are moderate to severe degenerative changes in the great toe metatarsophalangeal joint with joint space narrowing and prominent marginal osteophytes. There are mild-to-moderate degenerative changes in the interphalangeal joints of 2nd through 5th digits. Prominent plantar calcaneal enthesophyte is noted. No obvious soft tissue abnormality is noted.       No acute fracture or dislocation.     MACRO: None   Signed by: María Wood 6/12/2024 12:25 PM Dictation workstation:   [id number]    CT chest abdomen pelvis w IV contrast    Result Date: 6/11/2024  Interpreted By:  Zack Chavez, STUDY: CT CHEST ABDOMEN PELVIS W IV CONTRAST;  6/11/2024 9:21 am   INDICATION: Signs/Symptoms:R rib pain, fall   COMPARISON:   February 2, 2024 CT chest abdomen and pelvis, and June 11, 2024 CT cervical spine.   ACCESSION NUMBER(S): [id number]   ORDERING CLINICIAN: CHELLE DAVID   TECHNIQUE: CT of the chest, abdomen, and pelvis was performed. Contiguous axial images were obtained at  5 mm slice thickness through the chest, and at  3 mm through the abdomen and pelvis. Coronal and sagittal reconstructions at  3 mm slice thickness were performed. 100  ml of contrast material Omnipaque 350 were administered intravenously without immediate complication.   FINDINGS: Likely technically adequate although image degradation related to motion, and streak artifact from overlapping radiopaque structures most pronounced at the thoracoabdominal junction region.   CHEST:   LUNG/PLEURA/LARGE AIRWAYS: Compared with February 2, 2024 overall improved expansion and aeration with minimal platelike atelectasis at the right base. No evident contusion, edema, pleural effusion, new, enlarging, or suspicious nodule or pneumothorax. There is a similar probably benign relatively flat smoothly marginated fissural nodule right mid chest sagittal series 203, image 47 favoring intraparenchymal lymph node. There is a similar flat juxtapleural nodule anterior right mid chest measuring 7 mm series 204, image 220. VESSELS: No traumatic aortic injury is appreciated within the limitations of this non-EKG gated study.  The thoracic aorta is of normal course and caliber.  Similar scattered atherosclerosis thoracic aorta and branch vessels. Similar extensive coronary artery calcifications. No aneurysm.   HEART: The heart is normal in size.   There is no pericardial effusion.   MEDIASTINUM AND MICHELLE: No pneumomediastinum, abnormal mediastinal fluid collection or mediastinal hematoma are appreciated.  No mediastinal, hilar or biaxillary adenopathy is present.  The esophagus is normal in course and caliber.   CHEST WALL AND LOWER NECK: There is an acute minimally displaced fracture right posterolateral 9th rib series 204, image 281. There is chronic minimal deformity adjacent lateral 8th rib image 280.. There is acute nondisplaced fracture right lateral 8th rib series 204, image 247. There is a nonacute fracture with evidence of healing response involving the left posterolateral 11th rib image 334. no suspicious osseous lesions are identified.  The thoracic wall soft tissues are within normal limits.    ABDOMEN:   LIVER: No focal perfusion abnormality of the liver is appreciated to suggest contusion or laceration. There is no subcapsular hematoma, no perihepatic fluid collection.  Similar diffuse hypoattenuation liver compatible with steatosis.   GALLBLADDER: The gallbladder is nondistended containing tiny stones.   BILE DUCTS: The intahepatic and extrahepatic bile ducts are not dilated.   PANCREAS: The pancreas appears unremarkable.   SPLEEN: No parenchymal perfusion deficit of the spleen is appreciated to suggest contusion or laceration. There is no subcapsular hematoma, no perisplenic fluid collection. There are 2 rounded sharply marginated hypodensities series 21, image 84 not well delineated on the prior CT although likely present most likely incidental such as cysts although too small to characterize.   ADRENAL GLANDS: There is similar mild nodular thickening involving the adrenal glands left-greater-than-right stability favors probably benign cause such as adenomas. Findings include thickening left adrenal gland measuring 11 mm AP diameter series 7, image 99 9 mm right-side image 90.   KIDNEYS AND URETERS: No parenchymal perfusion deficit is appreciated in bilateral kidneys to suggest contusion or laceration. There is no subcapsular hematoma, no perinephric fluid collection.  There are similar probably benign bilateral renal hypodensities most compatible with cysts including dominant 3 cm left-sided cysts. No calculi or hydronephrosis.   PELVIS:   BLADDER: The urinary bladder appears within normal limits.   REPRODUCTIVE ORGANS: Similar prostatomegaly measuring 5.2 cm transverse diameter.   BOWEL: The stomach is unremarkable.  The small bowel is normal in caliber without evidence of focal wall thickening or obstruction.  There is no evidence of focal wall thickening or dilatation of the large bowel.   VESSELS: Similar scattered atherosclerosis aorta and branch vessels. Similar fusiform dilation infrarenal  aorta measuring 2.7 cm AP diameter sagittal series 203, image 85. 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 The saccular portion which extends posteriorly measures 18 x 9 mm sagittal series 203, image 73. The principal vasculature of the abdomen and pelvis is patent.   PERITONEUM/RETROPERITONEUM/LYMPH NODES: There is no evidence of intra- or retroperitoneal hematoma.  There is no free or loculated fluid collection, no free intraperitoneal air. No abdominopelvic lymphadenopathy is present. Similar distribution and extent of pre-existing lymph nodes.   BONES AND ABDOMINAL WALL: No evidence of acute fracture or dislocation of the included osseous structures.  No suspicious osseous lesions are identified.  Similar pre-existing moderate L2 compressive deformity and scattered degenerative changes. Similar appearance of the body wall.       Acute right-sided 8th and 9th rib fractures.   Newly seen nonacute left 11th rib fracture with evidence of healing response.   Improved pulmonary expansion and aeration.   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   Cholelithiasis.   Similar right-sided fissural and juxtapleural nodules including dominant fissural nodule measuring 1 cm. Stability and morphology favors probably benign cause. Per Fleischner criteria recommend 1 year follow-up noncontrast chest CT.   Similar adrenal nodules likely incidental such as adenomas although nonspecific.   Similar small splenic hypodensities likely incidental such as cysts although too small to characterize.   Similar liver steatosis.   Similar prostatomegaly.   MACRO: Incidental Finding:  Multiple solid non-calcified pulmonary nodules measuring up to greater than 8 mm.  (**-YCF-**)   Instructions:  Consider follow up non contrast chest CT at 3-6 months, then consider CT chest at 18-24 months. (Demar Clements et al., Guidelines for  management of incidental pulmonary nodules detected on CT images: From the Fleischner Society 2017, Radiology. 2017 Jul;284 (1):228-243.) FLEISCHNER.ACR.IF.5   Signed by: Zack Chavez 6/11/2024 10:42 AM Dictation workstation:   [id number]    ECG 12 lead    Result Date: 6/11/2024  Atrial fibrillation with rapid ventricular response with premature ventricular or aberrantly conducted complexes Low voltage QRS ST & T wave abnormality, consider lateral ischemia Abnormal ECG When compared with ECG of 03-FEB-2024 12:53, T wave inversion now evident in Anterolateral leads    CT cervical spine wo IV contrast    Result Date: 6/11/2024  Interpreted By:  John Peoples, STUDY: CT HEAD W/O CONTRAST TRAUMA PROTOCOL; CT CERVICAL SPINE WO IV CONTRAST;  6/11/2024 9:14 am; 6/11/2024 9:21 am   INDICATION: Signs/Symptoms:HIA   COMPARISON: CT head and cervical spine 02/02/2024   ACCESSION NUMBER(S): [id number]; [id number]   ORDERING CLINICIAN: CHELLE DAVID   TECHNIQUE: Axial noncontrast CT images of head with coronal and sagittal reconstructed images. Axial noncontrast CT images of the cervical spine with coronal and sagittal reconstructed images.   FINDINGS: CT head without contrast: The ventricles and sulci are mildly prominent in a pattern that can be seen with age-related parenchymal volume loss.   There is no evidence of acute intracranial hemorrhage or definite acute cortical infarction. There are patchy areas of hypodense attenuation within the subcortical and periventricular white matter.   There is no midline shift.   The visualized paranasal sinuses and mastoid air cells are clear.   No acute calvarial fracture is noted.   CERVICAL SPINE   Fractures: There is no evidence for an acute fracture of the cervical spine.   Vertebral Alignment: There is similar minimal anterolisthesis of C4 upon C5. Vertebral body height and alignment are otherwise within normal limits.   Craniocervical Junction: The odontoid process  and craniocervical junction are intact.   Vertebrae/Disc Spaces:  There is multilevel mild-to-moderate disc space narrowing of the cervical spine, most prominent at C6-C7 with endplate osteophytosis noted at this level. There is moderate multilevel facet joint hypertrophy, left-greater-than-right, most prominent at C2-C3 to C4-C5.   Prevertebral/Paraspinal Soft Tissues: The prevertebral and paraspinal soft tissues are unremarkable. The visualized pulmonary apices are unremarkable.       No acute intracranial abnormality or calvarial fracture.   No acute fracture or traumatic malalignment of the cervical spine.   Multilevel spondylotic changes of the cervical spine as detailed above, most prominent at C6-C7   Signed by: John Peoples 6/11/2024 10:04 AM Dictation workstation:   [id number]    CT head W O contrast trauma protocol    Result Date: 6/11/2024  Interpreted By:  John Peoples, STUDY: CT HEAD W/O CONTRAST TRAUMA PROTOCOL; CT CERVICAL SPINE WO IV CONTRAST;  6/11/2024 9:14 am; 6/11/2024 9:21 am   INDICATION: Signs/Symptoms:HIA   COMPARISON: CT head and cervical spine 02/02/2024   ACCESSION NUMBER(S): [id number]; [id number]   ORDERING CLINICIAN: CHELLE DVAID   TECHNIQUE: Axial noncontrast CT images of head with coronal and sagittal reconstructed images. Axial noncontrast CT images of the cervical spine with coronal and sagittal reconstructed images.   FINDINGS: CT head without contrast: The ventricles and sulci are mildly prominent in a pattern that can be seen with age-related parenchymal volume loss.   There is no evidence of acute intracranial hemorrhage or definite acute cortical infarction. There are patchy areas of hypodense attenuation within the subcortical and periventricular white matter.   There is no midline shift.   The visualized paranasal sinuses and mastoid air cells are clear.   No acute calvarial fracture is noted.   CERVICAL SPINE   Fractures: There is no evidence for an acute  fracture of the cervical spine.   Vertebral Alignment: There is similar minimal anterolisthesis of C4 upon C5. Vertebral body height and alignment are otherwise within normal limits.   Craniocervical Junction: The odontoid process and craniocervical junction are intact.   Vertebrae/Disc Spaces:  There is multilevel mild-to-moderate disc space narrowing of the cervical spine, most prominent at C6-C7 with endplate osteophytosis noted at this level. There is moderate multilevel facet joint hypertrophy, left-greater-than-right, most prominent at C2-C3 to C4-C5.   Prevertebral/Paraspinal Soft Tissues: The prevertebral and paraspinal soft tissues are unremarkable. The visualized pulmonary apices are unremarkable.       No acute intracranial abnormality or calvarial fracture.   No acute fracture or traumatic malalignment of the cervical spine.   Multilevel spondylotic changes of the cervical spine as detailed above, most prominent at C6-C7   Signed by: John Peoples 6/11/2024 10:04 AM Dictation workstation:   [id number]      Assessment/Plan      Left 2nd toe ulceration  Does not appear infected.  Xeroform and bandaid. Change daily.  Will need to follow-up with Dr. Hartmann regarding intervention for PAD now that he has a non-healing wound.    I spent 30 minutes in the professional and overall care of this patient.

## 2024-06-12 NOTE — CONSULTS
Wound Care Consult     Visit Date: 6/12/2024      Patient Name: Chao Thao         MRN: [id number]           YOB: 1940     Reason for Consult: wound        Wound History: Patient falls frequently at home per medical record.     Pertinent Labs:   Albumin   Date Value Ref Range Status   06/11/2024 3.2 (L) 3.4 - 5.0 g/dL Final       Wound Assessment:  Wound 02/02/24 Dorsal foot;Left (Active)       Wound 02/02/24 Pretibial Distal;Right (Active)       Wound 02/02/24 Pretibial Left (Active)   Wound Image   06/11/24 1253       Wound 02/04/24 Scrotum Left;Proximal;Upper;Anterior (Active)       Wound 02/04/24 Perineum (Active)       Wound 06/11/24 Buttocks (Active)   Wound Image   06/11/24 1246       Wound 06/11/24 Pretibial Right (Active)       Wound 06/11/24 Arm Left;Lower;Proximal (Active)   Wound Image   06/11/24 1255       Wound 06/11/24 Arm Lower;Right (Active)       Wound Team Summary Assessment: 84 year old male from home alone.  Patient sitting in  chair, brief and external catheter on, Redness to sacrum blanchable.    Left 2nd toe with small scab, tender to touch, redness.  BLE heels intact, dry, boggy.  BLE discolored, dry scale.    Wound Team Plan: Podiatry consult. Zinc 40% to promote local blood flow. 1 premier pro pad under patient with external catheter, no brief.  Message with questions.     Addis Mccallum RN  6/12/2024  2:40 PM

## 2024-06-12 NOTE — DISCHARGE SUMMARY
Discharge Diagnosis  Fall, initial encounter, frequent falls, acute rib fractures    Issues Requiring Follow-Up  Dc. To SNF  Will need follow up with cardiology to discuss ARAM closure and vascular to discuss LLE angiogram  Holding eliquis for now, continue ASA  Hold lisinopril for now also as BP has been low     Discharge Meds     Your medication list        START taking these medications        Instructions Last Dose Given Next Dose Due   lidocaine 4 % patch  Start taking on: June 13, 2024      Place 1 patch over 12 hours on the skin once daily. Remove & discard patch within 12 hours or as directed by MD.              CHANGE how you take these medications        Instructions Last Dose Given Next Dose Due   lisinopril 10 mg tablet  What changed: additional instructions      Take 0.5 tablets (5 mg) by mouth once daily. HOLD THIS MEDICATION FOR NOW (6/12/24) UNTIL MONITOR BP FOR A WEEK              CONTINUE taking these medications        Instructions Last Dose Given Next Dose Due   acetaminophen 500 mg tablet  Commonly known as: Tylenol      Take 2 tablets (1,000 mg) by mouth every 8 hours.       ascorbic acid 500 mg tablet  Commonly known as: Vitamin C           aspirin 81 mg EC tablet           cholecalciferol 25 MCG (1000 UT) capsule  Commonly known as: Vitamin D-3           cyanocobalamin 250 mcg tablet  Commonly known as: Vitamin B-12           DULoxetine 60 mg DR capsule  Commonly known as: Cymbalta      TAKE 1 CAPSULE BY MOUTH EVERY DAY       escitalopram 5 mg tablet  Commonly known as: Lexapro      Take 1 tablet (5 mg) by mouth once daily at bedtime.       metoprolol succinate XL 50 mg 24 hr tablet  Commonly known as: Toprol-XL      TAKE 1 TABLET BY MOUTH EVERY DAY       simvastatin 40 mg tablet  Commonly known as: Zocor      TAKE 1 TABLET BY MOUTH EVERY DAY IN THE EVENING       zinc acetate 50 mg (zinc) capsule           zinc oxide 40 % ointment ointment      Apply 1 Application topically 2 times a day.               STOP taking these medications      apixaban 5 mg tablet  Commonly known as: Eliquis                  Where to Get Your Medications        These medications were sent to [facility]/pharmacy #[id number] - [place], [place] - [address] AT COR[address] OF [facility]  [address] [facility] [address]      Phone: [phone]   lisinopril 10 mg tablet       Information about where to get these medications is not yet available    Ask your nurse or doctor about these medications  lidocaine 4 % patch         Test Results Pending At Discharge  Pending Labs       Order Current Status    Extra Urine Gray Tube In process    Urinalysis Microscopic In process    Urinalysis with Reflex Culture and Microscopic In process    Urinalysis with Reflex Culture and Microscopic In process            Hospital Course   From [facility]:  Chao hTao is a 84 y.o. male with A-fib recently started on Eliquis, history of fall with rhabdomyolysis.  He presented to the ED via EMS after a fall at home.  He was unable to get up due to pain.  He underwent trauma protocol imaging given the anticoagulation, which was significant for acute right-sided eighth and ninth rib fractures, as well as a newly seen nonacute healing left 11th rib fracture.  Labs revealed leukocytosis and worsening of chronic thrombocytosis.  Placed in observation due to concern for home situation.     At time of admission, pain is controlled with the lidocaine patch.  He says that he has fractured the same ribs multiple times and they heal just fine.  Endorses mild pain with deep inspiration.  Denies any other pain.  Denies shortness of breath, cough, fevers, chills, change in appetite, dysuria, GI symptoms.  Per EMS note, there was a hoarding situation in the house.    H/H stable, rib pain controlled with lidocaine patch.  PT/OT rec SNF and he was discharged to [facility].  Podiatry saw him for left 2nd toe pain and redness and recommends outpt evaluation by Dr Hartmann for angiogram.   Eliquis was held 2/2 frequent falls, discussed with [facility] NP who recommends holding it for now in light of frequent falls and he should follow up with them to discuss ARAM closure.  On day of discharge, patient denied CP, SOB, n/v/diarrhea/constipation, was tolerating diet.  Patient appeared hemodynamically stable at time of discharge. Discussed with attending physician who agreed with discharge plan.      Pertinent Physical Exam At Time of Discharge  Physical Exam  Gen: NAD  Eyes:  EOM intact  ENT: MMM  Neck: No JVD  Respiratory: CTAB, no wheezes/rhonchi  Cardiac: irregularly irregular  Abdomen: soft, NT, +BS  Extremities: no edema or cyanosis, lower extremity stasis dermatitis and abrasions, nontender  Neuro: No focal deficits, alert and oriented x 3  Psych:  appropriate mood and behavior  Outpatient Follow-Up  Future Appointments   Date Time Provider Department Center   7/2/2024 11:00 AM Babita Blackburn MD [id number] [facility]   9/10/2024  3:40 PM Stephen Mahoney MD [id number] [facility]         Edilma Jeffers PA-C

## 2024-06-12 NOTE — PROGRESS NOTES
Physical Therapy    Physical Therapy Evaluation    Patient Name: Chao Thao  MRN: [id number]  Today's Date: 6/12/2024   Time Calculation  Start Time: 0842  Stop Time: 0859  Time Calculation (min): 17 min    Assessment/Plan   PT Assessment  PT Assessment Results: Decreased strength, Impaired balance, Decreased endurance, Decreased mobility  Rehab Prognosis: Excellent  Barriers to Discharge: None  Evaluation/Treatment Tolerance: Patient tolerated treatment well  Medical Staff Made Aware: Yes  Strengths: Ability to acquire knowledge, Support of Caregivers  Barriers to Participation: Comorbidities, Housing layout  End of Session Communication: Bedside nurse  Assessment Comment: Patient tolerates mobility well, no reports of pain, eager to improve however home set up requires increased amount of stair training, no outside assist available at this time. Rec mod intensity PT intervention.  End of Session Patient Position: Up in chair, Alarm on (all needs within reach)     PT Plan  Treatment/Interventions: Bed mobility, Transfer training, Gait training, Stair training, Balance training, Strengthening, Endurance training  PT Plan: Ongoing PT  PT Frequency: 3 times per week  PT Discharge Recommendations: Moderate intensity level of continued care  Equipment Recommended upon Discharge: Wheeled walker (owns)  PT Recommended Transfer Status: Assist x1  PT - OK to Discharge: Yes (per PT POC)      Subjective   General Visit Information:  General  Reason for Referral: Impaired functional mobility; fall, R 8-9th rib fx  Referred By: Damien Abreu  Past Medical History Relevant to Rehab: A-fib recently started on Eliquis, history of fall with rhabdomyolysis  Family/Caregiver Present: No  Prior to Session Communication: Bedside nurse  Patient Position Received: Bed, 3 rail up, Alarm on  General Comment: Patient pleasant, cooperative and agreeable to PT eval  Home Living:  Home Living  Type of Home: House  Lives With: Spouse  Home  "Adaptive Equipment: Walker rolling or standard, Cane (2WW and SW)  Home Layout: Two level, Stairs to alternate level with rails  Alternate Level Stairs-Rails: Right  Alternate Level Stairs-Number of Steps: 16  Home Access: Stairs to enter with rails  Entrance Stairs-Rails: Right  Entrance Stairs-Number of Steps: 6  Bathroom Shower/Tub: Tub/shower unit  Bathroom Equipment: Shower chair with back, Grab bars in shower  Prior Level of Function:  Prior Function Per Pt/Caregiver Report  Level of [place]: Independent with ADLs and functional transfers, Independent with homemaking with ambulation  ADL Assistance: Independent  Homemaking Assistance: Needs assistance (shared with spouse)  Ambulatory Assistance: Independent (SW (outdoors), cane (inside))  Precautions:  Precautions  Medical Precautions: Fall precautions (external cath)    Objective   Pain:  Pain Assessment  Pain Assessment: 0-10  Pain Score: 0 - No pain  Cognition:  Cognition  Overall Cognitive Status: Within Functional Limits  Orientation Level: Oriented X4    General Assessments:  General Observation  General Observation: Discoloration B distal LE, patient reports is chronic \"I have poor circulation:     Activity Tolerance  Endurance: Tolerates 10 - 20 min exercise with multiple rests    Sensation  Light Touch: No apparent deficits    Strength  Strength Comments: B LE 4+/5    Coordination  Movements are Fluid and Coordinated: Yes  Heel to Shin: Intact    Postural Control  Postural Control: Within Functional Limits    Static Sitting Balance  Static Sitting-Balance Support: No upper extremity supported, Feet supported  Static Sitting-Level of Assistance: Close supervision    Static Standing Balance  Static Standing-Balance Support: Bilateral upper extremity supported  Static Standing-Level of Assistance: Contact guard  Functional Assessments:       Bed Mobility  Bed Mobility: Yes  Bed Mobility 1  Bed Mobility 1: Supine to sitting  Level of Assistance 1: " Close supervision (HOB elevated, use of rail)    Transfers  Transfer: Yes  Transfer 1  Transfer From 1: Sit to, Stand to  Transfer to 1: Sit, Stand  Technique 1: Sit to stand, Stand to sit  Transfer Device 1: Walker  Transfer Level of Assistance 1: Close supervision    Ambulation/Gait Training  Ambulation/Gait Training Performed: Yes  Ambulation/Gait Training 1  Surface 1: Level tile  Device 1: Rolling walker  Assistance 1: Contact guard  Quality of Gait 1:  (flexed posture, decreased step length, decreased shabana)  Comments/Distance (ft) 1: 15'    Outcome Measures:  [facility] Basic Mobility  Turning from your back to your side while in a flat bed without using bedrails: A little  Moving from lying on your back to sitting on the side of a flat bed without using bedrails: A little  Moving to and from bed to chair (including a wheelchair): A little  Standing up from a chair using your arms (e.g. wheelchair or bedside chair): A little  To walk in hospital room: A little  Climbing 3-5 steps with railing: A lot  Basic Mobility - Total Score: 17    Encounter Problems       Encounter Problems (Active)       Balance       STG - Maintains dynamic standing balance without upper extremity support x 5' with dual task  (Progressing)       Start:  06/12/24    Expected End:  06/26/24               Mobility       STG - Patient will ambulate with 2WW 75' supervision  (Progressing)       Start:  06/12/24    Expected End:  06/26/24            STG - Patient will ascend and descend six stairs with rail and cane supervision  (Progressing)       Start:  06/12/24    Expected End:  06/26/24               PT Transfers       STG - Patient will perform bed mobility independently with HOB flat without use of rails  (Progressing)       Start:  06/12/24    Expected End:  06/26/24            STG - Patient will transfer sit to and from stand mod I  (Progressing)       Start:  06/12/24    Expected End:  06/26/24                   Education  Documentation  Precautions, taught by Tabitha Young, PT at 6/12/2024  9:57 AM.  Learner: Patient  Readiness: Acceptance  Method: Explanation  Response: Verbalizes Understanding    Body Mechanics, taught by Tabitha Young, PT at 6/12/2024  9:57 AM.  Learner: Patient  Readiness: Acceptance  Method: Explanation  Response: Verbalizes Understanding    Mobility Training, taught by Tabitha Young, PT at 6/12/2024  9:57 AM.  Learner: Patient  Readiness: Acceptance  Method: Explanation  Response: Verbalizes Understanding    Education Comments  No comments found.

## 2024-06-12 NOTE — PROGRESS NOTES
Occupational Therapy    Evaluation    Patient Name: Chao Thao  MRN: [id number]  Today's Date: 6/12/2024  Time Calculation  Start Time: 1009  Stop Time: 1025  Time Calculation (min): 16 min    Assessment  IP OT Assessment  OT Assessment: Pt presents with decreased balance, strength, and endurance. Pt to benefit from skilled OT services to increase independence with ADL's, transfers, and mobility  Prognosis: Good  Barriers to Discharge: Inaccessible home environment  Evaluation/Treatment Tolerance: Patient limited by fatigue  Medical Staff Made Aware: Yes  End of Session Communication: Bedside nurse  End of Session Patient Position: Up in chair, Alarm on  Plan:  Treatment Interventions: ADL retraining, Functional transfer training, UE strengthening/ROM, Endurance training  OT Frequency: 3 times per week  OT Discharge Recommendations: Moderate intensity level of continued care  Equipment Recommended upon Discharge: Wheeled walker (pt owns)  OT Recommended Transfer Status: Minimal assist, Assist of 1  OT - OK to Discharge: Yes (per OT POC)    Subjective   Current Problem:  1. Fall, initial encounter        2. Closed fracture of multiple ribs of right side, initial encounter          General:  General  Reason for Referral: Pt is a 85 y/o male who was admitted for fall with rhabdomyolysis  Past Medical History Relevant to Rehab: A-fib recently started on Eliquis  Family/Caregiver Present: No  Prior to Session Communication: Bedside nurse  Patient Position Received: Up in chair, Alarm on  General Comment: Pt pleasant and agreeable to OT eval.  Precautions:  Medical Precautions: Fall precautions (external catheter, telemetry)     Pain:  Pain Assessment  Pain Assessment: 0-10  Pain Score: 0 - No pain    Objective   Cognition:  Overall Cognitive Status: Within Functional Limits  Orientation Level: Oriented X4     Home Living:  Type of Home: House  Lives With: Spouse  Home Adaptive Equipment: Walker rolling or standard,  Cane (FWW and SW)  Home Layout: Two level, Stairs to alternate level with rails  Alternate Level Stairs-Rails: Right  Alternate Level Stairs-Number of Steps: 16  Home Access: Stairs to enter with rails  Entrance Stairs-Rails: Right  Entrance Stairs-Number of Steps: 6  Bathroom Shower/Tub: Tub/shower unit  Bathroom Equipment: Shower chair with back, Grab bars in shower   Prior Function:  Level of [place]: Independent with ADLs and functional transfers, Independent with homemaking with ambulation  ADL Assistance: Independent  Homemaking Assistance: Needs assistance (shares tasks with wife)  Ambulatory Assistance: Independent (SW (outdoors), cane (inside))     ADL:  LE Dressing Assistance:  (CGA)  LE Dressing Deficit: Steadying, Requires assistive device for steadying, Supervision/safety, Increased time to complete, Setup, Thread RLE into underwear, Thread LLE into underwear, Pull up over hips  ADL Comments: Anticipate CGA-Min A for other LB ADL's  Activity Tolerance:  Endurance: Tolerates less than 10 min exercise, no significant change in vital signs (pt got tired with dressing task and needed to rest in chair)  Bed Mobility/Transfers:   Bed Mobility  Bed Mobility: No    Transfers  Transfer: Yes  Transfer 1  Transfer From 1: Sit to, Stand to  Transfer to 1: Sit, Stand  Technique 1: Sit to stand, Stand to sit  Transfer Device 1: Walker  Transfer Level of Assistance 1: Moderate assistance, Minimal verbal cues  Trials/Comments 1: verbal cues for hand placement    Functional Mobility:  Functional Mobility  Functional Mobility Performed: Yes  Functional Mobility 1  Surface 1: Level tile  Device 1: Rolling walker  Assistance 1: Contact guard  Comments 1: Pt took 5-6 steps foward and backwards from the chair with the FWW at CGA  Sitting Balance:  Static Sitting Balance  Static Sitting-Balance Support: Feet supported, No upper extremity supported  Static Sitting-Level of Assistance: Independent  Dynamic Sitting  Balance  Dynamic Sitting-Balance Support: Feet supported, No upper extremity supported  Dynamic Sitting-Balance: Forward lean  Dynamic Sitting-Comments: supervision  Standing Balance:  Static Standing Balance  Static Standing-Balance Support: Bilateral upper extremity supported  Static Standing-Level of Assistance: Contact guard  Dynamic Standing Balance  Dynamic Standing-Balance Support: Bilateral upper extremity supported  Dynamic Standing-Comments: CGA with FWW    Sensation:  Light Touch: No apparent deficits  Strength:  Strength Comments: B UE's: 3+/5     Extremities: RUE   RUE : Within Functional Limits and LUE   LUE: Within Functional Limits    Outcome Measures: [facility] Daily Activity  Putting on and taking off regular lower body clothing: A lot  Bathing (including washing, rinsing, drying): A lot  Putting on and taking off regular upper body clothing: A little  Toileting, which includes using toilet, bedpan or urinal: A lot  Taking care of personal grooming such as brushing teeth: A little  Eating Meals: None  Daily Activity - Total Score: 16      Education Documentation  Body Mechanics, taught by Kaye Laguna OT at 6/12/2024 10:43 AM.  Learner: Patient  Readiness: Acceptance  Method: Explanation  Response: Verbalizes Understanding, Needs Reinforcement    ADL Training, taught by Kaye Laguna OT at 6/12/2024 10:43 AM.  Learner: Patient  Readiness: Acceptance  Method: Explanation  Response: Verbalizes Understanding, Needs Reinforcement    Education Comments  No comments found.      Goals:   Encounter Problems       Encounter Problems (Active)       OT Goals       Pt will increase endurance to tolerate 15min of activity with no more than 1 rest break in order to increase ability to engage in ADL completion.        Start:  06/12/24    Expected End:  06/26/24            Pt to demonstrate transfers with FWW at mod I        Start:  06/12/24    Expected End:  06/26/24            Pt will demo  increased functional mobility to tolerate tasks necessary to complete ADL routine with FWW at mod I        Start:  06/12/24    Expected End:  06/26/24            Pt will demo good static/dynamic standing balance during ADL and functional activity with FWW for improved independence and participation in ADL        Start:  06/12/24    Expected End:  06/26/24            Pt will complete BUE exercises, 10-15 reps 1-2 sets in all functional planes, to demo improved functional strength for increased participation in BADL and mobility        Start:  06/12/24    Expected End:  06/26/24            Pt to demonstrate LB dressing, bathing, and toileting with FWW at mod I        Start:  06/12/24    Expected End:  06/26/24

## 2024-06-12 NOTE — PROGRESS NOTES
06/12/24 1320   Discharge Planning   Patient expects to be discharged to: Patient accepted by [facility], submitted for precert.   Patient Choice   Provider Choice list and CMS website (https://medicare.gov/care-compare#search) for post-acute Quality and Resource Measure Data were provided and reviewed with: Patient   Patient / Family choosing to utilize agency / facility established prior to hospitalization No

## 2024-06-13 LAB — HOLD SPECIMEN: NORMAL

## 2024-06-13 PROCEDURE — G0378 HOSPITAL OBSERVATION PER HR: HCPCS

## 2024-06-19 ENCOUNTER — TELEPHONE (OUTPATIENT)
Dept: GERIATRIC MEDICINE | Facility: CLINIC | Age: 84
End: 2024-06-19
Payer: MEDICARE

## 2024-06-19 ENCOUNTER — LAB REQUISITION (OUTPATIENT)
Dept: LAB | Facility: HOSPITAL | Age: 84
End: 2024-06-19
Payer: MEDICARE

## 2024-06-19 DIAGNOSIS — D64.9 ANEMIA, UNSPECIFIED: ICD-10-CM

## 2024-06-19 DIAGNOSIS — I10 ESSENTIAL (PRIMARY) HYPERTENSION: ICD-10-CM

## 2024-06-19 DIAGNOSIS — E78.5 HYPERLIPIDEMIA, UNSPECIFIED: ICD-10-CM

## 2024-06-19 LAB
ALBUMIN SERPL BCP-MCNC: 2.8 G/DL (ref 3.4–5)
ALP SERPL-CCNC: 131 U/L (ref 33–136)
ALT SERPL W P-5'-P-CCNC: 16 U/L (ref 10–52)
ANION GAP SERPL CALC-SCNC: 17 MMOL/L (ref 10–20)
AST SERPL W P-5'-P-CCNC: 18 U/L (ref 9–39)
BASOPHILS # BLD AUTO: 0.07 X10*3/UL (ref 0–0.1)
BASOPHILS NFR BLD AUTO: 0.5 %
BILIRUB SERPL-MCNC: 0.2 MG/DL (ref 0–1.2)
BUN SERPL-MCNC: 21 MG/DL (ref 6–23)
CALCIUM SERPL-MCNC: 8.3 MG/DL (ref 8.6–10.3)
CHLORIDE SERPL-SCNC: 96 MMOL/L (ref 98–107)
CO2 SERPL-SCNC: 24 MMOL/L (ref 21–32)
CREAT SERPL-MCNC: 0.84 MG/DL (ref 0.5–1.3)
EGFRCR SERPLBLD CKD-EPI 2021: 87 ML/MIN/1.73M*2
EOSINOPHIL # BLD AUTO: 0.02 X10*3/UL (ref 0–0.4)
EOSINOPHIL NFR BLD AUTO: 0.1 %
ERYTHROCYTE [DISTWIDTH] IN BLOOD BY AUTOMATED COUNT: 12.5 % (ref 11.5–14.5)
GLUCOSE SERPL-MCNC: 109 MG/DL (ref 74–99)
HCT VFR BLD AUTO: 31.2 % (ref 41–52)
HGB BLD-MCNC: 10 G/DL (ref 13.5–17.5)
IMM GRANULOCYTES # BLD AUTO: 0.37 X10*3/UL (ref 0–0.5)
IMM GRANULOCYTES NFR BLD AUTO: 2.5 % (ref 0–0.9)
LYMPHOCYTES # BLD AUTO: 1.34 X10*3/UL (ref 0.8–3)
LYMPHOCYTES NFR BLD AUTO: 9 %
MCH RBC QN AUTO: 31.9 PG (ref 26–34)
MCHC RBC AUTO-ENTMCNC: 32.1 G/DL (ref 32–36)
MCV RBC AUTO: 100 FL (ref 80–100)
MONOCYTES # BLD AUTO: 1 X10*3/UL (ref 0.05–0.8)
MONOCYTES NFR BLD AUTO: 6.7 %
NEUTROPHILS # BLD AUTO: 12.02 X10*3/UL (ref 1.6–5.5)
NEUTROPHILS NFR BLD AUTO: 81.2 %
NRBC BLD-RTO: 0 /100 WBCS (ref 0–0)
PLATELET # BLD AUTO: 595 X10*3/UL (ref 150–450)
POTASSIUM SERPL-SCNC: 4.8 MMOL/L (ref 3.5–5.3)
PROT SERPL-MCNC: 6.1 G/DL (ref 6.4–8.2)
RBC # BLD AUTO: 3.13 X10*6/UL (ref 4.5–5.9)
SODIUM SERPL-SCNC: 132 MMOL/L (ref 136–145)
WBC # BLD AUTO: 14.8 X10*3/UL (ref 4.4–11.3)

## 2024-06-19 PROCEDURE — 85025 COMPLETE CBC W/AUTO DIFF WBC: CPT | Mod: OUT | Performed by: FAMILY MEDICINE

## 2024-06-19 PROCEDURE — 80053 COMPREHEN METABOLIC PANEL: CPT | Mod: OUT | Performed by: FAMILY MEDICINE

## 2024-06-19 PROCEDURE — 36415 COLL VENOUS BLD VENIPUNCTURE: CPT | Performed by: FAMILY MEDICINE

## 2024-06-20 LAB
Q ONSET: 220 MS
QRS COUNT: 18 BEATS
QRS DURATION: 88 MS
QT INTERVAL: 352 MS
QTC CALCULATION(BAZETT): 474 MS
QTC FREDERICIA: 429 MS
R AXIS: 68 DEGREES
T AXIS: 224 DEGREES
T OFFSET: 396 MS
VENTRICULAR RATE: 109 BPM

## 2024-06-26 ENCOUNTER — NURSING HOME VISIT (OUTPATIENT)
Dept: POST ACUTE CARE | Facility: EXTERNAL LOCATION | Age: 84
End: 2024-06-26
Payer: MEDICARE

## 2024-06-26 DIAGNOSIS — I10 PRIMARY HYPERTENSION: ICD-10-CM

## 2024-06-26 DIAGNOSIS — J18.9 PNEUMONIA OF LEFT LOWER LOBE DUE TO INFECTIOUS ORGANISM: ICD-10-CM

## 2024-06-26 DIAGNOSIS — R41.89 COGNITIVE IMPAIRMENT: ICD-10-CM

## 2024-06-26 DIAGNOSIS — D64.9 ANEMIA, UNSPECIFIED TYPE: ICD-10-CM

## 2024-06-26 DIAGNOSIS — I73.9 PVD (PERIPHERAL VASCULAR DISEASE) (CMS-HCC): Primary | ICD-10-CM

## 2024-06-26 PROCEDURE — 99306 1ST NF CARE HIGH MDM 50: CPT | Performed by: FAMILY MEDICINE

## 2024-06-26 ASSESSMENT — ENCOUNTER SYMPTOMS
TREMORS: 0
FATIGUE: 1
HEADACHES: 0
NAUSEA: 0
CHEST TIGHTNESS: 0
SORE THROAT: 0
DYSURIA: 0
SHORTNESS OF BREATH: 1
FEVER: 0
VOMITING: 0
CONSTIPATION: 0
NERVOUS/ANXIOUS: 0
ARTHRALGIAS: 0
COUGH: 1
PALPITATIONS: 0
DYSPHORIC MOOD: 1
CONFUSION: 1
FREQUENCY: 1
DIARRHEA: 0
ABDOMINAL PAIN: 0
DIZZINESS: 0

## 2024-06-26 NOTE — PROGRESS NOTES
Subjective   Patient ID: Chao Thao is a 84 y.o. male who is acute skilled care being seen and evaluated for multiple medical problems.    HPI   Chao Thao is a 84 y.o. male with A-fib recently started on Eliquis, history of fall with rhabdomyolysis.  He presented to the ED via EMS after a fall at home.  He was unable to get up due to pain.  He underwent trauma protocol imaging given the anticoagulation, which was significant for acute right-sided eighth and ninth rib fractures, as well as a newly seen nonacute healing left 11th rib fracture.  Labs revealed leukocytosis and worsening of chronic thrombocytosis.  Placed in observation due to concern for home situation.  Their pain was controlled with lidocaine patches.  He continues with PT and OT.  Most recently noted to have worsening left lower lobe congestion as well as shortness of breath with chest x-ray showing pneumonia.  He is currently on Levaquin which she will complete over the weekend.  Laboratory studies done on admission showed sodium of 132 hemoglobin of 10 white count elevated to 15,000 consistent with the known pneumonia.  Will need to recheck to document resolution with treatment of infection.      He was also seen by podiatry for left 2nd toe pain and redness and recommends outpt evaluation by Dr Hartmann for angiogram.  Eliquis was held 2/2 frequent falls.cardiology recommended follow-up for discussion of ARAM closure to eliminate the need for anticoagulation due to fall risk.      His appointments are currently pending.  Unfortunately insurance did issue notification of noncoverage and he will be discharging to home possibly over the weekend.  Review of Systems   Constitutional:  Positive for fatigue. Negative for fever.   HENT:  Negative for congestion and sore throat.    Eyes:  Negative for visual disturbance.   Respiratory:  Positive for cough and shortness of breath. Negative for chest tightness.    Cardiovascular:  Positive for leg  swelling. Negative for chest pain and palpitations.   Gastrointestinal:  Negative for abdominal pain, constipation, diarrhea, nausea and vomiting.   Genitourinary:  Positive for frequency. Negative for dysuria and urgency.   Musculoskeletal:  Positive for gait problem. Negative for arthralgias.   Skin:  Negative for rash.   Neurological:  Negative for dizziness, tremors, syncope and headaches.   Psychiatric/Behavioral:  Positive for confusion and dysphoric mood. The patient is not nervous/anxious.        Objective   There were no vitals taken for this visit.    Physical Exam  Vitals reviewed.   Constitutional:       General: He is not in acute distress.     Appearance: Normal appearance.   HENT:      Head: Atraumatic.      Nose: Nose normal.      Mouth/Throat:      Mouth: Mucous membranes are dry.      Pharynx: Oropharynx is clear.   Eyes:      General: No scleral icterus.     Conjunctiva/sclera: Conjunctivae normal.   Cardiovascular:      Rate and Rhythm: Normal rate. Rhythm irregular.      Heart sounds:      No gallop.   Pulmonary:      Effort: Pulmonary effort is normal.      Breath sounds: Wheezing present.   Abdominal:      General: There is no distension.      Palpations: There is no mass.      Tenderness: There is no abdominal tenderness. There is no rebound.   Musculoskeletal:         General: Swelling present.      Cervical back: Normal range of motion.      Right lower leg: Edema present.      Left lower leg: Edema present.   Lymphadenopathy:      Cervical: No cervical adenopathy.   Skin:     General: Skin is warm.      Coloration: Skin is not jaundiced.   Neurological:      Mental Status: Mental status is at baseline.      Motor: Weakness present.      Gait: Gait abnormal.         Assessment/Plan   Problem List Items Addressed This Visit             ICD-10-CM    Cognitive impairment R41.89    Primary hypertension I10    PVD (peripheral vascular disease) (CMS-[facility]) - Primary I73.9    Anemia D64.9     Other  Visit Diagnoses         Codes    Pneumonia of left lower lobe due to infectious organism     J18.9        For now complete antibiotic treatment, follow-up with vascular surgery as scheduled, recheck laboratory studies Friday for baseline prior to discharge including follow-up of white count hemoglobin as well as sodium levels     Goals    None

## 2024-06-26 NOTE — LETTER
Patient: Chao Thao  : 1940    Encounter Date: 2024    Subjective  Patient ID: Chao Thao is a 84 y.o. male who is acute skilled care being seen and evaluated for multiple medical problems.    HPI   Chao Thao is a 84 y.o. male with A-fib recently started on Eliquis, history of fall with rhabdomyolysis.  He presented to the ED via EMS after a fall at home.  He was unable to get up due to pain.  He underwent trauma protocol imaging given the anticoagulation, which was significant for acute right-sided eighth and ninth rib fractures, as well as a newly seen nonacute healing left 11th rib fracture.  Labs revealed leukocytosis and worsening of chronic thrombocytosis.  Placed in observation due to concern for home situation.  Their pain was controlled with lidocaine patches.  He continues with PT and OT.  Most recently noted to have worsening left lower lobe congestion as well as shortness of breath with chest x-ray showing pneumonia.  He is currently on Levaquin which she will complete over the weekend.  Laboratory studies done on admission showed sodium of 132 hemoglobin of 10 white count elevated to 15,000 consistent with the known pneumonia.  Will need to recheck to document resolution with treatment of infection.      He was also seen by podiatry for left 2nd toe pain and redness and recommends outpt evaluation by Dr Harmtann for angiogram.  Eliquis was held 2/2 frequent falls.cardiology recommended follow-up for discussion of ARAM closure to eliminate the need for anticoagulation due to fall risk.      His appointments are currently pending.  Unfortunately insurance did issue notification of noncoverage and he will be discharging to home possibly over the weekend.  Review of Systems   Constitutional:  Positive for fatigue. Negative for fever.   HENT:  Negative for congestion and sore throat.    Eyes:  Negative for visual disturbance.   Respiratory:  Positive for cough and shortness of breath.  Negative for chest tightness.    Cardiovascular:  Positive for leg swelling. Negative for chest pain and palpitations.   Gastrointestinal:  Negative for abdominal pain, constipation, diarrhea, nausea and vomiting.   Genitourinary:  Positive for frequency. Negative for dysuria and urgency.   Musculoskeletal:  Positive for gait problem. Negative for arthralgias.   Skin:  Negative for rash.   Neurological:  Negative for dizziness, tremors, syncope and headaches.   Psychiatric/Behavioral:  Positive for confusion and dysphoric mood. The patient is not nervous/anxious.        Objective  There were no vitals taken for this visit.    Physical Exam  Vitals reviewed.   Constitutional:       General: He is not in acute distress.     Appearance: Normal appearance.   HENT:      Head: Atraumatic.      Nose: Nose normal.      Mouth/Throat:      Mouth: Mucous membranes are dry.      Pharynx: Oropharynx is clear.   Eyes:      General: No scleral icterus.     Conjunctiva/sclera: Conjunctivae normal.   Cardiovascular:      Rate and Rhythm: Normal rate. Rhythm irregular.      Heart sounds:      No gallop.   Pulmonary:      Effort: Pulmonary effort is normal.      Breath sounds: Wheezing present.   Abdominal:      General: There is no distension.      Palpations: There is no mass.      Tenderness: There is no abdominal tenderness. There is no rebound.   Musculoskeletal:         General: Swelling present.      Cervical back: Normal range of motion.      Right lower leg: Edema present.      Left lower leg: Edema present.   Lymphadenopathy:      Cervical: No cervical adenopathy.   Skin:     General: Skin is warm.      Coloration: Skin is not jaundiced.   Neurological:      Mental Status: Mental status is at baseline.      Motor: Weakness present.      Gait: Gait abnormal.         Assessment/Plan  Problem List Items Addressed This Visit             ICD-10-CM    Cognitive impairment R41.89    Primary hypertension I10    PVD (peripheral  vascular disease) (CMS-HCC) - Primary I73.9    Anemia D64.9     Other Visit Diagnoses         Codes    Pneumonia of left lower lobe due to infectious organism     J18.9        For now complete antibiotic treatment, follow-up with vascular surgery as scheduled, recheck laboratory studies Friday for baseline prior to discharge including follow-up of white count hemoglobin as well as sodium levels     Goals    None           Electronically Signed By: Beverly Torres MD   6/26/24  4:21 PM

## 2024-06-27 ENCOUNTER — APPOINTMENT (OUTPATIENT)
Dept: RADIOLOGY | Facility: HOSPITAL | Age: 84
End: 2024-06-27
Payer: MEDICARE

## 2024-06-27 ENCOUNTER — HOSPITAL ENCOUNTER (INPATIENT)
Facility: HOSPITAL | Age: 84
End: 2024-06-27
Attending: STUDENT IN AN ORGANIZED HEALTH CARE EDUCATION/TRAINING PROGRAM | Admitting: STUDENT IN AN ORGANIZED HEALTH CARE EDUCATION/TRAINING PROGRAM
Payer: MEDICARE

## 2024-06-27 DIAGNOSIS — J18.9 ACUTE PNEUMONIA: Primary | ICD-10-CM

## 2024-06-27 DIAGNOSIS — S22.41XD CLOSED FRACTURE OF MULTIPLE RIBS OF RIGHT SIDE WITH ROUTINE HEALING, SUBSEQUENT ENCOUNTER: ICD-10-CM

## 2024-06-27 DIAGNOSIS — E87.1 HYPONATREMIA: ICD-10-CM

## 2024-06-27 DIAGNOSIS — J98.11 ATELECTASIS: ICD-10-CM

## 2024-06-27 PROCEDURE — 99285 EMERGENCY DEPT VISIT HI MDM: CPT

## 2024-06-27 PROCEDURE — 71045 X-RAY EXAM CHEST 1 VIEW: CPT | Performed by: RADIOLOGY

## 2024-06-27 PROCEDURE — 73590 X-RAY EXAM OF LOWER LEG: CPT | Mod: LEFT SIDE | Performed by: RADIOLOGY

## 2024-06-27 PROCEDURE — 96365 THER/PROPH/DIAG IV INF INIT: CPT

## 2024-06-27 PROCEDURE — 73590 X-RAY EXAM OF LOWER LEG: CPT | Mod: LT

## 2024-06-27 PROCEDURE — 96375 TX/PRO/DX INJ NEW DRUG ADDON: CPT

## 2024-06-27 PROCEDURE — 71045 X-RAY EXAM CHEST 1 VIEW: CPT

## 2024-06-27 ASSESSMENT — PAIN - FUNCTIONAL ASSESSMENT: PAIN_FUNCTIONAL_ASSESSMENT: 0-10

## 2024-06-27 ASSESSMENT — COLUMBIA-SUICIDE SEVERITY RATING SCALE - C-SSRS
6. HAVE YOU EVER DONE ANYTHING, STARTED TO DO ANYTHING, OR PREPARED TO DO ANYTHING TO END YOUR LIFE?: NO
2. HAVE YOU ACTUALLY HAD ANY THOUGHTS OF KILLING YOURSELF?: NO
1. IN THE PAST MONTH, HAVE YOU WISHED YOU WERE DEAD OR WISHED YOU COULD GO TO SLEEP AND NOT WAKE UP?: NO

## 2024-06-28 ENCOUNTER — APPOINTMENT (OUTPATIENT)
Dept: RADIOLOGY | Facility: HOSPITAL | Age: 84
End: 2024-06-28
Payer: MEDICARE

## 2024-06-28 ENCOUNTER — APPOINTMENT (OUTPATIENT)
Dept: CARDIOLOGY | Facility: HOSPITAL | Age: 84
End: 2024-06-28
Payer: MEDICARE

## 2024-06-28 PROBLEM — E78.2 MIXED HYPERLIPIDEMIA: Status: ACTIVE | Noted: 2024-06-28

## 2024-06-28 PROBLEM — L97.219 VENOUS STASIS ULCER OF RIGHT CALF (MULTI): Status: RESOLVED | Noted: 2024-06-28 | Resolved: 2024-06-28

## 2024-06-28 PROBLEM — H91.93 BILATERAL HEARING LOSS: Status: ACTIVE | Noted: 2024-06-28

## 2024-06-28 PROBLEM — B35.1 ONYCHOMYCOSIS: Status: ACTIVE | Noted: 2024-06-28

## 2024-06-28 PROBLEM — J18.9 ACUTE PNEUMONIA: Status: ACTIVE | Noted: 2024-06-28

## 2024-06-28 PROBLEM — R06.02 CHRONIC SHORTNESS OF BREATH: Status: RESOLVED | Noted: 2024-06-28 | Resolved: 2024-06-28

## 2024-06-28 PROBLEM — M10.9 ACUTE GOUT: Status: ACTIVE | Noted: 2024-06-28

## 2024-06-28 PROBLEM — H61.22 IMPACTED CERUMEN OF LEFT EAR: Status: ACTIVE | Noted: 2020-02-04

## 2024-06-28 PROBLEM — F10.11 HISTORY OF ALCOHOL ABUSE: Status: ACTIVE | Noted: 2024-06-28

## 2024-06-28 PROBLEM — H70.10 CHRONIC MASTOIDITIS: Status: ACTIVE | Noted: 2020-02-04

## 2024-06-28 PROBLEM — I83.012 VENOUS STASIS ULCER OF RIGHT CALF (MULTI): Status: RESOLVED | Noted: 2024-06-28 | Resolved: 2024-06-28

## 2024-06-28 PROBLEM — L80 VITILIGO: Status: RESOLVED | Noted: 2024-06-28 | Resolved: 2024-06-28

## 2024-06-28 LAB
ALBUMIN SERPL BCP-MCNC: 2.7 G/DL (ref 3.4–5)
ALBUMIN SERPL BCP-MCNC: 3 G/DL (ref 3.4–5)
ALP SERPL-CCNC: 118 U/L (ref 33–136)
ALP SERPL-CCNC: 137 U/L (ref 33–136)
ALT SERPL W P-5'-P-CCNC: 13 U/L (ref 10–52)
ALT SERPL W P-5'-P-CCNC: 16 U/L (ref 10–52)
ANION GAP SERPL CALC-SCNC: 11 MMOL/L (ref 10–20)
ANION GAP SERPL CALC-SCNC: 13 MMOL/L (ref 10–20)
AST SERPL W P-5'-P-CCNC: 15 U/L (ref 9–39)
AST SERPL W P-5'-P-CCNC: 18 U/L (ref 9–39)
ATRIAL RATE: 79 BPM
ATRIAL RATE: 81 BPM
BASE EXCESS BLDV CALC-SCNC: 2.2 MMOL/L (ref -2–3)
BASOPHILS # BLD AUTO: 0.06 X10*3/UL (ref 0–0.1)
BASOPHILS NFR BLD AUTO: 0.3 %
BILIRUB SERPL-MCNC: 0.2 MG/DL (ref 0–1.2)
BILIRUB SERPL-MCNC: 0.3 MG/DL (ref 0–1.2)
BNP SERPL-MCNC: 43 PG/ML (ref 0–99)
BODY TEMPERATURE: ABNORMAL
BUN SERPL-MCNC: 21 MG/DL (ref 6–23)
BUN SERPL-MCNC: 22 MG/DL (ref 6–23)
CALCIUM SERPL-MCNC: 8 MG/DL (ref 8.6–10.3)
CALCIUM SERPL-MCNC: 8.6 MG/DL (ref 8.6–10.3)
CARDIAC TROPONIN I PNL SERPL HS: 8 NG/L (ref 0–20)
CHLORIDE SERPL-SCNC: 93 MMOL/L (ref 98–107)
CHLORIDE SERPL-SCNC: 95 MMOL/L (ref 98–107)
CO2 SERPL-SCNC: 27 MMOL/L (ref 21–32)
CO2 SERPL-SCNC: 28 MMOL/L (ref 21–32)
CREAT SERPL-MCNC: 0.86 MG/DL (ref 0.5–1.3)
CREAT SERPL-MCNC: 0.98 MG/DL (ref 0.5–1.3)
EGFRCR SERPLBLD CKD-EPI 2021: 77 ML/MIN/1.73M*2
EGFRCR SERPLBLD CKD-EPI 2021: 86 ML/MIN/1.73M*2
EOSINOPHIL # BLD AUTO: 0.01 X10*3/UL (ref 0–0.4)
EOSINOPHIL NFR BLD AUTO: 0.1 %
ERYTHROCYTE [DISTWIDTH] IN BLOOD BY AUTOMATED COUNT: 12.5 % (ref 11.5–14.5)
ERYTHROCYTE [DISTWIDTH] IN BLOOD BY AUTOMATED COUNT: 12.9 % (ref 11.5–14.5)
GLUCOSE SERPL-MCNC: 103 MG/DL (ref 74–99)
GLUCOSE SERPL-MCNC: 118 MG/DL (ref 74–99)
HCO3 BLDV-SCNC: 25.1 MMOL/L (ref 22–26)
HCT VFR BLD AUTO: 29.1 % (ref 41–52)
HCT VFR BLD AUTO: 32.5 % (ref 41–52)
HGB BLD-MCNC: 10.6 G/DL (ref 13.5–17.5)
HGB BLD-MCNC: 9.3 G/DL (ref 13.5–17.5)
IMM GRANULOCYTES # BLD AUTO: 0.21 X10*3/UL (ref 0–0.5)
IMM GRANULOCYTES NFR BLD AUTO: 1.2 % (ref 0–0.9)
INHALED O2 CONCENTRATION: 28 %
INR PPP: 1.3 (ref 0.9–1.1)
LACTATE SERPL-SCNC: 1.7 MMOL/L (ref 0.4–2)
LYMPHOCYTES # BLD AUTO: 1.06 X10*3/UL (ref 0.8–3)
LYMPHOCYTES NFR BLD AUTO: 6 %
MAGNESIUM SERPL-MCNC: 1.78 MG/DL (ref 1.6–2.4)
MCH RBC QN AUTO: 31.3 PG (ref 26–34)
MCH RBC QN AUTO: 32.1 PG (ref 26–34)
MCHC RBC AUTO-ENTMCNC: 32 G/DL (ref 32–36)
MCHC RBC AUTO-ENTMCNC: 32.6 G/DL (ref 32–36)
MCV RBC AUTO: 98 FL (ref 80–100)
MCV RBC AUTO: 99 FL (ref 80–100)
MONOCYTES # BLD AUTO: 0.89 X10*3/UL (ref 0.05–0.8)
MONOCYTES NFR BLD AUTO: 5.1 %
NEUTROPHILS # BLD AUTO: 15.34 X10*3/UL (ref 1.6–5.5)
NEUTROPHILS NFR BLD AUTO: 87.3 %
NRBC BLD-RTO: 0 /100 WBCS (ref 0–0)
NRBC BLD-RTO: 0 /100 WBCS (ref 0–0)
OXYHGB MFR BLDV: 95.7 % (ref 45–75)
P OFFSET: 212 MS
P ONSET: 132 MS
PCO2 BLDV: 33 MM HG (ref 41–51)
PH BLDV: 7.49 PH (ref 7.33–7.43)
PHOSPHATE SERPL-MCNC: 3.6 MG/DL (ref 2.5–4.9)
PLATELET # BLD AUTO: 530 X10*3/UL (ref 150–450)
PLATELET # BLD AUTO: 585 X10*3/UL (ref 150–450)
PO2 BLDV: 75 MM HG (ref 35–45)
POTASSIUM SERPL-SCNC: 4 MMOL/L (ref 3.5–5.3)
POTASSIUM SERPL-SCNC: 4.5 MMOL/L (ref 3.5–5.3)
PR INTERVAL: 176 MS
PROCALCITONIN SERPL-MCNC: 0.12 NG/ML
PROT SERPL-MCNC: 6.4 G/DL (ref 6.4–8.2)
PROT SERPL-MCNC: 7.3 G/DL (ref 6.4–8.2)
PROTHROMBIN TIME: 14.8 SECONDS (ref 9.8–12.8)
Q ONSET: 216 MS
Q ONSET: 220 MS
QRS COUNT: 13 BEATS
QRS COUNT: 15 BEATS
QRS DURATION: 100 MS
QRS DURATION: 92 MS
QT INTERVAL: 354 MS
QT INTERVAL: 372 MS
QTC CALCULATION(BAZETT): 415 MS
QTC CALCULATION(BAZETT): 462 MS
QTC FREDERICIA: 394 MS
QTC FREDERICIA: 430 MS
R AXIS: 13 DEGREES
R AXIS: 27 DEGREES
RBC # BLD AUTO: 2.97 X10*6/UL (ref 4.5–5.9)
RBC # BLD AUTO: 3.3 X10*6/UL (ref 4.5–5.9)
SAO2 % BLDV: 98 % (ref 45–75)
SODIUM SERPL-SCNC: 129 MMOL/L (ref 136–145)
SODIUM SERPL-SCNC: 129 MMOL/L (ref 136–145)
T AXIS: 134 DEGREES
T AXIS: 143 DEGREES
T OFFSET: 397 MS
T OFFSET: 402 MS
VENTRICULAR RATE: 83 BPM
VENTRICULAR RATE: 93 BPM
WBC # BLD AUTO: 12.8 X10*3/UL (ref 4.4–11.3)
WBC # BLD AUTO: 17.6 X10*3/UL (ref 4.4–11.3)

## 2024-06-28 PROCEDURE — 2500000001 HC RX 250 WO HCPCS SELF ADMINISTERED DRUGS (ALT 637 FOR MEDICARE OP)

## 2024-06-28 PROCEDURE — 87449 NOS EACH ORGANISM AG IA: CPT | Mod: GEALAB

## 2024-06-28 PROCEDURE — 2500000002 HC RX 250 W HCPCS SELF ADMINISTERED DRUGS (ALT 637 FOR MEDICARE OP, ALT 636 FOR OP/ED)

## 2024-06-28 PROCEDURE — 2550000001 HC RX 255 CONTRASTS: Performed by: STUDENT IN AN ORGANIZED HEALTH CARE EDUCATION/TRAINING PROGRAM

## 2024-06-28 PROCEDURE — 85025 COMPLETE CBC W/AUTO DIFF WBC: CPT | Performed by: STUDENT IN AN ORGANIZED HEALTH CARE EDUCATION/TRAINING PROGRAM

## 2024-06-28 PROCEDURE — 70450 CT HEAD/BRAIN W/O DYE: CPT | Performed by: RADIOLOGY

## 2024-06-28 PROCEDURE — 71260 CT THORAX DX C+: CPT

## 2024-06-28 PROCEDURE — 94667 MNPJ CHEST WALL 1ST: CPT

## 2024-06-28 PROCEDURE — 70450 CT HEAD/BRAIN W/O DYE: CPT

## 2024-06-28 PROCEDURE — 73630 X-RAY EXAM OF FOOT: CPT | Mod: LT

## 2024-06-28 PROCEDURE — 36415 COLL VENOUS BLD VENIPUNCTURE: CPT | Performed by: STUDENT IN AN ORGANIZED HEALTH CARE EDUCATION/TRAINING PROGRAM

## 2024-06-28 PROCEDURE — 93005 ELECTROCARDIOGRAM TRACING: CPT

## 2024-06-28 PROCEDURE — 83880 ASSAY OF NATRIURETIC PEPTIDE: CPT | Performed by: STUDENT IN AN ORGANIZED HEALTH CARE EDUCATION/TRAINING PROGRAM

## 2024-06-28 PROCEDURE — 87040 BLOOD CULTURE FOR BACTERIA: CPT | Mod: GEALAB | Performed by: STUDENT IN AN ORGANIZED HEALTH CARE EDUCATION/TRAINING PROGRAM

## 2024-06-28 PROCEDURE — 2500000004 HC RX 250 GENERAL PHARMACY W/ HCPCS (ALT 636 FOR OP/ED): Performed by: STUDENT IN AN ORGANIZED HEALTH CARE EDUCATION/TRAINING PROGRAM

## 2024-06-28 PROCEDURE — 85610 PROTHROMBIN TIME: CPT

## 2024-06-28 PROCEDURE — 82805 BLOOD GASES W/O2 SATURATION: CPT

## 2024-06-28 PROCEDURE — 87899 AGENT NOS ASSAY W/OPTIC: CPT | Mod: GEALAB

## 2024-06-28 PROCEDURE — 83605 ASSAY OF LACTIC ACID: CPT | Performed by: STUDENT IN AN ORGANIZED HEALTH CARE EDUCATION/TRAINING PROGRAM

## 2024-06-28 PROCEDURE — 84100 ASSAY OF PHOSPHORUS: CPT

## 2024-06-28 PROCEDURE — 83735 ASSAY OF MAGNESIUM: CPT

## 2024-06-28 PROCEDURE — 84145 PROCALCITONIN (PCT): CPT | Mod: GEALAB

## 2024-06-28 PROCEDURE — 1200000002 HC GENERAL ROOM WITH TELEMETRY DAILY

## 2024-06-28 PROCEDURE — 71260 CT THORAX DX C+: CPT | Performed by: RADIOLOGY

## 2024-06-28 PROCEDURE — 85027 COMPLETE CBC AUTOMATED: CPT

## 2024-06-28 PROCEDURE — 9420000001 HC RT PATIENT EDUCATION 5 MIN

## 2024-06-28 PROCEDURE — 80053 COMPREHEN METABOLIC PANEL: CPT | Performed by: STUDENT IN AN ORGANIZED HEALTH CARE EDUCATION/TRAINING PROGRAM

## 2024-06-28 PROCEDURE — 99223 1ST HOSP IP/OBS HIGH 75: CPT

## 2024-06-28 PROCEDURE — 84484 ASSAY OF TROPONIN QUANT: CPT | Performed by: STUDENT IN AN ORGANIZED HEALTH CARE EDUCATION/TRAINING PROGRAM

## 2024-06-28 PROCEDURE — 73630 X-RAY EXAM OF FOOT: CPT | Mod: LEFT SIDE | Performed by: RADIOLOGY

## 2024-06-28 PROCEDURE — 84075 ASSAY ALKALINE PHOSPHATASE: CPT

## 2024-06-28 RX ORDER — CEFTRIAXONE 2 G/50ML
2 INJECTION, SOLUTION INTRAVENOUS ONCE
Status: COMPLETED | OUTPATIENT
Start: 2024-06-28 | End: 2024-06-28

## 2024-06-28 RX ORDER — EAR PLUGS
1 EACH OTIC (EAR) 3 TIMES DAILY
Status: ACTIVE | OUTPATIENT
Start: 2024-06-28

## 2024-06-28 RX ORDER — LIDOCAINE 560 MG/1
1 PATCH PERCUTANEOUS; TOPICAL; TRANSDERMAL DAILY PRN
Status: ACTIVE | OUTPATIENT
Start: 2024-06-28

## 2024-06-28 RX ORDER — ONDANSETRON 4 MG/1
4 TABLET, FILM COATED ORAL EVERY 8 HOURS PRN
Status: ON HOLD | COMMUNITY
Start: 2024-06-26

## 2024-06-28 RX ORDER — AZITHROMYCIN 250 MG/1
500 TABLET, FILM COATED ORAL
Status: COMPLETED | OUTPATIENT
Start: 2024-06-29 | End: 2024-06-30

## 2024-06-28 RX ORDER — CEFTRIAXONE 1 G/50ML
1 INJECTION, SOLUTION INTRAVENOUS EVERY 24 HOURS
Status: DISPENSED | OUTPATIENT
Start: 2024-06-29

## 2024-06-28 RX ORDER — OXYCODONE HYDROCHLORIDE 5 MG/1
5 TABLET ORAL EVERY 6 HOURS PRN
Status: ACTIVE | OUTPATIENT
Start: 2024-06-28

## 2024-06-28 RX ORDER — SIMVASTATIN 20 MG/1
40 TABLET, FILM COATED ORAL EVERY EVENING
Status: DISPENSED | OUTPATIENT
Start: 2024-06-28

## 2024-06-28 RX ORDER — ESCITALOPRAM OXALATE 10 MG/1
5 TABLET ORAL NIGHTLY
Status: DISPENSED | OUTPATIENT
Start: 2024-06-28

## 2024-06-28 RX ORDER — ACETAMINOPHEN 325 MG/1
650 TABLET ORAL EVERY 4 HOURS PRN
Status: ACTIVE | OUTPATIENT
Start: 2024-06-28

## 2024-06-28 RX ORDER — DULOXETIN HYDROCHLORIDE 30 MG/1
60 CAPSULE, DELAYED RELEASE ORAL DAILY
Status: DISPENSED | OUTPATIENT
Start: 2024-06-28

## 2024-06-28 RX ORDER — OXYCODONE HYDROCHLORIDE 5 MG/1
2.5 TABLET ORAL EVERY 6 HOURS PRN
Status: ACTIVE | OUTPATIENT
Start: 2024-06-28

## 2024-06-28 RX ORDER — METOPROLOL SUCCINATE 25 MG/1
25 TABLET, EXTENDED RELEASE ORAL DAILY
Status: DISCONTINUED | OUTPATIENT
Start: 2024-06-28 | End: 2024-06-30

## 2024-06-28 SDOH — SOCIAL STABILITY: SOCIAL INSECURITY: HAS ANYONE EVER THREATENED TO HURT YOUR FAMILY OR YOUR PETS?: NO

## 2024-06-28 SDOH — SOCIAL STABILITY: SOCIAL INSECURITY: ABUSE: ADULT

## 2024-06-28 SDOH — SOCIAL STABILITY: SOCIAL INSECURITY: DOES ANYONE TRY TO KEEP YOU FROM HAVING/CONTACTING OTHER FRIENDS OR DOING THINGS OUTSIDE YOUR HOME?: NO

## 2024-06-28 SDOH — SOCIAL STABILITY: SOCIAL INSECURITY: ARE YOU OR HAVE YOU BEEN THREATENED OR ABUSED PHYSICALLY, EMOTIONALLY, OR SEXUALLY BY ANYONE?: NO

## 2024-06-28 SDOH — SOCIAL STABILITY: SOCIAL INSECURITY: DO YOU FEEL UNSAFE GOING BACK TO THE PLACE WHERE YOU ARE LIVING?: NO

## 2024-06-28 SDOH — SOCIAL STABILITY: SOCIAL INSECURITY: HAVE YOU HAD ANY THOUGHTS OF HARMING ANYONE ELSE?: NO

## 2024-06-28 SDOH — SOCIAL STABILITY: SOCIAL INSECURITY: HAVE YOU HAD THOUGHTS OF HARMING ANYONE ELSE?: NO

## 2024-06-28 SDOH — SOCIAL STABILITY: SOCIAL INSECURITY: WERE YOU ABLE TO COMPLETE ALL THE BEHAVIORAL HEALTH SCREENINGS?: YES

## 2024-06-28 SDOH — SOCIAL STABILITY: SOCIAL INSECURITY: ARE THERE ANY APPARENT SIGNS OF INJURIES/BEHAVIORS THAT COULD BE RELATED TO ABUSE/NEGLECT?: NO

## 2024-06-28 SDOH — SOCIAL STABILITY: SOCIAL INSECURITY: DO YOU FEEL ANYONE HAS EXPLOITED OR TAKEN ADVANTAGE OF YOU FINANCIALLY OR OF YOUR PERSONAL PROPERTY?: NO

## 2024-06-28 ASSESSMENT — ENCOUNTER SYMPTOMS
LIGHT-HEADEDNESS: 1
DYSURIA: 0
SHORTNESS OF BREATH: 1
CHILLS: 0
CONSTIPATION: 1
NAUSEA: 0
DIARRHEA: 0
ABDOMINAL PAIN: 0
BLOOD IN STOOL: 0
VOMITING: 0
HEADACHES: 0
COUGH: 0
FEVER: 0

## 2024-06-28 ASSESSMENT — COGNITIVE AND FUNCTIONAL STATUS - GENERAL
EATING MEALS: A LITTLE
PERSONAL GROOMING: A LITTLE
HELP NEEDED FOR BATHING: A LOT
PATIENT BASELINE BEDBOUND: NO
STANDING UP FROM CHAIR USING ARMS: A LOT
DAILY ACTIVITIY SCORE: 15
PERSONAL GROOMING: A LITTLE
MOVING FROM LYING ON BACK TO SITTING ON SIDE OF FLAT BED WITH BEDRAILS: A LITTLE
WALKING IN HOSPITAL ROOM: TOTAL
MOVING TO AND FROM BED TO CHAIR: A LOT
WALKING IN HOSPITAL ROOM: TOTAL
TOILETING: A LOT
DRESSING REGULAR UPPER BODY CLOTHING: A LITTLE
HELP NEEDED FOR BATHING: A LOT
STANDING UP FROM CHAIR USING ARMS: A LOT
MOVING TO AND FROM BED TO CHAIR: A LOT
TURNING FROM BACK TO SIDE WHILE IN FLAT BAD: A LOT
MOVING FROM LYING ON BACK TO SITTING ON SIDE OF FLAT BED WITH BEDRAILS: A LITTLE
DAILY ACTIVITIY SCORE: 15
EATING MEALS: A LITTLE
DRESSING REGULAR UPPER BODY CLOTHING: A LITTLE
MOBILITY SCORE: 11
DRESSING REGULAR LOWER BODY CLOTHING: A LOT
TOILETING: A LOT
MOBILITY SCORE: 11
CLIMB 3 TO 5 STEPS WITH RAILING: TOTAL
CLIMB 3 TO 5 STEPS WITH RAILING: TOTAL
TURNING FROM BACK TO SIDE WHILE IN FLAT BAD: A LOT
DRESSING REGULAR LOWER BODY CLOTHING: A LOT

## 2024-06-28 ASSESSMENT — ACTIVITIES OF DAILY LIVING (ADL)
ADEQUATE_TO_COMPLETE_ADL: YES
BATHING: NEEDS ASSISTANCE
HEARING - RIGHT EAR: DEAF
GROOMING: NEEDS ASSISTANCE
JUDGMENT_ADEQUATE_SAFELY_COMPLETE_DAILY_ACTIVITIES: UNABLE TO ASSESS
LACK_OF_TRANSPORTATION: NO
FEEDING YOURSELF: NEEDS ASSISTANCE
HEARING - LEFT EAR: DIFFICULTY WITH NOISE
LACK_OF_TRANSPORTATION: NO
WALKS IN HOME: NEEDS ASSISTANCE
DRESSING YOURSELF: NEEDS ASSISTANCE
PATIENT'S MEMORY ADEQUATE TO SAFELY COMPLETE DAILY ACTIVITIES?: UNABLE TO ASSESS
TOILETING: NEEDS ASSISTANCE

## 2024-06-28 ASSESSMENT — LIFESTYLE VARIABLES
HOW OFTEN DO YOU HAVE 6 OR MORE DRINKS ON ONE OCCASION: NEVER
HOW OFTEN DO YOU HAVE A DRINK CONTAINING ALCOHOL: NEVER
AUDIT-C TOTAL SCORE: 0
SKIP TO QUESTIONS 9-10: 1
HOW MANY STANDARD DRINKS CONTAINING ALCOHOL DO YOU HAVE ON A TYPICAL DAY: PATIENT DOES NOT DRINK
AUDIT-C TOTAL SCORE: 0

## 2024-06-28 ASSESSMENT — PAIN SCALES - GENERAL
PAINLEVEL_OUTOF10: 0 - NO PAIN
PAINLEVEL_OUTOF10: 3

## 2024-06-28 ASSESSMENT — PAIN - FUNCTIONAL ASSESSMENT: PAIN_FUNCTIONAL_ASSESSMENT: 0-10

## 2024-06-28 NOTE — ED PROVIDER NOTES
HPI   Chief Complaint   Patient presents with    Fall       Patient is an 84-year-old male presenting after a fall at home.  Patient recently underwent a fall with multiple rib fractures and hospitalization.  Was discharged to acute rehab where he completed his acute rehab stay this evening.  Upon returning home he was only home for a few hours prior to falling down.  Complains of no pain but had difficulty getting back up without assistance and so his wife called 911.  Denies any fevers or chills, nausea or vomiting, chest pain, abdominal pain, loss of bowel or bladder function.                          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    History of alcohol abuse    Drug abuse (Multi)     in remission    Edema, unspecified 11/26/2019    Dependent edema    Encounter for immunization 04/08/2016    Need for Zostavax administration    Pain in left knee 08/08/2018    Left knee pain    Personal history of other diseases of the nervous system and sense organs 09/18/2019    History of cataract    Personal history of other drug therapy 04/08/2016    History of pneumococcal vaccination    Tinea pedis 10/10/2018    Tinea pedis of both feet     Past Surgical History:   Procedure Laterality Date    CARDIAC CATHETERIZATION  05/14/2018    Cardiac Cath Procedure Summary    INNER EAR SURGERY  01/06/2016    Inner Ear Surgery    OTHER SURGICAL HISTORY  03/16/2020    Cataract surgery     No family history on file.  Social History     Tobacco Use    Smoking status: Former     Types: Cigarettes    Smokeless tobacco: Never   Vaping Use    Vaping status: Never Used   Substance Use Topics    Alcohol use: Not Currently    Drug use: Never       Physical Exam   ED Triage Vitals   Temperature Heart Rate Respirations BP   06/27/24 2316 06/27/24 2316 06/27/24 2315 06/27/24 2316   36.6 °C (97.9 °F) 84 (!) 32 97/62      Pulse Ox Temp src Heart Rate  Source Patient Position   06/27/24 2316 -- -- --   (!) 93 %         BP Location FiO2 (%)     -- --             Physical Exam  Constitutional:       General: He is not in acute distress.     Appearance: He is not toxic-appearing.   Cardiovascular:      Rate and Rhythm: Normal rate and regular rhythm.   Pulmonary:      Breath sounds: Normal breath sounds.   Abdominal:      Palpations: Abdomen is soft.      Tenderness: There is no abdominal tenderness.   Musculoskeletal:      Comments: Mild peripheral edema of the bilateral lower extremities with 2+ pitting in nature.  The patient has tenderness to palpation over the proximal left tibial area with normal range of motion of the knee, ankle.  The patient had intact peripheral pulses with 2+ DP bilaterally.   Neurological:      Mental Status: He is alert.         ED Course & MDM   ED Course as of 06/28/24 0435 Fri Jun 28, 2024 0018 EKG interpreted as sinus rhythm at a rate of 83 beats minute, normal axis, no ST elevations or depressions or T wave inversions/ischemia, QTc of 415. [AV]      ED Course User Index  [AV] Raymond Azevedo MD         Diagnoses as of 06/28/24 0435   Hyponatremia   Atelectasis   Closed fracture of multiple ribs of right side with routine healing, subsequent encounter   Acute pneumonia       Medical Decision Making  Patient is an 84-year-old male presenting after a fall.  History and physical examination are most concerning for potential traumatic injury versus presyncopal event with regards to the fall.  The patient had a broad workup obtained.  Nonischemic EKG.  Patient had no severe electrolyte derangement but did have significantly decreased sodium when compared to prior hospitalization.  Could be related to dehydration especially given the initial orthostasis.  Patient had IV fluids initiated by EMS continued here.  X-rays were negative for acute fracture but did have some basilar atelectasis versus consolidation.  The patient does have a  mild leukocytosis and given the worsening symptoms could be infectious in nature.  Was given community-acquired pneumonia coverage after cultures were obtained.  Patient will require hospitalization.  Ambulatory pulse ox desaturated to 87%.  Recruitment with rest to 93% on room air.        Procedure  Procedures     Raymond Azevedo MD  06/28/24 0431

## 2024-06-28 NOTE — CONSULTS
Wound Care Consult     Visit Date: 6/28/2024      Patient Name: Chao Thao         MRN: [id number]           YOB: 1940     Reason for Consult: Left foot wound, any podiatry consult needed.        Wound History: Patient seen by podiatry, PAD, non healing toe ulcer.     Pertinent Labs:   Albumin   Date Value Ref Range Status   06/28/2024 2.7 (L) 3.4 - 5.0 g/dL Final       Wound Assessment:  Wound 02/02/24 Pressure Injury Dorsal foot;Left (Active)   Wound Image   06/28/24 0508   Site Assessment Clean;Dry;Intact 06/28/24 0918   Fariba-Wound Assessment Clean;Dry 06/28/24 0918   Drainage Amount None 06/28/24 0918   Dressing Xeroform 06/28/24 0918   Dressing Status Clean;Dry 06/28/24 0918       Wound Team Summary Assessment: Left 2nd toe with ulceration on dorsum, Toenails, thick yellow, brown, PAD, Bilateral heels boggy, PAB.     Wound Team Plan: Podiatry consult placed.  Xeroform for moist wound healing pending podiatry recommendations.  True karri boots to offload heels.  Message with questions.     Addis Mccallum RN  6/28/2024  7:14 PM

## 2024-06-28 NOTE — PROGRESS NOTES
"Post-Rounds Updated Assessment and Plan Note     Subjective   Patient resting comfortably in bed. No new complaints.    Objective    Physical Exam  Constitutional:       General: He is not in acute distress.  HENT:      Head: Normocephalic and atraumatic.      Mouth/Throat:      Mouth: Mucous membranes are moist.      Pharynx: Oropharynx is clear.   Cardiovascular:      Rate and Rhythm: Normal rate. Rhythm irregular.      Pulses: Normal pulses.      Heart sounds: Normal heart sounds.   Pulmonary:      Effort: Pulmonary effort is normal.      Breath sounds: Normal breath sounds.   Abdominal:      Palpations: Abdomen is soft.      Tenderness: There is no abdominal tenderness.   Musculoskeletal:      Right lower leg: Edema present.      Left lower leg: Edema present.   Skin:     General: Skin is warm and dry.      Findings: Erythema present.   Neurological:      Mental Status: He is alert.      Comments: Answering questions, following commands.   Psychiatric:         Mood and Affect: Mood normal.         Behavior: Behavior normal.       Dorsal Foot Wound, left foot       Visit Vitals  /69 (BP Location: Right arm, Patient Position: Lying)   Pulse 77   Temp 36.6 °C (97.9 °F) (Temporal)   Resp 18   Ht 1.676 m (5' 5.98\")   Wt 80.7 kg (177 lb 14.6 oz)   SpO2 99%   BMI 28.73 kg/m²   Smoking Status Former   BSA 1.94 m²          Intake/Output Summary (Last 24 hours) at 6/28/2024 1347  Last data filed at 6/28/2024 1251  Gross per 24 hour   Intake 300 ml   Output 280 ml   Net 20 ml           I/Os    Intake/Output Summary (Last 24 hours) at 6/28/2024 1347  Last data filed at 6/28/2024 1251  Gross per 24 hour   Intake 300 ml   Output 280 ml   Net 20 ml       Labs:   Results from last 72 hours   Lab Units 06/28/24  0552 06/28/24  0032   SODIUM mmol/L 129* 129*   POTASSIUM mmol/L 4.0 4.5   CHLORIDE mmol/L 95* 93*   CO2 mmol/L 27 28   BUN mg/dL 21 22   CREATININE mg/dL 0.86 0.98   GLUCOSE mg/dL 118* 103*   CALCIUM mg/dL 8.0* " "8.6   ANION GAP mmol/L 11 13   EGFR mL/min/1.73m*2 86 77   PHOSPHORUS mg/dL 3.6  --       Results from last 72 hours   Lab Units 06/28/24  0552 06/28/24  0032   WBC AUTO x10*3/uL 12.8* 17.6*   HEMOGLOBIN g/dL 9.3* 10.6*   HEMATOCRIT % 29.1* 32.5*   PLATELETS AUTO x10*3/uL 530* 585*   NEUTROS PCT AUTO %  --  87.3   LYMPHS PCT AUTO %  --  6.0   MONOS PCT AUTO %  --  5.1   EOS PCT AUTO %  --  0.1      Lab Results   Component Value Date    CALCIUM 8.0 (L) 06/28/2024    PHOS 3.6 06/28/2024      No results found for: \"CRP\"    [unfilled]     Micro/ID:   No results found for the last 90 days.                   No lab exists for component: \"AGALPCRNB\"   .ID  Lab Results   Component Value Date    BLOODCULT No growth at 4 days -  FINAL REPORT 02/02/2024    BLOODCULT No growth at 4 days -  FINAL REPORT 02/02/2024       Meds    Scheduled medications  [START ON 6/29/2024] azithromycin, 500 mg, oral, q24h JIGNESH  [START ON 6/29/2024] cefTRIAXone, 1 g, intravenous, q24h  DULoxetine, 60 mg, oral, Daily  escitalopram, 5 mg, oral, Nightly  metoprolol succinate XL, 25 mg, oral, Daily  psyllium, 1 packet, oral, Daily  simvastatin, 40 mg, oral, q PM  zinc oxide, 1 Application, Topical, TID      Continuous medications     PRN medications  PRN medications: acetaminophen, lidocaine, oxyCODONE, oxyCODONE, oxygen       Images    XR foot left 3+ views    Result Date: 6/28/2024  Interpreted By:  Sin Bacon, STUDY: XR FOOT LEFT 3+ VIEWS; ;  6/28/2024 6:33 am   INDICATION: Signs/Symptoms:left middle toes tenderness wound osteo eval.   COMPARISON: 06/12/2024   ACCESSION NUMBER(S): [id number]   ORDERING CLINICIAN: KAVITA AMES   FINDINGS: AP, oblique, and lateral views of the left foot are obtained.   There is no fracture. No focal bone lysis is seen.   Degenerative changes are noted at the 1st MTP joint and D IP joints of the 2nd through 4th toes.   Examination of the soft tissue shows apparent mild diffuse thickening suggestive of edema " of the distal lower extremity and foot. Fine medial calcifications are noted along the arterial channels suggestive of diabetes.       No acute bony abnormality on radiographs of the left foot. No fracture or focal lytic change. Arthritic changes are noted along with plantar spur and soft tissue edema.     MACRO: None   Signed by: Sin Bacon 6/28/2024 8:18 AM Dictation workstation:   [id number]    CT chest w IV contrast    Result Date: 6/28/2024  Interpreted By:  Aminata Ryan, STUDY: CT CHEST W IV CONTRAST;  6/28/2024 4:45 am   INDICATION: Signs/Symptoms:hypoxemia after fall, LLL pna vs contusion.   COMPARISON: Chest CT 02/02/2024, chest radiograph 06/27/2024   ACCESSION NUMBER(S): [id number]   ORDERING CLINICIAN: AIYANA GONZALEZ   TECHNIQUE: Axial CT images of the chest obtained  after intravenous administration of contrast.   FINDINGS: VESSELS: No aortic aneurysm. No evidence of acute traumatic aortic injury, given limitations of non gated CT. HEART: Normal size. Severe coronary artery calcification. No pericardial effusion. MEDIASTINUM AND MICHELLE: Nonspecific borderline enlarged 10 mm aortopulmonary window lymph node. No mediastinal hematoma. No pneumomediastinum. LUNG, PLEURA, AND LARGE AIRWAYS: Small simple left pleural effusion with compressive atelectasis of the left lower lobe. Mild bandlike atelectasis in the right lower lobe and lingula. CHEST WALL AND LOWER NECK: Within normal limits.   UPPER ABDOMEN: No acute abnormality of the visualized abdomen. Low attenuation of the liver is suggestive of hepatic steatosis. Small exophytic lesions of both kidneys, favored to represent small cysts.   BONES: Acute, mildly displaced fractures of the right lateral 8th and 9th ribs. Chronic sternal fracture.       Small simple left pleural effusion and left lower lobe atelectasis.   Acute, mildly displaced fractures of the right lateral 8th and 9th ribs.   MACRO: None   Signed by: Aminata Ryan 6/28/2024 4:59 AM  Dictation workstation:   [id number]    CT head wo IV contrast    Result Date: 6/28/2024  Interpreted By:  Aminata Ryan, STUDY: CT HEAD WO IV CONTRAST;  6/28/2024 4:44 am   INDICATION: Signs/Symptoms:fall on eliquis.   COMPARISON: 06/11/2024   ACCESSION NUMBER(S): [id number]   ORDERING CLINICIAN: AIYANA GONZALEZ   TECHNIQUE: Axial noncontrast CT images of the head.   FINDINGS: BRAIN PARENCHYMA:  Gray-white matter interfaces are preserved. No mass effect or midline shift.   HEMORRHAGE: No acute intracranial hemorrhage. VENTRICLES and EXTRA-AXIAL SPACES: The ventricles and sulci are within normal limits in size for brain volume. No abnormal extraaxial fluid collection. EXTRACRANIAL SOFT TISSUES: Within normal limits. PARANASAL SINUSES/MASTOIDS:  The visualized paranasal sinuses and mastoid air cells are aerated. CALVARIUM: No depressed skull fracture. No destructive osseous lesion.   OTHER FINDINGS: None.       No acute intracranial abnormality.     MACRO: None   Signed by: Aminata Ryan 6/28/2024 4:49 AM Dictation workstation:   [id number]    XR chest 1 view    Result Date: 6/28/2024  Interpreted By:  Aminata Ryan, STUDY: XR CHEST 1 VIEW;  6/27/2024 11:45 pm   INDICATION: Signs/Symptoms:fall.   COMPARISON: 02/02/2024   ACCESSION NUMBER(S): [id number]   ORDERING CLINICIAN: AIYANA GONZALEZ   FINDINGS:     CARDIOMEDIASTINAL SILHOUETTE: Cardiomediastinal silhouette is normal in size and configuration.   LUNGS: Small bilateral pleural effusions. Left basilar strandy airspace opacity. No pneumothorax.   ABDOMEN: No remarkable upper abdominal findings.   BONES: Acute, mildly displaced fractures of the right 8th and 9th ribs.       Small bilateral pleural effusions.   Nonspecific left basilar airspace disease. Differential considerations include atelectasis and pneumonia, however, developing contusion may also be considered in the setting of trauma.   Acute, mildly displaced right lateral 8th and 9th rib fractures.    MACRO: None   Signed by: Aminata Ryan 6/28/2024 12:52 AM Dictation workstation:   [id number]    XR tibia fibula left 2 views    Result Date: 6/28/2024  Interpreted By:  Aminata Ryan, STUDY: XR TIBIA FIBULA LEFT 2 VIEWS; ;  6/27/2024 11:45 pm   INDICATION: Signs/Symptoms:fall with tenderness.   COMPARISON: None.   ACCESSION NUMBER(S): [id number]   ORDERING CLINICIAN: AIYANA GONZALEZ   FINDINGS: Two views left tibia-fibula. No acute fracture or malalignment. No osseous destruction or abnormal periosteal bone formation. Mild to moderate tricompartmental knee osteoarthrosis. Atherosclerotic vascular calcifications are noted. Nonspecific diffuse lower extremity edema.       No acute osseous abnormality.   Nonspecific lower extremity edema.   Mild-to-moderate left knee osteoarthrosis.     MACRO: None   Signed by: Aminata Ryan 6/28/2024 12:49 AM Dictation workstation:   [id number]    ECG 12 lead    Result Date: 6/20/2024  Atrial fibrillation with rapid ventricular response with premature ventricular or aberrantly conducted complexes Low voltage QRS ST & T wave abnormality, consider lateral ischemia Abnormal ECG When compared with ECG of 03-FEB-2024 12:53, T wave inversion now evident in Anterolateral leads See ED provider note for full interpretation and clinical correlation Confirmed by Niyah Duval ([id number]) on 6/20/2024 1:15:38 PM    XR foot left 3+ views    Result Date: 6/12/2024  Interpreted By:  María Wood, STUDY: XR FOOT LEFT 3+ VIEWS; ;  6/12/2024 11:34 am   INDICATION: Signs/Symptoms:fall with tender 2nd digit.   COMPARISON: None.   ACCESSION NUMBER(S): [id number]   ORDERING CLINICIAN: JENNIFER VINSON   FINDINGS: Three views of left foot were performed.   There is no acute fracture or dislocation. There are moderate to severe degenerative changes in the great toe metatarsophalangeal joint with joint space narrowing and prominent marginal osteophytes. There are mild-to-moderate degenerative changes in  the interphalangeal joints of 2nd through 5th digits. Prominent plantar calcaneal enthesophyte is noted. No obvious soft tissue abnormality is noted.       No acute fracture or dislocation.     MACRO: None   Signed by: María Wood 6/12/2024 12:25 PM Dictation workstation:   [id number]    CT chest abdomen pelvis w IV contrast    Result Date: 6/11/2024  Interpreted By:  Zack Chavez, STUDY: CT CHEST ABDOMEN PELVIS W IV CONTRAST;  6/11/2024 9:21 am   INDICATION: Signs/Symptoms:R rib pain, fall   COMPARISON:   February 2, 2024 CT chest abdomen and pelvis, and June 11, 2024 CT cervical spine.   ACCESSION NUMBER(S): [id number]   ORDERING CLINICIAN: CHELLE DAVID   TECHNIQUE: CT of the chest, abdomen, and pelvis was performed. Contiguous axial images were obtained at  5 mm slice thickness through the chest, and at  3 mm through the abdomen and pelvis. Coronal and sagittal reconstructions at  3 mm slice thickness were performed. 100 ml of contrast material Omnipaque 350 were administered intravenously without immediate complication.   FINDINGS: Likely technically adequate although image degradation related to motion, and streak artifact from overlapping radiopaque structures most pronounced at the thoracoabdominal junction region.   CHEST:   LUNG/PLEURA/LARGE AIRWAYS: Compared with February 2, 2024 overall improved expansion and aeration with minimal platelike atelectasis at the right base. No evident contusion, edema, pleural effusion, new, enlarging, or suspicious nodule or pneumothorax. There is a similar probably benign relatively flat smoothly marginated fissural nodule right mid chest sagittal series 203, image 47 favoring intraparenchymal lymph node. There is a similar flat juxtapleural nodule anterior right mid chest measuring 7 mm series 204, image 220. VESSELS: No traumatic aortic injury is appreciated within the limitations of this non-EKG gated study.  The thoracic aorta is of normal course and  caliber.  Similar scattered atherosclerosis thoracic aorta and branch vessels. Similar extensive coronary artery calcifications. No aneurysm.   HEART: The heart is normal in size.   There is no pericardial effusion.   MEDIASTINUM AND MICHELLE: No pneumomediastinum, abnormal mediastinal fluid collection or mediastinal hematoma are appreciated.  No mediastinal, hilar or biaxillary adenopathy is present.  The esophagus is normal in course and caliber.   CHEST WALL AND LOWER NECK: There is an acute minimally displaced fracture right posterolateral 9th rib series 204, image 281. There is chronic minimal deformity adjacent lateral 8th rib image 280.. There is acute nondisplaced fracture right lateral 8th rib series 204, image 247. There is a nonacute fracture with evidence of healing response involving the left posterolateral 11th rib image 334. no suspicious osseous lesions are identified.  The thoracic wall soft tissues are within normal limits.   ABDOMEN:   LIVER: No focal perfusion abnormality of the liver is appreciated to suggest contusion or laceration. There is no subcapsular hematoma, no perihepatic fluid collection.  Similar diffuse hypoattenuation liver compatible with steatosis.   GALLBLADDER: The gallbladder is nondistended containing tiny stones.   BILE DUCTS: The intahepatic and extrahepatic bile ducts are not dilated.   PANCREAS: The pancreas appears unremarkable.   SPLEEN: No parenchymal perfusion deficit of the spleen is appreciated to suggest contusion or laceration. There is no subcapsular hematoma, no perisplenic fluid collection. There are 2 rounded sharply marginated hypodensities series 21, image 84 not well delineated on the prior CT although likely present most likely incidental such as cysts although too small to characterize.   ADRENAL GLANDS: There is similar mild nodular thickening involving the adrenal glands left-greater-than-right stability favors probably benign cause such as adenomas.  Findings include thickening left adrenal gland measuring 11 mm AP diameter series 7, image 99 9 mm right-side image 90.   KIDNEYS AND URETERS: No parenchymal perfusion deficit is appreciated in bilateral kidneys to suggest contusion or laceration. There is no subcapsular hematoma, no perinephric fluid collection.  There are similar probably benign bilateral renal hypodensities most compatible with cysts including dominant 3 cm left-sided cysts. No calculi or hydronephrosis.   PELVIS:   BLADDER: The urinary bladder appears within normal limits.   REPRODUCTIVE ORGANS: Similar prostatomegaly measuring 5.2 cm transverse diameter.   BOWEL: The stomach is unremarkable.  The small bowel is normal in caliber without evidence of focal wall thickening or obstruction.  There is no evidence of focal wall thickening or dilatation of the large bowel.   VESSELS: Similar scattered atherosclerosis aorta and branch vessels. Similar fusiform dilation infrarenal aorta measuring 2.7 cm AP diameter sagittal series 203, image 85. 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 The saccular portion which extends posteriorly measures 18 x 9 mm sagittal series 203, image 73. The principal vasculature of the abdomen and pelvis is patent.   PERITONEUM/RETROPERITONEUM/LYMPH NODES: There is no evidence of intra- or retroperitoneal hematoma.  There is no free or loculated fluid collection, no free intraperitoneal air. No abdominopelvic lymphadenopathy is present. Similar distribution and extent of pre-existing lymph nodes.   BONES AND ABDOMINAL WALL: No evidence of acute fracture or dislocation of the included osseous structures.  No suspicious osseous lesions are identified.  Similar pre-existing moderate L2 compressive deformity and scattered degenerative changes. Similar appearance of the body wall.       Acute right-sided 8th and 9th rib fractures.   Newly seen nonacute left 11th  rib fracture with evidence of healing response.   Improved pulmonary expansion and aeration.   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   Cholelithiasis.   Similar right-sided fissural and juxtapleural nodules including dominant fissural nodule measuring 1 cm. Stability and morphology favors probably benign cause. Per Fleischner criteria recommend 1 year follow-up noncontrast chest CT.   Similar adrenal nodules likely incidental such as adenomas although nonspecific.   Similar small splenic hypodensities likely incidental such as cysts although too small to characterize.   Similar liver steatosis.   Similar prostatomegaly.   MACRO: Incidental Finding:  Multiple solid non-calcified pulmonary nodules measuring up to greater than 8 mm.  (**-YCF-**)   Instructions:  Consider follow up non contrast chest CT at 3-6 months, then consider CT chest at 18-24 months. (Demar Clements et al., Guidelines for management of incidental pulmonary nodules detected on CT images: From the Fleischner Society 2017, Radiology. 2017 Jul;284 (1):228-243.) [email or web address]   Signed by: Zack Chavez 6/11/2024 10:42 AM Dictation workstation:   [id number]    CT cervical spine wo IV contrast    Result Date: 6/11/2024  Interpreted By:  John Peoples, STUDY: CT HEAD W/O CONTRAST TRAUMA PROTOCOL; CT CERVICAL SPINE WO IV CONTRAST;  6/11/2024 9:14 am; 6/11/2024 9:21 am   INDICATION: Signs/Symptoms:HIA   COMPARISON: CT head and cervical spine 02/02/2024   ACCESSION NUMBER(S): [id number]; [id number]   ORDERING CLINICIAN: CHELLE DAVID   TECHNIQUE: Axial noncontrast CT images of head with coronal and sagittal reconstructed images. Axial noncontrast CT images of the cervical spine with coronal and sagittal reconstructed images.   FINDINGS: CT head without contrast: The ventricles and sulci are mildly prominent in a pattern that can be seen with age-related parenchymal volume loss.   There  is no evidence of acute intracranial hemorrhage or definite acute cortical infarction. There are patchy areas of hypodense attenuation within the subcortical and periventricular white matter.   There is no midline shift.   The visualized paranasal sinuses and mastoid air cells are clear.   No acute calvarial fracture is noted.   CERVICAL SPINE   Fractures: There is no evidence for an acute fracture of the cervical spine.   Vertebral Alignment: There is similar minimal anterolisthesis of C4 upon C5. Vertebral body height and alignment are otherwise within normal limits.   Craniocervical Junction: The odontoid process and craniocervical junction are intact.   Vertebrae/Disc Spaces:  There is multilevel mild-to-moderate disc space narrowing of the cervical spine, most prominent at C6-C7 with endplate osteophytosis noted at this level. There is moderate multilevel facet joint hypertrophy, left-greater-than-right, most prominent at C2-C3 to C4-C5.   Prevertebral/Paraspinal Soft Tissues: The prevertebral and paraspinal soft tissues are unremarkable. The visualized pulmonary apices are unremarkable.       No acute intracranial abnormality or calvarial fracture.   No acute fracture or traumatic malalignment of the cervical spine.   Multilevel spondylotic changes of the cervical spine as detailed above, most prominent at C6-C7   Signed by: John Peoples 6/11/2024 10:04 AM Dictation workstation:   [id number]    CT head W O contrast trauma protocol    Result Date: 6/11/2024  Interpreted By:  John Peoples, STUDY: CT HEAD W/O CONTRAST TRAUMA PROTOCOL; CT CERVICAL SPINE WO IV CONTRAST;  6/11/2024 9:14 am; 6/11/2024 9:21 am   INDICATION: Signs/Symptoms:HIA   COMPARISON: CT head and cervical spine 02/02/2024   ACCESSION NUMBER(S): [id number]; [id number]   ORDERING CLINICIAN: CHELLE DAVID   TECHNIQUE: Axial noncontrast CT images of head with coronal and sagittal reconstructed images. Axial noncontrast CT images of  the cervical spine with coronal and sagittal reconstructed images.   FINDINGS: CT head without contrast: The ventricles and sulci are mildly prominent in a pattern that can be seen with age-related parenchymal volume loss.   There is no evidence of acute intracranial hemorrhage or definite acute cortical infarction. There are patchy areas of hypodense attenuation within the subcortical and periventricular white matter.   There is no midline shift.   The visualized paranasal sinuses and mastoid air cells are clear.   No acute calvarial fracture is noted.   CERVICAL SPINE   Fractures: There is no evidence for an acute fracture of the cervical spine.   Vertebral Alignment: There is similar minimal anterolisthesis of C4 upon C5. Vertebral body height and alignment are otherwise within normal limits.   Craniocervical Junction: The odontoid process and craniocervical junction are intact.   Vertebrae/Disc Spaces:  There is multilevel mild-to-moderate disc space narrowing of the cervical spine, most prominent at C6-C7 with endplate osteophytosis noted at this level. There is moderate multilevel facet joint hypertrophy, left-greater-than-right, most prominent at C2-C3 to C4-C5.   Prevertebral/Paraspinal Soft Tissues: The prevertebral and paraspinal soft tissues are unremarkable. The visualized pulmonary apices are unremarkable.       No acute intracranial abnormality or calvarial fracture.   No acute fracture or traumatic malalignment of the cervical spine.   Multilevel spondylotic changes of the cervical spine as detailed above, most prominent at C6-C7   Signed by: John Peoples 6/11/2024 10:04 AM Dictation workstation:   [id number]     Encounter Date: 06/11/24   ECG 12 lead   Result Value    Ventricular Rate 109    QRS Duration 88    QT Interval 352    QTC Calculation(Bazett) 474    R Axis 68    T Axis 224    QRS Count 18    Q Onset 220    T Offset 396    QTC Fredericia 429    Narrative    Atrial fibrillation with  rapid ventricular response with premature ventricular or aberrantly conducted complexes  Low voltage QRS  ST & T wave abnormality, consider lateral ischemia  Abnormal ECG  When compared with ECG of 03-FEB-2024 12:53,  T wave inversion now evident in Anterolateral leads  See ED provider note for full interpretation and clinical correlation  Confirmed by Niyah Duval ([id number]) on 6/20/2024 1:15:38 PM      Encounter Date: 06/11/24   ECG 12 lead   Result Value    Ventricular Rate 109    QRS Duration 88    QT Interval 352    QTC Calculation(Bazett) 474    R Axis 68    T Axis 224    QRS Count 18    Q Onset 220    T Offset 396    QTC Fredericia 429    Narrative    Atrial fibrillation with rapid ventricular response with premature ventricular or aberrantly conducted complexes  Low voltage QRS  ST & T wave abnormality, consider lateral ischemia  Abnormal ECG  When compared with ECG of 03-FEB-2024 12:53,  T wave inversion now evident in Anterolateral leads  See ED provider note for full interpretation and clinical correlation  Confirmed by Niyah Duval ([id number]) on 6/20/2024 1:15:38 PM      @CT@   [unfilled]   [unfilled]   === 06/27/24 ===    CT HEAD WO IV CONTRAST    - Impression -  No acute intracranial abnormality.      MACRO:  None    Signed by: Aminata Ryan 6/28/2024 4:49 AM  Dictation workstation:   [id number]     Assessment and Plan    84m with HTN, HLD, MDD, CAD, frequent falls, dementia, afib, hard of hearing, HFpEF 50% 2/2024 who presents from home after leaving [facility]. Brought in by EMS after a fall. Treating for acute PNA failed outpatient and pain control including for rib fxs.      #acute hypoxic respiratory failure  #concern for L community associated PNA failed outpatient   #hx recent acute R rib fxs s/p fall   -Back to room air.  -CT chest w/ contrast: small simple left pleural effusion and LLL atelectasis  -ceftriaxone (6/28-) azithro 3d, pna labs procal, blood cultures   -pain regimen for acute pain.  Spirometry, airway clearance.     #LLE wound and other chronic wounds   #chronic BLE pain   #chronic neuropathy and recurrent falls   #MDD   -Wound onboard  -Podiatry consulted, appreciate recs  -Xray left foot no acute bony abnormality  -duloxetine 60 daily, Lexapro 5mg daily  -pt did not complete acute rehab  -PT/OT eval pending     #HTN - hold lisinopril 10. Metoprolol succinate to 25.   #CAD - Hold asa for now   #Afib w/ frequent falls - pt reports he has been given Eliquis at [place]. Holding. Pt still needs eval for Watchman procedure outpatient. Continue metoprolol   #HFpEF - echo 50% diastolic dysfunction 2/2024. This stay BNP normal   #BLE edema - chronic per pt not increased recently.  Hold home lisinopril 10. Continue metoprolol succinate 50 as 25.      Fluids: none   Nutrition: npo until surgery eval   VTE prophylaxis: scds. Hold dvt ppx until surgery eval   O2: room air, 2L prn   Code Status: full code per pt on admission      Disposition: pt admitted for concern for acute PNA, fall, and weakness. Has foot wound. On IV CTX. Podiatry onboard. PT/OT eval pending.    Layo Garcia, DO  Internal Medicine PGY-1

## 2024-06-28 NOTE — PROGRESS NOTES
06/28/24 2454   Discharge Planning   Living Arrangements   (From [facility])   Support Systems Spouse/significant other   Assistance Needed Alert and oriented x 3; very [place]. Needs assistance with ADLs. Ambulates with a Cane or Walker, Doesn't drive   Type of Residence Private residence   Number of Stairs to Enter Residence 4   Number of Stairs Within Residence 14   Do you have animals or pets at home? Yes   Type of Animals or Pets dog   Who is requesting discharge planning? Provider   Home or Post Acute Services Post acute facilities (Rehab/SNF/etc)   Type of Post Acute Facility Services Skilled nursing   Patient expects to be discharged to: Return to [facility], will require precert   Does the patient need discharge transport arranged? Yes   RoundTrip coordination needed? Yes   Has discharge transport been arranged? No   Financial Resource Strain   How hard is it for you to pay for the very basics like food, housing, medical care, and heating? Not hard   Housing Stability   In the last 12 months, was there a time when you were not able to pay the mortgage or rent on time? N   In the last 12 months, how many places have you lived? 1   In the last 12 months, was there a time when you did not have a steady place to sleep or slept in a shelter (including now)? N   Transportation Needs   In the past 12 months, has lack of transportation kept you from medical appointments or from getting medications? no   In the past 12 months, has lack of transportation kept you from meetings, work, or from getting things needed for daily living? No   Patient Choice   Provider Choice list and CMS website (https://medicare.gov/care-compare#search) for post-acute Quality and Resource Measure Data were provided and reviewed with: Patient   Patient / Family choosing to utilize agency / facility established prior to hospitalization Yes

## 2024-06-28 NOTE — ED TRIAGE NOTES
Pt presents via [facility] from home with his wife for an unwitnessed fall outside. Pt was discharged today at aprox 1800 from [facility] after a 1 month stay after a fall where he broke several ribs. Pt denies any pain or injury, denies hitting his head, pt has Hx of a-fib and takes Aspirin 81mg daily. Per EMS pt's wife states pt has early signs of dementia. EMS states that after standing pt up, with a heavy 3 person assist, pt had aprox 5 seconds of body shaking and his eyes rolled back. EMS states pt was not postictal after.

## 2024-06-28 NOTE — H&P
History Of Present Illness  Chao Thao is a 84 y.o. male with HTN, HLD, MDD, CAD, frequent falls, dementia, afib, hard of hearing, HFpEF 50% 2/2024 who presents from home after leaving [facility]. Brought in by EMS after a fall. He is A&O x3 and provided history and it was obtained from prior charts.   Had a fall 1 month ago where he broke several ribs. Per 6/12 discharge summary, Eliquis was held 2/2 frequent falls, discussed with [facility] NP who recommends holding it for now in light of frequent falls and he should follow up with them to discuss ARAM closure.  Per pt, he had just been discharged from [facility] after rehab there for rib fx. Pt notes he was given PO abx for PNA at [facility], denies IV abx. He was walking home 6/27 night when be became lightheaded and “faded out” for about 20 minutes. He denies LOC but says he was “barely conscious”. Pt says he has been getting Eliquis 5mg BID at [facility]. He denies hitting his head or his chest during the fall, saying he “slid down” to the ground. His wife Karissa called EMS. Pt denies HA.   He has had increased dyspnea and ARAUZ for about 2 days. Denies cough, chest pain, abd pain, fever, chills. He feels a “little bit” weaker in both legs but denies one sided weakness. He denies dysuria, says he has chronic constipation without blood. He also has chronic pain to both lower legs, mostly in his “tibia”. He has a chronic left foot wound which has been treated with topical ointments at [facility].   “Per EMS pt's wife states pt has early signs of dementia. EMS states that after standing pt up, with a heavy 3 person assist, pt had aprox 5 seconds of body shaking and his eyes rolled back”.   Vitals in ED 84 hr, 18 rr, 97/62, 93% on room air, dropped to 89% at the lowest in ED.   Labs sodium 129, cr 0.98, alk phos 137, lactate 1.7, trop 8, bnp 43, wbc 17.6. plt 585. 2x blood cultures obtained in ed.   CXR small pleural effusions, left basilar airspace disease. Left tib fib xray no  acute fx.   In the ED pt received azithromycin, ceftriaxone and surgery consulted from ED.     Other hx: alcohol use hx, drug use hx   Allergies: penicillin when he was very young.   Surgical hx: cardiac cath, inner ear surgery, cataract surgery   Family hx: father hx MI   Social hx: quit smoking 1980s. No alcohol quit 2022..     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    Surgical History  He has a past surgical history that includes Inner ear surgery (01/06/2016); Other surgical history (03/16/2020); and Cardiac catheterization (05/14/2018).     Social History  He reports that he has quit smoking. His smoking use included cigarettes. He has never used smokeless tobacco. He reports that he does not currently use alcohol. He reports that he does not use drugs.    Family History  No family history on file.     Allergies  Gabapentin and Penicillin    Review of Systems   Constitutional:  Negative for chills and fever.   Respiratory:  Positive for shortness of breath. Negative for cough.    Cardiovascular:  Positive for leg swelling. Negative for chest pain.   Gastrointestinal:  Positive for constipation. Negative for abdominal pain, blood in stool, diarrhea, nausea and vomiting.   Genitourinary:  Negative for dysuria.   Neurological:  Positive for light-headedness. Negative for headaches.   All other systems reviewed and are negative.       Physical Exam  Vitals reviewed.   Constitutional:       General: He is not in acute distress.  HENT:      Head: Normocephalic and atraumatic.   Eyes:      Extraocular Movements: Extraocular movements intact.      Conjunctiva/sclera:  Conjunctivae normal.   Cardiovascular:      Rate and Rhythm: Normal rate. Rhythm irregular.      Heart sounds: No murmur heard.     No friction rub. No gallop.   Pulmonary:      Effort: Pulmonary effort is normal.      Breath sounds: Normal breath sounds. No wheezing, rhonchi or rales.      Comments: No significant chest wall tenderness to light palpation   Abdominal:      General: Bowel sounds are normal.      Palpations: Abdomen is soft. There is no mass.      Tenderness: There is no abdominal tenderness. There is no guarding or rebound.   Musculoskeletal:         General: Tenderness present.      Cervical back: Neck supple.      Right lower leg: Edema present.      Left lower leg: Edema present.      Comments: Erythema and mild tenderness around middle left toes not well demarcated  Pitting edema BLEs    Skin:     General: Skin is warm and dry.      Findings: No bruising or rash.   Neurological:      Mental Status: He is alert and oriented to person, place, and time.      Cranial Nerves: No cranial nerve deficit.      Comments: Answering questions, following commands. Moving all extremities.   CN 2-12 unremarkable. Finger to nose unremarkable b/l   5/5 strengths both upper extremities  Equal strengths both lower extremities    Psychiatric:         Mood and Affect: Mood and affect normal.          Last Recorded Vitals  BP 99/87 (BP Location: Right arm, Patient Position: Lying)   Pulse 85   Temp 37.4 °C (99.3 °F) (Temporal)   Resp 18   Wt 80.7 kg (177 lb 14.6 oz)   SpO2 96%     Relevant Results      Scheduled medications  azithromycin, 500 mg, intravenous, Once  [START ON 6/29/2024] azithromycin, 500 mg, intravenous, q24h  [START ON 6/29/2024] cefTRIAXone, 1 g, intravenous, q24h  DULoxetine, 60 mg, oral, Daily  escitalopram, 5 mg, oral, Nightly  metoprolol succinate XL, 25 mg, oral, Daily  psyllium, 1 packet, oral, Daily  simvastatin, 40 mg, oral, q PM      Continuous medications     PRN medications  PRN  medications: acetaminophen, lidocaine, oxyCODONE, oxyCODONE, oxygen  CT chest w IV contrast    Result Date: 6/28/2024  Interpreted By:  Aminata Ryan, STUDY: CT CHEST W IV CONTRAST;  6/28/2024 4:45 am   INDICATION: Signs/Symptoms:hypoxemia after fall, LLL pna vs contusion.   COMPARISON: Chest CT 02/02/2024, chest radiograph 06/27/2024   ACCESSION NUMBER(S): [id number]   ORDERING CLINICIAN: AIYANA GONZALEZ   TECHNIQUE: Axial CT images of the chest obtained  after intravenous administration of contrast.   FINDINGS: VESSELS: No aortic aneurysm. No evidence of acute traumatic aortic injury, given limitations of non gated CT. HEART: Normal size. Severe coronary artery calcification. No pericardial effusion. MEDIASTINUM AND MICHELLE: Nonspecific borderline enlarged 10 mm aortopulmonary window lymph node. No mediastinal hematoma. No pneumomediastinum. LUNG, PLEURA, AND LARGE AIRWAYS: Small simple left pleural effusion with compressive atelectasis of the left lower lobe. Mild bandlike atelectasis in the right lower lobe and lingula. CHEST WALL AND LOWER NECK: Within normal limits.   UPPER ABDOMEN: No acute abnormality of the visualized abdomen. Low attenuation of the liver is suggestive of hepatic steatosis. Small exophytic lesions of both kidneys, favored to represent small cysts.   BONES: Acute, mildly displaced fractures of the right lateral 8th and 9th ribs. Chronic sternal fracture.       Small simple left pleural effusion and left lower lobe atelectasis.   Acute, mildly displaced fractures of the right lateral 8th and 9th ribs.   MACRO: None   Signed by: Aminata Ryan 6/28/2024 4:59 AM Dictation workstation:   [id number]    CT head wo IV contrast    Result Date: 6/28/2024  Interpreted By:  Aminata Ryan, STUDY: CT HEAD WO IV CONTRAST;  6/28/2024 4:44 am   INDICATION: Signs/Symptoms:fall on eliquis.   COMPARISON: 06/11/2024   ACCESSION NUMBER(S): [id number]   ORDERING CLINICIAN: AIYANA GONZALEZ   TECHNIQUE: Axial  noncontrast CT images of the head.   FINDINGS: BRAIN PARENCHYMA:  Gray-white matter interfaces are preserved. No mass effect or midline shift.   HEMORRHAGE: No acute intracranial hemorrhage. VENTRICLES and EXTRA-AXIAL SPACES: The ventricles and sulci are within normal limits in size for brain volume. No abnormal extraaxial fluid collection. EXTRACRANIAL SOFT TISSUES: Within normal limits. PARANASAL SINUSES/MASTOIDS:  The visualized paranasal sinuses and mastoid air cells are aerated. CALVARIUM: No depressed skull fracture. No destructive osseous lesion.   OTHER FINDINGS: None.       No acute intracranial abnormality.     MACRO: None   Signed by: Aminata Ryan 6/28/2024 4:49 AM Dictation workstation:   [id number]    XR chest 1 view    Result Date: 6/28/2024  Interpreted By:  Aminata Ryan, STUDY: XR CHEST 1 VIEW;  6/27/2024 11:45 pm   INDICATION: Signs/Symptoms:fall.   COMPARISON: 02/02/2024   ACCESSION NUMBER(S): [id number]   ORDERING CLINICIAN: AIYANA GONZALEZ   FINDINGS:     CARDIOMEDIASTINAL SILHOUETTE: Cardiomediastinal silhouette is normal in size and configuration.   LUNGS: Small bilateral pleural effusions. Left basilar strandy airspace opacity. No pneumothorax.   ABDOMEN: No remarkable upper abdominal findings.   BONES: Acute, mildly displaced fractures of the right 8th and 9th ribs.       Small bilateral pleural effusions.   Nonspecific left basilar airspace disease. Differential considerations include atelectasis and pneumonia, however, developing contusion may also be considered in the setting of trauma.   Acute, mildly displaced right lateral 8th and 9th rib fractures.   MACRO: None   Signed by: Aminata Ryan 6/28/2024 12:52 AM Dictation workstation:   [id number]    XR tibia fibula left 2 views    Result Date: 6/28/2024  Interpreted By:  Aminata Ryan, STUDY: XR TIBIA FIBULA LEFT 2 VIEWS; ;  6/27/2024 11:45 pm   INDICATION: Signs/Symptoms:fall with tenderness.   COMPARISON: None.   ACCESSION  NUMBER(S): [id number]   ORDERING CLINICIAN: AIYANA GONZALEZ   FINDINGS: Two views left tibia-fibula. No acute fracture or malalignment. No osseous destruction or abnormal periosteal bone formation. Mild to moderate tricompartmental knee osteoarthrosis. Atherosclerotic vascular calcifications are noted. Nonspecific diffuse lower extremity edema.       No acute osseous abnormality.   Nonspecific lower extremity edema.   Mild-to-moderate left knee osteoarthrosis.     MACRO: None   Signed by: Aminata Ryan 6/28/2024 12:49 AM Dictation workstation:   [id number]    ECG 12 lead    Result Date: 6/20/2024  Atrial fibrillation with rapid ventricular response with premature ventricular or aberrantly conducted complexes Low voltage QRS ST & T wave abnormality, consider lateral ischemia Abnormal ECG When compared with ECG of 03-FEB-2024 12:53, T wave inversion now evident in Anterolateral leads See ED provider note for full interpretation and clinical correlation Confirmed by Niyah Duval ([id number]) on 6/20/2024 1:15:38 PM    XR foot left 3+ views    Result Date: 6/12/2024  Interpreted By:  María Wood, STUDY: XR FOOT LEFT 3+ VIEWS; ;  6/12/2024 11:34 am   INDICATION: Signs/Symptoms:fall with tender 2nd digit.   COMPARISON: None.   ACCESSION NUMBER(S): [id number]   ORDERING CLINICIAN: JENNIFER VINSON   FINDINGS: Three views of left foot were performed.   There is no acute fracture or dislocation. There are moderate to severe degenerative changes in the great toe metatarsophalangeal joint with joint space narrowing and prominent marginal osteophytes. There are mild-to-moderate degenerative changes in the interphalangeal joints of 2nd through 5th digits. Prominent plantar calcaneal enthesophyte is noted. No obvious soft tissue abnormality is noted.       No acute fracture or dislocation.     MACRO: None   Signed by: María Wood 6/12/2024 12:25 PM Dictation workstation:   [id number]    CT chest abdomen pelvis w IV  contrast    Result Date: 6/11/2024  Interpreted By:  Zack Chavez, STUDY: CT CHEST ABDOMEN PELVIS W IV CONTRAST;  6/11/2024 9:21 am   INDICATION: Signs/Symptoms:R rib pain, fall   COMPARISON:   February 2, 2024 CT chest abdomen and pelvis, and June 11, 2024 CT cervical spine.   ACCESSION NUMBER(S): [id number]   ORDERING CLINICIAN: CHELLE DAVID   TECHNIQUE: CT of the chest, abdomen, and pelvis was performed. Contiguous axial images were obtained at  5 mm slice thickness through the chest, and at  3 mm through the abdomen and pelvis. Coronal and sagittal reconstructions at  3 mm slice thickness were performed. 100 ml of contrast material Omnipaque 350 were administered intravenously without immediate complication.   FINDINGS: Likely technically adequate although image degradation related to motion, and streak artifact from overlapping radiopaque structures most pronounced at the thoracoabdominal junction region.   CHEST:   LUNG/PLEURA/LARGE AIRWAYS: Compared with February 2, 2024 overall improved expansion and aeration with minimal platelike atelectasis at the right base. No evident contusion, edema, pleural effusion, new, enlarging, or suspicious nodule or pneumothorax. There is a similar probably benign relatively flat smoothly marginated fissural nodule right mid chest sagittal series 203, image 47 favoring intraparenchymal lymph node. There is a similar flat juxtapleural nodule anterior right mid chest measuring 7 mm series 204, image 220. VESSELS: No traumatic aortic injury is appreciated within the limitations of this non-EKG gated study.  The thoracic aorta is of normal course and caliber.  Similar scattered atherosclerosis thoracic aorta and branch vessels. Similar extensive coronary artery calcifications. No aneurysm.   HEART: The heart is normal in size.   There is no pericardial effusion.   MEDIASTINUM AND MICHELLE: No pneumomediastinum, abnormal mediastinal fluid collection or mediastinal hematoma are  appreciated.  No mediastinal, hilar or biaxillary adenopathy is present.  The esophagus is normal in course and caliber.   CHEST WALL AND LOWER NECK: There is an acute minimally displaced fracture right posterolateral 9th rib series 204, image 281. There is chronic minimal deformity adjacent lateral 8th rib image 280.. There is acute nondisplaced fracture right lateral 8th rib series 204, image 247. There is a nonacute fracture with evidence of healing response involving the left posterolateral 11th rib image 334. no suspicious osseous lesions are identified.  The thoracic wall soft tissues are within normal limits.   ABDOMEN:   LIVER: No focal perfusion abnormality of the liver is appreciated to suggest contusion or laceration. There is no subcapsular hematoma, no perihepatic fluid collection.  Similar diffuse hypoattenuation liver compatible with steatosis.   GALLBLADDER: The gallbladder is nondistended containing tiny stones.   BILE DUCTS: The intahepatic and extrahepatic bile ducts are not dilated.   PANCREAS: The pancreas appears unremarkable.   SPLEEN: No parenchymal perfusion deficit of the spleen is appreciated to suggest contusion or laceration. There is no subcapsular hematoma, no perisplenic fluid collection. There are 2 rounded sharply marginated hypodensities series 21, image 84 not well delineated on the prior CT although likely present most likely incidental such as cysts although too small to characterize.   ADRENAL GLANDS: There is similar mild nodular thickening involving the adrenal glands left-greater-than-right stability favors probably benign cause such as adenomas. Findings include thickening left adrenal gland measuring 11 mm AP diameter series 7, image 99 9 mm right-side image 90.   KIDNEYS AND URETERS: No parenchymal perfusion deficit is appreciated in bilateral kidneys to suggest contusion or laceration. There is no subcapsular hematoma, no perinephric fluid collection.  There are similar  probably benign bilateral renal hypodensities most compatible with cysts including dominant 3 cm left-sided cysts. No calculi or hydronephrosis.   PELVIS:   BLADDER: The urinary bladder appears within normal limits.   REPRODUCTIVE ORGANS: Similar prostatomegaly measuring 5.2 cm transverse diameter.   BOWEL: The stomach is unremarkable.  The small bowel is normal in caliber without evidence of focal wall thickening or obstruction.  There is no evidence of focal wall thickening or dilatation of the large bowel.   VESSELS: Similar scattered atherosclerosis aorta and branch vessels. Similar fusiform dilation infrarenal aorta measuring 2.7 cm AP diameter sagittal series 203, image 85. 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 The saccular portion which extends posteriorly measures 18 x 9 mm sagittal series 203, image 73. The principal vasculature of the abdomen and pelvis is patent.   PERITONEUM/RETROPERITONEUM/LYMPH NODES: There is no evidence of intra- or retroperitoneal hematoma.  There is no free or loculated fluid collection, no free intraperitoneal air. No abdominopelvic lymphadenopathy is present. Similar distribution and extent of pre-existing lymph nodes.   BONES AND ABDOMINAL WALL: No evidence of acute fracture or dislocation of the included osseous structures.  No suspicious osseous lesions are identified.  Similar pre-existing moderate L2 compressive deformity and scattered degenerative changes. Similar appearance of the body wall.       Acute right-sided 8th and 9th rib fractures.   Newly seen nonacute left 11th rib fracture with evidence of healing response.   Improved pulmonary expansion and aeration.   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   Cholelithiasis.   Similar right-sided fissural and juxtapleural nodules including dominant fissural nodule measuring 1 cm. Stability and morphology  favors probably benign cause. Per Fleischner criteria recommend 1 year follow-up noncontrast chest CT.   Similar adrenal nodules likely incidental such as adenomas although nonspecific.   Similar small splenic hypodensities likely incidental such as cysts although too small to characterize.   Similar liver steatosis.   Similar prostatomegaly.   MACRO: Incidental Finding:  Multiple solid non-calcified pulmonary nodules measuring up to greater than 8 mm.  (**-YCF-**)   Instructions:  Consider follow up non contrast chest CT at 3-6 months, then consider CT chest at 18-24 months. (Demar Clements et al., Guidelines for management of incidental pulmonary nodules detected on CT images: From the Fleischner Society 2017, Radiology. 2017 Jul;284 (1):228-243.) [email or web address]   Signed by: Zack Chavez 6/11/2024 10:42 AM Dictation workstation:   [id number]    CT cervical spine wo IV contrast    Result Date: 6/11/2024  Interpreted By:  John Peoples, STUDY: CT HEAD W/O CONTRAST TRAUMA PROTOCOL; CT CERVICAL SPINE WO IV CONTRAST;  6/11/2024 9:14 am; 6/11/2024 9:21 am   INDICATION: Signs/Symptoms:HIA   COMPARISON: CT head and cervical spine 02/02/2024   ACCESSION NUMBER(S): [id number]; [id number]   ORDERING CLINICIAN: CHELLE DAVID   TECHNIQUE: Axial noncontrast CT images of head with coronal and sagittal reconstructed images. Axial noncontrast CT images of the cervical spine with coronal and sagittal reconstructed images.   FINDINGS: CT head without contrast: The ventricles and sulci are mildly prominent in a pattern that can be seen with age-related parenchymal volume loss.   There is no evidence of acute intracranial hemorrhage or definite acute cortical infarction. There are patchy areas of hypodense attenuation within the subcortical and periventricular white matter.   There is no midline shift.   The visualized paranasal sinuses and mastoid air cells are clear.   No acute calvarial fracture is noted.    CERVICAL SPINE   Fractures: There is no evidence for an acute fracture of the cervical spine.   Vertebral Alignment: There is similar minimal anterolisthesis of C4 upon C5. Vertebral body height and alignment are otherwise within normal limits.   Craniocervical Junction: The odontoid process and craniocervical junction are intact.   Vertebrae/Disc Spaces:  There is multilevel mild-to-moderate disc space narrowing of the cervical spine, most prominent at C6-C7 with endplate osteophytosis noted at this level. There is moderate multilevel facet joint hypertrophy, left-greater-than-right, most prominent at C2-C3 to C4-C5.   Prevertebral/Paraspinal Soft Tissues: The prevertebral and paraspinal soft tissues are unremarkable. The visualized pulmonary apices are unremarkable.       No acute intracranial abnormality or calvarial fracture.   No acute fracture or traumatic malalignment of the cervical spine.   Multilevel spondylotic changes of the cervical spine as detailed above, most prominent at C6-C7   Signed by: John Peoples 6/11/2024 10:04 AM Dictation workstation:   [id number]    CT head W O contrast trauma protocol    Result Date: 6/11/2024  Interpreted By:  John Peoples, STUDY: CT HEAD W/O CONTRAST TRAUMA PROTOCOL; CT CERVICAL SPINE WO IV CONTRAST;  6/11/2024 9:14 am; 6/11/2024 9:21 am   INDICATION: Signs/Symptoms:HIA   COMPARISON: CT head and cervical spine 02/02/2024   ACCESSION NUMBER(S): [id number]; [id number]   ORDERING CLINICIAN: CHELLE DAVID   TECHNIQUE: Axial noncontrast CT images of head with coronal and sagittal reconstructed images. Axial noncontrast CT images of the cervical spine with coronal and sagittal reconstructed images.   FINDINGS: CT head without contrast: The ventricles and sulci are mildly prominent in a pattern that can be seen with age-related parenchymal volume loss.   There is no evidence of acute intracranial hemorrhage or definite acute cortical infarction. There are  patchy areas of hypodense attenuation within the subcortical and periventricular white matter.   There is no midline shift.   The visualized paranasal sinuses and mastoid air cells are clear.   No acute calvarial fracture is noted.   CERVICAL SPINE   Fractures: There is no evidence for an acute fracture of the cervical spine.   Vertebral Alignment: There is similar minimal anterolisthesis of C4 upon C5. Vertebral body height and alignment are otherwise within normal limits.   Craniocervical Junction: The odontoid process and craniocervical junction are intact.   Vertebrae/Disc Spaces:  There is multilevel mild-to-moderate disc space narrowing of the cervical spine, most prominent at C6-C7 with endplate osteophytosis noted at this level. There is moderate multilevel facet joint hypertrophy, left-greater-than-right, most prominent at C2-C3 to C4-C5.   Prevertebral/Paraspinal Soft Tissues: The prevertebral and paraspinal soft tissues are unremarkable. The visualized pulmonary apices are unremarkable.       No acute intracranial abnormality or calvarial fracture.   No acute fracture or traumatic malalignment of the cervical spine.   Multilevel spondylotic changes of the cervical spine as detailed above, most prominent at C6-C7   Signed by: John Peoples 6/11/2024 10:04 AM Dictation workstation:   [id number]   Results for orders placed or performed during the hospital encounter of 06/27/24 (from the past 24 hour(s))   CBC and Auto Differential   Result Value Ref Range    WBC 17.6 (H) 4.4 - 11.3 x10*3/uL    nRBC 0.0 0.0 - 0.0 /100 WBCs    RBC 3.30 (L) 4.50 - 5.90 x10*6/uL    Hemoglobin 10.6 (L) 13.5 - 17.5 g/dL    Hematocrit 32.5 (L) 41.0 - 52.0 %    MCV 99 80 - 100 fL    MCH 32.1 26.0 - 34.0 pg    MCHC 32.6 32.0 - 36.0 g/dL    RDW 12.9 11.5 - 14.5 %    Platelets 585 (H) 150 - 450 x10*3/uL    Neutrophils % 87.3 40.0 - 80.0 %    Immature Granulocytes %, Automated 1.2 (H) 0.0 - 0.9 %    Lymphocytes % 6.0 13.0 - 44.0 %     Monocytes % 5.1 2.0 - 10.0 %    Eosinophils % 0.1 0.0 - 6.0 %    Basophils % 0.3 0.0 - 2.0 %    Neutrophils Absolute 15.34 (H) 1.60 - 5.50 x10*3/uL    Immature Granulocytes Absolute, Automated 0.21 0.00 - 0.50 x10*3/uL    Lymphocytes Absolute 1.06 0.80 - 3.00 x10*3/uL    Monocytes Absolute 0.89 (H) 0.05 - 0.80 x10*3/uL    Eosinophils Absolute 0.01 0.00 - 0.40 x10*3/uL    Basophils Absolute 0.06 0.00 - 0.10 x10*3/uL   Comprehensive metabolic panel   Result Value Ref Range    Glucose 103 (H) 74 - 99 mg/dL    Sodium 129 (L) 136 - 145 mmol/L    Potassium 4.5 3.5 - 5.3 mmol/L    Chloride 93 (L) 98 - 107 mmol/L    Bicarbonate 28 21 - 32 mmol/L    Anion Gap 13 10 - 20 mmol/L    Urea Nitrogen 22 6 - 23 mg/dL    Creatinine 0.98 0.50 - 1.30 mg/dL    eGFR 77 >60 mL/min/1.73m*2    Calcium 8.6 8.6 - 10.3 mg/dL    Albumin 3.0 (L) 3.4 - 5.0 g/dL    Alkaline Phosphatase 137 (H) 33 - 136 U/L    Total Protein 7.3 6.4 - 8.2 g/dL    AST 18 9 - 39 U/L    Bilirubin, Total 0.3 0.0 - 1.2 mg/dL    ALT 16 10 - 52 U/L   B-Type Natriuretic Peptide   Result Value Ref Range    BNP 43 0 - 99 pg/mL   Troponin I, High Sensitivity   Result Value Ref Range    Troponin I, High Sensitivity 8 0 - 20 ng/L   Lactate   Result Value Ref Range    Lactate 1.7 0.4 - 2.0 mmol/L        Assessment/Plan   Principal Problem:    Acute pneumonia      84m with HTN, HLD, MDD, CAD, frequent falls, dementia, afib, hard of hearing, HFpEF 50% 2/2024 who presents from home after leaving [facility]. Brought in by EMS after a fall. Treating for acute PNA failed outpatient and pain control including for rib fxs.     #acute hypoxic respiratory failure  #concern for L community associated PNA failed outpatient   #acute R rib fxs s/p fall   -room air baseline. suspect fall due to hypoxemia on exertion related from L PNA vs contusion. get CT chest w contrast see if it can differentiate   -ceftriaxone (6/28-) azithro 3d, pna labs procal, blood cultures   -pain regimen for  acute pain. Spirometry, airway clearance. Surgery on board for trauma eval.     #LLE wound and other chronic wounds   #chronic BLE pain   #chronic neuropathy and recurrent falls   #MDD   -L foot xray, wound consult. Consider podiatry consult if suspect left foot is infected   -duloxetine 60 daily, Lexapro 5mg daily  -pt ot     #HTN - hold lisinopril 10. Metoprolol succinate to 25.   #CAD - Hold asa for now   #Afib w/ frequent falls - pt reports he has been given Eliquis at [facility]. Hold on admission. Pt still needs eval for Watchman procedure outpatient. Continue metoprolol   #HFpEF - echo 50% diastolic dysfunction 2/2024. This say BNP normal   #BLE edema - chronic per pt not increased recently. Consider DVT ultrasound   Hold home lisinopril 10. Continue metoprolol succinate 50 as 25.     Fluids: none   Nutrition: npo until surgery eval   VTE prophylaxis: scds. Hold dvt ppx until surgery eval   O2: room air, 2L prn   Code Status: full code per pt on admission     Disposition: pt admitted for concern for acute PNA, rib fxs, estimated LOS > 48h            Tiera Betancur DO

## 2024-06-28 NOTE — PROGRESS NOTES
06/28/24 1413   Discharge Planning   Living Arrangements Other (Comment)  (From [facility])   Support Systems Spouse/significant other   Assistance Needed Alert and oriented x 3; very [place]. Needs assistance with ADLs. Ambulates with a Cane or Walker, Doesn't drive   Type of Residence Skilled nursing facility   Number of Stairs to Enter Residence 0   Number of Stairs Within Residence 0   Do you have animals or pets at home? Yes   Type of Animals or Pets dog   Who is requesting discharge planning? Provider   Home or Post Acute Services Post acute facilities (Rehab/SNF/etc)   Type of Post Acute Facility Services Skilled nursing   Patient expects to be discharged to: Return to [facility], will need precert(PT/OT pending)   Does the patient need discharge transport arranged? Yes   RoundTrip coordination needed? Yes   Has discharge transport been arranged? No   Financial Resource Strain   How hard is it for you to pay for the very basics like food, housing, medical care, and heating? Not hard   Housing Stability   In the last 12 months, was there a time when you were not able to pay the mortgage or rent on time? N   In the last 12 months, how many places have you lived? 1   In the last 12 months, was there a time when you did not have a steady place to sleep or slept in a shelter (including now)? N   Transportation Needs   In the past 12 months, has lack of transportation kept you from medical appointments or from getting medications? no   In the past 12 months, has lack of transportation kept you from meetings, work, or from getting things needed for daily living? No   Patient Choice   Provider Choice list and CMS website (https://medicare.gov/care-compare#search) for post-acute Quality and Resource Measure Data were provided and reviewed with: Patient   Patient / Family choosing to utilize agency / facility established prior to hospitalization Yes        negative

## 2024-06-28 NOTE — CONSULTS
Recommendation(s):  Consider Low Sodium diet if LE edema present  Monitor intakes for adequacy   Daily weights     Nutrition Assessment     Reason for Assessment: Admission nursing screening (weight loss unsure)    84 y.o. male adm with Acute pneumonia, significant hx HF    Food and Nutrient History: Pt noted d/o to situation, did not interview this date. No visitor present.      Difficulty chewing/swallowing: none noted       Per Flowsheet Percent Meal intake: 0 (Pt. is NPO)   Dietary Orders (From admission, onward)       Start     Ordered    06/28/24 1339  Adult diet Regular  Diet effective now        Comments: No Red meat Per Wife   Question:  Diet type  Answer:  Regular    06/28/24 1338    06/28/24 0951  May Participate in Room Service  Once        Question:  .  Answer:  Yes    06/28/24 0950                  Feeding assistance level:    Scheduled medications:  [START ON 6/29/2024] azithromycin, 500 mg, oral, q24h JIGNESH  [START ON 6/29/2024] cefTRIAXone, 1 g, intravenous, q24h  DULoxetine, 60 mg, oral, Daily  escitalopram, 5 mg, oral, Nightly  lactated Ringer's, 1,000 mL, intravenous, Once  metoprolol succinate XL, 25 mg, oral, Daily  psyllium, 1 packet, oral, Daily  simvastatin, 40 mg, oral, q PM  zinc oxide, 1 Application, Topical, TID      Continuous medications:     PRN medications:  PRN medications: acetaminophen, lidocaine, oxyCODONE, oxyCODONE, oxygen   Results from last 7 days   Lab Units 06/28/24  0552 06/28/24  0032   GLUCOSE mg/dL 118* 103*   SODIUM mmol/L 129* 129*   POTASSIUM mmol/L 4.0 4.5   CHLORIDE mmol/L 95* 93*   CO2 mmol/L 27 28   BUN mg/dL 21 22   CREATININE mg/dL 0.86 0.98   EGFR mL/min/1.73m*2 86 77   CALCIUM mg/dL 8.0* 8.6   PHOSPHORUS mg/dL 3.6  --    MAGNESIUM mg/dL 1.78  --      Lab Results   Component Value Date    HGBA1C 5.9 03/16/2020           Per Flowsheet:  Gastrointestinal  Gastrointestinal (WDL): Within Defined Limits  Abdomen Inspection: Soft  Abdominal Tenderness:  "Nontender  Bowel Sounds: All quadrants  Bowel Sounds (All Quadrants): Active  Last bowel movement documented:    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    History of alcohol abuse    Chronic shortness of breath 06/28/2024    Drug abuse (Multi)     in remission    Edema, unspecified 11/26/2019    Dependent edema    Encounter for immunization 04/08/2016    Need for Zostavax administration    Pain in left knee 08/08/2018    Left knee pain    Personal history of other diseases of the nervous system and sense organs 09/18/2019    History of cataract    Personal history of other drug therapy 04/08/2016    History of pneumococcal vaccination    Tinea pedis 10/10/2018    Tinea pedis of both feet    Venous stasis ulcer of right calf (Multi) 06/28/2024    Vitiligo 06/28/2024      Past Surgical History:   Procedure Laterality Date    CARDIAC CATHETERIZATION  05/14/2018    Cardiac Cath Procedure Summary    INNER EAR SURGERY  01/06/2016    Inner Ear Surgery    OTHER SURGICAL HISTORY  03/16/2020    Cataract surgery      Allergies: Gabapentin and Penicillin     Anthropometrics:  Height: 167.6 cm (5' 5.98\")  Weight: 80.7 kg (177 lb 14.6 oz)  BMI (Calculated): 28.73         Ideal Body Weight:   64.5kg    Daily Weight  06/28/24 : 80.7 kg (177 lb 14.6 oz)  06/12/24 : 86.4 kg (190 lb 7.6 oz)  02/02/24 : 84.5 kg (186 lb 4.6 oz)  01/09/24 : 88.5 kg (195 lb)  07/05/22 : 90.5 kg (199 lb 8 oz)  03/08/22 : 91.2 kg (201 lb)  11/02/21 : 95.4 kg (210 lb 5.1 oz)  08/03/21 : 94.1 kg (207 lb 7.3 oz)  05/04/21 : 102 kg (223 lb 15.8 oz)  11/02/20 : 93.7 kg (206 lb 9.1 oz)    Weight         6/27/2024  2316 6/28/2024  0500          Weight: 90.7 kg (200 lb) 80.7 kg (177 lb 14.6 oz)             Weight History / % Weight Change: 4.8% weight loss in 2weeks; weight varies greatly, some maybe r/t fluid shift?, overall noted weight loss    Skin: left foot Pressure Injury (please see nursing/wound notes for further " details)  Edema: +1 BLE   0-10 (Numeric) Pain Score: 0 - No pain    Estimated Nutritional Needs:  Energy Needs: 1614-1936kcal (25-30kcal/kg IBW)  Protein Needs: 64.5-81g (1g/kg IBW-CBW)  Fluid Needs: (1mL/kcal) or per MD                   Nutrition Focused Physical Findings: defer                       Nutrition Diagnosis        Patient has Nutrition Diagnosis: Yes  New  Nutrition Diagnosis 1: Increased nutrient needs  Related to (1): increased healing needs  As Evidenced by (1): left foot wound                           Nutrition Interventions/Recommendations   Intervention:  Coordination of Care: n/a        Individualized Nutrition Prescription Provided for : Magic Cup 1/day for additional nutrition (290kcal, 9g protein)    Education: Available if needs arise.    Goals: oral intake >50%       Nutrition Monitoring and Evaluation   Weights  Labs  PO intake           Time Spent (min): 60 minutes

## 2024-06-29 LAB
ALBUMIN SERPL BCP-MCNC: 2.4 G/DL (ref 3.4–5)
ALP SERPL-CCNC: 106 U/L (ref 33–136)
ALT SERPL W P-5'-P-CCNC: 14 U/L (ref 10–52)
ANION GAP SERPL CALC-SCNC: 11 MMOL/L (ref 10–20)
AST SERPL W P-5'-P-CCNC: 21 U/L (ref 9–39)
BILIRUB SERPL-MCNC: 0.2 MG/DL (ref 0–1.2)
BUN SERPL-MCNC: 18 MG/DL (ref 6–23)
CALCIUM SERPL-MCNC: 7.8 MG/DL (ref 8.6–10.3)
CHLORIDE SERPL-SCNC: 98 MMOL/L (ref 98–107)
CO2 SERPL-SCNC: 27 MMOL/L (ref 21–32)
CREAT SERPL-MCNC: 0.75 MG/DL (ref 0.5–1.3)
EGFRCR SERPLBLD CKD-EPI 2021: 90 ML/MIN/1.73M*2
ERYTHROCYTE [DISTWIDTH] IN BLOOD BY AUTOMATED COUNT: 12.8 % (ref 11.5–14.5)
GLUCOSE SERPL-MCNC: 93 MG/DL (ref 74–99)
HCT VFR BLD AUTO: 27.7 % (ref 41–52)
HGB BLD-MCNC: 8.8 G/DL (ref 13.5–17.5)
LEGIONELLA AG UR QL: NEGATIVE
MAGNESIUM SERPL-MCNC: 1.85 MG/DL (ref 1.6–2.4)
MCH RBC QN AUTO: 32 PG (ref 26–34)
MCHC RBC AUTO-ENTMCNC: 31.8 G/DL (ref 32–36)
MCV RBC AUTO: 101 FL (ref 80–100)
NRBC BLD-RTO: 0 /100 WBCS (ref 0–0)
PHOSPHATE SERPL-MCNC: 3.2 MG/DL (ref 2.5–4.9)
PLATELET # BLD AUTO: 485 X10*3/UL (ref 150–450)
POTASSIUM SERPL-SCNC: 4.1 MMOL/L (ref 3.5–5.3)
PROT SERPL-MCNC: 5.9 G/DL (ref 6.4–8.2)
RBC # BLD AUTO: 2.75 X10*6/UL (ref 4.5–5.9)
S PNEUM AG UR QL: NEGATIVE
SODIUM SERPL-SCNC: 132 MMOL/L (ref 136–145)
WBC # BLD AUTO: 11.8 X10*3/UL (ref 4.4–11.3)

## 2024-06-29 PROCEDURE — 1200000002 HC GENERAL ROOM WITH TELEMETRY DAILY

## 2024-06-29 PROCEDURE — 85027 COMPLETE CBC AUTOMATED: CPT

## 2024-06-29 PROCEDURE — 2500000004 HC RX 250 GENERAL PHARMACY W/ HCPCS (ALT 636 FOR OP/ED)

## 2024-06-29 PROCEDURE — 94760 N-INVAS EAR/PLS OXIMETRY 1: CPT

## 2024-06-29 PROCEDURE — 99233 SBSQ HOSP IP/OBS HIGH 50: CPT

## 2024-06-29 PROCEDURE — 2500000005 HC RX 250 GENERAL PHARMACY W/O HCPCS

## 2024-06-29 PROCEDURE — 36415 COLL VENOUS BLD VENIPUNCTURE: CPT

## 2024-06-29 PROCEDURE — 2500000001 HC RX 250 WO HCPCS SELF ADMINISTERED DRUGS (ALT 637 FOR MEDICARE OP)

## 2024-06-29 PROCEDURE — 84100 ASSAY OF PHOSPHORUS: CPT

## 2024-06-29 PROCEDURE — 80053 COMPREHEN METABOLIC PANEL: CPT

## 2024-06-29 PROCEDURE — 97161 PT EVAL LOW COMPLEX 20 MIN: CPT | Mod: GP

## 2024-06-29 PROCEDURE — 83735 ASSAY OF MAGNESIUM: CPT

## 2024-06-29 PROCEDURE — 2500000002 HC RX 250 W HCPCS SELF ADMINISTERED DRUGS (ALT 637 FOR MEDICARE OP, ALT 636 FOR OP/ED)

## 2024-06-29 PROCEDURE — 97165 OT EVAL LOW COMPLEX 30 MIN: CPT | Mod: GO

## 2024-06-29 RX ORDER — ENOXAPARIN SODIUM 100 MG/ML
40 INJECTION SUBCUTANEOUS EVERY 24 HOURS
Status: DISPENSED | OUTPATIENT
Start: 2024-06-29

## 2024-06-29 ASSESSMENT — COGNITIVE AND FUNCTIONAL STATUS - GENERAL
MOBILITY SCORE: 8
PERSONAL GROOMING: A LITTLE
HELP NEEDED FOR BATHING: A LOT
MOVING TO AND FROM BED TO CHAIR: A LOT
MOVING FROM LYING ON BACK TO SITTING ON SIDE OF FLAT BED WITH BEDRAILS: A LOT
STANDING UP FROM CHAIR USING ARMS: A LOT
WALKING IN HOSPITAL ROOM: TOTAL
DRESSING REGULAR UPPER BODY CLOTHING: A LITTLE
HELP NEEDED FOR BATHING: A LOT
MOVING FROM LYING ON BACK TO SITTING ON SIDE OF FLAT BED WITH BEDRAILS: A LOT
DAILY ACTIVITIY SCORE: 14
CLIMB 3 TO 5 STEPS WITH RAILING: TOTAL
STANDING UP FROM CHAIR USING ARMS: TOTAL
WALKING IN HOSPITAL ROOM: TOTAL
TOILETING: A LITTLE
DAILY ACTIVITIY SCORE: 16
DRESSING REGULAR UPPER BODY CLOTHING: A LITTLE
MOVING TO AND FROM BED TO CHAIR: TOTAL
TOILETING: TOTAL
MOBILITY SCORE: 11
MOVING FROM LYING ON BACK TO SITTING ON SIDE OF FLAT BED WITH BEDRAILS: A LITTLE
HELP NEEDED FOR BATHING: A LOT
TURNING FROM BACK TO SIDE WHILE IN FLAT BAD: A LOT
DRESSING REGULAR LOWER BODY CLOTHING: A LOT
TURNING FROM BACK TO SIDE WHILE IN FLAT BAD: A LOT
EATING MEALS: A LITTLE
EATING MEALS: A LITTLE
TOILETING: A LOT
CLIMB 3 TO 5 STEPS WITH RAILING: TOTAL
PERSONAL GROOMING: A LITTLE
MOBILITY SCORE: 10
DAILY ACTIVITIY SCORE: 15
PERSONAL GROOMING: A LITTLE
DRESSING REGULAR UPPER BODY CLOTHING: A LITTLE
CLIMB 3 TO 5 STEPS WITH RAILING: TOTAL
DRESSING REGULAR LOWER BODY CLOTHING: A LOT
MOVING TO AND FROM BED TO CHAIR: A LOT
WALKING IN HOSPITAL ROOM: TOTAL
DRESSING REGULAR LOWER BODY CLOTHING: TOTAL
TURNING FROM BACK TO SIDE WHILE IN FLAT BAD: A LOT
STANDING UP FROM CHAIR USING ARMS: A LOT

## 2024-06-29 ASSESSMENT — PAIN - FUNCTIONAL ASSESSMENT
PAIN_FUNCTIONAL_ASSESSMENT: 0-10

## 2024-06-29 ASSESSMENT — PAIN SCALES - GENERAL
PAINLEVEL_OUTOF10: 0 - NO PAIN

## 2024-06-29 ASSESSMENT — ACTIVITIES OF DAILY LIVING (ADL)
ADL_ASSISTANCE: INDEPENDENT
BATHING_ASSISTANCE: MODERATE
ADL_ASSISTANCE: INDEPENDENT

## 2024-06-29 NOTE — PROGRESS NOTES
Occupational Therapy    Evaluation    Patient Name: Chao Thao  MRN: [id number]  Today's Date: 6/29/2024  Time Calculation  Start Time: 1030  Stop Time: 1049  Time Calculation (min): 19 min    Assessment  IP OT Assessment  OT Assessment: Pt presents with decreased endurance, decreased ADL performance, decreased functional mobility. Continued skilled OT recommended to maximize pt safety and independence prior to returning home.  Prognosis: Good  Barriers to Discharge: None  Evaluation/Treatment Tolerance: Patient limited by fatigue  Medical Staff Made Aware: Yes  End of Session Communication: Bedside nurse  End of Session Patient Position: Bed, 3 rail up, Alarm on  Plan:  Treatment Interventions: ADL retraining, Functional transfer training, Endurance training, UE strengthening/ROM, Compensatory technique education  OT Frequency: 3 times per week  OT Discharge Recommendations: Moderate intensity level of continued care  Equipment Recommended upon Discharge: Wheeled walker  OT Recommended Transfer Status: Assist of 1  OT - OK to Discharge: Yes (per OT POC)    Subjective   Current Problem:  1. Acute pneumonia        2. Hyponatremia        3. Atelectasis        4. Closed fracture of multiple ribs of right side with routine healing, subsequent encounter          General:  General  Reason for Referral: 85 yo male referred to OT for fall, impaired ADL/safety  Referred By: Tiera Betancur DO  Past Medical History Relevant to Rehab: Alcohol abuse, in remission (04/08/2016), Drug abuse (Multi), Edema, unspecified (11/26/2019), Encounter for immunization (04/08/2016), Pain in left knee (08/08/2018), Personal history of other diseases of the nervous system and sense organs (09/18/2019), Personal history of other drug therapy (04/08/2016), and Tinea pedis (10/10/2018).  Co-Treatment: PT  Co-Treatment Reason: To maximize pt mobility safely  Prior to Session Communication: Bedside nurse  Patient Position Received: Bed, 3 rail up,  Alarm on  General Comment: Pt pleasant, cooperative with therapy evaluations  Precautions:  Hearing/Visual Limitations: very [place]  Medical Precautions: Fall precautions, Oxygen therapy device and L/min (3L NC, external cath)     Pain:  Pain Assessment  Pain Assessment: 0-10  0-10 (Numeric) Pain Score: 0 - No pain    Objective   Cognition:  Overall Cognitive Status: Within Functional Limits  Orientation Level: Oriented X4     Home Living:  Type of Home: House  Lives With: Spouse  Home Adaptive Equipment: Walker rolling or standard, Cane  Home Layout: Two level, Bed/bath upstairs  Home Access: Stairs to enter with rails  Entrance Stairs-Rails: Right  Entrance Stairs-Number of Steps: 6  Bathroom Shower/Tub: Tub/shower unit  Bathroom Equipment: Shower chair with back, Grab bars in shower  Home Living Comments: Recently discharged from [facility] with plan to return post-d/c   Prior Function:  Level of [place]: Independent with ADLs and functional transfers, Independent with homemaking with ambulation  Receives Help From: Family  ADL Assistance: Independent  Homemaking Assistance: Needs assistance  Ambulatory Assistance: Independent (with Cane or FWW)  Prior Function Comments: all prior function before recent SNF admission    ADL:  Eating Assistance: Independent  Grooming Assistance: Minimal  Bathing Assistance: Moderate  UE Dressing Assistance: Minimal  LE Dressing Assistance: Maximal  Toileting Assistance with Device: Maximal  Functional Assistance: Maximal  ADL Comments: ADL performance anticipated d/t current clinical presentation  Activity Tolerance:  Endurance: Tolerates less than 10 min exercise with changes in vital signs  Bed Mobility/Transfers: Bed Mobility  Bed Mobility: Yes  Bed Mobility 1  Bed Mobility 1: Supine to sitting, Sitting to supine, Scooting  Level of Assistance 1: Moderate assistance  Bed Mobility Comments 1: Increased time, HOB elevated and use of bed rails with multiple VC for technique  Bed  Mobility 2  Bed Mobility  2: Rolling right, Rolling left  Level of Assistance 2: Maximum assistance    Transfers  Transfer: Yes  Transfer 1  Transfer From 1: Sit to  Transfer to 1: Stand  Technique 1: Sit to stand, Stand to sit  Transfer Device 1: Walker  Transfer Level of Assistance 1: Maximum assistance (x2)    Sitting Balance:  Static Sitting Balance  Static Sitting-Balance Support: Feet supported  Static Sitting-Level of Assistance: Contact guard  Standing Balance:  Static Standing Balance  Static Standing-Balance Support: Bilateral upper extremity supported  Static Standing-Level of Assistance: Minimum assistance    Strength:  Strength Comments: BUE 3+/5    Coordination:  Movements are Fluid and Coordinated: Yes   Hand Function:  Hand Function  Gross Grasp: Functional  Coordination: Functional  Extremities: RUE   RUE : Exceptions to WFL  RUE AROM (degrees)  RUE AROM Comment: Shoulder flexion 100 degrees, other UE AROM WFL and LUE   LUE: Exceptions to WFL  LUE AROM (degrees)  LUE AROM Comment: Shoulder flexion 120 degrees, other UE AROM WFL    Outcome Measures: [facility] Daily Activity  Putting on and taking off regular lower body clothing: Total  Bathing (including washing, rinsing, drying): A lot  Putting on and taking off regular upper body clothing: A little  Toileting, which includes using toilet, bedpan or urinal: Total  Taking care of personal grooming such as brushing teeth: A little  Eating Meals: None  Daily Activity - Total Score: 14      Education Documentation  Body Mechanics, taught by Bobby Ramirez OT at 6/29/2024 12:30 PM.  Learner: Patient  Readiness: Acceptance  Method: Explanation  Response: Verbalizes Understanding    Precautions, taught by Bobby Ramirez OT at 6/29/2024 12:30 PM.  Learner: Patient  Readiness: Acceptance  Method: Explanation  Response: Verbalizes Understanding    ADL Training, taught by Bobby Ramirez OT at 6/29/2024 12:30 PM.  Learner: Patient  Readiness:  Acceptance  Method: Explanation  Response: Verbalizes Understanding    Education Comments  No comments found.      Goals:   Encounter Problems       Encounter Problems (Active)       OT Goals       Pt will complete fwlp-wj-lnde transfers using LRD in preparation for ADLs with CGA (Progressing)       Start:  06/29/24    Expected End:  07/13/24            Pt will increase endurance to tolerate 15min of OOB activity with no more than 1 rest break in order to increase ability to engage in ADL completion.  (Progressing)       Start:  06/29/24    Expected End:  07/13/24            Pt will tolerate 10min stand during functional task completion with no more than 1 rest break in order to increase endurance for functional task completion.  (Progressing)       Start:  06/29/24    Expected End:  07/13/24            Pt will demo LE ADL completion with Min A, using AE if needed.  (Progressing)       Start:  06/29/24    Expected End:  07/13/24            Pt will demo and/or verbalize 2-3 energy conservation techniques to incorporate into functional mobility or ADL to improve performance and increase independence.  (Progressing)       Start:  06/29/24    Expected End:  07/13/24

## 2024-06-29 NOTE — PROGRESS NOTES
06/29/24 1417   Discharge Planning   Patient expects to be discharged to: PT/OT jenni completed. CNC to initiate direct precert to return to [facility].   Does the patient need discharge transport arranged? Yes   RoundTrip coordination needed? Yes   Has discharge transport been arranged? No   Patient Choice   Provider Choice list and CMS website (https://medicare.gov/care-compare#search) for post-acute Quality and Resource Measure Data were provided and reviewed with: Patient   Patient / Family choosing to utilize agency / facility established prior to hospitalization Yes

## 2024-06-29 NOTE — PROGRESS NOTES
Physical Therapy    Physical Therapy Evaluation    Patient Name: Chao Thao  MRN: [id number]  Today's Date: 6/29/2024   Time Calculation  Start Time: 1030  Stop Time: 1049  Time Calculation (min): 19 min    Assessment/Plan Pt is an 85 yo male who presents to the hospital after a fall at home upon discharge from SNF. Pt presents with impaired mobility, generalized weakness, and decreased activity tolerance. Pt may benefit from PT services at this time for strengthening and mobility training to improve activity tolerance to improve functional independence.   PT Assessment  PT Assessment Results: Decreased strength, Decreased endurance, Decreased mobility, Pain  Rehab Prognosis: Good  Evaluation/Treatment Tolerance: Patient limited by fatigue  Medical Staff Made Aware: Yes  Strengths: Rehab experience  Barriers to Participation:  ([place])  End of Session Communication: Bedside nurse  End of Session Patient Position: Bed, 3 rail up, Alarm on  IP OR SWING BED PT PLAN  Inpatient or Swing Bed: Inpatient  PT Plan  Treatment/Interventions: Bed mobility, Transfer training, Gait training, Strengthening, Endurance training, Therapeutic exercise, Therapeutic activity, Home exercise program  PT Plan: Ongoing PT  PT Frequency: 3 times per week  PT Discharge Recommendations: Moderate intensity level of continued care  Equipment Recommended upon Discharge: Wheeled walker  PT Recommended Transfer Status: Assist x2  PT - OK to Discharge: Yes      Subjective   General Visit Information:  General  Reason for Referral: presents from home after leaving [facility]. Brought in by EMS after a fall.  Referred By: Tiera Betancur DO  Past Medical History Relevant to Rehab: PMH: Alcohol abuse, in remission (04/08/2016), Drug abuse (Multi), Edema, unspecified (11/26/2019), Encounter for immunization (04/08/2016), Pain in left knee (08/08/2018), Personal history of other diseases of the nervous system and sense organs (09/18/2019), Personal history  of other drug therapy (04/08/2016), and Tinea pedis (10/10/2018).  Co-Treatment: OT  Co-Treatment Reason: To maximize pt mobility safely  Prior to Session Communication: Bedside nurse  Patient Position Received: Bed, 3 rail up, Alarm on  General Comment: Pt very [place], motivated  Home Living:  Home Living  Home Living Comments: Recently discharged from [facility]  Prior Level of Function:  Prior Function Per Pt/Caregiver Report  Level of [place]: Independent with ADLs and functional transfers, Independent with homemaking with ambulation (Prior to SNF admission)  Receives Help From: Family  ADL Assistance: Independent (Prior to SNF admission)  Homemaking Assistance: Needs assistance (Prior to SNF admission)  Ambulatory Assistance: Independent (SW and cane Prior to SNF admission)  Precautions:  Precautions  Medical Precautions: Fall precautions, Oxygen therapy device and L/min         Objective   Pain:  Pain Assessment  Pain Assessment: 0-10  0-10 (Numeric) Pain Score: 0 - No pain  Cognition:  Cognition  Overall Cognitive Status: Within Functional Limits    General Assessments:  General Observation  General Observation: 3L O2, telemetry, purewick catheter, left foot surgical shoe    Strength  Strength Comments: BLE at least 3/5  Strength  Strength Comments: BLE at least 3/5          Functional Assessments:  Bed Mobility  Bed Mobility: Yes  Bed Mobility 1  Bed Mobility 1: Supine to sitting  Level of Assistance 1: Moderate assistance  Bed Mobility Comments 1: Increased time required, HOB elevated, max verbal cues  Bed Mobility 2  Bed Mobility  2: Scooting (Sitting EOB)  Level of Assistance 2: Moderate assistance  Bed Mobility 3  Bed Mobility 3: Sitting to supine  Level of Assistance 3: Moderate assistance (x2)  Bed Mobility Comments 3: LE's and trunk  Bed Mobility 4  Bed Mobility 4: Rolling left  Level of Assistance 4: Maximum assistance    Transfers  Transfer: Yes  Transfer 1  Transfer From 1: Sit to, Bed to  Transfer  to 1: Stand  Technique 1: Sit to stand  Transfer Device 1: Walker  Transfer Level of Assistance 1: Maximum assistance (x2)  Transfers 2  Transfer From 2: Stand to  Transfer to 2: Sit, Bed  Technique 2: Stand to sit  Transfer Device 2: Walker  Transfer Level of Assistance 2: Maximum assistance (x2)       Outcome Measures:  [facility] Basic Mobility  Turning from your back to your side while in a flat bed without using bedrails: A lot  Moving from lying on your back to sitting on the side of a flat bed without using bedrails: A lot  Moving to and from bed to chair (including a wheelchair): Total  Standing up from a chair using your arms (e.g. wheelchair or bedside chair): Total  To walk in hospital room: Total  Climbing 3-5 steps with railing: Total  Basic Mobility - Total Score: 8    Encounter Problems       Encounter Problems (Active)       Mobility       STG - Patient will ambulate at least 10' with FWW, CGA       Start:  06/29/24    Expected End:  07/13/24               PT Transfers       STG - Patient to transfer to and from sit to supine mod I       Start:  06/29/24    Expected End:  07/13/24            STG - Patient will roll mod I       Start:  06/29/24    Expected End:  07/13/24            STG - Patient will transfer sit to and from stand with FWW, minAx1       Start:  06/29/24    Expected End:  07/13/24            Pt will participate in at least 25 minutes of PT treatment with at most 2 seated rest breaks       Start:  06/29/24    Expected End:  07/13/24               Pain - Adult              Education Documentation  Precautions, taught by Muna Bauer PT at 6/29/2024 11:58 AM.  Learner: Patient  Readiness: Acceptance  Method: Explanation  Response: Verbalizes Understanding    Body Mechanics, taught by Muna Bauer PT at 6/29/2024 11:58 AM.  Learner: Patient  Readiness: Acceptance  Method: Explanation  Response: Verbalizes Understanding    Mobility Training, taught by Muna Bauer PT at  6/29/2024 11:58 AM.  Learner: Patient  Readiness: Acceptance  Method: Explanation  Response: Verbalizes Understanding    Education Comments  No comments found.

## 2024-06-29 NOTE — PROGRESS NOTES
06/29/24 1412   Discharge Planning   Patient expects to be discharged to: Discussed case with medical team. Pt might need LTC after SNF. SW attempted to call pt wife- unable to reach and unable to leave message. Will attempt to contact wife again.

## 2024-06-29 NOTE — CONSULTS
Consults    Reason For Consult  Left 2nd toe ulceration  Onychomycosis    History Of Present Illness  Chao Thao is a 84 y.o. male presenting with a chronic left 2nd toe ulceration. Scheduled to see Dr. Hartmann again for follow-up end of July.  Does admit to pain to left 2nd toe.  Not a great historian.     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    Surgical History  He has a past surgical history that includes Inner ear surgery (01/06/2016); Other surgical history (03/16/2020); and Cardiac catheterization (05/14/2018).     Social History  He reports that he has quit smoking. His smoking use included cigarettes. He has never used smokeless tobacco. He reports that he does not currently use alcohol. He reports that he does not use drugs.    Family History  No family history on file.     Allergies  Gabapentin and Penicillin    Review of Systems  Unable to perform     Physical Exam   CON: awake, alert, minimal distress;   EYES: conjunctiva wnl; PERRL; EOMI  ENMT: hearing intact; MMM;   NECK: symmetric; thyroid and cervical nodes wnl;   CV: S1 S2 - irregular with no m/g/r; no lower extremity edema; normal JVP; symmetric pulses  RESP: normal work of breathing; lungs CTAB;   GI: abdomen nontender; no organomegaly;   SKIN: Diffuse ecchymoses, lower extremity stasis dermatitis and abrasions, onychomycosis of all toenails;  non-palpable pedal pulses B/L;  ulceration to left 2nd toe PIPJ - no erythema or active drainage.  Measures 0.9 x 1.0 x 0.3 cm  MSK:  "ROM wnl; digits wnl;   NEURO: language and speech wnl; sensation and motor function grossly intact   PSYCH: oriented to situation; affect normal;     Last Recorded Vitals  Blood pressure 108/67, pulse 71, temperature 36.2 °C (97.2 °F), temperature source Temporal, resp. rate 16, height 1.676 m (5' 5.98\"), weight 83.4 kg (183 lb 13.8 oz), SpO2 96%.    Relevant Results  Results for orders placed or performed during the hospital encounter of 06/27/24 (from the past 24 hour(s))   CBC   Result Value Ref Range    WBC 11.8 (H) 4.4 - 11.3 x10*3/uL    nRBC 0.0 0.0 - 0.0 /100 WBCs    RBC 2.75 (L) 4.50 - 5.90 x10*6/uL    Hemoglobin 8.8 (L) 13.5 - 17.5 g/dL    Hematocrit 27.7 (L) 41.0 - 52.0 %     (H) 80 - 100 fL    MCH 32.0 26.0 - 34.0 pg    MCHC 31.8 (L) 32.0 - 36.0 g/dL    RDW 12.8 11.5 - 14.5 %    Platelets 485 (H) 150 - 450 x10*3/uL   Comprehensive metabolic panel   Result Value Ref Range    Glucose 93 74 - 99 mg/dL    Sodium 132 (L) 136 - 145 mmol/L    Potassium 4.1 3.5 - 5.3 mmol/L    Chloride 98 98 - 107 mmol/L    Bicarbonate 27 21 - 32 mmol/L    Anion Gap 11 10 - 20 mmol/L    Urea Nitrogen 18 6 - 23 mg/dL    Creatinine 0.75 0.50 - 1.30 mg/dL    eGFR 90 >60 mL/min/1.73m*2    Calcium 7.8 (L) 8.6 - 10.3 mg/dL    Albumin 2.4 (L) 3.4 - 5.0 g/dL    Alkaline Phosphatase 106 33 - 136 U/L    Total Protein 5.9 (L) 6.4 - 8.2 g/dL    AST 21 9 - 39 U/L    Bilirubin, Total 0.2 0.0 - 1.2 mg/dL    ALT 14 10 - 52 U/L   Magnesium   Result Value Ref Range    Magnesium 1.85 1.60 - 2.40 mg/dL   Phosphorus   Result Value Ref Range    Phosphorus 3.2 2.5 - 4.9 mg/dL   ECG 12 lead    Result Date: 6/28/2024  Sinus rhythm with Premature supraventricular complexes Low voltage QRS Nonspecific T wave abnormality Abnormal ECG When compared with ECG of 11-JUN-2024 09:36, Sinus rhythm has replaced Atrial fibrillation ST no longer depressed in Anterior leads Nonspecific T wave abnormality, improved in Inferior leads T wave inversion no longer " evident in Anterior leads QT has shortened    ECG 12 lead    Result Date: 6/28/2024  Undetermined rhythm Low voltage QRS Nonspecific T wave abnormality Prolonged QT Abnormal ECG When compared with ECG of 11-JUN-2024 09:36, Current undetermined rhythm precludes rhythm comparison, needs review ST no longer depressed in Anterior leads T wave inversion no longer evident in Anterior leads    XR foot left 3+ views    Result Date: 6/28/2024  Interpreted By:  Sin Bacon, STUDY: XR FOOT LEFT 3+ VIEWS; ;  6/28/2024 6:33 am   INDICATION: Signs/Symptoms:left middle toes tenderness wound osteo eval.   COMPARISON: 06/12/2024   ACCESSION NUMBER(S): [id number]   ORDERING CLINICIAN: KAVITA AMES   FINDINGS: AP, oblique, and lateral views of the left foot are obtained.   There is no fracture. No focal bone lysis is seen.   Degenerative changes are noted at the 1st MTP joint and D IP joints of the 2nd through 4th toes.   Examination of the soft tissue shows apparent mild diffuse thickening suggestive of edema of the distal lower extremity and foot. Fine medial calcifications are noted along the arterial channels suggestive of diabetes.       No acute bony abnormality on radiographs of the left foot. No fracture or focal lytic change. Arthritic changes are noted along with plantar spur and soft tissue edema.     MACRO: None   Signed by: Sin Bacon 6/28/2024 8:18 AM Dictation workstation:   [id number]    CT chest w IV contrast    Result Date: 6/28/2024  Interpreted By:  Aminata Ryan, STUDY: CT CHEST W IV CONTRAST;  6/28/2024 4:45 am   INDICATION: Signs/Symptoms:hypoxemia after fall, LLL pna vs contusion.   COMPARISON: Chest CT 02/02/2024, chest radiograph 06/27/2024   ACCESSION NUMBER(S): [id number]   ORDERING CLINICIAN: AIYANA GONZALEZ   TECHNIQUE: Axial CT images of the chest obtained  after intravenous administration of contrast.   FINDINGS: VESSELS: No aortic aneurysm. No evidence of acute traumatic aortic injury,  given limitations of non gated CT. HEART: Normal size. Severe coronary artery calcification. No pericardial effusion. MEDIASTINUM AND MICHELLE: Nonspecific borderline enlarged 10 mm aortopulmonary window lymph node. No mediastinal hematoma. No pneumomediastinum. LUNG, PLEURA, AND LARGE AIRWAYS: Small simple left pleural effusion with compressive atelectasis of the left lower lobe. Mild bandlike atelectasis in the right lower lobe and lingula. CHEST WALL AND LOWER NECK: Within normal limits.   UPPER ABDOMEN: No acute abnormality of the visualized abdomen. Low attenuation of the liver is suggestive of hepatic steatosis. Small exophytic lesions of both kidneys, favored to represent small cysts.   BONES: Acute, mildly displaced fractures of the right lateral 8th and 9th ribs. Chronic sternal fracture.       Small simple left pleural effusion and left lower lobe atelectasis.   Acute, mildly displaced fractures of the right lateral 8th and 9th ribs.   MACRO: None   Signed by: Aminata Ryan 6/28/2024 4:59 AM Dictation workstation:   [id number]    CT head wo IV contrast    Result Date: 6/28/2024  Interpreted By:  Aminata Ryan, STUDY: CT HEAD WO IV CONTRAST;  6/28/2024 4:44 am   INDICATION: Signs/Symptoms:fall on eliquis.   COMPARISON: 06/11/2024   ACCESSION NUMBER(S): [id number]   ORDERING CLINICIAN: AIYANA GONZALEZ   TECHNIQUE: Axial noncontrast CT images of the head.   FINDINGS: BRAIN PARENCHYMA:  Gray-white matter interfaces are preserved. No mass effect or midline shift.   HEMORRHAGE: No acute intracranial hemorrhage. VENTRICLES and EXTRA-AXIAL SPACES: The ventricles and sulci are within normal limits in size for brain volume. No abnormal extraaxial fluid collection. EXTRACRANIAL SOFT TISSUES: Within normal limits. PARANASAL SINUSES/MASTOIDS:  The visualized paranasal sinuses and mastoid air cells are aerated. CALVARIUM: No depressed skull fracture. No destructive osseous lesion.   OTHER FINDINGS: None.       No acute  intracranial abnormality.     MACRO: None   Signed by: Aminata Ryan 6/28/2024 4:49 AM Dictation workstation:   [id number]    XR chest 1 view    Result Date: 6/28/2024  Interpreted By:  Aminata Ryan, STUDY: XR CHEST 1 VIEW;  6/27/2024 11:45 pm   INDICATION: Signs/Symptoms:fall.   COMPARISON: 02/02/2024   ACCESSION NUMBER(S): [id number]   ORDERING CLINICIAN: AIYANA GONZALEZ   FINDINGS:     CARDIOMEDIASTINAL SILHOUETTE: Cardiomediastinal silhouette is normal in size and configuration.   LUNGS: Small bilateral pleural effusions. Left basilar strandy airspace opacity. No pneumothorax.   ABDOMEN: No remarkable upper abdominal findings.   BONES: Acute, mildly displaced fractures of the right 8th and 9th ribs.       Small bilateral pleural effusions.   Nonspecific left basilar airspace disease. Differential considerations include atelectasis and pneumonia, however, developing contusion may also be considered in the setting of trauma.   Acute, mildly displaced right lateral 8th and 9th rib fractures.   MACRO: None   Signed by: Aminata Ryan 6/28/2024 12:52 AM Dictation workstation:   [id number]    XR tibia fibula left 2 views    Result Date: 6/28/2024  Interpreted By:  Aminata Ryan, STUDY: XR TIBIA FIBULA LEFT 2 VIEWS; ;  6/27/2024 11:45 pm   INDICATION: Signs/Symptoms:fall with tenderness.   COMPARISON: None.   ACCESSION NUMBER(S): [id number]   ORDERING CLINICIAN: AIYANA GONZALEZ   FINDINGS: Two views left tibia-fibula. No acute fracture or malalignment. No osseous destruction or abnormal periosteal bone formation. Mild to moderate tricompartmental knee osteoarthrosis. Atherosclerotic vascular calcifications are noted. Nonspecific diffuse lower extremity edema.       No acute osseous abnormality.   Nonspecific lower extremity edema.   Mild-to-moderate left knee osteoarthrosis.     MACRO: None   Signed by: Aminata Ryan 6/28/2024 12:49 AM Dictation workstation:   [id number]    ECG 12 lead    Result Date:  6/20/2024  Atrial fibrillation with rapid ventricular response with premature ventricular or aberrantly conducted complexes Low voltage QRS ST & T wave abnormality, consider lateral ischemia Abnormal ECG When compared with ECG of 03-FEB-2024 12:53, T wave inversion now evident in Anterolateral leads See ED provider note for full interpretation and clinical correlation Confirmed by Niyah Duval ([id number]) on 6/20/2024 1:15:38 PM    XR foot left 3+ views    Result Date: 6/12/2024  Interpreted By:  María Wood, STUDY: XR FOOT LEFT 3+ VIEWS; ;  6/12/2024 11:34 am   INDICATION: Signs/Symptoms:fall with tender 2nd digit.   COMPARISON: None.   ACCESSION NUMBER(S): [id number]   ORDERING CLINICIAN: JENNIFER VINSON   FINDINGS: Three views of left foot were performed.   There is no acute fracture or dislocation. There are moderate to severe degenerative changes in the great toe metatarsophalangeal joint with joint space narrowing and prominent marginal osteophytes. There are mild-to-moderate degenerative changes in the interphalangeal joints of 2nd through 5th digits. Prominent plantar calcaneal enthesophyte is noted. No obvious soft tissue abnormality is noted.       No acute fracture or dislocation.     MACRO: None   Signed by: María Wood 6/12/2024 12:25 PM Dictation workstation:   [id number]    CT chest abdomen pelvis w IV contrast    Result Date: 6/11/2024  Interpreted By:  Zack Chavez, STUDY: CT CHEST ABDOMEN PELVIS W IV CONTRAST;  6/11/2024 9:21 am   INDICATION: Signs/Symptoms:R rib pain, fall   COMPARISON:   February 2, 2024 CT chest abdomen and pelvis, and June 11, 2024 CT cervical spine.   ACCESSION NUMBER(S): [id number]   ORDERING CLINICIAN: CHELLE DAVID   TECHNIQUE: CT of the chest, abdomen, and pelvis was performed. Contiguous axial images were obtained at  5 mm slice thickness through the chest, and at  3 mm through the abdomen and pelvis. Coronal and sagittal reconstructions at  3 mm slice  thickness were performed. 100 ml of contrast material Omnipaque 350 were administered intravenously without immediate complication.   FINDINGS: Likely technically adequate although image degradation related to motion, and streak artifact from overlapping radiopaque structures most pronounced at the thoracoabdominal junction region.   CHEST:   LUNG/PLEURA/LARGE AIRWAYS: Compared with February 2, 2024 overall improved expansion and aeration with minimal platelike atelectasis at the right base. No evident contusion, edema, pleural effusion, new, enlarging, or suspicious nodule or pneumothorax. There is a similar probably benign relatively flat smoothly marginated fissural nodule right mid chest sagittal series 203, image 47 favoring intraparenchymal lymph node. There is a similar flat juxtapleural nodule anterior right mid chest measuring 7 mm series 204, image 220. VESSELS: No traumatic aortic injury is appreciated within the limitations of this non-EKG gated study.  The thoracic aorta is of normal course and caliber.  Similar scattered atherosclerosis thoracic aorta and branch vessels. Similar extensive coronary artery calcifications. No aneurysm.   HEART: The heart is normal in size.   There is no pericardial effusion.   MEDIASTINUM AND MICHELLE: No pneumomediastinum, abnormal mediastinal fluid collection or mediastinal hematoma are appreciated.  No mediastinal, hilar or biaxillary adenopathy is present.  The esophagus is normal in course and caliber.   CHEST WALL AND LOWER NECK: There is an acute minimally displaced fracture right posterolateral 9th rib series 204, image 281. There is chronic minimal deformity adjacent lateral 8th rib image 280.. There is acute nondisplaced fracture right lateral 8th rib series 204, image 247. There is a nonacute fracture with evidence of healing response involving the left posterolateral 11th rib image 334. no suspicious osseous lesions are identified.  The thoracic wall soft tissues  are within normal limits.   ABDOMEN:   LIVER: No focal perfusion abnormality of the liver is appreciated to suggest contusion or laceration. There is no subcapsular hematoma, no perihepatic fluid collection.  Similar diffuse hypoattenuation liver compatible with steatosis.   GALLBLADDER: The gallbladder is nondistended containing tiny stones.   BILE DUCTS: The intahepatic and extrahepatic bile ducts are not dilated.   PANCREAS: The pancreas appears unremarkable.   SPLEEN: No parenchymal perfusion deficit of the spleen is appreciated to suggest contusion or laceration. There is no subcapsular hematoma, no perisplenic fluid collection. There are 2 rounded sharply marginated hypodensities series 21, image 84 not well delineated on the prior CT although likely present most likely incidental such as cysts although too small to characterize.   ADRENAL GLANDS: There is similar mild nodular thickening involving the adrenal glands left-greater-than-right stability favors probably benign cause such as adenomas. Findings include thickening left adrenal gland measuring 11 mm AP diameter series 7, image 99 9 mm right-side image 90.   KIDNEYS AND URETERS: No parenchymal perfusion deficit is appreciated in bilateral kidneys to suggest contusion or laceration. There is no subcapsular hematoma, no perinephric fluid collection.  There are similar probably benign bilateral renal hypodensities most compatible with cysts including dominant 3 cm left-sided cysts. No calculi or hydronephrosis.   PELVIS:   BLADDER: The urinary bladder appears within normal limits.   REPRODUCTIVE ORGANS: Similar prostatomegaly measuring 5.2 cm transverse diameter.   BOWEL: The stomach is unremarkable.  The small bowel is normal in caliber without evidence of focal wall thickening or obstruction.  There is no evidence of focal wall thickening or dilatation of the large bowel.   VESSELS: Similar scattered atherosclerosis aorta and branch vessels. Similar  fusiform dilation infrarenal aorta measuring 2.7 cm AP diameter sagittal series 203, image 85. 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 The saccular portion which extends posteriorly measures 18 x 9 mm sagittal series 203, image 73. The principal vasculature of the abdomen and pelvis is patent.   PERITONEUM/RETROPERITONEUM/LYMPH NODES: There is no evidence of intra- or retroperitoneal hematoma.  There is no free or loculated fluid collection, no free intraperitoneal air. No abdominopelvic lymphadenopathy is present. Similar distribution and extent of pre-existing lymph nodes.   BONES AND ABDOMINAL WALL: No evidence of acute fracture or dislocation of the included osseous structures.  No suspicious osseous lesions are identified.  Similar pre-existing moderate L2 compressive deformity and scattered degenerative changes. Similar appearance of the body wall.       Acute right-sided 8th and 9th rib fractures.   Newly seen nonacute left 11th rib fracture with evidence of healing response.   Improved pulmonary expansion and aeration.   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   Cholelithiasis.   Similar right-sided fissural and juxtapleural nodules including dominant fissural nodule measuring 1 cm. Stability and morphology favors probably benign cause. Per Fleischner criteria recommend 1 year follow-up noncontrast chest CT.   Similar adrenal nodules likely incidental such as adenomas although nonspecific.   Similar small splenic hypodensities likely incidental such as cysts although too small to characterize.   Similar liver steatosis.   Similar prostatomegaly.   MACRO: Incidental Finding:  Multiple solid non-calcified pulmonary nodules measuring up to greater than 8 mm.  (**-YCF-**)   Instructions:  Consider follow up non contrast chest CT at 3-6 months, then consider CT chest at 18-24 months. (Demar  Starr et al., Guidelines for management of incidental pulmonary nodules detected on CT images: From the Fleischner Society 2017, Radiology. 2017 Jul;284 (1):228-243.) [email or web address]   Signed by: Zack Chavez 6/11/2024 10:42 AM Dictation workstation:   [id number]    CT cervical spine wo IV contrast    Result Date: 6/11/2024  Interpreted By:  John Peoples, STUDY: CT HEAD W/O CONTRAST TRAUMA PROTOCOL; CT CERVICAL SPINE WO IV CONTRAST;  6/11/2024 9:14 am; 6/11/2024 9:21 am   INDICATION: Signs/Symptoms:HIA   COMPARISON: CT head and cervical spine 02/02/2024   ACCESSION NUMBER(S): [id number]; [id number]   ORDERING CLINICIAN: CHELLE DAVID   TECHNIQUE: Axial noncontrast CT images of head with coronal and sagittal reconstructed images. Axial noncontrast CT images of the cervical spine with coronal and sagittal reconstructed images.   FINDINGS: CT head without contrast: The ventricles and sulci are mildly prominent in a pattern that can be seen with age-related parenchymal volume loss.   There is no evidence of acute intracranial hemorrhage or definite acute cortical infarction. There are patchy areas of hypodense attenuation within the subcortical and periventricular white matter.   There is no midline shift.   The visualized paranasal sinuses and mastoid air cells are clear.   No acute calvarial fracture is noted.   CERVICAL SPINE   Fractures: There is no evidence for an acute fracture of the cervical spine.   Vertebral Alignment: There is similar minimal anterolisthesis of C4 upon C5. Vertebral body height and alignment are otherwise within normal limits.   Craniocervical Junction: The odontoid process and craniocervical junction are intact.   Vertebrae/Disc Spaces:  There is multilevel mild-to-moderate disc space narrowing of the cervical spine, most prominent at C6-C7 with endplate osteophytosis noted at this level. There is moderate multilevel facet joint hypertrophy, left-greater-than-right,  most prominent at C2-C3 to C4-C5.   Prevertebral/Paraspinal Soft Tissues: The prevertebral and paraspinal soft tissues are unremarkable. The visualized pulmonary apices are unremarkable.       No acute intracranial abnormality or calvarial fracture.   No acute fracture or traumatic malalignment of the cervical spine.   Multilevel spondylotic changes of the cervical spine as detailed above, most prominent at C6-C7   Signed by: John Peoples 6/11/2024 10:04 AM Dictation workstation:   [id number]    CT head W O contrast trauma protocol    Result Date: 6/11/2024  Interpreted By:  John Peoples, STUDY: CT HEAD W/O CONTRAST TRAUMA PROTOCOL; CT CERVICAL SPINE WO IV CONTRAST;  6/11/2024 9:14 am; 6/11/2024 9:21 am   INDICATION: Signs/Symptoms:HIA   COMPARISON: CT head and cervical spine 02/02/2024   ACCESSION NUMBER(S): [id number]; [id number]   ORDERING CLINICIAN: CHELLE DAVID   TECHNIQUE: Axial noncontrast CT images of head with coronal and sagittal reconstructed images. Axial noncontrast CT images of the cervical spine with coronal and sagittal reconstructed images.   FINDINGS: CT head without contrast: The ventricles and sulci are mildly prominent in a pattern that can be seen with age-related parenchymal volume loss.   There is no evidence of acute intracranial hemorrhage or definite acute cortical infarction. There are patchy areas of hypodense attenuation within the subcortical and periventricular white matter.   There is no midline shift.   The visualized paranasal sinuses and mastoid air cells are clear.   No acute calvarial fracture is noted.   CERVICAL SPINE   Fractures: There is no evidence for an acute fracture of the cervical spine.   Vertebral Alignment: There is similar minimal anterolisthesis of C4 upon C5. Vertebral body height and alignment are otherwise within normal limits.   Craniocervical Junction: The odontoid process and craniocervical junction are intact.   Vertebrae/Disc Spaces:   There is multilevel mild-to-moderate disc space narrowing of the cervical spine, most prominent at C6-C7 with endplate osteophytosis noted at this level. There is moderate multilevel facet joint hypertrophy, left-greater-than-right, most prominent at C2-C3 to C4-C5.   Prevertebral/Paraspinal Soft Tissues: The prevertebral and paraspinal soft tissues are unremarkable. The visualized pulmonary apices are unremarkable.       No acute intracranial abnormality or calvarial fracture.   No acute fracture or traumatic malalignment of the cervical spine.   Multilevel spondylotic changes of the cervical spine as detailed above, most prominent at C6-C7   Signed by: John Peoples 6/11/2024 10:04 AM Dictation workstation:   [id number]      Assessment/Plan     Left 2nd toe ulceration  Does not appear infected.  Xeroform and bandaid. Change daily.     Follow-up with Dr. Hartmann as scheduled    Onychomycosis   Nails x 10 debrided to patient tolerance.    I spent 35 minutes in the professional and overall care of this patient.

## 2024-06-29 NOTE — HOSPITAL COURSE
Brief HPI  Chao Thao is a 84 y.o. male with HTN, HLD, MDD, CAD, frequent falls, dementia, afib, hard of hearing, HFpEF 50% 2/2024 who presents from home after leaving [facility]. Brought in by EMS after a fall.     ED Course  Vitals in ED showed 84 hr, 18 rr, 97/62, 93% on room air, SpO2 89%. Chest x-ray showed small pleural effusions, left basilar airspace disease. Left tib fib xray showed no acute fx. In the ED pt received azithromycin, ceftriaxone and surgery was consulted.      Floor Course  Patient was continued on IV ceftriaxone and a 3 day course of azithromycin then started on IV vancomycin with rocephin. Podiatry and wound care were consulted for patient's non healing toe ulcer. Podiatry determined wound did not look infected and recommended patient follow-up with Dr. Hartmann outpatient as scheduled for his PAD. Patient with orthostatic hypotension He was given IV fluids and AM cortisol ruled out adrenal insufficiency. He was started on Florinef. PT/OT recommended moderate intensity therapy at discharge. He was medically stable and discharged to SNF on 7/2/24. Sent with script for Doxycycline 100mg BID x1 day to complete a 5 day course of abx and Florinef. Instructed to follow up with PCP and Cardiology on discharge.

## 2024-06-29 NOTE — PROGRESS NOTES
"Overnight Events   Nothing acute overnight.    Subjective   Patient resting comfortably in bed. A&Ox3. No new complaints.    Objective    Physical Exam  Constitutional:       General: He is not in acute distress.  HENT:      Head: Normocephalic and atraumatic.      Mouth/Throat:      Mouth: Mucous membranes are moist.      Pharynx: Oropharynx is clear.   Cardiovascular:      Rate and Rhythm: Normal rate. Rhythm irregular.      Pulses: Normal pulses.      Heart sounds: Normal heart sounds.   Pulmonary:      Effort: Pulmonary effort is normal.      Breath sounds: Normal breath sounds.   Abdominal:      Palpations: Abdomen is soft.      Tenderness: There is no abdominal tenderness.   Musculoskeletal:      Right lower leg: Edema present.      Left lower leg: Edema present.   Skin:     General: Skin is warm and dry.      Findings: Erythema present.   Neurological:      Mental Status: He is alert.      Comments: Answering questions, following commands.   Psychiatric:         Mood and Affect: Mood normal.         Behavior: Behavior normal.       Visit Vitals  BP 94/58   Pulse 69   Temp 37.2 °C (99 °F)   Resp 16   Ht 1.676 m (5' 5.98\")   Wt 83.4 kg (183 lb 13.8 oz)   SpO2 96%   BMI 29.69 kg/m²   Smoking Status Former   BSA 1.97 m²          Intake/Output Summary (Last 24 hours) at 6/29/2024 1600  Last data filed at 6/29/2024 1432  Gross per 24 hour   Intake 1050 ml   Output 1150 ml   Net -100 ml           I/Os    Intake/Output Summary (Last 24 hours) at 6/29/2024 1600  Last data filed at 6/29/2024 1432  Gross per 24 hour   Intake 1050 ml   Output 1150 ml   Net -100 ml       Labs:   Results from last 72 hours   Lab Units 06/29/24  0533 06/28/24  0552 06/28/24  0032   SODIUM mmol/L 132* 129* 129*   POTASSIUM mmol/L 4.1 4.0 4.5   CHLORIDE mmol/L 98 95* 93*   CO2 mmol/L 27 27 28   BUN mg/dL 18 21 22   CREATININE mg/dL 0.75 0.86 0.98   GLUCOSE mg/dL 93 118* 103*   CALCIUM mg/dL 7.8* 8.0* 8.6   ANION GAP mmol/L 11 11 13   EGFR " "mL/min/1.73m*2 90 86 77   PHOSPHORUS mg/dL 3.2 3.6  --       Results from last 72 hours   Lab Units 06/29/24  0533 06/28/24  0552 06/28/24  0032   WBC AUTO x10*3/uL 11.8* 12.8* 17.6*   HEMOGLOBIN g/dL 8.8* 9.3* 10.6*   HEMATOCRIT % 27.7* 29.1* 32.5*   PLATELETS AUTO x10*3/uL 485* 530* 585*   NEUTROS PCT AUTO %  --   --  87.3   LYMPHS PCT AUTO %  --   --  6.0   MONOS PCT AUTO %  --   --  5.1   EOS PCT AUTO %  --   --  0.1      Lab Results   Component Value Date    CALCIUM 7.8 (L) 06/29/2024    PHOS 3.2 06/29/2024      No results found for: \"CRP\"    [unfilled]     Micro/ID:   No results found for the last 90 days.                   No lab exists for component: \"AGALPCRNB\"   .ID  Lab Results   Component Value Date    BLOODCULT Loaded on Instrument - Culture in progress 06/28/2024    BLOODCULT Loaded on Instrument - Culture in progress 06/28/2024       Meds    Scheduled medications  azithromycin, 500 mg, oral, q24h JIGNESH  cefTRIAXone, 1 g, intravenous, q24h  DULoxetine, 60 mg, oral, Daily  escitalopram, 5 mg, oral, Nightly  metoprolol succinate XL, 25 mg, oral, Daily  psyllium, 1 packet, oral, Daily  simvastatin, 40 mg, oral, q PM  zinc oxide, 1 Application, Topical, TID      Continuous medications     PRN medications  PRN medications: acetaminophen, lidocaine, oxyCODONE, oxyCODONE, oxygen       Images    XR foot left 3+ views    Result Date: 6/28/2024  Interpreted By:  Sin Bacon, STUDY: XR FOOT LEFT 3+ VIEWS; ;  6/28/2024 6:33 am   INDICATION: Signs/Symptoms:left middle toes tenderness wound osteo eval.   COMPARISON: 06/12/2024   ACCESSION NUMBER(S): [id number]   ORDERING CLINICIAN: KAVITA AMES   FINDINGS: AP, oblique, and lateral views of the left foot are obtained.   There is no fracture. No focal bone lysis is seen.   Degenerative changes are noted at the 1st MTP joint and D IP joints of the 2nd through 4th toes.   Examination of the soft tissue shows apparent mild diffuse thickening suggestive of edema " of the distal lower extremity and foot. Fine medial calcifications are noted along the arterial channels suggestive of diabetes.       No acute bony abnormality on radiographs of the left foot. No fracture or focal lytic change. Arthritic changes are noted along with plantar spur and soft tissue edema.     MACRO: None   Signed by: Sin Bacon 6/28/2024 8:18 AM Dictation workstation:   [id number]    CT chest w IV contrast    Result Date: 6/28/2024  Interpreted By:  Aminata Ryan, STUDY: CT CHEST W IV CONTRAST;  6/28/2024 4:45 am   INDICATION: Signs/Symptoms:hypoxemia after fall, LLL pna vs contusion.   COMPARISON: Chest CT 02/02/2024, chest radiograph 06/27/2024   ACCESSION NUMBER(S): [id number]   ORDERING CLINICIAN: AIYANA GONZALEZ   TECHNIQUE: Axial CT images of the chest obtained  after intravenous administration of contrast.   FINDINGS: VESSELS: No aortic aneurysm. No evidence of acute traumatic aortic injury, given limitations of non gated CT. HEART: Normal size. Severe coronary artery calcification. No pericardial effusion. MEDIASTINUM AND MICHELLE: Nonspecific borderline enlarged 10 mm aortopulmonary window lymph node. No mediastinal hematoma. No pneumomediastinum. LUNG, PLEURA, AND LARGE AIRWAYS: Small simple left pleural effusion with compressive atelectasis of the left lower lobe. Mild bandlike atelectasis in the right lower lobe and lingula. CHEST WALL AND LOWER NECK: Within normal limits.   UPPER ABDOMEN: No acute abnormality of the visualized abdomen. Low attenuation of the liver is suggestive of hepatic steatosis. Small exophytic lesions of both kidneys, favored to represent small cysts.   BONES: Acute, mildly displaced fractures of the right lateral 8th and 9th ribs. Chronic sternal fracture.       Small simple left pleural effusion and left lower lobe atelectasis.   Acute, mildly displaced fractures of the right lateral 8th and 9th ribs.   MACRO: None   Signed by: Aminata Ryan 6/28/2024 4:59 AM  Dictation workstation:   [id number]    CT head wo IV contrast    Result Date: 6/28/2024  Interpreted By:  Aminata Ryan, STUDY: CT HEAD WO IV CONTRAST;  6/28/2024 4:44 am   INDICATION: Signs/Symptoms:fall on eliquis.   COMPARISON: 06/11/2024   ACCESSION NUMBER(S): [id number]   ORDERING CLINICIAN: AIYANA GONZALEZ   TECHNIQUE: Axial noncontrast CT images of the head.   FINDINGS: BRAIN PARENCHYMA:  Gray-white matter interfaces are preserved. No mass effect or midline shift.   HEMORRHAGE: No acute intracranial hemorrhage. VENTRICLES and EXTRA-AXIAL SPACES: The ventricles and sulci are within normal limits in size for brain volume. No abnormal extraaxial fluid collection. EXTRACRANIAL SOFT TISSUES: Within normal limits. PARANASAL SINUSES/MASTOIDS:  The visualized paranasal sinuses and mastoid air cells are aerated. CALVARIUM: No depressed skull fracture. No destructive osseous lesion.   OTHER FINDINGS: None.       No acute intracranial abnormality.     MACRO: None   Signed by: Aminata Ryan 6/28/2024 4:49 AM Dictation workstation:   [id number]    XR chest 1 view    Result Date: 6/28/2024  Interpreted By:  Aminata Ryan, STUDY: XR CHEST 1 VIEW;  6/27/2024 11:45 pm   INDICATION: Signs/Symptoms:fall.   COMPARISON: 02/02/2024   ACCESSION NUMBER(S): [id number]   ORDERING CLINICIAN: AIYANA GONZALEZ   FINDINGS:     CARDIOMEDIASTINAL SILHOUETTE: Cardiomediastinal silhouette is normal in size and configuration.   LUNGS: Small bilateral pleural effusions. Left basilar strandy airspace opacity. No pneumothorax.   ABDOMEN: No remarkable upper abdominal findings.   BONES: Acute, mildly displaced fractures of the right 8th and 9th ribs.       Small bilateral pleural effusions.   Nonspecific left basilar airspace disease. Differential considerations include atelectasis and pneumonia, however, developing contusion may also be considered in the setting of trauma.   Acute, mildly displaced right lateral 8th and 9th rib fractures.    MACRO: None   Signed by: Aminata Ryan 6/28/2024 12:52 AM Dictation workstation:   [id number]    XR tibia fibula left 2 views    Result Date: 6/28/2024  Interpreted By:  Aminata Ryan, STUDY: XR TIBIA FIBULA LEFT 2 VIEWS; ;  6/27/2024 11:45 pm   INDICATION: Signs/Symptoms:fall with tenderness.   COMPARISON: None.   ACCESSION NUMBER(S): [id number]   ORDERING CLINICIAN: AIYANA GONZALEZ   FINDINGS: Two views left tibia-fibula. No acute fracture or malalignment. No osseous destruction or abnormal periosteal bone formation. Mild to moderate tricompartmental knee osteoarthrosis. Atherosclerotic vascular calcifications are noted. Nonspecific diffuse lower extremity edema.       No acute osseous abnormality.   Nonspecific lower extremity edema.   Mild-to-moderate left knee osteoarthrosis.     MACRO: None   Signed by: Aminata Ryan 6/28/2024 12:49 AM Dictation workstation:   [id number]    ECG 12 lead    Result Date: 6/20/2024  Atrial fibrillation with rapid ventricular response with premature ventricular or aberrantly conducted complexes Low voltage QRS ST & T wave abnormality, consider lateral ischemia Abnormal ECG When compared with ECG of 03-FEB-2024 12:53, T wave inversion now evident in Anterolateral leads See ED provider note for full interpretation and clinical correlation Confirmed by Niyah Duval ([id number]) on 6/20/2024 1:15:38 PM    XR foot left 3+ views    Result Date: 6/12/2024  Interpreted By:  María Wood, STUDY: XR FOOT LEFT 3+ VIEWS; ;  6/12/2024 11:34 am   INDICATION: Signs/Symptoms:fall with tender 2nd digit.   COMPARISON: None.   ACCESSION NUMBER(S): [id number]   ORDERING CLINICIAN: JENNIFER VINSON   FINDINGS: Three views of left foot were performed.   There is no acute fracture or dislocation. There are moderate to severe degenerative changes in the great toe metatarsophalangeal joint with joint space narrowing and prominent marginal osteophytes. There are mild-to-moderate degenerative changes in  the interphalangeal joints of 2nd through 5th digits. Prominent plantar calcaneal enthesophyte is noted. No obvious soft tissue abnormality is noted.       No acute fracture or dislocation.     MACRO: None   Signed by: María Wood 6/12/2024 12:25 PM Dictation workstation:   [id number]    CT chest abdomen pelvis w IV contrast    Result Date: 6/11/2024  Interpreted By:  Zack Chavez, STUDY: CT CHEST ABDOMEN PELVIS W IV CONTRAST;  6/11/2024 9:21 am   INDICATION: Signs/Symptoms:R rib pain, fall   COMPARISON:   February 2, 2024 CT chest abdomen and pelvis, and June 11, 2024 CT cervical spine.   ACCESSION NUMBER(S): [id number]   ORDERING CLINICIAN: CHELLE DAVID   TECHNIQUE: CT of the chest, abdomen, and pelvis was performed. Contiguous axial images were obtained at  5 mm slice thickness through the chest, and at  3 mm through the abdomen and pelvis. Coronal and sagittal reconstructions at  3 mm slice thickness were performed. 100 ml of contrast material Omnipaque 350 were administered intravenously without immediate complication.   FINDINGS: Likely technically adequate although image degradation related to motion, and streak artifact from overlapping radiopaque structures most pronounced at the thoracoabdominal junction region.   CHEST:   LUNG/PLEURA/LARGE AIRWAYS: Compared with February 2, 2024 overall improved expansion and aeration with minimal platelike atelectasis at the right base. No evident contusion, edema, pleural effusion, new, enlarging, or suspicious nodule or pneumothorax. There is a similar probably benign relatively flat smoothly marginated fissural nodule right mid chest sagittal series 203, image 47 favoring intraparenchymal lymph node. There is a similar flat juxtapleural nodule anterior right mid chest measuring 7 mm series 204, image 220. VESSELS: No traumatic aortic injury is appreciated within the limitations of this non-EKG gated study.  The thoracic aorta is of normal course and  caliber.  Similar scattered atherosclerosis thoracic aorta and branch vessels. Similar extensive coronary artery calcifications. No aneurysm.   HEART: The heart is normal in size.   There is no pericardial effusion.   MEDIASTINUM AND MICHELLE: No pneumomediastinum, abnormal mediastinal fluid collection or mediastinal hematoma are appreciated.  No mediastinal, hilar or biaxillary adenopathy is present.  The esophagus is normal in course and caliber.   CHEST WALL AND LOWER NECK: There is an acute minimally displaced fracture right posterolateral 9th rib series 204, image 281. There is chronic minimal deformity adjacent lateral 8th rib image 280.. There is acute nondisplaced fracture right lateral 8th rib series 204, image 247. There is a nonacute fracture with evidence of healing response involving the left posterolateral 11th rib image 334. no suspicious osseous lesions are identified.  The thoracic wall soft tissues are within normal limits.   ABDOMEN:   LIVER: No focal perfusion abnormality of the liver is appreciated to suggest contusion or laceration. There is no subcapsular hematoma, no perihepatic fluid collection.  Similar diffuse hypoattenuation liver compatible with steatosis.   GALLBLADDER: The gallbladder is nondistended containing tiny stones.   BILE DUCTS: The intahepatic and extrahepatic bile ducts are not dilated.   PANCREAS: The pancreas appears unremarkable.   SPLEEN: No parenchymal perfusion deficit of the spleen is appreciated to suggest contusion or laceration. There is no subcapsular hematoma, no perisplenic fluid collection. There are 2 rounded sharply marginated hypodensities series 21, image 84 not well delineated on the prior CT although likely present most likely incidental such as cysts although too small to characterize.   ADRENAL GLANDS: There is similar mild nodular thickening involving the adrenal glands left-greater-than-right stability favors probably benign cause such as adenomas.  Findings include thickening left adrenal gland measuring 11 mm AP diameter series 7, image 99 9 mm right-side image 90.   KIDNEYS AND URETERS: No parenchymal perfusion deficit is appreciated in bilateral kidneys to suggest contusion or laceration. There is no subcapsular hematoma, no perinephric fluid collection.  There are similar probably benign bilateral renal hypodensities most compatible with cysts including dominant 3 cm left-sided cysts. No calculi or hydronephrosis.   PELVIS:   BLADDER: The urinary bladder appears within normal limits.   REPRODUCTIVE ORGANS: Similar prostatomegaly measuring 5.2 cm transverse diameter.   BOWEL: The stomach is unremarkable.  The small bowel is normal in caliber without evidence of focal wall thickening or obstruction.  There is no evidence of focal wall thickening or dilatation of the large bowel.   VESSELS: Similar scattered atherosclerosis aorta and branch vessels. Similar fusiform dilation infrarenal aorta measuring 2.7 cm AP diameter sagittal series 203, image 85. 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 The saccular portion which extends posteriorly measures 18 x 9 mm sagittal series 203, image 73. The principal vasculature of the abdomen and pelvis is patent.   PERITONEUM/RETROPERITONEUM/LYMPH NODES: There is no evidence of intra- or retroperitoneal hematoma.  There is no free or loculated fluid collection, no free intraperitoneal air. No abdominopelvic lymphadenopathy is present. Similar distribution and extent of pre-existing lymph nodes.   BONES AND ABDOMINAL WALL: No evidence of acute fracture or dislocation of the included osseous structures.  No suspicious osseous lesions are identified.  Similar pre-existing moderate L2 compressive deformity and scattered degenerative changes. Similar appearance of the body wall.       Acute right-sided 8th and 9th rib fractures.   Newly seen nonacute left 11th  rib fracture with evidence of healing response.   Improved pulmonary expansion and aeration.   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   Cholelithiasis.   Similar right-sided fissural and juxtapleural nodules including dominant fissural nodule measuring 1 cm. Stability and morphology favors probably benign cause. Per Fleischner criteria recommend 1 year follow-up noncontrast chest CT.   Similar adrenal nodules likely incidental such as adenomas although nonspecific.   Similar small splenic hypodensities likely incidental such as cysts although too small to characterize.   Similar liver steatosis.   Similar prostatomegaly.   MACRO: Incidental Finding:  Multiple solid non-calcified pulmonary nodules measuring up to greater than 8 mm.  (**-YCF-**)   Instructions:  Consider follow up non contrast chest CT at 3-6 months, then consider CT chest at 18-24 months. (Demar Clements et al., Guidelines for management of incidental pulmonary nodules detected on CT images: From the Fleischner Society 2017, Radiology. 2017 Jul;284 (1):228-243.) [email or web address]   Signed by: Zack Chavez 6/11/2024 10:42 AM Dictation workstation:   [id number]    CT cervical spine wo IV contrast    Result Date: 6/11/2024  Interpreted By:  John Peoples, STUDY: CT HEAD W/O CONTRAST TRAUMA PROTOCOL; CT CERVICAL SPINE WO IV CONTRAST;  6/11/2024 9:14 am; 6/11/2024 9:21 am   INDICATION: Signs/Symptoms:HIA   COMPARISON: CT head and cervical spine 02/02/2024   ACCESSION NUMBER(S): [id number]; [id number]   ORDERING CLINICIAN: CHELLE DAVID   TECHNIQUE: Axial noncontrast CT images of head with coronal and sagittal reconstructed images. Axial noncontrast CT images of the cervical spine with coronal and sagittal reconstructed images.   FINDINGS: CT head without contrast: The ventricles and sulci are mildly prominent in a pattern that can be seen with age-related parenchymal volume loss.   There  is no evidence of acute intracranial hemorrhage or definite acute cortical infarction. There are patchy areas of hypodense attenuation within the subcortical and periventricular white matter.   There is no midline shift.   The visualized paranasal sinuses and mastoid air cells are clear.   No acute calvarial fracture is noted.   CERVICAL SPINE   Fractures: There is no evidence for an acute fracture of the cervical spine.   Vertebral Alignment: There is similar minimal anterolisthesis of C4 upon C5. Vertebral body height and alignment are otherwise within normal limits.   Craniocervical Junction: The odontoid process and craniocervical junction are intact.   Vertebrae/Disc Spaces:  There is multilevel mild-to-moderate disc space narrowing of the cervical spine, most prominent at C6-C7 with endplate osteophytosis noted at this level. There is moderate multilevel facet joint hypertrophy, left-greater-than-right, most prominent at C2-C3 to C4-C5.   Prevertebral/Paraspinal Soft Tissues: The prevertebral and paraspinal soft tissues are unremarkable. The visualized pulmonary apices are unremarkable.       No acute intracranial abnormality or calvarial fracture.   No acute fracture or traumatic malalignment of the cervical spine.   Multilevel spondylotic changes of the cervical spine as detailed above, most prominent at C6-C7   Signed by: John Peoples 6/11/2024 10:04 AM Dictation workstation:   [id number]    CT head W O contrast trauma protocol    Result Date: 6/11/2024  Interpreted By:  John Peoples, STUDY: CT HEAD W/O CONTRAST TRAUMA PROTOCOL; CT CERVICAL SPINE WO IV CONTRAST;  6/11/2024 9:14 am; 6/11/2024 9:21 am   INDICATION: Signs/Symptoms:HIA   COMPARISON: CT head and cervical spine 02/02/2024   ACCESSION NUMBER(S): [id number]; [id number]   ORDERING CLINICIAN: CHELLE DAVID   TECHNIQUE: Axial noncontrast CT images of head with coronal and sagittal reconstructed images. Axial noncontrast CT images of  the cervical spine with coronal and sagittal reconstructed images.   FINDINGS: CT head without contrast: The ventricles and sulci are mildly prominent in a pattern that can be seen with age-related parenchymal volume loss.   There is no evidence of acute intracranial hemorrhage or definite acute cortical infarction. There are patchy areas of hypodense attenuation within the subcortical and periventricular white matter.   There is no midline shift.   The visualized paranasal sinuses and mastoid air cells are clear.   No acute calvarial fracture is noted.   CERVICAL SPINE   Fractures: There is no evidence for an acute fracture of the cervical spine.   Vertebral Alignment: There is similar minimal anterolisthesis of C4 upon C5. Vertebral body height and alignment are otherwise within normal limits.   Craniocervical Junction: The odontoid process and craniocervical junction are intact.   Vertebrae/Disc Spaces:  There is multilevel mild-to-moderate disc space narrowing of the cervical spine, most prominent at C6-C7 with endplate osteophytosis noted at this level. There is moderate multilevel facet joint hypertrophy, left-greater-than-right, most prominent at C2-C3 to C4-C5.   Prevertebral/Paraspinal Soft Tissues: The prevertebral and paraspinal soft tissues are unremarkable. The visualized pulmonary apices are unremarkable.       No acute intracranial abnormality or calvarial fracture.   No acute fracture or traumatic malalignment of the cervical spine.   Multilevel spondylotic changes of the cervical spine as detailed above, most prominent at C6-C7   Signed by: John Peoples 6/11/2024 10:04 AM Dictation workstation:   [id number]     Encounter Date: 06/27/24   ECG 12 lead   Result Value    Ventricular Rate 83    Atrial Rate 79    MN Interval 176    QRS Duration 92    QT Interval 354    QTC Calculation(Bazett) 415    R Axis 13    T Axis 134    QRS Count 13    Q Onset 220    P Onset 132    P Offset 212    T Offset 397     QTC Fredericia 394    Narrative    Sinus rhythm with Premature supraventricular complexes  Low voltage QRS  Nonspecific T wave abnormality  Abnormal ECG  When compared with ECG of 11-JUN-2024 09:36,  Sinus rhythm has replaced Atrial fibrillation  ST no longer depressed in Anterior leads  Nonspecific T wave abnormality, improved in Inferior leads  T wave inversion no longer evident in Anterior leads  QT has shortened      Encounter Date: 06/27/24   ECG 12 lead   Result Value    Ventricular Rate 83    Atrial Rate 79    VT Interval 176    QRS Duration 92    QT Interval 354    QTC Calculation(Bazett) 415    R Axis 13    T Axis 134    QRS Count 13    Q Onset 220    P Onset 132    P Offset 212    T Offset 397    QTC Fredericia 394    Narrative    Sinus rhythm with Premature supraventricular complexes  Low voltage QRS  Nonspecific T wave abnormality  Abnormal ECG  When compared with ECG of 11-JUN-2024 09:36,  Sinus rhythm has replaced Atrial fibrillation  ST no longer depressed in Anterior leads  Nonspecific T wave abnormality, improved in Inferior leads  T wave inversion no longer evident in Anterior leads  QT has shortened      @CT@   [unfilled]   [unfilled]   === 06/27/24 ===    CT HEAD WO IV CONTRAST    - Impression -  No acute intracranial abnormality.      MACRO:  None    Signed by: Aminata Ryan 6/28/2024 4:49 AM  Dictation workstation:   [id number]     Assessment and Plan    84m with HTN, HLD, MDD, CAD, frequent falls, dementia, afib, hard of hearing, HFpEF 50% 2/2024 who presents from home after leaving [facility]. Brought in by EMS after a fall. Treating for acute PNA failed outpatient and pain control including for rib fxs.      #acute hypoxic respiratory failure  #concern for L community associated PNA failed outpatient   #hx recent acute R rib fxs s/p fall   -CT chest w/ contrast: small simple left pleural effusion and LLL atelectasis  -ceftriaxone (6/28-) azithro 3d, pna labs negative, blood cultures in  progress  -pain regimen for acute pain. Spirometry, airway clearance.  -PO abx on discharge     #LLE wound and other chronic wounds   #chronic BLE pain   #chronic neuropathy and recurrent falls   #MDD   -Wound onboard  -Podiatry onboard  -Xray left foot no acute bony abnormality  -duloxetine 60 daily, Lexapro 5mg daily  -pt did not complete acute rehab  -orthostats positive  -AM cortisol and ACTH  -PT/OT recommends mod intensity     # Orthostatic hypotension, hyponatremia  # History of HTN   - Discontinue lisinopril 10. Metoprolol succinate to 25.   -Check morning cortisol, ACTH  -Would start Florinef rather than midodrine to avoid acute urinary retention     #CAD - Hold asa for now   #Afib w/ frequent falls - pt reports he has been given Eliquis at [facility]. Pt still needs eval for Watchman procedure outpatient. Continue metoprolol   #HFpEF - echo 50% diastolic dysfunction 2/2024. This stay BNP normal   #BLE edema - chronic per pt not increased recently.       Fluids: PO intake  Nutrition: Adult Regular  VTE prophylaxis: Lovenox  O2: room air, 2L prn   Code Status: full code     Disposition: Medically ready for discharge, pending precert.    Layo Garcia, DO  Internal Medicine PGY-1

## 2024-06-30 ENCOUNTER — APPOINTMENT (OUTPATIENT)
Dept: RADIOLOGY | Facility: HOSPITAL | Age: 84
End: 2024-06-30
Payer: MEDICARE

## 2024-06-30 VITALS
TEMPERATURE: 98.8 F | BODY MASS INDEX: 29.02 KG/M2 | RESPIRATION RATE: 22 BRPM | HEIGHT: 66 IN | SYSTOLIC BLOOD PRESSURE: 94 MMHG | OXYGEN SATURATION: 95 % | HEART RATE: 96 BPM | WEIGHT: 180.56 LBS | DIASTOLIC BLOOD PRESSURE: 61 MMHG

## 2024-06-30 LAB
ALBUMIN SERPL BCP-MCNC: 2.5 G/DL (ref 3.4–5)
ALP SERPL-CCNC: 109 U/L (ref 33–136)
ALT SERPL W P-5'-P-CCNC: 13 U/L (ref 10–52)
ANION GAP SERPL CALC-SCNC: 12 MMOL/L (ref 10–20)
AST SERPL W P-5'-P-CCNC: 19 U/L (ref 9–39)
BACTERIA BLD CULT: NORMAL
BACTERIA BLD CULT: NORMAL
BILIRUB SERPL-MCNC: 0.3 MG/DL (ref 0–1.2)
BUN SERPL-MCNC: 14 MG/DL (ref 6–23)
CALCIUM SERPL-MCNC: 7.8 MG/DL (ref 8.6–10.3)
CHLORIDE SERPL-SCNC: 96 MMOL/L (ref 98–107)
CO2 SERPL-SCNC: 27 MMOL/L (ref 21–32)
CORTIS SERPL-MCNC: 28.2 UG/DL (ref 2.5–20)
CREAT SERPL-MCNC: 0.64 MG/DL (ref 0.5–1.3)
CRP SERPL-MCNC: 9.28 MG/DL
EGFRCR SERPLBLD CKD-EPI 2021: >90 ML/MIN/1.73M*2
ERYTHROCYTE [DISTWIDTH] IN BLOOD BY AUTOMATED COUNT: 12.4 % (ref 11.5–14.5)
ERYTHROCYTE [SEDIMENTATION RATE] IN BLOOD BY WESTERGREN METHOD: 78 MM/H (ref 0–20)
GLUCOSE SERPL-MCNC: 107 MG/DL (ref 74–99)
HCT VFR BLD AUTO: 28.5 % (ref 41–52)
HGB BLD-MCNC: 9 G/DL (ref 13.5–17.5)
MAGNESIUM SERPL-MCNC: 1.81 MG/DL (ref 1.6–2.4)
MCH RBC QN AUTO: 31.5 PG (ref 26–34)
MCHC RBC AUTO-ENTMCNC: 31.6 G/DL (ref 32–36)
MCV RBC AUTO: 100 FL (ref 80–100)
MRSA DNA SPEC QL NAA+PROBE: NOT DETECTED
NRBC BLD-RTO: 0 /100 WBCS (ref 0–0)
PHOSPHATE SERPL-MCNC: 3.3 MG/DL (ref 2.5–4.9)
PLATELET # BLD AUTO: 467 X10*3/UL (ref 150–450)
POTASSIUM SERPL-SCNC: 3.7 MMOL/L (ref 3.5–5.3)
PROT SERPL-MCNC: 6.2 G/DL (ref 6.4–8.2)
RBC # BLD AUTO: 2.86 X10*6/UL (ref 4.5–5.9)
SODIUM SERPL-SCNC: 131 MMOL/L (ref 136–145)
WBC # BLD AUTO: 18.6 X10*3/UL (ref 4.4–11.3)

## 2024-06-30 PROCEDURE — 2500000005 HC RX 250 GENERAL PHARMACY W/O HCPCS

## 2024-06-30 PROCEDURE — 82533 TOTAL CORTISOL: CPT | Mod: GEALAB

## 2024-06-30 PROCEDURE — 2500000002 HC RX 250 W HCPCS SELF ADMINISTERED DRUGS (ALT 637 FOR MEDICARE OP, ALT 636 FOR OP/ED)

## 2024-06-30 PROCEDURE — 74177 CT ABD & PELVIS W/CONTRAST: CPT | Performed by: STUDENT IN AN ORGANIZED HEALTH CARE EDUCATION/TRAINING PROGRAM

## 2024-06-30 PROCEDURE — 74177 CT ABD & PELVIS W/CONTRAST: CPT

## 2024-06-30 PROCEDURE — 2500000004 HC RX 250 GENERAL PHARMACY W/ HCPCS (ALT 636 FOR OP/ED)

## 2024-06-30 PROCEDURE — 94760 N-INVAS EAR/PLS OXIMETRY 1: CPT

## 2024-06-30 PROCEDURE — 36415 COLL VENOUS BLD VENIPUNCTURE: CPT | Performed by: INTERNAL MEDICINE

## 2024-06-30 PROCEDURE — 2500000001 HC RX 250 WO HCPCS SELF ADMINISTERED DRUGS (ALT 637 FOR MEDICARE OP)

## 2024-06-30 PROCEDURE — 82024 ASSAY OF ACTH: CPT

## 2024-06-30 PROCEDURE — 86140 C-REACTIVE PROTEIN: CPT | Performed by: INTERNAL MEDICINE

## 2024-06-30 PROCEDURE — 1200000002 HC GENERAL ROOM WITH TELEMETRY DAILY

## 2024-06-30 PROCEDURE — 36415 COLL VENOUS BLD VENIPUNCTURE: CPT

## 2024-06-30 PROCEDURE — 80053 COMPREHEN METABOLIC PANEL: CPT

## 2024-06-30 PROCEDURE — 99233 SBSQ HOSP IP/OBS HIGH 50: CPT

## 2024-06-30 PROCEDURE — 87641 MR-STAPH DNA AMP PROBE: CPT | Performed by: INTERNAL MEDICINE

## 2024-06-30 PROCEDURE — 83735 ASSAY OF MAGNESIUM: CPT

## 2024-06-30 PROCEDURE — 2500000004 HC RX 250 GENERAL PHARMACY W/ HCPCS (ALT 636 FOR OP/ED): Performed by: STUDENT IN AN ORGANIZED HEALTH CARE EDUCATION/TRAINING PROGRAM

## 2024-06-30 PROCEDURE — 85652 RBC SED RATE AUTOMATED: CPT | Performed by: INTERNAL MEDICINE

## 2024-06-30 PROCEDURE — 84145 PROCALCITONIN (PCT): CPT | Mod: GEALAB | Performed by: INTERNAL MEDICINE

## 2024-06-30 PROCEDURE — 84100 ASSAY OF PHOSPHORUS: CPT

## 2024-06-30 PROCEDURE — 2550000001 HC RX 255 CONTRASTS: Performed by: STUDENT IN AN ORGANIZED HEALTH CARE EDUCATION/TRAINING PROGRAM

## 2024-06-30 PROCEDURE — 85027 COMPLETE CBC AUTOMATED: CPT

## 2024-06-30 RX ORDER — VANCOMYCIN HYDROCHLORIDE 1 G/20ML
INJECTION, POWDER, LYOPHILIZED, FOR SOLUTION INTRAVENOUS DAILY PRN
Status: DISPENSED | OUTPATIENT
Start: 2024-06-30

## 2024-06-30 RX ORDER — VANCOMYCIN HYDROCHLORIDE 1 G/200ML
1000 INJECTION, SOLUTION INTRAVENOUS EVERY 12 HOURS
Status: ACTIVE | OUTPATIENT
Start: 2024-07-01

## 2024-06-30 ASSESSMENT — COGNITIVE AND FUNCTIONAL STATUS - GENERAL
MOVING TO AND FROM BED TO CHAIR: A LOT
HELP NEEDED FOR BATHING: A LOT
MOBILITY SCORE: 10
WALKING IN HOSPITAL ROOM: TOTAL
EATING MEALS: A LITTLE
STANDING UP FROM CHAIR USING ARMS: A LOT
MOVING FROM LYING ON BACK TO SITTING ON SIDE OF FLAT BED WITH BEDRAILS: A LOT
CLIMB 3 TO 5 STEPS WITH RAILING: TOTAL
DRESSING REGULAR LOWER BODY CLOTHING: A LOT
WALKING IN HOSPITAL ROOM: TOTAL
TOILETING: A LITTLE
TURNING FROM BACK TO SIDE WHILE IN FLAT BAD: A LOT
STANDING UP FROM CHAIR USING ARMS: A LOT
MOBILITY SCORE: 10
MOVING TO AND FROM BED TO CHAIR: A LOT
CLIMB 3 TO 5 STEPS WITH RAILING: TOTAL
HELP NEEDED FOR BATHING: A LOT
TOILETING: A LITTLE
PERSONAL GROOMING: A LITTLE
DRESSING REGULAR LOWER BODY CLOTHING: A LOT
EATING MEALS: A LITTLE
PERSONAL GROOMING: A LITTLE
DAILY ACTIVITIY SCORE: 16
MOVING FROM LYING ON BACK TO SITTING ON SIDE OF FLAT BED WITH BEDRAILS: A LOT
DAILY ACTIVITIY SCORE: 16
TURNING FROM BACK TO SIDE WHILE IN FLAT BAD: A LOT
DRESSING REGULAR UPPER BODY CLOTHING: A LITTLE
DRESSING REGULAR UPPER BODY CLOTHING: A LITTLE

## 2024-06-30 ASSESSMENT — PAIN SCALES - GENERAL
PAINLEVEL_OUTOF10: 0 - NO PAIN
PAINLEVEL_OUTOF10: 0 - NO PAIN

## 2024-06-30 ASSESSMENT — PAIN - FUNCTIONAL ASSESSMENT: PAIN_FUNCTIONAL_ASSESSMENT: 0-10

## 2024-06-30 NOTE — PROGRESS NOTES
Chao Thao is a 84 y.o. male on day 2 of admission presenting with Acute pneumonia.      Subjective   No acute events overnight. Currently no acute complaints at this time. On 3L NC while sleeping    Objective     Last Recorded Vitals  /66 (BP Location: Right arm, Patient Position: Lying)   Pulse 82   Temp 36.9 °C (98.4 °F) (Tympanic)   Resp 18   Wt 81.9 kg (180 lb 8.9 oz)   SpO2 94%   Intake/Output last 3 Shifts:    Intake/Output Summary (Last 24 hours) at 6/30/2024 1238  Last data filed at 6/30/2024 1118  Gross per 24 hour   Intake 170 ml   Output 825 ml   Net -655 ml       Admission Weight  Weight: 90.7 kg (200 lb) (06/27/24 2316)    Daily Weight  06/30/24 : 81.9 kg (180 lb 8.9 oz)    Image Results  ECG 12 lead  Sinus rhythm with Premature supraventricular complexes  Low voltage QRS  Nonspecific T wave abnormality  Abnormal ECG  When compared with ECG of 11-JUN-2024 09:36,  Sinus rhythm has replaced Atrial fibrillation  ST no longer depressed in Anterior leads  Nonspecific T wave abnormality, improved in Inferior leads  T wave inversion no longer evident in Anterior leads  QT has shortened  ECG 12 lead  Undetermined rhythm  Low voltage QRS  Nonspecific T wave abnormality  Prolonged QT  Abnormal ECG  When compared with ECG of 11-JUN-2024 09:36,  Current undetermined rhythm precludes rhythm comparison, needs review  ST no longer depressed in Anterior leads  T wave inversion no longer evident in Anterior leads  XR foot left 3+ views  Narrative: Interpreted By:  Sin Bacon,   STUDY:  XR FOOT LEFT 3+ VIEWS; ;  6/28/2024 6:33 am      INDICATION:  Signs/Symptoms:left middle toes tenderness wound osteo eval.      COMPARISON:  06/12/2024      ACCESSION NUMBER(S):  [id number]      ORDERING CLINICIAN:  KAVITA AMES      FINDINGS:  AP, oblique, and lateral views of the left foot are obtained.      There is no fracture. No focal bone lysis is seen.      Degenerative changes are noted at the 1st MTP joint  and D IP joints  of the 2nd through 4th toes.      Examination of the soft tissue shows apparent mild diffuse thickening  suggestive of edema of the distal lower extremity and foot. Fine  medial calcifications are noted along the arterial channels  suggestive of diabetes.      Impression: No acute bony abnormality on radiographs of the left foot. No  fracture or focal lytic change. Arthritic changes are noted along  with plantar spur and soft tissue edema.          MACRO:  None      Signed by: Sin Bacon 6/28/2024 8:18 AM  Dictation workstation:   [id number]  CT chest w IV contrast  Narrative: Interpreted By:  Aminata Ryan,   STUDY:  CT CHEST W IV CONTRAST;  6/28/2024 4:45 am      INDICATION:  Signs/Symptoms:hypoxemia after fall, LLL pna vs contusion.      COMPARISON:  Chest CT 02/02/2024, chest radiograph 06/27/2024      ACCESSION NUMBER(S):  [id number]      ORDERING CLINICIAN:  AIYANA GONZALEZ      TECHNIQUE:  Axial CT images of the chest obtained  after intravenous  administration of contrast.      FINDINGS:  VESSELS: No aortic aneurysm. No evidence of acute traumatic aortic  injury, given limitations of non gated CT. HEART: Normal size. Severe  coronary artery calcification. No pericardial effusion. MEDIASTINUM  AND MICHELLE: Nonspecific borderline enlarged 10 mm aortopulmonary window  lymph node. No mediastinal hematoma. No pneumomediastinum. LUNG,  PLEURA, AND LARGE AIRWAYS: Small simple left pleural effusion with  compressive atelectasis of the left lower lobe. Mild bandlike  atelectasis in the right lower lobe and lingula. CHEST WALL AND LOWER  NECK: Within normal limits.      UPPER ABDOMEN: No acute abnormality of the visualized abdomen. Low  attenuation of the liver is suggestive of hepatic steatosis. Small  exophytic lesions of both kidneys, favored to represent small cysts.      BONES: Acute, mildly displaced fractures of the right lateral 8th and  9th ribs. Chronic sternal fracture.      Impression:  Small simple left pleural effusion and left lower lobe atelectasis.      Acute, mildly displaced fractures of the right lateral 8th and 9th  ribs.      MACRO:  None      Signed by: Aminata Ryan 6/28/2024 4:59 AM  Dictation workstation:   [id number]  CT head wo IV contrast  Narrative: Interpreted By:  Aminata Ryan,   STUDY:  CT HEAD WO IV CONTRAST;  6/28/2024 4:44 am      INDICATION:  Signs/Symptoms:fall on eliquis.      COMPARISON:  06/11/2024      ACCESSION NUMBER(S):  [id number]      ORDERING CLINICIAN:  AIYANA GONZALEZ      TECHNIQUE:  Axial noncontrast CT images of the head.      FINDINGS:  BRAIN PARENCHYMA:  Gray-white matter interfaces are preserved. No  mass effect or midline shift.      HEMORRHAGE: No acute intracranial hemorrhage.  VENTRICLES and EXTRA-AXIAL SPACES: The ventricles and sulci are  within normal limits in size for brain volume. No abnormal extraaxial  fluid collection. EXTRACRANIAL SOFT TISSUES: Within normal limits.  PARANASAL SINUSES/MASTOIDS:  The visualized paranasal sinuses and  mastoid air cells are aerated. CALVARIUM: No depressed skull  fracture. No destructive osseous lesion.      OTHER FINDINGS: None.      Impression: No acute intracranial abnormality.          MACRO:  None      Signed by: Aminata Ryan 6/28/2024 4:49 AM  Dictation workstation:   [id number]  XR chest 1 view  Narrative: Interpreted By:  Aminata Ryan,   STUDY:  XR CHEST 1 VIEW;  6/27/2024 11:45 pm      INDICATION:  Signs/Symptoms:fall.      COMPARISON:  02/02/2024      ACCESSION NUMBER(S):  [id number]      ORDERING CLINICIAN:  AIYANA GONZAELZ      FINDINGS:          CARDIOMEDIASTINAL SILHOUETTE:  Cardiomediastinal silhouette is normal in size and configuration.      LUNGS:  Small bilateral pleural effusions. Left basilar strandy airspace  opacity. No pneumothorax.      ABDOMEN:  No remarkable upper abdominal findings.      BONES:  Acute, mildly displaced fractures of the right 8th and 9th ribs.       Impression: Small bilateral pleural effusions.      Nonspecific left basilar airspace disease. Differential  considerations include atelectasis and pneumonia, however, developing  contusion may also be considered in the setting of trauma.      Acute, mildly displaced right lateral 8th and 9th rib fractures.      MACRO:  None      Signed by: Aminata Ryan 6/28/2024 12:52 AM  Dictation workstation:   [id number]  XR tibia fibula left 2 views  Narrative: Interpreted By:  Aminata Ryan,   STUDY:  XR TIBIA FIBULA LEFT 2 VIEWS; ;  6/27/2024 11:45 pm      INDICATION:  Signs/Symptoms:fall with tenderness.      COMPARISON:  None.      ACCESSION NUMBER(S):  [id number]      ORDERING CLINICIAN:  AIYANA GONZALEZ      FINDINGS:  Two views left tibia-fibula. No acute fracture or malalignment. No  osseous destruction or abnormal periosteal bone formation. Mild to  moderate tricompartmental knee osteoarthrosis. Atherosclerotic  vascular calcifications are noted. Nonspecific diffuse lower  extremity edema.      Impression: No acute osseous abnormality.      Nonspecific lower extremity edema.      Mild-to-moderate left knee osteoarthrosis.          MACRO:  None      Signed by: Aminata Ryan 6/28/2024 12:49 AM  Dictation workstation:   [id number]      Physical Exam  Vitals reviewed.   Constitutional:       General: He is not in acute distress.  Cardiovascular:      Rate and Rhythm: Normal rate. Rhythm irregular.      Heart sounds: Normal heart sounds. No murmur heard.  Pulmonary:      Effort: No respiratory distress.      Breath sounds: Normal breath sounds. No wheezing.   Abdominal:      General: There is no distension.      Palpations: Abdomen is soft.      Tenderness: There is no abdominal tenderness.   Musculoskeletal:         General: No tenderness.      Right lower leg: Edema present.      Left lower leg: Edema present.   Neurological:      General: No focal deficit present.      Mental Status: He is alert. Mental status  is at baseline.         Relevant Results  Scheduled medications  cefTRIAXone, 1 g, intravenous, q24h  DULoxetine, 60 mg, oral, Daily  enoxaparin, 40 mg, subcutaneous, q24h  escitalopram, 5 mg, oral, Nightly  lactated Ringer's, 1,000 mL, intravenous, Once  psyllium, 1 packet, oral, Daily  simvastatin, 40 mg, oral, q PM  zinc oxide, 1 Application, Topical, TID      Continuous medications     PRN medications  PRN medications: acetaminophen, lidocaine, oxyCODONE, oxyCODONE, oxygen    Results for orders placed or performed during the hospital encounter of 06/27/24 (from the past 24 hour(s))   CBC   Result Value Ref Range    WBC 18.6 (H) 4.4 - 11.3 x10*3/uL    nRBC 0.0 0.0 - 0.0 /100 WBCs    RBC 2.86 (L) 4.50 - 5.90 x10*6/uL    Hemoglobin 9.0 (L) 13.5 - 17.5 g/dL    Hematocrit 28.5 (L) 41.0 - 52.0 %     80 - 100 fL    MCH 31.5 26.0 - 34.0 pg    MCHC 31.6 (L) 32.0 - 36.0 g/dL    RDW 12.4 11.5 - 14.5 %    Platelets 467 (H) 150 - 450 x10*3/uL   Comprehensive metabolic panel   Result Value Ref Range    Glucose 107 (H) 74 - 99 mg/dL    Sodium 131 (L) 136 - 145 mmol/L    Potassium 3.7 3.5 - 5.3 mmol/L    Chloride 96 (L) 98 - 107 mmol/L    Bicarbonate 27 21 - 32 mmol/L    Anion Gap 12 10 - 20 mmol/L    Urea Nitrogen 14 6 - 23 mg/dL    Creatinine 0.64 0.50 - 1.30 mg/dL    eGFR >90 >60 mL/min/1.73m*2    Calcium 7.8 (L) 8.6 - 10.3 mg/dL    Albumin 2.5 (L) 3.4 - 5.0 g/dL    Alkaline Phosphatase 109 33 - 136 U/L    Total Protein 6.2 (L) 6.4 - 8.2 g/dL    AST 19 9 - 39 U/L    Bilirubin, Total 0.3 0.0 - 1.2 mg/dL    ALT 13 10 - 52 U/L   Magnesium   Result Value Ref Range    Magnesium 1.81 1.60 - 2.40 mg/dL   Phosphorus   Result Value Ref Range    Phosphorus 3.3 2.5 - 4.9 mg/dL       Assessment/Plan   84m with HTN, HLD, MDD, CAD, frequent falls, dementia, afib, hard of hearing, HFpEF 50% 2/2024 who presents from home after leaving [facility]. Brought in by EMS after a fall. Treating for acute PNA failed outpatient and pain  control including for rib fxs.     Acute Medical Issues     #acute hypoxic respiratory failure  #concern for L community associated PNA failed outpatient   #hx recent acute R rib fxs s/p fall   -CT chest w/ contrast: small simple left pleural effusion and LLL atelectasis  -ceftriaxone (6/28-) azithro 3d, pna labs negative, blood cultures in progress  -pain regimen for acute pain. Spirometry, airway clearance.  -PO abx on discharge    #Sepsis  - Patient has had persistent leukocytosis since admission, however the WBC count increased from 11.8 to 18.6 this AM. Patient also remaining persistently hypotensive, unclear reason why  - Continuing ceftriaxone and azithromycin for now, only likely source is pneumonia  - Blood cultures obtained 6/28 negative so far. Legionella and strep negative  - CT chest obtained in ED negative for infectious etiology  - Ordered CT abdomen/pelvis w/ contrast to further assess a potential etiology of sepsis.  - ID consulted, appreciate recs     #LLE wound and other chronic wounds   #chronic BLE pain   #chronic neuropathy and recurrent falls   #MDD   -Wound onboard  -Podiatry onboard  -Xray left foot no acute bony abnormality  -duloxetine 60 daily, Lexapro 5mg daily  -pt did not complete acute rehab  -orthostats positive  -AM cortisol and ACTH are pending  -PT/OT recommends mod intensity     # Orthostatic hypotension, hyponatremia  # History of HTN   - Discontinue lisinopril 10 and metoprolol   - Will give another 1L LR today  - Pending cortisol and ACTH  -Would start Florinef rather than midodrine to avoid acute urinary retention     Chronic Medical Issues     #CAD - Hold asa for now   #Afib w/ frequent falls - pt reports he has been given Eliquis at [facility]. Pt still needs eval for Watchman procedure outpatient. Holding metoprolol due to hypotension  #HFpEF - echo 50% diastolic dysfunction 2/2024. This stay BNP normal   #BLE edema - chronic per pt not increased recently.         Fluids: PO intake  Nutrition: Adult Regular  VTE prophylaxis: Lovenox  O2: 3L  Code Status: full code     Disposition: 84 year old male admitted for acute hypoxic respiratory failure and sepsis of unclear etiology. Continuing ceftriaxone and azithromycin, fluid resuscitation. ID consulted. Ordered CT abdomen/pelvis.    Alexy Perez MD

## 2024-06-30 NOTE — CONSULTS
Consults  Referred by HARISH Abreu MD: Babita Blackburn MD    Reason For Consult  sepsis    History Of Present Illness  Chao Thao is a 84 y.o. male, .hxof HTN, hx of AF, hx of CAD, hx of CHF, hx of hearing loss, hx of alcohol abuse, hx of recurrent falls, hx of recent rib fracture, he was readmitted after discharge from SNF for a fall, he was noted to be hypotensive, intermittent tachycardia, the WBC is trending up, he has been on Azithromycin and Rocephin for possible pneumonia, the CT with small effusion / atelectasis, the BC is negative, the hx from the patient is limited, no fever, has some exertional dyspnea, some chest pain, no cough, no emesis or diarrhea, no dysuria, no rash     Past Medical History  He has a past medical history of Alcohol abuse, 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    Surgical History  He has a past surgical history that includes Inner ear surgery (01/06/2016); Other surgical history (03/16/2020); and Cardiac catheterization (05/14/2018).     Social History     Occupational History    Not on file   Tobacco Use    Smoking status: Former     Types: Cigarettes    Smokeless tobacco: Never   Vaping Use    Vaping status: Never Used   Substance and Sexual Activity    Alcohol use: Not Currently    Drug use: Never    Sexual activity: Not on file     Travel History   Travel since 05/30/24    No documented travel since 05/30/24       Family History  No family history on file., no immunodeficiency  Allergies  Gabapentin and Penicillin     Immunization History   Administered Date(s)  Administered    Influenza, High Dose Seasonal, Preservative Free 09/18/2019    Pneumococcal conjugate vaccine, 13-valent (PREVNAR 13) 08/08/2018    Tdap vaccine, age 7 year and older (BOOSTRIX, ADACEL) 06/11/2024    Zoster vaccine, recombinant, adult (SHINGRIX) 03/16/2020    Zoster, live 03/16/2020   Pneumonia vaccine is up to date  Burt fall risk 75, preventive protocol was iimplemented  Depression screen is negative    Medications  Home medications:  Medications Prior to Admission   Medication Sig Dispense Refill Last Dose    ondansetron (Zofran) 4 mg tablet Take 1 tablet (4 mg) by mouth every 8 hours if needed.       acetaminophen (Tylenol) 500 mg tablet Take 2 tablets (1,000 mg) by mouth every 8 hours. (Patient not taking: Reported on 6/28/2024)   Not Taking    apixaban (Eliquis) 5 mg tablet Take 1 tablet (5 mg) by mouth 2 times a day.       ascorbic acid (Vitamin C) 500 mg tablet Take 1 tablet (500 mg) by mouth once daily.       aspirin 81 mg EC tablet Take 1 tablet (81 mg) by mouth once daily.       cholecalciferol (Vitamin D-3) 25 MCG (1000 UT) capsule Take 1 capsule (25 mcg) by mouth once daily.       cyanocobalamin (Vitamin B-12) 250 mcg tablet Take 1 tablet (250 mcg) by mouth once daily.       DULoxetine (Cymbalta) 60 mg DR capsule TAKE 1 CAPSULE BY MOUTH EVERY DAY 90 capsule 3     escitalopram (Lexapro) 5 mg tablet Take 1 tablet (5 mg) by mouth once daily at bedtime. (Patient not taking: Reported on 6/28/2024) 90 tablet 3 Not Taking    lidocaine 4 % patch Place 1 patch over 12 hours on the skin once daily. Remove & discard patch within 12 hours or as directed by MD.       lisinopril 10 mg tablet Take 0.5 tablets (5 mg) by mouth once daily. HOLD THIS MEDICATION FOR NOW (6/12/24) UNTIL MONITOR BP FOR A WEEK 15 tablet 0     metoprolol succinate XL (Toprol-XL) 50 mg 24 hr tablet TAKE 1 TABLET BY MOUTH EVERY DAY 90 tablet 3     simvastatin (Zocor) 40 mg tablet TAKE 1 TABLET BY MOUTH EVERY DAY IN THE EVENING  90 tablet 3     zinc acetate 50 mg (zinc) capsule Take 1 capsule by mouth once daily.       zinc oxide 40 % ointment ointment Apply 1 Application topically 2 times a day.        Current medications:  Scheduled medications  cefTRIAXone, 1 g, intravenous, q24h  DULoxetine, 60 mg, oral, Daily  enoxaparin, 40 mg, subcutaneous, q24h  escitalopram, 5 mg, oral, Nightly  lactated Ringer's, 1,000 mL, intravenous, Once  psyllium, 1 packet, oral, Daily  simvastatin, 40 mg, oral, q PM  zinc oxide, 1 Application, Topical, TID      Continuous medications     PRN medications  PRN medications: acetaminophen, lidocaine, oxyCODONE, oxyCODONE, oxygen    Review of Systems   All other systems reviewed and are negative.       Objective  Range of Vitals (last 24 hours)  Heart Rate:  [82-88]   Temp:  [36.7 °C (98.1 °F)-37.1 °C (98.8 °F)]   Resp:  [18-20]   BP: (100-106)/(59-72)   Weight:  [81.9 kg (180 lb 8.9 oz)]   SpO2:  [94 %-96 %]   Daily Weight  06/30/24 : 81.9 kg (180 lb 8.9 oz)    Body mass index is 29.16 kg/m².   Nutritional consult    Physical Exam  Constitutional:       Appearance: Normal appearance.   HENT:      Head: Normocephalic and atraumatic.      Mouth/Throat:      Mouth: Mucous membranes are moist.      Pharynx: Oropharynx is clear.   Eyes:      Pupils: Pupils are equal, round, and reactive to light.   Cardiovascular:      Rate and Rhythm: Normal rate and regular rhythm.      Heart sounds: Normal heart sounds.   Pulmonary:      Effort: Pulmonary effort is normal.      Breath sounds: Rales present.   Abdominal:      General: Abdomen is flat. Bowel sounds are normal.      Palpations: Abdomen is soft.   Musculoskeletal:         General: Swelling present.      Cervical back: Normal range of motion.      Comments: Lt 2nd toe wound, some redness   Neurological:      Mental Status: He is alert.          Relevant Results  Outside Hospital Results  reviewed  Labs  Results from last 72 hours   Lab Units 06/30/24  2358  "06/29/24  0533 06/28/24  0552 06/28/24  0032   WBC AUTO x10*3/uL 18.6* 11.8* 12.8* 17.6*   HEMOGLOBIN g/dL 9.0* 8.8* 9.3* 10.6*   HEMATOCRIT % 28.5* 27.7* 29.1* 32.5*   PLATELETS AUTO x10*3/uL 467* 485* 530* 585*   NEUTROS PCT AUTO %  --   --   --  87.3   LYMPHS PCT AUTO %  --   --   --  6.0   MONOS PCT AUTO %  --   --   --  5.1   EOS PCT AUTO %  --   --   --  0.1     Results from last 72 hours   Lab Units 06/30/24 0544 06/29/24  0533 06/28/24  0552   SODIUM mmol/L 131* 132* 129*   POTASSIUM mmol/L 3.7 4.1 4.0   CHLORIDE mmol/L 96* 98 95*   CO2 mmol/L 27 27 27   BUN mg/dL 14 18 21   CREATININE mg/dL 0.64 0.75 0.86   GLUCOSE mg/dL 107* 93 118*   CALCIUM mg/dL 7.8* 7.8* 8.0*   ANION GAP mmol/L 12 11 11   EGFR mL/min/1.73m*2 >90 90 86   PHOSPHORUS mg/dL 3.3 3.2 3.6     Results from last 72 hours   Lab Units 06/30/24  0544 06/29/24  0533 06/28/24  0552   ALK PHOS U/L 109 106 118   BILIRUBIN TOTAL mg/dL 0.3 0.2 0.2   PROTEIN TOTAL g/dL 6.2* 5.9* 6.4   ALT U/L 13 14 13   AST U/L 19 21 15   ALBUMIN g/dL 2.5* 2.4* 2.7*     Estimated Creatinine Clearance: 89.4 mL/min (by C-G formula based on SCr of 0.64 mg/dL).  Sedimentation Rate   Date Value Ref Range Status   05/26/2020 46 (H) 0 - 20 mm/h Final     No results found for: \"HIV1X2\", \"HIVCONF\", \"TESMTT1OH\"  No results found for: \"HEPCABINIT\", \"HEPCAB\", \"HCVPCRQUANT\"  Microbiology  Reviewed  Imaging  Reviewed       Assessment/Plan   Possible sepsis  Respiratory failure / rib fracture / atelectasis / effusion / possible pneumonia  Left 2nd toe wound  Leukocytosis    Recommendations :  Continue Rocephin  Discontinue Azithromycin  Add Vancomycin  Repeat BC  Follow the WBC  Chest PT / incentive spirometry  Wound care    I spent minutes in the professional and overall care of this patient.      Wale Townsend MD  "

## 2024-07-01 LAB
ALBUMIN SERPL BCP-MCNC: 2.3 G/DL (ref 3.4–5)
ALP SERPL-CCNC: 103 U/L (ref 33–136)
ALT SERPL W P-5'-P-CCNC: 16 U/L (ref 10–52)
ANION GAP SERPL CALC-SCNC: 9 MMOL/L (ref 10–20)
AST SERPL W P-5'-P-CCNC: 25 U/L (ref 9–39)
BASOPHILS # BLD AUTO: 0.05 X10*3/UL (ref 0–0.1)
BASOPHILS NFR BLD AUTO: 0.3 %
BILIRUB SERPL-MCNC: 0.2 MG/DL (ref 0–1.2)
BUN SERPL-MCNC: 17 MG/DL (ref 6–23)
CALCIUM SERPL-MCNC: 7.5 MG/DL (ref 8.6–10.3)
CHLORIDE SERPL-SCNC: 96 MMOL/L (ref 98–107)
CO2 SERPL-SCNC: 29 MMOL/L (ref 21–32)
CREAT SERPL-MCNC: 0.65 MG/DL (ref 0.5–1.3)
EGFRCR SERPLBLD CKD-EPI 2021: >90 ML/MIN/1.73M*2
EOSINOPHIL # BLD AUTO: 0.15 X10*3/UL (ref 0–0.4)
EOSINOPHIL NFR BLD AUTO: 1 %
ERYTHROCYTE [DISTWIDTH] IN BLOOD BY AUTOMATED COUNT: 12.4 % (ref 11.5–14.5)
GLUCOSE SERPL-MCNC: 108 MG/DL (ref 74–99)
HCT VFR BLD AUTO: 28.1 % (ref 41–52)
HGB BLD-MCNC: 8.9 G/DL (ref 13.5–17.5)
IMM GRANULOCYTES # BLD AUTO: 0.16 X10*3/UL (ref 0–0.5)
IMM GRANULOCYTES NFR BLD AUTO: 1.1 % (ref 0–0.9)
LYMPHOCYTES # BLD AUTO: 1.09 X10*3/UL (ref 0.8–3)
LYMPHOCYTES NFR BLD AUTO: 7.5 %
MAGNESIUM SERPL-MCNC: 1.74 MG/DL (ref 1.6–2.4)
MCH RBC QN AUTO: 31.6 PG (ref 26–34)
MCHC RBC AUTO-ENTMCNC: 31.7 G/DL (ref 32–36)
MCV RBC AUTO: 100 FL (ref 80–100)
MONOCYTES # BLD AUTO: 0.92 X10*3/UL (ref 0.05–0.8)
MONOCYTES NFR BLD AUTO: 6.3 %
NEUTROPHILS # BLD AUTO: 12.14 X10*3/UL (ref 1.6–5.5)
NEUTROPHILS NFR BLD AUTO: 83.8 %
NRBC BLD-RTO: 0 /100 WBCS (ref 0–0)
PHOSPHATE SERPL-MCNC: 3.1 MG/DL (ref 2.5–4.9)
PLATELET # BLD AUTO: 463 X10*3/UL (ref 150–450)
POTASSIUM SERPL-SCNC: 3.7 MMOL/L (ref 3.5–5.3)
PROCALCITONIN SERPL-MCNC: 0.19 NG/ML
PROT SERPL-MCNC: 5.8 G/DL (ref 6.4–8.2)
RBC # BLD AUTO: 2.82 X10*6/UL (ref 4.5–5.9)
SODIUM SERPL-SCNC: 130 MMOL/L (ref 136–145)
WBC # BLD AUTO: 14.5 X10*3/UL (ref 4.4–11.3)

## 2024-07-01 PROCEDURE — 80053 COMPREHEN METABOLIC PANEL: CPT

## 2024-07-01 PROCEDURE — 2500000004 HC RX 250 GENERAL PHARMACY W/ HCPCS (ALT 636 FOR OP/ED): Performed by: STUDENT IN AN ORGANIZED HEALTH CARE EDUCATION/TRAINING PROGRAM

## 2024-07-01 PROCEDURE — 2500000004 HC RX 250 GENERAL PHARMACY W/ HCPCS (ALT 636 FOR OP/ED): Performed by: INTERNAL MEDICINE

## 2024-07-01 PROCEDURE — 83735 ASSAY OF MAGNESIUM: CPT

## 2024-07-01 PROCEDURE — 2500000004 HC RX 250 GENERAL PHARMACY W/ HCPCS (ALT 636 FOR OP/ED)

## 2024-07-01 PROCEDURE — 97110 THERAPEUTIC EXERCISES: CPT | Mod: GP,CQ

## 2024-07-01 PROCEDURE — 2500000001 HC RX 250 WO HCPCS SELF ADMINISTERED DRUGS (ALT 637 FOR MEDICARE OP)

## 2024-07-01 PROCEDURE — 2500000005 HC RX 250 GENERAL PHARMACY W/O HCPCS: Performed by: INTERNAL MEDICINE

## 2024-07-01 PROCEDURE — 87040 BLOOD CULTURE FOR BACTERIA: CPT | Mod: GEALAB | Performed by: INTERNAL MEDICINE

## 2024-07-01 PROCEDURE — 97535 SELF CARE MNGMENT TRAINING: CPT | Mod: GO,CO

## 2024-07-01 PROCEDURE — 99233 SBSQ HOSP IP/OBS HIGH 50: CPT | Performed by: STUDENT IN AN ORGANIZED HEALTH CARE EDUCATION/TRAINING PROGRAM

## 2024-07-01 PROCEDURE — 85025 COMPLETE CBC W/AUTO DIFF WBC: CPT

## 2024-07-01 PROCEDURE — 97530 THERAPEUTIC ACTIVITIES: CPT | Mod: GP,CQ

## 2024-07-01 PROCEDURE — 2500000001 HC RX 250 WO HCPCS SELF ADMINISTERED DRUGS (ALT 637 FOR MEDICARE OP): Performed by: STUDENT IN AN ORGANIZED HEALTH CARE EDUCATION/TRAINING PROGRAM

## 2024-07-01 PROCEDURE — 36415 COLL VENOUS BLD VENIPUNCTURE: CPT

## 2024-07-01 PROCEDURE — 36415 COLL VENOUS BLD VENIPUNCTURE: CPT | Performed by: INTERNAL MEDICINE

## 2024-07-01 PROCEDURE — 2500000002 HC RX 250 W HCPCS SELF ADMINISTERED DRUGS (ALT 637 FOR MEDICARE OP, ALT 636 FOR OP/ED)

## 2024-07-01 PROCEDURE — 84100 ASSAY OF PHOSPHORUS: CPT

## 2024-07-01 PROCEDURE — 1200000002 HC GENERAL ROOM WITH TELEMETRY DAILY

## 2024-07-01 RX ORDER — VANCOMYCIN HYDROCHLORIDE 1 G/200ML
1000 INJECTION, SOLUTION INTRAVENOUS EVERY 12 HOURS
Status: DISCONTINUED | OUTPATIENT
Start: 2024-07-01 | End: 2024-07-02

## 2024-07-01 RX ORDER — IBUPROFEN 200 MG
1 TABLET ORAL DAILY
COMMUNITY

## 2024-07-01 RX ORDER — LANOLIN ALCOHOL/MO/W.PET/CERES
400 CREAM (GRAM) TOPICAL ONCE
Status: COMPLETED | OUTPATIENT
Start: 2024-07-01 | End: 2024-07-01

## 2024-07-01 RX ORDER — POTASSIUM CHLORIDE 1.5 G/1.58G
20 POWDER, FOR SOLUTION ORAL ONCE
Status: COMPLETED | OUTPATIENT
Start: 2024-07-01 | End: 2024-07-01

## 2024-07-01 RX ORDER — FLUDROCORTISONE ACETATE 0.1 MG/1
0.05 TABLET ORAL DAILY
Status: DISCONTINUED | OUTPATIENT
Start: 2024-07-01 | End: 2024-07-02 | Stop reason: HOSPADM

## 2024-07-01 RX ORDER — DOXYLAMINE SUCCINATE 25 MG
1 TABLET ORAL 3 TIMES DAILY
COMMUNITY

## 2024-07-01 RX ORDER — VANCOMYCIN HYDROCHLORIDE 1 G/20ML
INJECTION, POWDER, LYOPHILIZED, FOR SOLUTION INTRAVENOUS DAILY PRN
Status: DISCONTINUED | OUTPATIENT
Start: 2024-07-01 | End: 2024-07-02 | Stop reason: HOSPADM

## 2024-07-01 ASSESSMENT — COGNITIVE AND FUNCTIONAL STATUS - GENERAL
MOVING FROM LYING ON BACK TO SITTING ON SIDE OF FLAT BED WITH BEDRAILS: A LOT
TOILETING: A LOT
TOILETING: A LITTLE
CLIMB 3 TO 5 STEPS WITH RAILING: TOTAL
DAILY ACTIVITIY SCORE: 15
DRESSING REGULAR UPPER BODY CLOTHING: A LITTLE
TOILETING: A LOT
MOVING FROM LYING ON BACK TO SITTING ON SIDE OF FLAT BED WITH BEDRAILS: A LOT
STANDING UP FROM CHAIR USING ARMS: A LOT
HELP NEEDED FOR BATHING: A LOT
DRESSING REGULAR UPPER BODY CLOTHING: A LITTLE
WALKING IN HOSPITAL ROOM: TOTAL
WALKING IN HOSPITAL ROOM: TOTAL
MOVING TO AND FROM BED TO CHAIR: A LOT
DRESSING REGULAR LOWER BODY CLOTHING: A LOT
PERSONAL GROOMING: A LITTLE
MOVING FROM LYING ON BACK TO SITTING ON SIDE OF FLAT BED WITH BEDRAILS: A LOT
TURNING FROM BACK TO SIDE WHILE IN FLAT BAD: A LOT
PERSONAL GROOMING: A LITTLE
MOVING TO AND FROM BED TO CHAIR: A LOT
EATING MEALS: A LITTLE
MOBILITY SCORE: 10
CLIMB 3 TO 5 STEPS WITH RAILING: TOTAL
CLIMB 3 TO 5 STEPS WITH RAILING: TOTAL
PERSONAL GROOMING: A LITTLE
TURNING FROM BACK TO SIDE WHILE IN FLAT BAD: A LOT
HELP NEEDED FOR BATHING: A LOT
EATING MEALS: A LITTLE
TURNING FROM BACK TO SIDE WHILE IN FLAT BAD: A LOT
WALKING IN HOSPITAL ROOM: TOTAL
DRESSING REGULAR LOWER BODY CLOTHING: A LOT
DAILY ACTIVITIY SCORE: 14
STANDING UP FROM CHAIR USING ARMS: A LOT
MOBILITY SCORE: 10
HELP NEEDED FOR BATHING: A LOT
MOVING TO AND FROM BED TO CHAIR: A LOT
DAILY ACTIVITIY SCORE: 16
DRESSING REGULAR UPPER BODY CLOTHING: A LITTLE
DRESSING REGULAR LOWER BODY CLOTHING: TOTAL
EATING MEALS: A LITTLE
MOBILITY SCORE: 10
STANDING UP FROM CHAIR USING ARMS: A LOT

## 2024-07-01 ASSESSMENT — PAIN - FUNCTIONAL ASSESSMENT
PAIN_FUNCTIONAL_ASSESSMENT: 0-10

## 2024-07-01 ASSESSMENT — PAIN SCALES - GENERAL
PAINLEVEL_OUTOF10: 0 - NO PAIN

## 2024-07-01 ASSESSMENT — ACTIVITIES OF DAILY LIVING (ADL): HOME_MANAGEMENT_TIME_ENTRY: 10

## 2024-07-01 NOTE — CONSULTS
Vancomycin Dosing by Pharmacy- INITIAL    Chao Thao is a 84 y.o. year old male who Pharmacy has been consulted for vancomycin dosing for cellulitis, skin and soft tissue. Based on the patient's indication and renal status this patient will be dosed based on a goal AUC of 400-600.     Renal function is currently stable.    Visit Vitals  /71 (BP Location: Left arm, Patient Position: Lying)   Pulse 82   Temp 36.2 °C (97.2 °F) (Temporal)   Resp 18        Lab Results   Component Value Date    CREATININE 0.64 2024    CREATININE 0.75 2024    CREATININE 0.86 2024    CREATININE 0.98 2024        Patient weight is as follows:   Vitals:    24 0620   Weight: 83.4 kg (183 lb 12.8 oz)       Cultures:  No results found for the encounter in last 14 days.        I/O last 3 completed shifts:  In: 1960 (23.5 mL/kg) [P.O.:360; IV Piggyback:1600]  Out: 1000 (12 mL/kg) [Urine:1000 (0.3 mL/kg/hr)]  Weight: 83.4 kg   I/O during current shift:  No intake/output data recorded.    Temp (24hrs), Av.8 °C (98.2 °F), Min:36.2 °C (97.2 °F), Max:37.1 °C (98.8 °F)         Assessment/Plan     Patient has already been given a loading dose of 2000 mg given  @ 0020.   Will initiate vancomycin maintenance,  1000 mg every 12 hours.    This dosing regimen is predicted by [facility] #[email or web address] to result in the following pharmacokinetic parameters:    Regimen: 1000 mg IV every 12 hours.  Start time: 12:20 on 2024  Exposure target: AUC24 (range)400-600 mg/L.hr   AUC24,ss: 492 mg/L.hr  Probability of AUC24 > 400: 71 %  Ctrough,ss: 15.5 mg/L  Probability of Ctrough,ss > 20: 30 %  Probability of nephrotoxicity (Lodise VITO ): 11 %      Follow-up level will be ordered on  at 0500 unless clinically indicated sooner.  Will continue to monitor renal function daily while on vancomycin and order serum creatinine at least every 48 hours if not already ordered.  Follow for continued vancomycin needs, clinical response, and  signs/symptoms of toxicity.     Elie Dove, PharmD, BCPS, BCCCP  Clinical Pharmacy Specialist- Internal Medicine  Available via EPIC Secure Chat  Phone: [phone]

## 2024-07-01 NOTE — PROGRESS NOTES
Vancomycin Dosing by Pharmacy- INITIAL    Chao Thao is a 84 y.o. year old male who Pharmacy has been consulted for vancomycin dosing for pneumonia. Based on the patient's indication and renal status this patient will be dosed based on a goal AUC of 400-600.     Renal function is currently stable.    Visit Vitals  BP 94/61   Pulse 96   Temp 37.1 °C (98.8 °F)   Resp 22        Lab Results   Component Value Date    CREATININE 0.64 2024    CREATININE 0.75 2024    CREATININE 0.86 2024    CREATININE 0.98 2024        Patient weight is as follows:   Vitals:    24 0500   Weight: 81.9 kg (180 lb 8.9 oz)       Cultures:  No results found for the encounter in last 14 days.        I/O last 3 completed shifts:  In: 1191.7 (14.6 mL/kg) [P.O.:360; IV Piggyback:831.7]  Out: 825 (10.1 mL/kg) [Urine:825 (0.3 mL/kg/hr)]  Weight: 81.9 kg   I/O during current shift:  No intake/output data recorded.    Temp (24hrs), Av.9 °C (98.5 °F), Min:36.7 °C (98.1 °F), Max:37.1 °C (98.8 °F)         Assessment/Plan     Patient will be given a loading dose of 2000 mg.  Will initiate vancomycin maintenance,  1000 mg every 12 hours.  Loading dose: 2000 mg at 22:00 2024.  Regimen: 1000 mg IV every 12 hours.  Start time: 10:00 on 2024  Exposure target: AUC24 (range)400-600 mg/L.hr   AUC24,ss: 499 mg/L.hr  Probability of AUC24 > 400: 72 %  Ctrough,ss: 15.8 mg/L  Probability of Ctrough,ss > 20: 31 %  Probability of nephrotoxicity (Lodise VITO ): 11 %      Follow-up level will be ordered on 2024 at 1st a.m. draw unless clinically indicated sooner.  Will continue to monitor renal function daily while on vancomycin and order serum creatinine at least every 48 hours if not already ordered.  Follow for continued vancomycin needs, clinical response, and signs/symptoms of toxicity.       Carol Amador, PharmD

## 2024-07-01 NOTE — PROGRESS NOTES
Occupational Therapy    Occupational Therapy Treatment    Name: Chao Thao  MRN: [id number]  : 1940  Date: 24  Time Calculation  Start Time: 1438  Stop Time: 1455  Time Calculation (min): 17 min    Assessment:  OT Assessment: Pt presents with decreased endurance, decreased ADL performance, decreased functional mobility. Continued skilled OT recommended to maximize pt safety and independence prior to returning home.  Prognosis: Good  Barriers to Discharge: None  Evaluation/Treatment Tolerance: Patient limited by fatigue  Medical Staff Made Aware: Yes  End of Session Communication: Bedside nurse  End of Session Patient Position: Bed, 3 rail up, Alarm on  Plan:  Treatment Interventions: ADL retraining, Functional transfer training, Endurance training, UE strengthening/ROM, Compensatory technique education  OT Frequency: 3 times per week  OT Discharge Recommendations: Moderate intensity level of continued care  Equipment Recommended upon Discharge: Wheeled walker  OT Recommended Transfer Status: Assist of 1  OT - OK to Discharge: Yes (In accord with OT POC)    Subjective   Previous Visit Info:  OT Last Visit  OT Received On: 24  General:  General  Reason for Referral: 85 yo male referred to OT for fall, impaired ADL/safety  Referred By: Tiera Betancur DO  Past Medical History Relevant to Rehab: Alcohol abuse, in remission (2016), Drug abuse (Multi), Edema, unspecified (2019), Encounter for immunization (2016), Pain in left knee (2018), Personal history of other diseases of the nervous system and sense organs (2019), Personal history of other drug therapy (2016), and Tinea pedis (10/10/2018).  Family/Caregiver Present: No  Prior to Session Communication: Bedside nurse  Patient Position Received: Bed, 3 rail up, Alarm on  Preferred Learning Style: verbal, visual  General Comment: Pt pleasantly confused and cooperative with treatment.  Precautions:  Hearing/Visual  Limitations: very [place]  Medical Precautions: Fall precautions (Telemetry, male purewick external catheter.)    Pain Assessment:  Pain Assessment  Pain Assessment: 0-10  0-10 (Numeric) Pain Score: 0 - No pain     Objective   Cognition:  Overall Cognitive Status: Within Functional Limits  Orientation Level: Disoriented to situation, Disoriented to time  Activities of Daily Living: LE Dressing  LE Dressing: Yes  Sock Level of Assistance: Dependent  Shoe Level of Assistance: Setup, Dependent (Post-op shoe left foot dependent for positioning and for velcro straps)  LE Dressing Where Assessed: Edge of bed  LE Dressing Comments: Pt required Mod Assist for sitting balance in course of donning socks and post-op shoe     Bed Mobility/Transfers: Bed Mobility  Bed Mobility: Yes  Bed Mobility 1  Bed Mobility 1: Supine to sitting, Sitting to supine, Scooting  Level of Assistance 1: Moderate assistance  Bed Mobility Comments 1: Increased time with single step verbal and tactile cues.  Bed Mobility 2  Bed Mobility  2: Rolling right, Rolling left  Level of Assistance 2: Maximum assistance  Bed Mobility 3  Bed Mobility 3: Sitting to supine  Level of Assistance 3: Moderate assistance  Bed Mobility Comments 3: Assist needed for returning both legs to bed surface.and trunk.    Transfers  Transfer: Yes  Transfer 1  Transfer From 1: Bed to  Transfer to 1: Stand  Technique 1: Sit to stand, Stand to sit  Transfer Device 1: Walker  Transfer Level of Assistance 1: Maximum assistance  Trials/Comments 1: x3 STS  Transfers 2  Transfer From 2: Stand to  Transfer to 2: Bed  Technique 2: Lateral  Transfer Device 2: Walker  Transfer Level of Assistance 2: Maximum assistance  Trials/Comments 2: x2 reps    Sitting Balance:  Static Sitting Balance  Static Sitting-Balance Support: Feet supported  Static Sitting-Level of Assistance: Contact guard  Dynamic Sitting Balance  Dynamic Sitting-Balance Support: Bilateral upper extremity supported, Feet  supported  Dynamic Sitting-Balance: Trunk control activities, Forward lean  Dynamic Sitting-Comments: Sequential reaching.    Standing Balance:  Static Standing Balance  Static Standing-Balance Support: Bilateral upper extremity supported  Static Standing-Level of Assistance: Minimum assistance  Static Standing-Comment/Number of Minutes: x2 attempts ~40 seconds each    Outcome Measures:  [facility] Daily Activity  Putting on and taking off regular lower body clothing: Total  Bathing (including washing, rinsing, drying): A lot  Putting on and taking off regular upper body clothing: A little  Toileting, which includes using toilet, bedpan or urinal: A lot  Taking care of personal grooming such as brushing teeth: A little  Eating Meals: A little  Daily Activity - Total Score: 14    Education Documentation  Body Mechanics, taught by Gerardo Castellanos OT at 7/1/2024  3:19 PM.  Learner: Patient  Readiness: Acceptance  Method: Explanation, Demonstration  Response: Verbalizes Understanding, Needs Reinforcement, Demonstrated Understanding  Comment: Pt compliant to instruction provided in course of session, though follow-through is not anticipated.    Precautions, taught by Gerardo Castellanos OT at 7/1/2024  3:19 PM.  Learner: Patient  Readiness: Acceptance  Method: Explanation, Demonstration  Response: Verbalizes Understanding, Needs Reinforcement, Demonstrated Understanding  Comment: Pt compliant to instruction provided in course of session, though follow-through is not anticipated.    ADL Training, taught by Gerardo Castellanos OT at 7/1/2024  3:19 PM.  Learner: Patient  Readiness: Acceptance  Method: Explanation, Demonstration  Response: Verbalizes Understanding, Needs Reinforcement, Demonstrated Understanding  Comment: Pt compliant to instruction provided in course of session, though follow-through is not anticipated.    Education Comments  Reinforcement of education is indicated.    Goals:  Encounter Problems       Encounter Problems  (Active)       OT Goals       Pt will complete ircr-gz-yhan transfers using LRD in preparation for ADLs with CGA (Progressing)       Start:  06/29/24    Expected End:  07/13/24            Pt will increase endurance to tolerate 15min of OOB activity with no more than 1 rest break in order to increase ability to engage in ADL completion.  (Progressing)       Start:  06/29/24    Expected End:  07/13/24            Pt will tolerate 10min stand during functional task completion with no more than 1 rest break in order to increase endurance for functional task completion.  (Progressing)       Start:  06/29/24    Expected End:  07/13/24            Pt will demo LE ADL completion with Min A, using AE if needed.  (Progressing)       Start:  06/29/24    Expected End:  07/13/24            Pt will demo and/or verbalize 2-3 energy conservation techniques to incorporate into functional mobility or ADL to improve performance and increase independence.  (Progressing)       Start:  06/29/24    Expected End:  07/13/24

## 2024-07-01 NOTE — PROGRESS NOTES
07/01/24 0917   Discharge Planning   Living Arrangements Other (Comment)  (From [facility])   Support Systems Spouse/significant other   Assistance Needed Alert and oriented x 3; very [place]. Needs assistance with ADLs. Ambulates with a Cane or Walker, Doesn't drive   Type of Residence Skilled nursing facility   Number of Stairs to Enter Residence 0   Number of Stairs Within Residence 0   Do you have animals or pets at home? Yes   Type of Animals or Pets dog   Who is requesting discharge planning? Provider   Home or Post Acute Services Post acute facilities (Rehab/SNF/etc)   Type of Post Acute Facility Services Skilled nursing   Patient expects to be discharged to: Spouse does not want patient to return to [facility] and wouold like [facility] or [facility], referrals sent, awaiting acceptance.   Does the patient need discharge transport arranged? Yes   RoundTrip coordination needed? Yes   Has discharge transport been arranged? No   Patient Choice   Provider Choice list and CMS website (https://medicare.gov/care-compare#search) for post-acute Quality and Resource Measure Data were provided and reviewed with: Family   Patient / Family choosing to utilize agency / facility established prior to hospitalization No

## 2024-07-01 NOTE — PROGRESS NOTES
Physical Therapy    Physical Therapy Treatment    Patient Name: Chao Thao  MRN: [id number]  Today's Date: 7/1/2024  Time Calculation  Start Time: 1337  Stop Time: 1416  Time Calculation (min): 39 min    Assessment/Plan   PT Assessment  PT Assessment Results: Decreased strength, Decreased endurance, Impaired balance, Decreased mobility  Evaluation/Treatment Tolerance: Patient limited by fatigue  Barriers to Participation: Comorbidities  End of Session Communication: Bedside nurse  End of Session Patient Position: Bed, 3 rail up, Alarm on     PT Plan  Treatment/Interventions: Bed mobility, Transfer training, Gait training, Strengthening, Endurance training, Therapeutic exercise, Therapeutic activity, Home exercise program  PT Plan: Ongoing PT  PT Frequency: 3 times per week  PT Discharge Recommendations: Moderate intensity level of continued care  Equipment Recommended upon Discharge: Wheeled walker  PT Recommended Transfer Status: Assist x2  PT - OK to Discharge: Yes      General Visit Information:   PT  Visit  PT Received On: 07/01/24  General  Family/Caregiver Present: No  Prior to Session Communication: Bedside nurse  Patient Position Received: Bed, 3 rail up, Alarm on  Preferred Learning Style: verbal, visual  General Comment: Pleasant and cooperative (Appears slightly confused. Patient is [place])    Subjective   Precautions:  Precautions  Hearing/Visual Limitations: very [place]  Medical Precautions: Fall precautions  Precautions Comment: Tele, Purewick, 3L O2 NC  Vital Signs:       Objective   Pain:  Pain Assessment  Pain Assessment: 0-10  0-10 (Numeric) Pain Score: 0 - No pain  Cognition:  Cognition  Overall Cognitive Status: Within Functional Limits  Orientation Level: Disoriented to place, Disoriented to time  Coordination:     Postural Control:  Postural Control  Postural Control: Impaired  Trunk Control: B UE support to maintain upright seated position at EOB (Patient presents with retrolean and slight L sided  lean)  Static Sitting Balance  Static Sitting-Balance Support: Bilateral upper extremity supported  Static Sitting-Level of Assistance: Minimum assistance, Moderate assistance  Static Sitting-Comment/Number of Minutes: Max time ten minutes w/ VC to maintain upright seated position  Dynamic Sitting Balance  Dynamic Sitting-Balance Support: Bilateral upper extremity supported  Dynamic Sitting-Balance: Lateral lean  Extremity/Trunk Assessments:     Activity Tolerance:     Treatments:  Therapeutic Exercise  Therapeutic Exercise Performed: Yes  Therapeutic Exercise Activity 1: Seated ther ex x 10-15 B UE using EOB and bedrail (HR/TR, ABD/ADD, Hip flex, Heal slide, LAQ)         Bed Mobility  Bed Mobility: Yes  Bed Mobility 1  Bed Mobility 1: Supine to sitting, Sitting to supine  Level of Assistance 1: Maximum assistance  Bed Mobility Comments 1: Increased time to complete using single step by step instructions for hand placement  Bed Mobility 2  Bed Mobility  2: Scooting  Level of Assistance 2: Maximum assistance  Bed Mobility Comments 2: Patient scooted to EOB seated at EOB. Extended time to complete. x 5 trials.  Bed Mobility 3  Bed Mobility 3: Scooting  Level of Assistance 3: Maximum assistance, +2  Bed Mobility Comments 3: Draw sheet used. Patient supine and unable to assist.       Transfers  Transfer: No (Attempted several trials for Sit<>Stand. Patient able to lift buttocks off of bed but unable to complete standing)         Outcome Measures:  [facility] Basic Mobility  Turning from your back to your side while in a flat bed without using bedrails: A lot  Moving from lying on your back to sitting on the side of a flat bed without using bedrails: A lot  Moving to and from bed to chair (including a wheelchair): A lot  Standing up from a chair using your arms (e.g. wheelchair or bedside chair): A lot  To walk in hospital room: Total  Climbing 3-5 steps with railing: Total  Basic Mobility - Total Score: 10    Education  Documentation  Precautions, taught by Carmen Croft PTA at 7/1/2024  3:41 PM.  Learner: Patient  Readiness: Acceptance  Method: Explanation, Demonstration  Response: Needs Reinforcement    Body Mechanics, taught by Carmen Croft PTA at 7/1/2024  3:41 PM.  Learner: Patient  Readiness: Acceptance  Method: Explanation, Demonstration  Response: Needs Reinforcement    Mobility Training, taught by Carmen Croft PTA at 7/1/2024  3:41 PM.  Learner: Patient  Readiness: Acceptance  Method: Explanation, Demonstration  Response: Needs Reinforcement    Home Exercise Program, taught by Carmen Croft PTA at 7/1/2024  3:41 PM.  Learner: Patient  Readiness: Acceptance  Method: Explanation, Demonstration  Response: Needs Reinforcement    Education Comments  No comments found.        OP EDUCATION:       Encounter Problems       Encounter Problems (Active)       Mobility       STG - Patient will ambulate at least 10' with FWW, CGA       Start:  06/29/24    Expected End:  07/13/24               PT Transfers       STG - Patient to transfer to and from sit to supine mod I       Start:  06/29/24    Expected End:  07/13/24            STG - Patient will roll mod I       Start:  06/29/24    Expected End:  07/13/24            STG - Patient will transfer sit to and from stand with FWW, minAx1       Start:  06/29/24    Expected End:  07/13/24            Pt will participate in at least 25 minutes of PT treatment with at most 2 seated rest breaks       Start:  06/29/24    Expected End:  07/13/24               Pain - Adult

## 2024-07-01 NOTE — PROGRESS NOTES
07/01/24 1532   Discharge Planning   Patient expects to be discharged to: [facility] bed, [facility] still pending. Referral also sent to [facility] per spouse Karissa request.

## 2024-07-01 NOTE — PROGRESS NOTES
Chao Thao is a 84 y.o. male on day 3 of admission presenting with Acute pneumonia.      Subjective   : Seen and examined today.  Patient has decreased hearing, so it was difficult to assess whether he is altered or not.  Double checked with the nurse.        Objective     Last Recorded Vitals  /71 (BP Location: Left arm, Patient Position: Lying)   Pulse 82   Temp 36.2 °C (97.2 °F) (Temporal)   Resp 18   Wt 83.4 kg (183 lb 12.8 oz)   SpO2 95%   Intake/Output last 3 Shifts:    Intake/Output Summary (Last 24 hours) at 7/1/2024 1333  Last data filed at 7/1/2024 1300  Gross per 24 hour   Intake 2060 ml   Output 500 ml   Net 1560 ml       Admission Weight  Weight: 90.7 kg (200 lb) (06/27/24 2316)    Daily Weight  07/01/24 : 83.4 kg (183 lb 12.8 oz)    Image Results  CT abdomen pelvis w IV contrast  Narrative: Interpreted By:  Benedicto Easton,   STUDY:  CT ABDOMEN PELVIS W IV CONTRAST;  6/30/2024 2:53 pm      INDICATION:  Signs/Symptoms:Persistent leukocytosis and sepsis of unclear etiology.      COMPARISON:  CT abdomen and pelvis 02/02/2024      ACCESSION NUMBER(S):  [id number]      ORDERING CLINICIAN:  KAVITA AMES      TECHNIQUE:  Axial CT of the abdomen and pelvis was performed following  intravenous administration of 75 ml of contrast Omnipaque 350 with  coronal and sagittal reconstruction.      FINDINGS:  Lower chest: New small left and trace right pleural effusions with  adjacent consolidative opacities.      Liver: No mass.      Biliary: No intrahepatic or extrahepatic bile duct dilation.  Cholelithiasis without acute inflammation.      Spleen: No mass. No splenomegaly.      Pancreas: No mass or duct dilation.      Adrenals: No mass.      Kidneys: Bilateral cysts. Additional subcentimeter right renal  lesions too small to characterize likely benign. No definite calculus  or hydronephrosis.      GI tract: No bowel wall thickening or dilation. Normal appendix.  Colonic diverticulosis without acute  inflammation.      Lymph nodes: No abdominopelvic lymphadenopathy.      Mesentery/peritoneum: No free fluid, free air or fluid collection.      Vasculature: Severe aorto bi-iliac atherosclerotic calcifications  without aneurysmal dilation. 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      Pelvis: No free fluid, free air or fluid collection.      Bones/Soft tissues: No acute fracture or abdominal wall soft tissue  abnormalities.Unchanged moderate L2 superior endplate compression  deformity.      Impression: No intra-abdominal fluid collection or other acute abdominopelvic  process.      New small left and trace right pleural effusions with adjacent  consolidative opacities, likely compressive atelectasis; superimposed  infection not excluded.      MACRO:  None      Signed by: Benedicto Easton 7/1/2024 8:11 AM  Dictation workstation:   [id number]      Physical Exam  Constitutional:       Appearance: He is obese.   Cardiovascular:      Rate and Rhythm: Normal rate. Rhythm irregular.      Pulses: Normal pulses.      Heart sounds: Normal heart sounds. No murmur heard.     No friction rub. No gallop.   Pulmonary:      Effort: Pulmonary effort is normal.      Breath sounds: Normal breath sounds. No wheezing, rhonchi or rales.      Comments: Little shallow breathing  Abdominal:      General: Bowel sounds are normal.      Palpations: Abdomen is soft.      Tenderness: There is no abdominal tenderness. There is no guarding or rebound.   Musculoskeletal:      Comments: Chest wall tenderness on right midline and midaxillary line   Neurological:      Mental Status: He is alert. Mental status is at baseline.         Relevant Results               Assessment/Plan      84m with HTN, HLD, MDD, CAD, frequent falls, dementia, afib, hard of hearing, HFpEF 50% 2/2024 who presents from home after leaving [facility]. Brought in by EMS after a fall. Treating for acute PNA failed outpatient and pain control including for  rib fxs.     # Possible sepsis  # Acute hypoxic respiratory failure (resolved)  # Possible CAP/atelectasis  #Rib fracture status post fall  #Left lower extremity (second toe) wound  #Leukocytosis    -Vitally BP in the lower side  -Labs significant for leukocytosis  -Legionella and strep pneumonia and MRSA negative  -BC (6/26) no growth in 2 days    Recommendation  -Continue Vanco and Rocephin  -Please do CBC with differential's tomorrow.      Meghan Blair MD   Internal Medicine  PGY3             Principal Problem:    Acute pneumonia                  Meghan Blair MD        Attending note : the patient was evaluated, the note was reviewed and updated  Possible sepsis  Respiratory failure / rib fracture / atelectasis / effusion / possible pneumonia  Left 2nd toe wound  Leukocytosis  Recommendations :  Continue Rocephin and Vancomycin  Discussed with the medical team     I spent minutes in the professional and overall care of this patient.        Wale Townsend MD

## 2024-07-01 NOTE — CARE PLAN
The patient's goals for the shift include      The clinical goals for the shift include pt will remain safe throughout shift    Pt avss, bed alarm on

## 2024-07-01 NOTE — PROGRESS NOTES
Pharmacy Medication History Review    Chao Thao is a 84 y.o. male admitted for Acute pneumonia. Pharmacy reviewed the patient's tzqta-sp-ycspyvicl medications and allergies for accuracy.    The list below reflectives the updated PTA list. Please review each medication in order reconciliation for additional clarification and justification.  Medications Prior to Admission   Medication Sig Dispense Refill Last Dose    acetaminophen (Tylenol) 500 mg tablet Take 2 tablets (1,000 mg) by mouth every 8 hours.   Past Week    ondansetron (Zofran) 4 mg tablet Take 1 tablet (4 mg) by mouth every 6 hours if needed for nausea or vomiting.       apixaban (Eliquis) 5 mg tablet Take 1 tablet (5 mg) by mouth 2 times a day.       ascorbic acid (Vitamin C) 500 mg tablet Take 1 tablet (500 mg) by mouth once daily.       aspirin 81 mg EC tablet Take 1 tablet (81 mg) by mouth once daily.       cholecalciferol (Vitamin D-3) 25 MCG (1000 UT) capsule Take 1 capsule (25 mcg) by mouth once daily.       cyanocobalamin (Vitamin B-12) 250 mcg tablet Take 1 tablet (250 mcg) by mouth once daily.       DULoxetine (Cymbalta) 60 mg DR capsule TAKE 1 CAPSULE BY MOUTH EVERY DAY 90 capsule 3     lactobacillus acidophilus (Acidophilus) capsule Take 1 capsule by mouth once daily.       lidocaine 4 % patch Place 1 patch over 12 hours on the skin once daily. Remove & discard patch within 12 hours or as directed by MD.       lisinopril 10 mg tablet Take 0.5 tablets (5 mg) by mouth once daily. HOLD THIS MEDICATION FOR NOW (6/12/24) UNTIL MONITOR BP FOR A WEEK 15 tablet 0     metoprolol succinate XL (Toprol-XL) 50 mg 24 hr tablet TAKE 1 TABLET BY MOUTH EVERY DAY 90 tablet 3     miconazole (Micotin) 2 % cream Apply 1 Application topically 3 times a day.       simvastatin (Zocor) 40 mg tablet TAKE 1 TABLET BY MOUTH EVERY DAY IN THE EVENING 90 tablet 3     zinc acetate 50 mg (zinc) capsule Take 1 capsule by mouth once daily.           The list below  reflectives the updated allergy list. Please review each documented allergy for additional clarification and justification.  Allergies  Reviewed by Patrica Valverde RN on 6/27/2024        Severity Reactions Comments    Gabapentin Not Specified Other     Penicillin Not Specified Unknown             Below are additional concerns with the patient's PTA list.  Confirmed medications with facility MAR. Patient no longer takes lexapro.     Elie Dove RPh

## 2024-07-01 NOTE — PROGRESS NOTES
Vancomycin Dosing by Pharmacy- Cessation of Therapy    Consult to pharmacy for vancomycin dosing has been discontinued by the prescriber, pharmacy will sign off at this time.    Please call pharmacy if there are further questions or re-enter a consult if vancomycin is resumed.     Marita Oates, PharmD

## 2024-07-01 NOTE — PROGRESS NOTES
[facility] Internal Medicine Daily Progress Note    Chao Thao is a 84 y.o. male on Hospital Day: 5 of admission presenting with Acute pneumonia.    Subjective     This morning Pt is AO X 3. Saturating 95% On NC 3L.  Denied Fever, dizziness, blurry vision, SOB, chest pain.     Overnight Events: []    No acute OVN events.            Objective   Overnight Vitals  Heart Rate:  [82-89]   Temp:  [36.2 °C (97.2 °F)-37 °C (98.6 °F)]   Resp:  [18]   BP: ()/(56-71)   Weight:  [83.4 kg (183 lb 12.8 oz)]   SpO2:  [95 %-96 %]     Physical Exam   Physical Exam  Constitutional:       General: He is not in acute distress.     Appearance: He is not ill-appearing.   Cardiovascular:      Rate and Rhythm: Normal rate. Rhythm irregular.   Pulmonary:      Effort: Pulmonary effort is normal. No respiratory distress.      Breath sounds: Normal breath sounds.   Abdominal:      General: Abdomen is flat. Bowel sounds are normal.      Palpations: Abdomen is soft.   Musculoskeletal:      Right lower leg: Edema present.      Left lower leg: Edema present.   Neurological:      Mental Status: Mental status is at baseline.           IOs  I/O last 3 completed shifts:  In: 1960 (23.5 mL/kg) [P.O.:360; IV Piggyback:1600]  Out: 1000 (12 mL/kg) [Urine:1000 (0.3 mL/kg/hr)]  Weight: 83.4 kg   No intake/output data recorded.            Labs:   Most recent labs:   Results from last 7 days   Lab Units 06/30/24  0544 06/29/24  0533 06/28/24  0552   WBC AUTO x10*3/uL 18.6* 11.8* 12.8*   HEMOGLOBIN g/dL 9.0* 8.8* 9.3*   HEMATOCRIT % 28.5* 27.7* 29.1*   PLATELETS AUTO x10*3/uL 467* 485* 530*     Results from last 7 days   Lab Units 06/30/24  0544 06/29/24  0533 06/28/24  0552   CO2 mmol/L 27 27 27   ANION GAP mmol/L 12 11 11   BUN mg/dL 14 18 21   CREATININE mg/dL 0.64 0.75 0.86   GLUCOSE mg/dL 107* 93 118*   CALCIUM mg/dL 7.8* 7.8* 8.0*   MAGNESIUM mg/dL 1.81 1.85 1.78   PHOSPHORUS mg/dL 3.3 3.2 3.6      Results from last 7 days   Lab Units  "06/30/24  0544 06/29/24  0533 06/28/24  0552   ALT U/L 13 14 13   AST U/L 19 21 15   ALK PHOS U/L 109 106 118      Results from last 7 days   Lab Units 06/28/24  0552   INR  1.3*     Results from last 7 days   Lab Units 06/28/24  0651   FIO2 % 28     Results from last 7 days   Lab Units 06/28/24  0651   POCT PH, VENOUS pH 7.49*   POCT PCO2, VENOUS mm Hg 33*     Results from last 7 days   Lab Units 06/28/24  0334   LACTATE mmol/L 1.7           Micro/ID:   No results found for the last 90 days.                   No lab exists for component: \"AGALPCRNB\"   Lab Results   Component Value Date    BLOODCULT No growth at 2 days 06/28/2024    BLOODCULT No growth at 2 days 06/28/2024       Images  All images:   CT abdomen pelvis w IV contrast    Result Date: 7/1/2024  Interpreted By:  Benedicto Easton, STUDY: CT ABDOMEN PELVIS W IV CONTRAST;  6/30/2024 2:53 pm   INDICATION: Signs/Symptoms:Persistent leukocytosis and sepsis of unclear etiology.   COMPARISON: CT abdomen and pelvis 02/02/2024   ACCESSION NUMBER(S): [id number]   ORDERING CLINICIAN: KAVITA AMES   TECHNIQUE: Axial CT of the abdomen and pelvis was performed following intravenous administration of 75 ml of contrast Omnipaque 350 with coronal and sagittal reconstruction.   FINDINGS: Lower chest: New small left and trace right pleural effusions with adjacent consolidative opacities.   Liver: No mass.   Biliary: No intrahepatic or extrahepatic bile duct dilation. Cholelithiasis without acute inflammation.   Spleen: No mass. No splenomegaly.   Pancreas: No mass or duct dilation.   Adrenals: No mass.   Kidneys: Bilateral cysts. Additional subcentimeter right renal lesions too small to characterize likely benign. No definite calculus or hydronephrosis.   GI tract: No bowel wall thickening or dilation. Normal appendix. Colonic diverticulosis without acute inflammation.   Lymph nodes: No abdominopelvic lymphadenopathy.   Mesentery/peritoneum: No free fluid, free air or " fluid collection.   Vasculature: Severe aorto bi-iliac atherosclerotic calcifications without aneurysmal dilation. Stable right common iliac artery aneurysm measuring 2.1 cm.   Pelvis: No free fluid, free air or fluid collection.   Bones/Soft tissues: No acute fracture or abdominal wall soft tissue abnormalities.Unchanged moderate L2 superior endplate compression deformity.       No intra-abdominal fluid collection or other acute abdominopelvic process.   New small left and trace right pleural effusions with adjacent consolidative opacities, likely compressive atelectasis; superimposed infection not excluded.   MACRO: None   Signed by: Benedicto Easton 7/1/2024 8:11 AM Dictation workstation:   [id number]           Meds  Scheduled medications  cefTRIAXone, 1 g, intravenous, q24h  DULoxetine, 60 mg, oral, Daily  enoxaparin, 40 mg, subcutaneous, q24h  escitalopram, 5 mg, oral, Nightly  psyllium, 1 packet, oral, Daily  simvastatin, 40 mg, oral, q PM  vancomycin, 1,000 mg, intravenous, q12h  zinc oxide, 1 Application, Topical, TID      Continuous medications     PRN medications  PRN medications: acetaminophen, lidocaine, oxyCODONE, oxyCODONE, oxygen, vancomycin          ASSESSMENT/PLAN  84m with HTN, HLD, MDD, CAD, frequent falls, dementia, afib, hard of hearing, HFpEF 50% 2/2024 who [facility] Brought in by EMS after a fall. Treating for acute PNA failed outpatient and pain control including for rib fxs.      Acute Medical Issues     #acute hypoxic respiratory failure  #concern for L community associated PNA failed outpatient   #hx recent acute R rib fxs s/p fall     -CT chest w/ contrast: small simple left pleural effusion and LLL atelectasis  -ceftriaxone (6/29-) pna labs negative, blood cultures in progress  -pain regimen for acute pain. Spirometry, airway clearance.  - Started on Vancomycin IV ( 7/1- )   -PO abx on discharge     #Sepsis  - Patient has had persistent leukocytosis  since admission,  Most recent lab showed WBC count increased from 11.8 to 18.6. Patient also remaining persistently hypotensive.  - Continuing ceftriaxone and azithromycin for now, only likely source is pneumonia  - Blood cultures obtained 6/28 negative so far. Legionella and strep negative  - CT chest obtained in ED negative for infectious etiology  - Unremarkable CT abdomen/pelvis w/ contrast  - ID consulted, appreciate recs  - Started on Vancomycin IV ( 7/1- )        #LLE wound and other chronic wounds   #chronic BLE pain   #chronic neuropathy and recurrent falls   #MDD   -Wound onboard  -Podiatry onboard  -Xray left foot no acute bony abnormality  -duloxetine 60 daily, Lexapro 5mg daily  -pt did not complete acute rehab  -orthostats positive  -AM cortisol 28.2.   -PT/OT recommends mod intensity     # Orthostatic hypotension, hyponatremia  # History of HTN   - Discontinue lisinopril 10 and metoprolol   - AM cortisol 28.2. Ruled out adrenal insufficiency   -Started on Florinef       Chronic Medical Issues     #CAD - Hold asa for now   #Afib w/ frequent falls - pt reports he has been given Eliquis at [facility]. Holding. Pt still needs eval for Watchman procedure outpatient. Holding metoprolol due to hypotension  #HFpEF - echo 50% diastolic dysfunction 2/2024. This stay BNP normal   #BLE edema - chronic per pt not increased recently.        Fluids: PO intake  Nutrition: Adult Regular  VTE prophylaxis: Lovenox  O2: 3L  Code Status: full code     Disposition: Continuing antibiotics, pending SNF choice.     Nicolasa Reyes M.D.   Internal Medicine, PGY 1

## 2024-07-02 ENCOUNTER — APPOINTMENT (OUTPATIENT)
Dept: GERIATRIC MEDICINE | Facility: CLINIC | Age: 84
End: 2024-07-02
Payer: MEDICARE

## 2024-07-02 VITALS
TEMPERATURE: 98.1 F | BODY MASS INDEX: 29.26 KG/M2 | SYSTOLIC BLOOD PRESSURE: 96 MMHG | WEIGHT: 182.06 LBS | OXYGEN SATURATION: 97 % | RESPIRATION RATE: 20 BRPM | DIASTOLIC BLOOD PRESSURE: 65 MMHG | HEART RATE: 93 BPM | HEIGHT: 66 IN

## 2024-07-02 LAB
ACTH PLAS-MCNC: 10.9 PG/ML (ref 7.2–63.3)
ALBUMIN SERPL BCP-MCNC: 2.3 G/DL (ref 3.4–5)
ALP SERPL-CCNC: 114 U/L (ref 33–136)
ALT SERPL W P-5'-P-CCNC: 29 U/L (ref 10–52)
ANION GAP SERPL CALC-SCNC: 10 MMOL/L (ref 10–20)
AST SERPL W P-5'-P-CCNC: 49 U/L (ref 9–39)
BACTERIA BLD CULT: NORMAL
BACTERIA BLD CULT: NORMAL
BASOPHILS # BLD AUTO: 0.06 X10*3/UL (ref 0–0.1)
BASOPHILS NFR BLD AUTO: 0.4 %
BILIRUB SERPL-MCNC: 0.2 MG/DL (ref 0–1.2)
BUN SERPL-MCNC: 17 MG/DL (ref 6–23)
CALCIUM SERPL-MCNC: 7.6 MG/DL (ref 8.6–10.3)
CHLORIDE SERPL-SCNC: 95 MMOL/L (ref 98–107)
CO2 SERPL-SCNC: 29 MMOL/L (ref 21–32)
CREAT SERPL-MCNC: 0.67 MG/DL (ref 0.5–1.3)
EGFRCR SERPLBLD CKD-EPI 2021: >90 ML/MIN/1.73M*2
EOSINOPHIL # BLD AUTO: 0.34 X10*3/UL (ref 0–0.4)
EOSINOPHIL NFR BLD AUTO: 2.5 %
ERYTHROCYTE [DISTWIDTH] IN BLOOD BY AUTOMATED COUNT: 12.4 % (ref 11.5–14.5)
GLUCOSE BLD MANUAL STRIP-MCNC: 107 MG/DL (ref 74–99)
GLUCOSE SERPL-MCNC: 94 MG/DL (ref 74–99)
HCT VFR BLD AUTO: 29.3 % (ref 41–52)
HGB BLD-MCNC: 9.3 G/DL (ref 13.5–17.5)
IMM GRANULOCYTES # BLD AUTO: 0.12 X10*3/UL (ref 0–0.5)
IMM GRANULOCYTES NFR BLD AUTO: 0.9 % (ref 0–0.9)
LYMPHOCYTES # BLD AUTO: 1.32 X10*3/UL (ref 0.8–3)
LYMPHOCYTES NFR BLD AUTO: 9.8 %
MAGNESIUM SERPL-MCNC: 1.83 MG/DL (ref 1.6–2.4)
MCH RBC QN AUTO: 31.3 PG (ref 26–34)
MCHC RBC AUTO-ENTMCNC: 31.7 G/DL (ref 32–36)
MCV RBC AUTO: 99 FL (ref 80–100)
MONOCYTES # BLD AUTO: 1.01 X10*3/UL (ref 0.05–0.8)
MONOCYTES NFR BLD AUTO: 7.5 %
NEUTROPHILS # BLD AUTO: 10.64 X10*3/UL (ref 1.6–5.5)
NEUTROPHILS NFR BLD AUTO: 78.9 %
NRBC BLD-RTO: 0 /100 WBCS (ref 0–0)
PHOSPHATE SERPL-MCNC: 3.4 MG/DL (ref 2.5–4.9)
PLATELET # BLD AUTO: 506 X10*3/UL (ref 150–450)
POTASSIUM SERPL-SCNC: 3.8 MMOL/L (ref 3.5–5.3)
PROT SERPL-MCNC: 5.9 G/DL (ref 6.4–8.2)
RBC # BLD AUTO: 2.97 X10*6/UL (ref 4.5–5.9)
SODIUM SERPL-SCNC: 130 MMOL/L (ref 136–145)
VANCOMYCIN SERPL-MCNC: 22.9 UG/ML (ref 5–20)
WBC # BLD AUTO: 13.5 X10*3/UL (ref 4.4–11.3)

## 2024-07-02 PROCEDURE — 99239 HOSP IP/OBS DSCHRG MGMT >30: CPT

## 2024-07-02 PROCEDURE — 36415 COLL VENOUS BLD VENIPUNCTURE: CPT

## 2024-07-02 PROCEDURE — 2500000004 HC RX 250 GENERAL PHARMACY W/ HCPCS (ALT 636 FOR OP/ED)

## 2024-07-02 PROCEDURE — 80202 ASSAY OF VANCOMYCIN: CPT

## 2024-07-02 PROCEDURE — 84100 ASSAY OF PHOSPHORUS: CPT

## 2024-07-02 PROCEDURE — 82947 ASSAY GLUCOSE BLOOD QUANT: CPT

## 2024-07-02 PROCEDURE — 2500000001 HC RX 250 WO HCPCS SELF ADMINISTERED DRUGS (ALT 637 FOR MEDICARE OP)

## 2024-07-02 PROCEDURE — 80053 COMPREHEN METABOLIC PANEL: CPT

## 2024-07-02 PROCEDURE — 2500000004 HC RX 250 GENERAL PHARMACY W/ HCPCS (ALT 636 FOR OP/ED): Performed by: STUDENT IN AN ORGANIZED HEALTH CARE EDUCATION/TRAINING PROGRAM

## 2024-07-02 PROCEDURE — 85025 COMPLETE CBC W/AUTO DIFF WBC: CPT

## 2024-07-02 PROCEDURE — 2500000001 HC RX 250 WO HCPCS SELF ADMINISTERED DRUGS (ALT 637 FOR MEDICARE OP): Performed by: STUDENT IN AN ORGANIZED HEALTH CARE EDUCATION/TRAINING PROGRAM

## 2024-07-02 PROCEDURE — 83735 ASSAY OF MAGNESIUM: CPT

## 2024-07-02 RX ORDER — FLUDROCORTISONE ACETATE 0.1 MG/1
0.05 TABLET ORAL DAILY
Start: 2024-07-03

## 2024-07-02 RX ORDER — DOXYCYCLINE 100 MG/1
100 CAPSULE ORAL 2 TIMES DAILY
Start: 2024-07-03

## 2024-07-02 RX ORDER — VANCOMYCIN HYDROCHLORIDE 750 MG/150ML
750 INJECTION, SOLUTION INTRAVENOUS EVERY 12 HOURS
Status: DISCONTINUED | OUTPATIENT
Start: 2024-07-02 | End: 2024-07-02 | Stop reason: HOSPADM

## 2024-07-02 ASSESSMENT — COGNITIVE AND FUNCTIONAL STATUS - GENERAL
WALKING IN HOSPITAL ROOM: TOTAL
PERSONAL GROOMING: A LITTLE
MOVING FROM LYING ON BACK TO SITTING ON SIDE OF FLAT BED WITH BEDRAILS: A LOT
DAILY ACTIVITIY SCORE: 15
MOVING TO AND FROM BED TO CHAIR: A LOT
STANDING UP FROM CHAIR USING ARMS: A LOT
TURNING FROM BACK TO SIDE WHILE IN FLAT BAD: A LOT
TOILETING: A LOT
HELP NEEDED FOR BATHING: A LOT
CLIMB 3 TO 5 STEPS WITH RAILING: TOTAL
MOBILITY SCORE: 10
DRESSING REGULAR LOWER BODY CLOTHING: A LOT
DRESSING REGULAR UPPER BODY CLOTHING: A LITTLE
EATING MEALS: A LITTLE

## 2024-07-02 ASSESSMENT — PAIN SCALES - GENERAL: PAINLEVEL_OUTOF10: 3

## 2024-07-02 NOTE — PROGRESS NOTES
"Physical Therapy                 Therapy Communication Note    Patient Name: Chao Thao  MRN: [id number]  Today's Date: 7/2/2024     Discipline: Physical Therapy    Missed Visit Reason: Missed Visit Reason: Patient refused (\"I dont want to go back into that chair, I was up all morning, felt like rocks i was sitting on\" declined any therapy, options given, no tx, Augustine RN notified)    Missed Time: Attempt 1300    Comment:  "

## 2024-07-02 NOTE — DOCUMENTATION CLARIFICATION NOTE
"    PATIENT:               KRISHNA CHEN  ACCT #:                  [id number]  MRN:                       [id number]  :                       1940  ADMIT DATE:       2024 11:08 PM  DISCH DATE:  RESPONDING PROVIDER #:        [id number]          PROVIDER RESPONSE TEXT:    Acute Hypoxic Respiratory Failure ruled out after further workup    CDI QUERY TEXT:    Clarification        Instruction:    Based on your assessment of the patient and the clinical information, please provide the requested documentation by clicking on the appropriate radio button and enter any additional information if prompted.    Question: Please clarify diagnosis of respiratory failure as    When answering this query, please exercise your independent professional judgment. The fact that a question is being asked, does not imply that any particular answer is desired or expected.    The patient's clinical indicators include:  Clinical Information:  84 year old admitted with right rib fractures after fall.  On arrival he was 93% on RA, he would desat easily, still on 2L.   and 2L/NC applied.  Lowest documented sat was 88%.  He was diagnosed with CAP and antibiotics started.  ID was consulted for antibiotic management and podiatry for toe ulcer.    Documented Diagnosis:  \"Acute hypoxic respiratory failure\"  per H&P, Medicine on , , , , DC summary    Clinical Indicators and Documentation:  -Vital Signs: 36.6, 84, 18, 97/62, 93% RA in ER  -ABG results:  VBG done on , only set done 7.49/33/75/25.1.  -Physical Exam Findings:  -Breath sounds:  \"CTAB\" per podiatry , \"normal\" per medicine on , , , , ID on   -Distress:  \"Not in acute distress\" per ER, medicine H&P, , , , , DC summary.  \"Minimal\" per podiatry on   -Cyanosis:  None charted  -Oxygen Therapy:  2L/NC  -Pulmonary Consult:  None    Treatment:  O2 at 2L/NC,  Rocephin 2g IV x1 dose, Zithromax 500mg IV x1 dose, Rocephin 1g IV x4 doses, " Vancomycin 1000mg IV x2 doses, Vancomycin 750mg IV x1 dose.    Risk Factors:  :  Elderly, recent nursing home stay, dementia, falls.  Options provided:  -- Acute Hypoxic Respiratory Failure ruled out after further workup  -- Acute Hypoxic Respiratory Failure as evidenced by, Please specify additional information below  -- Other - I will add my own diagnosis  -- Refer to Clinical Documentation Reviewer    Query created by: Mishel Montelongo on 7/2/2024 3:19 PM      Electronically signed by:  ESTUARDO GONZALEZ DO 7/2/2024 3:56 PM

## 2024-07-02 NOTE — PROGRESS NOTES
Chao Thao is a 84 y.o. male on day 4 of admission presenting with Acute pneumonia.      Subjective    Seen and examined today.  Mentioned doing good.  No acute events overnight..         Objective     Last Recorded Vitals  /63 (BP Location: Left arm, Patient Position: Sitting)   Pulse 97   Temp 36.9 °C (98.5 °F) (Temporal)   Resp 20   Wt 82.6 kg (182 lb 0.9 oz)   SpO2 98%   Intake/Output last 3 Shifts:    Intake/Output Summary (Last 24 hours) at 7/2/2024 1351  Last data filed at 7/2/2024 1242  Gross per 24 hour   Intake 1160 ml   Output 1000 ml   Net 160 ml       Admission Weight  Weight: 90.7 kg (200 lb) (06/27/24 2316)    Daily Weight  07/02/24 : 82.6 kg (182 lb 0.9 oz)    Image Results  CT abdomen pelvis w IV contrast  Narrative: Interpreted By:  Benedicto Easton,   STUDY:  CT ABDOMEN PELVIS W IV CONTRAST;  6/30/2024 2:53 pm      INDICATION:  Signs/Symptoms:Persistent leukocytosis and sepsis of unclear etiology.      COMPARISON:  CT abdomen and pelvis 02/02/2024      ACCESSION NUMBER(S):  [id number]      ORDERING CLINICIAN:  KAVITA AMES      TECHNIQUE:  Axial CT of the abdomen and pelvis was performed following  intravenous administration of 75 ml of contrast Omnipaque 350 with  coronal and sagittal reconstruction.      FINDINGS:  Lower chest: New small left and trace right pleural effusions with  adjacent consolidative opacities.      Liver: No mass.      Biliary: No intrahepatic or extrahepatic bile duct dilation.  Cholelithiasis without acute inflammation.      Spleen: No mass. No splenomegaly.      Pancreas: No mass or duct dilation.      Adrenals: No mass.      Kidneys: Bilateral cysts. Additional subcentimeter right renal  lesions too small to characterize likely benign. No definite calculus  or hydronephrosis.      GI tract: No bowel wall thickening or dilation. Normal appendix.  Colonic diverticulosis without acute inflammation.      Lymph nodes: No abdominopelvic lymphadenopathy.       Mesentery/peritoneum: No free fluid, free air or fluid collection.      Vasculature: Severe aorto bi-iliac atherosclerotic calcifications  without aneurysmal dilation. 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      Pelvis: No free fluid, free air or fluid collection.      Bones/Soft tissues: No acute fracture or abdominal wall soft tissue  abnormalities.Unchanged moderate L2 superior endplate compression  deformity.      Impression: No intra-abdominal fluid collection or other acute abdominopelvic  process.      New small left and trace right pleural effusions with adjacent  consolidative opacities, likely compressive atelectasis; superimposed  infection not excluded.      MACRO:  None      Signed by: Benedicto Easton 7/1/2024 8:11 AM  Dictation workstation:   [id number]      Physical Exam  Constitutional:       Appearance: He is obese.   Cardiovascular:      Rate and Rhythm: Normal rate. Rhythm irregular.      Pulses: Normal pulses.      Heart sounds: Normal heart sounds. No murmur heard.     No friction rub. No gallop.   Pulmonary:      Effort: Pulmonary effort is normal.      Breath sounds: Normal breath sounds. No wheezing, rhonchi or rales.      Comments: Little shallow breathing  Abdominal:      General: Bowel sounds are normal.      Palpations: Abdomen is soft.      Tenderness: There is no abdominal tenderness. There is no guarding or rebound.   Musculoskeletal:      Comments: Chest wall tenderness on right midline and midaxillary line   Neurological:      Mental Status: He is alert. Mental status is at baseline.         Relevant Results               Assessment/Plan      84m with HTN, HLD, MDD, CAD, frequent falls, dementia, afib, hard of hearing, HFpEF 50% 2/2024 who presents from home after leaving [facility]. Brought in by EMS after a fall. Treating for acute PNA failed outpatient and pain control including for rib fxs.     # Possible sepsis  # Acute hypoxic respiratory failure  (resolved)  # Possible CAP/atelectasis  #Rib fracture status post fall  #Left lower extremity (second toe) wound  #Leukocytosis    -Vitally BP in the softer side..  -Labs significant for leukocytosis  -Legionella and strep pneumonia and MRSA negative  -BC (6/26) no growth in 3 days  -Repeat BC (7/1) pending    Recommendation  -Continue Vanco and Rocephin        Meghan Blair MD   Internal Medicine  PGY3             Principal Problem:    Acute pneumonia                  Meghan Blair MD          Attending note : the patient was evaluated, the note was reviewed and updated  Possible sepsis, BC is negative  Respiratory failure / rib fracture / atelectasis / effusion / possible pneumonia  Left 2nd toe wound  Leukocytosis, trending down  Recommendations :  Continue Rocephin and Vancomycin  Discussed with the medical team     I spent minutes in the professional and overall care of this patient.        Wale Townsend MD

## 2024-07-02 NOTE — DOCUMENTATION CLARIFICATION NOTE
"    PATIENT:               KRISHNA CHEN  ACCT #:                  [id number]  MRN:                       [id number]  :                       1940  ADMIT DATE:       2024 11:08 PM  DISCH DATE:  RESPONDING PROVIDER #:        [id number]          PROVIDER RESPONSE TEXT:    Sepsis was a differential diagnosis and ruled out after study    CDI QUERY TEXT:    Clarification        Instruction:  Based on your assessment of the patient and the clinical information, please provide the requested documentation by clicking on the appropriate radio button and enter any additional information if prompted.    Question: Sepsis was documented in the medical record. Based on the documentation and the clinical information, can the diagnosis be further clarified as    When answering this query, please exercise your independent professional judgment. The fact that a question is being asked, does not imply that any particular answer is desired or expected.    The patient's clinical indicators include:  Clinical Information:  84 year old admitted with right rib fractures after fall.  On arrival he was 93% on RA, he would desat easily, still on 2L.   and 2L/NC applied.  Lowest documented sat was 88%.  He was diagnosed with CAP and antibiotics started.  ID was consulted for antibiotic management and podiatry for toe ulcer.  Sepsis was written many times as a diagnosis and a possible diagnosis, please clarify if he had sepsis or not and associated organ dysfunction.    Documented Diagnosis:  \"Sepsis\" per medicine on , ,     \"Possible sepsis\" per Medicine on , ID on     Clinical Indicators:  -Vital Signs:  36.6, 84, 18, 97/62, 93% RA  -WBC:  17.6 on   -Microbiology Results:  Strep pneumoniae and Legionella neg on   -Band Neutrophil Count/percent Band Neutrophil:  Not done  -Lactic acid:  1.7 on   -BUN/Creat:  22/0.98 on   -Blood cultures:  No growth x3 days on   -Bilirubin:  0.3 on   -MAP:  57-90 " this stay  -[place]dle Coma Scale:  Not done  -PAO2/FIO2: 267.86 on 6/28  -Procalcitonin:  0.12 on 6/28  -Platelets:  585 on 6/28  -Other clinical indicators:    Treatment:  Rocephin 2g IV x1 dose, Zithromax 500mg IV x1 dose, Rocephin 1g IV x4 doses, Vancomycin 1000mg IV x2 doses, Vancomycin 750mg IV x1 dose.    Risk Factors:  Elderly, recent nursing home stay, dementia, falls.  Options provided:  -- Sepsis was a differential diagnosis and ruled out after study  -- Sepsis with other organ dysfunction, Please specify sepsis associated organ dysfunction below  -- Other - I will add my own diagnosis  -- Refer to Clinical Documentation Reviewer    Query created by: Msihel Montelongo on 7/2/2024 3:06 PM      Electronically signed by:  ESTUARDO GONZALEZ DO 7/2/2024 3:56 PM

## 2024-07-02 NOTE — DISCHARGE SUMMARY
Discharge Diagnosis  Acute hypoxic respiratory failure 2/2 CAP   LLE wound and other chronic wounds   Orthostatic hypotension     Issues Requiring Follow-Up  Follow up with Cardiology- will need Vascular follow up with Dr. Hartmann and discuss watchman procedure  Continue Doxycycline 100mg BID x1 day on 7/3/24 ONLY    Discharge Meds     Your medication list        START taking these medications        Instructions Last Dose Given Next Dose Due   doxycycline 100 mg capsule  Commonly known as: Vibramycin  Start taking on: July 3, 2024      Take 1 capsule (100 mg) by mouth 2 times a day. Take with at least 8 ounces (large glass) of water, do not lie down for 30 minutes after Do not fill before July 3, 2024.       fludrocortisone 0.1 mg tablet  Commonly known as: Florinef  Start taking on: July 3, 2024      Take 0.5 tablets (0.05 mg) by mouth once daily.       oxygen gas therapy  Commonly known as: O2      Inhale 1 each once every 24 hours.              CONTINUE taking these medications        Instructions Last Dose Given Next Dose Due   acetaminophen 500 mg tablet  Commonly known as: Tylenol      Take 2 tablets (1,000 mg) by mouth every 8 hours.       Acidophilus capsule  Generic drug: lactobacillus acidophilus           ascorbic acid 500 mg tablet  Commonly known as: Vitamin C           aspirin 81 mg EC tablet           cholecalciferol 25 MCG (1000 UT) capsule  Commonly known as: Vitamin D-3           cyanocobalamin 250 mcg tablet  Commonly known as: Vitamin B-12           DULoxetine 60 mg DR capsule  Commonly known as: Cymbalta      TAKE 1 CAPSULE BY MOUTH EVERY DAY       lidocaine 4 % patch      Place 1 patch over 12 hours on the skin once daily. Remove & discard patch within 12 hours or as directed by MD.       miconazole 2 % cream  Commonly known as: Micotin           ondansetron 4 mg tablet  Commonly known as: Zofran           simvastatin 40 mg tablet  Commonly known as: Zocor      TAKE 1 TABLET BY MOUTH EVERY DAY  IN THE EVENING       zinc acetate 50 mg (zinc) capsule                  STOP taking these medications      Eliquis 5 mg tablet  Generic drug: apixaban        lisinopril 10 mg tablet        metoprolol succinate XL 50 mg 24 hr tablet  Commonly known as: Toprol-XL                  Where to Get Your Medications        Information about where to get these medications is not yet available    Ask your nurse or doctor about these medications  doxycycline 100 mg capsule  fludrocortisone 0.1 mg tablet  oxygen gas therapy         Test Results Pending At Discharge  Pending Labs       Order Current Status    ACTH In process    Blood Culture Preliminary result    Blood Culture Preliminary result    Blood Culture Preliminary result            Hospital Course  Brief HPI  Chao Thao is a 84 y.o. male with HTN, HLD, MDD, CAD, frequent falls, dementia, afib, hard of hearing, HFpEF 50% 2/2024 who presents from home after leaving [facility]. Brought in by EMS after a fall.     ED Course  Vitals in ED showed 84 hr, 18 rr, 97/62, 93% on room air, SpO2 89%. Chest x-ray showed small pleural effusions, left basilar airspace disease. Left tib fib xray showed no acute fx. In the ED pt received azithromycin, ceftriaxone and surgery was consulted.      Floor Course  Patient was continued on IV ceftriaxone and a 3 day course of azithromycin then started on IV vancomycin with rocephin. Podiatry and wound care were consulted for patient's non healing toe ulcer. Podiatry determined wound did not look infected and recommended patient follow-up with Dr. Hartmann outpatient as scheduled for his PAD. Patient with orthostatic hypotension He was given IV fluids and AM cortisol ruled out adrenal insufficiency. He was started on Florinef. PT/OT recommended moderate intensity therapy at discharge. He was medically stable and discharged to SNF on 7/2/24. Sent with script for Doxycycline 100mg BID x1 day to complete a 5 day course of abx and Florinef.  Instructed to follow up with PCP and Cardiology on discharge.    Pertinent Physical Exam At Time of Discharge  Physical Exam  Constitutional:       General: He is not in acute distress.     Appearance: Normal appearance.   Cardiovascular:      Rate and Rhythm: Normal rate and regular rhythm.      Heart sounds: Normal heart sounds. No murmur heard.     No friction rub. No gallop.   Pulmonary:      Effort: Pulmonary effort is normal. No respiratory distress.      Breath sounds: Normal breath sounds. No wheezing, rhonchi or rales.      Comments: Currently on 2L NC   Abdominal:      General: Abdomen is flat. Bowel sounds are normal. There is no distension.      Palpations: Abdomen is soft.      Tenderness: There is no abdominal tenderness.   Musculoskeletal:         General: No swelling or tenderness. Normal range of motion.   Skin:     General: Skin is warm and dry.      Findings: No erythema or rash.   Neurological:      General: No focal deficit present.      Mental Status: He is alert and oriented to person, place, and time. Mental status is at baseline.   Psychiatric:         Mood and Affect: Mood normal.         Behavior: Behavior normal.         Outpatient Follow-Up  Future Appointments   Date Time Provider Department Center   7/16/2024  2:20 PM MD MICHELLE Trent [facility]   7/23/2024 11:30 AM Babita Blackburn MD [id number] [facility]   7/26/2024  9:00 AM MD MICHELLE [facility]   9/10/2024  3:40 PM MD MICHELLE Trent [facility]         Beth Seo DO

## 2024-07-02 NOTE — PROGRESS NOTES
Vancomycin Dosing by Pharmacy- FOLLOW UP    Chao Thao is a 84 y.o. year old male who Pharmacy has been consulted for vancomycin dosing for cellulitis, skin and soft tissue. Based on the patient's indication and renal status this patient is being dosed based on a goal AUC of 400-600.     Renal function is currently stable.    Current vancomycin dose: 1000 mg given every 12 hours    Estimated vancomycin AUC on current dose: 606 mg/L.hr     Visit Vitals  BP 96/61 (BP Location: Right arm, Patient Position: Lying)   Pulse (!) 121   Temp 36.5 °C (97.7 °F) (Temporal)   Resp 19        Lab Results   Component Value Date    CREATININE 0.67 2024    CREATININE 0.65 2024    CREATININE 0.64 2024    CREATININE 0.75 2024        Patient weight is as follows:   Vitals:    24 0620   Weight: 83.4 kg (183 lb 12.8 oz)       Cultures:  No results found for the encounter in last 14 days.       I/O last 3 completed shifts:  In: 1508.3 (18.1 mL/kg) [P.O.:440; IV Piggyback:1068.3]  Out: 1350 (16.2 mL/kg) [Urine:1350 (0.4 mL/kg/hr)]  Weight: 83.4 kg   I/O during current shift:  No intake/output data recorded.    Temp (24hrs), Av.4 °C (97.6 °F), Min:36.2 °C (97.2 °F), Max:36.7 °C (98.1 °F)      Assessment/Plan    Above goal AUC. Orders placed for new vancomcyin regimen of 750 every 12 hours to begin at 1200.    This dosing regimen is predicted by [employer] to result in the following pharmacokinetic parameters:  Loading dose: N/A  Regimen: 750 mg IV every 12 hours.  Start time: 11:14 on 2024  Exposure target: AUC24 (range)400-600 mg/L.hr   AUC24,ss: 460 mg/L.hr  Probability of AUC24 > 400: 79 %  Ctrough,ss: 14.7 mg/L  Probability of Ctrough,ss > 20: 11 %  Probability of nephrotoxicity (Lodise VITO ): 10 %      The next level will be obtained on  at am labs. May be obtained sooner if clinically indicated.   Will continue to monitor renal function daily while on vancomycin and order serum  creatinine at least every 48 hours if not already ordered.  Follow for continued vancomycin needs, clinical response, and signs/symptoms of toxicity.       He Tam, PharmD  PGY1 Pharmacy Resident

## 2024-07-05 LAB
ATRIAL RATE: 79 BPM
BACTERIA BLD CULT: NORMAL
P OFFSET: 212 MS
P ONSET: 132 MS
PR INTERVAL: 176 MS
Q ONSET: 220 MS
QRS COUNT: 13 BEATS
QRS DURATION: 92 MS
QT INTERVAL: 354 MS
QTC CALCULATION(BAZETT): 415 MS
QTC FREDERICIA: 394 MS
R AXIS: 13 DEGREES
T AXIS: 134 DEGREES
T OFFSET: 397 MS
VENTRICULAR RATE: 83 BPM

## 2024-07-06 ENCOUNTER — NURSING HOME VISIT (OUTPATIENT)
Dept: POST ACUTE CARE | Facility: EXTERNAL LOCATION | Age: 84
End: 2024-07-06
Payer: MEDICARE

## 2024-07-06 DIAGNOSIS — I25.110 CORONARY ARTERY DISEASE INVOLVING NATIVE CORONARY ARTERY OF NATIVE HEART WITH UNSTABLE ANGINA PECTORIS (MULTI): ICD-10-CM

## 2024-07-06 DIAGNOSIS — J96.01 ACUTE HYPOXEMIC RESPIRATORY FAILURE (MULTI): ICD-10-CM

## 2024-07-06 DIAGNOSIS — I10 PRIMARY HYPERTENSION: ICD-10-CM

## 2024-07-06 DIAGNOSIS — T14.8XXA CHRONIC WOUND: ICD-10-CM

## 2024-07-06 DIAGNOSIS — G62.9 NEUROPATHY: ICD-10-CM

## 2024-07-06 DIAGNOSIS — I95.1 ORTHOSTATIC HYPOTENSION: ICD-10-CM

## 2024-07-06 DIAGNOSIS — J18.9 RECURRENT PNEUMONIA: ICD-10-CM

## 2024-07-06 PROCEDURE — 99306 1ST NF CARE HIGH MDM 50: CPT | Performed by: INTERNAL MEDICINE

## 2024-07-06 NOTE — LETTER
Patient: Chao Thao  : 1940    Encounter Date: 2024    Name: Chao Thao  : 1940  MRN: [id number]  Visit Date: 2024  Chief Complaint: HISTORY AND PHYSICAL    HPI:  83 y/o Full Code, PMH of HTN, HLD, MDD, CAD, frequent falls, dementia, afib, hard of hearing, HFpEF 50% 2024 who [facility] Brought in by EMS after a fall. Patient was continued on IV ceftriaxone and a 3 day course of azithromycin then started on IV vancomycin with rocephin. Podiatry and wound care were consulted for patient's non healing toe ulcer. Podiatry determined wound did not look infected and recommended patient follow-up with Dr. Hartmann outpatient as scheduled for his PAD. Patient with orthostatic hypotension He was given IV fluids and AM cortisol ruled out adrenal insufficiency. He was started on Florinef. PT/OT recommended moderate intensity therapy at discharge. He was medically stable and discharged to SNF on 24. Sent with script for Doxycycline 100mg BID x1 day to complete a 5 day course of abx and Florinef. Instructed to follow up with PCP and Cardiology on discharge. Once stabilized, pt was brought to [facility] on 2024 for ongoing med mgt and therapy services.     Subjective: Seen and examined today. Laying in bed. Reports weakness in BLE. Reviewed hospitalization with him. Questions answered with understanding verbalized.    The patient was counseled regarding diagnostic results, instructions for management, risk factor reductions, prognosis, patient and family education, impressions, risks and benefits of treatment options and importance of compliance with treatment. I have reviewed nursing notes since my last visit and document any significant changes Reviewed orders, medications, Labs. Reviewed chart looking at current medications, treatments, labs, x-rays etc.     ROS:  As above in subjective. Otherwise, all other systems have been reviewed and are negative for  complaint.  Current Outpatient Medications   Medication Instructions   • acetaminophen (TYLENOL) 1,000 mg, oral, Every 8 hours   • ascorbic acid (VITAMIN C) 500 mg, oral, Daily   • aspirin 81 mg, oral, Daily   • atorvastatin (LIPITOR) 20 mg, oral, Daily   • cholecalciferol (VITAMIN D-3) 25 mcg, oral, Daily   • cyanocobalamin (VITAMIN B-12) 250 mcg, oral, Daily   • doxycycline (VIBRAMYCIN) 100 mg, oral, 2 times daily, Take with at least 8 ounces (large glass) of water, do not lie down for 30 minutes after   • DULoxetine (CYMBALTA) 60 mg, oral, Daily   • DULoxetine (CYMBALTA) 40 mg, oral, Daily, Do not crush or chew.   • fludrocortisone (FLORINEF) 0.05 mg, oral, Daily   • lactobacillus acidophilus (Acidophilus) capsule 1 capsule, oral, Daily   • lidocaine 4 % patch 1 patch, transdermal, Daily, Remove & discard patch within 12 hours or as directed by MD.   • miconazole (Micotin) 2 % cream 1 Application, Topical, 3 times daily   • ondansetron (ZOFRAN) 4 mg, oral, Every 6 hours PRN   • oxygen (O2) gas therapy 1 each, inhalation, Every 24 hours   • potassium chloride CR 20 mEq ER tablet 20 mEq, oral, Daily, Do not crush or chew.   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    History of alcohol abuse   • Chronic shortness of breath 06/28/2024   • Drug abuse (Multi)     in remission   • Edema, unspecified 11/26/2019    Dependent edema   • Encounter for immunization 04/08/2016    Need for Zostavax administration   • Pain in left knee 08/08/2018    Left knee pain   • Personal history of other diseases of the nervous system and sense organs 09/18/2019    History of cataract   • Personal history of other drug therapy 04/08/2016    History of pneumococcal vaccination   • Tinea pedis 10/10/2018    Tinea pedis of both feet   • Venous stasis ulcer of right calf (Multi) 06/28/2024    • Vitiligo 06/28/2024       Past Surgical History:   Procedure Laterality Date   • CARDIAC CATHETERIZATION  05/14/2018    Cardiac Cath Procedure Summary   • INNER EAR SURGERY  01/06/2016    Inner Ear Surgery   • OTHER SURGICAL HISTORY  03/16/2020    Cataract surgery       No family history on file.    Social History     Tobacco Use   • Smoking status: Former     Types: Cigarettes   • Smokeless tobacco: Never   Substance Use Topics   • Alcohol use: Not Currently       Allergies   Allergen Reactions   • Gabapentin Other   • Penicillin Unknown      Visit Vitals  /80   Pulse 95   Temp 36.2 °C (97.1 °F)   Resp 18   Wt 83.5 kg (184 lb)   SpO2 95%   BMI 29.71 kg/m²   Smoking Status Former   BSA 1.97 m²      Physical Exam  Vitals reviewed.   Constitutional:       General: He is not in acute distress.     Appearance: Normal appearance. He is ill-appearing.      Interventions: Nasal cannula in place.       HENT:      Head: Normocephalic and atraumatic.      Right Ear: External ear normal.      Left Ear: External ear normal.      Nose: Nose normal.      Mouth/Throat:      Mouth: Mucous membranes are moist.      Pharynx: Oropharynx is clear.   Eyes:      Conjunctiva/sclera: Conjunctivae normal.      Pupils: Pupils are equal, round, and reactive to light.   Cardiovascular:      Rate and Rhythm: Normal rate and regular rhythm.      Heart sounds: Normal heart sounds. No murmur heard.  Pulmonary:      Effort: Pulmonary effort is normal. No respiratory distress.      Breath sounds: Decreased breath sounds present. No wheezing.   Abdominal:      General: There is no distension.      Palpations: Abdomen is soft. There is no mass.      Tenderness: There is no abdominal tenderness.   Musculoskeletal:         General: Normal range of motion.      Cervical back: Normal range of motion and neck supple.      Comments: Weakness of BLE   Skin:     General: Skin is warm and dry.      Findings: Bruising present.   Neurological:       General: No focal deficit present.      Mental Status: He is alert and oriented to person, place, and time.      Sensory: No sensory deficit.      Motor: Weakness present.   Psychiatric:         Mood and Affect: Mood normal.         Behavior: Behavior normal. Behavior is cooperative.       Lab Results   Component Value Date    WBC 13.5 (H) 07/02/2024    HGB 9.3 (L) 07/02/2024    HCT 29.3 (L) 07/02/2024     (H) 07/02/2024    CHOL 147 03/16/2020    TRIG 95 03/16/2020    HDL 34.5 (A) 03/16/2020    ALT 29 07/02/2024    AST 49 (H) 07/02/2024     (L) 07/02/2024    K 3.8 07/02/2024    CL 95 (L) 07/02/2024    CREATININE 0.67 07/02/2024    BUN 17 07/02/2024    CO2 29 07/02/2024    TSH 0.67 02/03/2024    INR 1.3 (H) 06/28/2024    HGBA1C 5.9 03/16/2020     Results were reviewed and addressed accordingly. Lab Results were also reviewed in [facility].     Provider Impression:   Acute hypoxic respiratory failure 2/2 recent sepsis d/t pneumonia  #recurrent pneumonia  #hx recent acute R rib fxs s/p fall   - CT chest w/ contrast: small simple left pleural effusion and LLL atelectasis  - pain regimen for acute pain. Spirometry, airway clearance.  - Blood cultures obtained 6/28 negative so far. Legionella and strep negative  - CT chest obtained in ED negative for infectious etiology  - Unremarkable CT abdomen/pelvis w/ contrast  - continue with doxycycline 100mg BID to complete the duration     LLE wound and other chronic wounds   #chronic neuropathy and recurrent falls   - consult Wound care team  - Xray left foot no acute bony abnormality  -  c/w duloxetine 60 daily  - AM cortisol 28.2.   - PT/OT to eval and tx     Orthostatic hypotension, hyponatremia  #h/o HTN   - AM cortisol 28.2. Ruled out adrenal insufficiency   - Started on Florinef      CV- CAD, Afib w/ frequent falls, HTN, HFpEF  - lisinopril and metoprolol were discontinued during hospitalization  - pt reports he has been given Eliquis at [facility].   - Pt  still needs eval for Watchman procedure outpatient.   - Holding metoprolol due to hypotension  - echo 50% diastolic dysfunction 2/2024.     BLE edema   - chronic per pt not increased recently.     Code Status  - full code  ----------------  Written by Shira Varela RN, acting as a scribe for Dr. Escalante. This note accurately reflects the work and decisions made by Dr. Escalante.     I, Dr. Escalante, attest all medical record entries made by the scribe were under my direction and were personally dictated by me. I have reviewed the chart and agree that the record accurately reflects my performance of the history, physical exam, and assessment and plan.       Electronically Signed By: Dagoberto Chin MD   7/25/24 11:28 AM

## 2024-07-11 ENCOUNTER — NURSING HOME VISIT (OUTPATIENT)
Dept: POST ACUTE CARE | Facility: EXTERNAL LOCATION | Age: 84
End: 2024-07-11
Payer: MEDICARE

## 2024-07-11 DIAGNOSIS — J96.01 ACUTE HYPOXEMIC RESPIRATORY FAILURE (MULTI): ICD-10-CM

## 2024-07-11 DIAGNOSIS — G62.9 NEUROPATHY: ICD-10-CM

## 2024-07-11 DIAGNOSIS — I73.9 PVD (PERIPHERAL VASCULAR DISEASE) (CMS-HCC): ICD-10-CM

## 2024-07-11 DIAGNOSIS — T14.8XXA CHRONIC WOUND: ICD-10-CM

## 2024-07-11 DIAGNOSIS — I95.1 ORTHOSTATIC HYPOTENSION: ICD-10-CM

## 2024-07-11 DIAGNOSIS — R41.89 COGNITIVE IMPAIRMENT: ICD-10-CM

## 2024-07-11 DIAGNOSIS — I10 PRIMARY HYPERTENSION: ICD-10-CM

## 2024-07-11 DIAGNOSIS — I25.110 CORONARY ARTERY DISEASE INVOLVING NATIVE CORONARY ARTERY OF NATIVE HEART WITH UNSTABLE ANGINA PECTORIS (MULTI): ICD-10-CM

## 2024-07-11 DIAGNOSIS — J18.9 RECURRENT PNEUMONIA: ICD-10-CM

## 2024-07-11 PROCEDURE — 99308 SBSQ NF CARE LOW MDM 20: CPT | Performed by: INTERNAL MEDICINE

## 2024-07-11 NOTE — LETTER
Patient: Chao Thao  : 1940    Encounter Date: 2024    Name: Chao Thao  : 1940  MRN: [id number]  Visit Date: 2024  Chief Complaint: Weekly SNF Physician Visit    HPI: 83 y/o Full Code, PMH of HTN, HLD, MDD, CAD, frequent falls, dementia, afib, hard of hearing, HFpEF 50% 2024 who presents from home after leaving [facility]. Brought in by EMS after a fall. Patient was continued on IV ceftriaxone and a 3 day course of azithromycin then started on IV vancomycin with rocephin. Podiatry and wound care were consulted for patient's non healing toe ulcer. Podiatry determined wound did not look infected and recommended patient follow-up with Dr. Hartmann outpatient as scheduled for his PAD. Patient with orthostatic hypotension He was given IV fluids and AM cortisol ruled out adrenal insufficiency. He was started on Florinef. PT/OT recommended moderate intensity therapy at discharge. He was medically stable and discharged to SNF on 24. Sent with script for Doxycycline 100mg BID x1 day to complete a 5 day course of abx and Florinef. Instructed to follow up with PCP and Cardiology on discharge. Once stabilized, pt was brought to [facility] on 2024 for ongoing med mgt and therapy services.     Subjective: Seen and examined today. Laying in bed. Reports weakness in BLE. Denies N/V/D/C/CP. No fever or chills.     The patient was counseled regarding diagnostic results, instructions for management, risk factor reductions, prognosis, patient and family education, impressions, risks and benefits of treatment options and importance of compliance with treatment. I have reviewed nursing notes since my last visit and document any significant changes Reviewed orders, medications, Labs. Reviewed chart looking at current medications, treatments, labs, x-rays etc.     ROS:  As above in subjective. Otherwise, all other systems have been reviewed and are negative for complaint.    Current Outpatient  Medications   Medication Instructions   • acetaminophen (TYLENOL) 1,000 mg, oral, Every 8 hours   • ascorbic acid (VITAMIN C) 500 mg, oral, Daily   • aspirin 81 mg, oral, Daily   • atorvastatin (LIPITOR) 20 mg, oral, Daily   • cholecalciferol (VITAMIN D-3) 25 mcg, oral, Daily   • cyanocobalamin (VITAMIN B-12) 250 mcg, oral, Daily   • doxycycline (VIBRAMYCIN) 100 mg, oral, 2 times daily, Take with at least 8 ounces (large glass) of water, do not lie down for 30 minutes after   • DULoxetine (CYMBALTA) 60 mg, oral, Daily   • DULoxetine (CYMBALTA) 40 mg, oral, Daily, Do not crush or chew.   • fludrocortisone (FLORINEF) 0.05 mg, oral, Daily   • lactobacillus acidophilus (Acidophilus) capsule 1 capsule, oral, Daily   • lidocaine 4 % patch 1 patch, transdermal, Daily, Remove & discard patch within 12 hours or as directed by MD.   • miconazole (Micotin) 2 % cream 1 Application, Topical, 3 times daily   • ondansetron (ZOFRAN) 4 mg, oral, Every 6 hours PRN   • oxygen (O2) gas therapy 1 each, inhalation, Every 24 hours   • potassium chloride CR 20 mEq ER tablet 20 mEq, oral, Daily, Do not crush or chew.   • simvastatin (ZOCOR) 40 mg, oral, Every evening   • zinc acetate 50 mg (zinc) capsule 1 capsule, oral, Daily    Physical Exam  Vitals reviewed.   Constitutional:       General: He is not in acute distress.     Appearance: Normal appearance. He is ill-appearing.   HENT:      Head: Normocephalic and atraumatic.      Right Ear: Tympanic membrane and external ear normal.      Left Ear: Tympanic membrane and external ear normal.      Nose: Nose normal.      Mouth/Throat:      Mouth: Mucous membranes are moist.      Pharynx: Oropharynx is clear.   Eyes:      Conjunctiva/sclera: Conjunctivae normal.      Pupils: Pupils are equal, round, and reactive to light.   Cardiovascular:      Rate and Rhythm: Normal rate and regular rhythm.      Heart sounds: Normal heart sounds. No murmur heard.  Pulmonary:      Effort: Pulmonary effort is  normal. No respiratory distress.      Breath sounds: Decreased breath sounds present. No wheezing.   Abdominal:      General: There is no distension.      Palpations: Abdomen is soft. There is no mass.      Tenderness: There is no abdominal tenderness.   Musculoskeletal:         General: Normal range of motion.      Cervical back: Normal range of motion and neck supple.   Skin:     General: Skin is warm and dry.      Findings: Bruising present.   Neurological:      General: No focal deficit present.      Mental Status: He is alert and oriented to person, place, and time.      Sensory: No sensory deficit.      Motor: Weakness present.   Psychiatric:         Mood and Affect: Mood normal.         Behavior: Behavior normal. Behavior is cooperative.          Lab Results   Component Value Date    WBC 13.5 (H) 07/02/2024    HGB 9.3 (L) 07/02/2024    HCT 29.3 (L) 07/02/2024     (H) 07/02/2024    CHOL 147 03/16/2020    TRIG 95 03/16/2020    HDL 34.5 (A) 03/16/2020    ALT 29 07/02/2024    AST 49 (H) 07/02/2024     (L) 07/02/2024    K 3.8 07/02/2024    CL 95 (L) 07/02/2024    CREATININE 0.67 07/02/2024    BUN 17 07/02/2024    CO2 29 07/02/2024    TSH 0.67 02/03/2024    INR 1.3 (H) 06/28/2024    HGBA1C 5.9 03/16/2020     Results were reviewed and addressed accordingly. Lab Results were also reviewed in [facility].     Provider Impression:   Acute hypoxic respiratory failure 2/2 recent sepsis d/t pneumonia  #recurrent pneumonia  #hx recent acute R rib fxs s/p fall   - CT chest w/ contrast: small simple left pleural effusion and LLL atelectasis  - pain regimen for acute pain. Spirometry, airway clearance.  - Blood cultures obtained 6/28 negative so far. Legionella and strep negative  - CT chest obtained in ED negative for infectious etiology  - Unremarkable CT abdomen/pelvis w/ contrast  - continue with doxycycline 100mg BID to complete the duration     LLE wound and other chronic wounds   #chronic neuropathy and  recurrent falls   - consult Wound care team  - Xray left foot no acute bony abnormality  -  c/w duloxetine 60 daily  - AM cortisol 28.2.   - PT/OT to eval and tx     Orthostatic hypotension, hyponatremia  #h/o HTN   - AM cortisol 28.2. Ruled out adrenal insufficiency   - Started on Florinef      CV- CAD, Afib w/ frequent falls, HTN, HFpEF  - lisinopril and metoprolol were discontinued during hospitalization  - pt reports he has been given Eliquis at [facility]. Holding.   - Pt still needs eval for Watchman procedure outpatient.   - Holding metoprolol due to hypotension  - echo 50% diastolic dysfunction 2/2024.     BLE edema   - chronic per pt not increased recently.     Code Status  - full code  ----------------  Written by Shira Varela RN, acting as a scribe for Dr. Escalante. This note accurately reflects the work and decisions made by Dr. Escalante.     I, Dr. Escalante, attest all medical record entries made by the scribe were under my direction and were personally dictated by me. I have reviewed the chart and agree that the record accurately reflects my performance of the history, physical exam, and assessment and plan.     Electronically Signed By: Dagoberto Chin MD   7/25/24 11:30 AM

## 2024-07-16 ENCOUNTER — APPOINTMENT (OUTPATIENT)
Dept: GERIATRIC MEDICINE | Facility: CLINIC | Age: 84
End: 2024-07-16
Payer: MEDICARE

## 2024-07-18 ENCOUNTER — NURSING HOME VISIT (OUTPATIENT)
Dept: POST ACUTE CARE | Facility: EXTERNAL LOCATION | Age: 84
End: 2024-07-18
Payer: MEDICARE

## 2024-07-18 DIAGNOSIS — R41.89 COGNITIVE IMPAIRMENT: ICD-10-CM

## 2024-07-18 DIAGNOSIS — T14.8XXA CHRONIC WOUND: ICD-10-CM

## 2024-07-18 DIAGNOSIS — G62.9 NEUROPATHY: ICD-10-CM

## 2024-07-18 DIAGNOSIS — J96.01 ACUTE HYPOXEMIC RESPIRATORY FAILURE (MULTI): ICD-10-CM

## 2024-07-18 DIAGNOSIS — I10 PRIMARY HYPERTENSION: ICD-10-CM

## 2024-07-18 DIAGNOSIS — J18.9 RECURRENT PNEUMONIA: ICD-10-CM

## 2024-07-18 DIAGNOSIS — I25.110 CORONARY ARTERY DISEASE INVOLVING NATIVE CORONARY ARTERY OF NATIVE HEART WITH UNSTABLE ANGINA PECTORIS (MULTI): ICD-10-CM

## 2024-07-18 DIAGNOSIS — I73.9 PVD (PERIPHERAL VASCULAR DISEASE) (CMS-HCC): ICD-10-CM

## 2024-07-18 PROCEDURE — 99308 SBSQ NF CARE LOW MDM 20: CPT | Performed by: INTERNAL MEDICINE

## 2024-07-18 NOTE — LETTER
Patient: Chao Thao  : 1940    Encounter Date: 2024    Name: Chao Thao  : 1940  MRN: [id number]  Visit Date: 2024  Chief Complaint: Weekly SNF Physician Visit    HPI: 85 y/o Full Code, PMH of HTN, HLD, MDD, CAD, frequent falls, dementia, afib, hard of hearing, HFpEF 50% 2024 who presents from home after leaving [facility]. Brought in by EMS after a fall. Patient was continued on IV ceftriaxone and a 3 day course of azithromycin then started on IV vancomycin with rocephin. Podiatry and wound care were consulted for patient's non healing toe ulcer. Podiatry determined wound did not look infected and recommended patient follow-up with Dr. Hartmann outpatient as scheduled for his PAD. Patient with orthostatic hypotension He was given IV fluids and AM cortisol ruled out adrenal insufficiency. He was started on Florinef. PT/OT recommended moderate intensity therapy at discharge. He was medically stable and discharged to SNF on 24. Sent with script for Doxycycline 100mg BID x1 day to complete a 5 day course of abx and Florinef. Instructed to follow up with PCP and Cardiology on discharge. Once stabilized, pt was brought to [facility] on 2024 for ongoing med mgt and therapy services.     Subjective: Seen and examined today. Laying in bed. Still on 02. Denies N/V/D/C/CP. No fever or chills.     The patient was counseled regarding diagnostic results, instructions for management, risk factor reductions, prognosis, patient and family education, impressions, risks and benefits of treatment options and importance of compliance with treatment. I have reviewed nursing notes since my last visit and document any significant changes Reviewed orders, medications, Labs. Reviewed chart looking at current medications, treatments, labs, x-rays etc.     ROS:  As above in subjective. Otherwise, all other systems have been reviewed and are negative for complaint.    Current Outpatient Medications    Medication Instructions   • acetaminophen (TYLENOL) 1,000 mg, oral, Every 8 hours   • ammonium lactate (Lac-Hydrin) 12 % lotion 1 Application, Topical, 2 times daily   • ascorbic acid (VITAMIN C) 500 mg, oral, Daily   • aspirin 81 mg, oral, Daily   • atorvastatin (LIPITOR) 20 mg, oral, Daily   • cholecalciferol (VITAMIN D-3) 25 mcg, oral, Daily   • cyanocobalamin (VITAMIN B-12) 250 mcg, oral, Daily   • DULoxetine (CYMBALTA) 40 mg, oral, Daily, Do not crush or chew.   • fludrocortisone (FLORINEF) 0.05 mg, oral, Daily   • lactobacillus acidophilus (Acidophilus) capsule 1 capsule, oral, Daily   • lidocaine 4 % patch 1 patch, transdermal, Daily, Remove & discard patch within 12 hours or as directed by MD.   • magnesium hydroxide (Milk of Magnesia) 400 mg/5 mL suspension 30 mL, oral, Daily PRN   • metoprolol succinate XL (TOPROL-XL) 25 mg, oral, Daily, Swallow whole. Do not chew or crush   • miconazole (Micotin) 2 % cream 1 Application, Topical, 3 times daily   • OLANZapine (ZYPREXA) 5 mg, oral, Nightly   • ondansetron (ZOFRAN) 4 mg, oral, Every 4 hours PRN   • oxygen (O2) gas therapy 1 each, inhalation, Every 24 hours   • potassium chloride CR 20 mEq ER tablet 20 mEq, oral, Daily, Do not crush or chew.   • zinc acetate 50 mg (zinc) capsule 1 capsule, oral, Daily   • zinc oxide 40 % ointment ointment 1 Application, Topical, 2 times daily PRN    Physical Exam  Vitals reviewed.   Constitutional:       General: He is not in acute distress.     Appearance: Normal appearance. He is ill-appearing.   HENT:      Head: Normocephalic and atraumatic.      Right Ear: Tympanic membrane and external ear normal.      Left Ear: Tympanic membrane and external ear normal.      Nose: Nose normal.      Mouth/Throat:      Mouth: Mucous membranes are moist.      Pharynx: Oropharynx is clear.   Eyes:      Conjunctiva/sclera: Conjunctivae normal.      Pupils: Pupils are equal, round, and reactive to light.   Cardiovascular:      Rate and  Rhythm: Normal rate and regular rhythm.      Heart sounds: Normal heart sounds. No murmur heard.  Pulmonary:      Effort: Pulmonary effort is normal. No respiratory distress.      Breath sounds: Decreased breath sounds present. No wheezing.   Abdominal:      General: There is no distension.      Palpations: Abdomen is soft. There is no mass.      Tenderness: There is no abdominal tenderness.   Musculoskeletal:         General: Normal range of motion.      Cervical back: Normal range of motion and neck supple.   Skin:     General: Skin is warm and dry.      Findings: Bruising present.   Neurological:      General: No focal deficit present.      Mental Status: He is alert and oriented to person, place, and time.      Sensory: No sensory deficit.      Motor: Weakness present.   Psychiatric:         Mood and Affect: Mood normal.         Behavior: Behavior normal. Behavior is cooperative.          Lab Results   Component Value Date    WBC 14.3 (H) 08/12/2024    HGB 10.6 (L) 08/12/2024    HCT 34.3 (L) 08/12/2024     (H) 08/12/2024    CHOL 147 03/16/2020    TRIG 95 03/16/2020    HDL 34.5 (A) 03/16/2020    ALT 17 08/12/2024    AST 23 08/12/2024     08/12/2024    K 4.4 08/12/2024     08/12/2024    CREATININE 0.70 08/12/2024    BUN 16 08/12/2024    CO2 30 08/12/2024    TSH 0.67 02/03/2024    INR 1.3 (H) 06/28/2024    HGBA1C 5.9 03/16/2020     Results were reviewed and addressed accordingly. Lab Results were also reviewed in [facility].     Provider Impression:   Acute hypoxic respiratory failure 2/2 recent sepsis d/t pneumonia  #recurrent pneumonia  #hx recent acute R rib fxs s/p fall   - CT chest w/ contrast: small simple left pleural effusion and LLL atelectasis  - pain regimen for acute pain. Spirometry, airway clearance.  - Blood cultures obtained 6/28 negative so far. Legionella and strep negative  - CT chest obtained in ED negative for infectious etiology  - Unremarkable CT abdomen/pelvis w/ contrast  -  completed doxycycline atb duration     LLE wound and other chronic wounds   #chronic neuropathy and recurrent falls   - consult Wound care team  - Xray left foot no acute bony abnormality  -  c/w duloxetine 60 daily  - AM cortisol 28.2.   - PT/OT to eval and tx     Orthostatic hypotension, hyponatremia  #h/o HTN   - AM cortisol 28.2. Ruled out adrenal insufficiency   - Started on Florinef      CV- CAD, Afib w/ frequent falls, HTN, HFpEF  - lisinopril and metoprolol were discontinued during hospitalization  - pt reports he has been given Eliquis at [facility].   - Pt still needs eval for Watchman procedure outpatient.   - Holding metoprolol due to hypotension  - echo 50% diastolic dysfunction 2/2024.     BLE edema   - chronic per pt not increased recently.     Code Status  - full code  ----------------  Written by Shira Varela RN, acting as a scribe for Dr. Escalante. This note accurately reflects the work and decisions made by Dr. Escalante.     I, Dr. Escalante, attest all medical record entries made by the scribe were under my direction and were personally dictated by me. I have reviewed the chart and agree that the record accurately reflects my performance of the history, physical exam, and assessment and plan.       Electronically Signed By: Dagoberto Chin MD   8/20/24  8:52 AM

## 2024-07-24 VITALS
HEART RATE: 95 BPM | WEIGHT: 184 LBS | DIASTOLIC BLOOD PRESSURE: 80 MMHG | OXYGEN SATURATION: 95 % | BODY MASS INDEX: 29.71 KG/M2 | TEMPERATURE: 97.1 F | SYSTOLIC BLOOD PRESSURE: 119 MMHG | RESPIRATION RATE: 18 BRPM

## 2024-07-24 PROBLEM — J96.01 ACUTE HYPOXEMIC RESPIRATORY FAILURE (MULTI): Status: ACTIVE | Noted: 2024-07-24

## 2024-07-24 RX ORDER — POTASSIUM CHLORIDE 20 MEQ/1
20 TABLET, EXTENDED RELEASE ORAL DAILY
Status: ON HOLD | COMMUNITY

## 2024-07-24 RX ORDER — ATORVASTATIN CALCIUM 20 MG/1
20 TABLET, FILM COATED ORAL DAILY
Status: ON HOLD | COMMUNITY

## 2024-07-24 RX ORDER — DULOXETIN HYDROCHLORIDE 20 MG/1
40 CAPSULE, DELAYED RELEASE ORAL DAILY
Status: ON HOLD | COMMUNITY

## 2024-07-25 ENCOUNTER — NURSING HOME VISIT (OUTPATIENT)
Dept: POST ACUTE CARE | Facility: EXTERNAL LOCATION | Age: 84
End: 2024-07-25
Payer: MEDICARE

## 2024-07-25 DIAGNOSIS — R41.89 COGNITIVE IMPAIRMENT: ICD-10-CM

## 2024-07-25 DIAGNOSIS — J96.01 ACUTE HYPOXEMIC RESPIRATORY FAILURE (MULTI): ICD-10-CM

## 2024-07-25 DIAGNOSIS — J18.9 RECURRENT PNEUMONIA: ICD-10-CM

## 2024-07-25 DIAGNOSIS — I10 PRIMARY HYPERTENSION: ICD-10-CM

## 2024-07-25 DIAGNOSIS — G62.9 NEUROPATHY: ICD-10-CM

## 2024-07-25 DIAGNOSIS — I25.110 CORONARY ARTERY DISEASE INVOLVING NATIVE CORONARY ARTERY OF NATIVE HEART WITH UNSTABLE ANGINA PECTORIS (MULTI): ICD-10-CM

## 2024-07-25 DIAGNOSIS — I73.9 PVD (PERIPHERAL VASCULAR DISEASE) (CMS-HCC): ICD-10-CM

## 2024-07-25 DIAGNOSIS — T14.8XXA CHRONIC WOUND: ICD-10-CM

## 2024-07-25 PROCEDURE — 99308 SBSQ NF CARE LOW MDM 20: CPT | Performed by: INTERNAL MEDICINE

## 2024-07-25 NOTE — PROGRESS NOTES
Name: Chao Thao  : 1940  MRN: [id number]  Visit Date: 2024  Chief Complaint: HISTORY AND PHYSICAL    HPI:  83 y/o Full Code, PMH of HTN, HLD, MDD, CAD, frequent falls, dementia, afib, hard of hearing, HFpEF 50% 2024 who presents from home after leaving [facility]. Brought in by EMS after a fall. Patient was continued on IV ceftriaxone and a 3 day course of azithromycin then started on IV vancomycin with rocephin. Podiatry and wound care were consulted for patient's non healing toe ulcer. Podiatry determined wound did not look infected and recommended patient follow-up with Dr. Hartmann outpatient as scheduled for his PAD. Patient with orthostatic hypotension He was given IV fluids and AM cortisol ruled out adrenal insufficiency. He was started on Florinef. PT/OT recommended moderate intensity therapy at discharge. He was medically stable and discharged to SNF on 24. Sent with script for Doxycycline 100mg BID x1 day to complete a 5 day course of abx and Florinef. Instructed to follow up with PCP and Cardiology on discharge. Once stabilized, pt was brought to [facility] on 2024 for ongoing med mgt and therapy services.     Subjective: Seen and examined today. Laying in bed. Reports weakness in BLE. Reviewed hospitalization with him. Questions answered with understanding verbalized.    The patient was counseled regarding diagnostic results, instructions for management, risk factor reductions, prognosis, patient and family education, impressions, risks and benefits of treatment options and importance of compliance with treatment. I have reviewed nursing notes since my last visit and document any significant changes Reviewed orders, medications, Labs. Reviewed chart looking at current medications, treatments, labs, x-rays etc.     ROS:  As above in subjective. Otherwise, all other systems have been reviewed and are negative for complaint.  Current Outpatient Medications   Medication Instructions     acetaminophen (TYLENOL) 1,000 mg, oral, Every 8 hours    ascorbic acid (VITAMIN C) 500 mg, oral, Daily    aspirin 81 mg, oral, Daily    atorvastatin (LIPITOR) 20 mg, oral, Daily    cholecalciferol (VITAMIN D-3) 25 mcg, oral, Daily    cyanocobalamin (VITAMIN B-12) 250 mcg, oral, Daily    doxycycline (VIBRAMYCIN) 100 mg, oral, 2 times daily, Take with at least 8 ounces (large glass) of water, do not lie down for 30 minutes after    DULoxetine (CYMBALTA) 60 mg, oral, Daily    DULoxetine (CYMBALTA) 40 mg, oral, Daily, Do not crush or chew.    fludrocortisone (FLORINEF) 0.05 mg, oral, Daily    lactobacillus acidophilus (Acidophilus) capsule 1 capsule, oral, Daily    lidocaine 4 % patch 1 patch, transdermal, Daily, Remove & discard patch within 12 hours or as directed by MD.    miconazole (Micotin) 2 % cream 1 Application, Topical, 3 times daily    ondansetron (ZOFRAN) 4 mg, oral, Every 6 hours PRN    oxygen (O2) gas therapy 1 each, inhalation, Every 24 hours    potassium chloride CR 20 mEq ER tablet 20 mEq, oral, Daily, Do not crush or chew.    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    History of alcohol abuse    Chronic shortness of breath 06/28/2024    Drug abuse (Multi)     in remission    Edema, unspecified 11/26/2019    Dependent edema    Encounter for immunization 04/08/2016    Need for Zostavax administration    Pain in left knee 08/08/2018    Left knee pain    Personal history of other diseases of the nervous system and sense organs 09/18/2019    History of cataract    Personal history of other drug therapy 04/08/2016    History of pneumococcal vaccination    Tinea pedis 10/10/2018    Tinea pedis of both feet    Venous stasis ulcer of right calf (Multi) 06/28/2024    Vitiligo 06/28/2024       Past Surgical History:   Procedure Laterality Date    CARDIAC  CATHETERIZATION  05/14/2018    Cardiac Cath Procedure Summary    INNER EAR SURGERY  01/06/2016    Inner Ear Surgery    OTHER SURGICAL HISTORY  03/16/2020    Cataract surgery       No family history on file.    Social History     Tobacco Use    Smoking status: Former     Types: Cigarettes    Smokeless tobacco: Never   Substance Use Topics    Alcohol use: Not Currently       Allergies   Allergen Reactions    Gabapentin Other    Penicillin Unknown      Visit Vitals  /80   Pulse 95   Temp 36.2 °C (97.1 °F)   Resp 18   Wt 83.5 kg (184 lb)   SpO2 95%   BMI 29.71 kg/m²   Smoking Status Former   BSA 1.97 m²      Physical Exam  Vitals reviewed.   Constitutional:       General: He is not in acute distress.     Appearance: Normal appearance. He is ill-appearing.      Interventions: Nasal cannula in place.       HENT:      Head: Normocephalic and atraumatic.      Right Ear: External ear normal.      Left Ear: External ear normal.      Nose: Nose normal.      Mouth/Throat:      Mouth: Mucous membranes are moist.      Pharynx: Oropharynx is clear.   Eyes:      Conjunctiva/sclera: Conjunctivae normal.      Pupils: Pupils are equal, round, and reactive to light.   Cardiovascular:      Rate and Rhythm: Normal rate and regular rhythm.      Heart sounds: Normal heart sounds. No murmur heard.  Pulmonary:      Effort: Pulmonary effort is normal. No respiratory distress.      Breath sounds: Decreased breath sounds present. No wheezing.   Abdominal:      General: There is no distension.      Palpations: Abdomen is soft. There is no mass.      Tenderness: There is no abdominal tenderness.   Musculoskeletal:         General: Normal range of motion.      Cervical back: Normal range of motion and neck supple.      Comments: Weakness of BLE   Skin:     General: Skin is warm and dry.      Findings: Bruising present.   Neurological:      General: No focal deficit present.      Mental Status: He is alert and oriented to person, place, and  time.      Sensory: No sensory deficit.      Motor: Weakness present.   Psychiatric:         Mood and Affect: Mood normal.         Behavior: Behavior normal. Behavior is cooperative.       Lab Results   Component Value Date    WBC 13.5 (H) 07/02/2024    HGB 9.3 (L) 07/02/2024    HCT 29.3 (L) 07/02/2024     (H) 07/02/2024    CHOL 147 03/16/2020    TRIG 95 03/16/2020    HDL 34.5 (A) 03/16/2020    ALT 29 07/02/2024    AST 49 (H) 07/02/2024     (L) 07/02/2024    K 3.8 07/02/2024    CL 95 (L) 07/02/2024    CREATININE 0.67 07/02/2024    BUN 17 07/02/2024    CO2 29 07/02/2024    TSH 0.67 02/03/2024    INR 1.3 (H) 06/28/2024    HGBA1C 5.9 03/16/2020     Results were reviewed and addressed accordingly. Lab Results were also reviewed in [facility].     Provider Impression:   Acute hypoxic respiratory failure 2/2 recent sepsis d/t pneumonia  #recurrent pneumonia  #hx recent acute R rib fxs s/p fall   - CT chest w/ contrast: small simple left pleural effusion and LLL atelectasis  - pain regimen for acute pain. Spirometry, airway clearance.  - Blood cultures obtained 6/28 negative so far. Legionella and strep negative  - CT chest obtained in ED negative for infectious etiology  - Unremarkable CT abdomen/pelvis w/ contrast  - continue with doxycycline 100mg BID to complete the duration     LLE wound and other chronic wounds   #chronic neuropathy and recurrent falls   - consult Wound care team  - Xray left foot no acute bony abnormality  -  c/w duloxetine 60 daily  - AM cortisol 28.2.   - PT/OT to eval and tx     Orthostatic hypotension, hyponatremia  #h/o HTN   - AM cortisol 28.2. Ruled out adrenal insufficiency   - Started on Florinef      CV- CAD, Afib w/ frequent falls, HTN, HFpEF  - lisinopril and metoprolol were discontinued during hospitalization  - pt reports he has been given Eliquis at [facility].   - Pt still needs eval for Watchman procedure outpatient.   - Holding metoprolol due to hypotension  - echo  50% diastolic dysfunction 2/2024.     BLE edema   - chronic per pt not increased recently.     Code Status  - full code  ----------------  Written by Shira Varela RN, acting as a scribe for Dr. Escalante. This note accurately reflects the work and decisions made by Dr. Escalante.     I, Dr. Escalante, attest all medical record entries made by the scribe were under my direction and were personally dictated by me. I have reviewed the chart and agree that the record accurately reflects my performance of the history, physical exam, and assessment and plan.

## 2024-07-25 NOTE — LETTER
Patient: Chao Thao  : 1940    Encounter Date: 2024    Name: Chao Thao  : 1940  MRN: [id number]  Visit Date: 2024  Chief Complaint: Weekly SNF Physician Visit    HPI: 85 y/o Full Code, PMH of HTN, HLD, MDD, CAD, frequent falls, dementia, afib, hard of hearing, HFpEF 50% 2024 who presents from home after leaving [facility]. Brought in by EMS after a fall. Patient was continued on IV ceftriaxone and a 3 day course of azithromycin then started on IV vancomycin with rocephin. Podiatry and wound care were consulted for patient's non healing toe ulcer. Podiatry determined wound did not look infected and recommended patient follow-up with Dr. Hartmann outpatient as scheduled for his PAD. Patient with orthostatic hypotension He was given IV fluids and AM cortisol ruled out adrenal insufficiency. He was started on Florinef. PT/OT recommended moderate intensity therapy at discharge. He was medically stable and discharged to SNF on 24. Sent with script for Doxycycline 100mg BID x1 day to complete a 5 day course of abx and Florinef. Instructed to follow up with PCP and Cardiology on discharge. Once stabilized, pt was brought to [facility] on 2024 for ongoing med mgt and therapy services.     Subjective: Seen and examined today. Laying in bed. Still on 02. Denies N/V/D/C/CP. No fever or chills.     The patient was counseled regarding diagnostic results, instructions for management, risk factor reductions, prognosis, patient and family education, impressions, risks and benefits of treatment options and importance of compliance with treatment. I have reviewed nursing notes since my last visit and document any significant changes Reviewed orders, medications, Labs. Reviewed chart looking at current medications, treatments, labs, x-rays etc.     ROS:  As above in subjective. Otherwise, all other systems have been reviewed and are negative for complaint.     Physical Exam  Vitals reviewed.    Constitutional:       General: He is not in acute distress.     Appearance: Normal appearance. He is ill-appearing.   HENT:      Head: Normocephalic and atraumatic.      Right Ear: Tympanic membrane and external ear normal.      Left Ear: Tympanic membrane and external ear normal.      Nose: Nose normal.      Mouth/Throat:      Mouth: Mucous membranes are moist.      Pharynx: Oropharynx is clear.   Eyes:      Conjunctiva/sclera: Conjunctivae normal.      Pupils: Pupils are equal, round, and reactive to light.   Cardiovascular:      Rate and Rhythm: Normal rate and regular rhythm.      Heart sounds: Normal heart sounds. No murmur heard.  Pulmonary:      Effort: Pulmonary effort is normal. No respiratory distress.      Breath sounds: Decreased breath sounds present. No wheezing.   Abdominal:      General: There is no distension.      Palpations: Abdomen is soft. There is no mass.      Tenderness: There is no abdominal tenderness.   Musculoskeletal:         General: Normal range of motion.      Cervical back: Normal range of motion and neck supple.   Skin:     General: Skin is warm and dry.      Findings: Bruising present.   Neurological:      General: No focal deficit present.      Mental Status: He is alert and oriented to person, place, and time.      Sensory: No sensory deficit.      Motor: Weakness present.   Psychiatric:         Mood and Affect: Mood normal.         Behavior: Behavior normal. Behavior is cooperative.          Lab Results   Component Value Date    WBC 14.3 (H) 08/12/2024    HGB 10.6 (L) 08/12/2024    HCT 34.3 (L) 08/12/2024     (H) 08/12/2024    CHOL 147 03/16/2020    TRIG 95 03/16/2020    HDL 34.5 (A) 03/16/2020    ALT 17 08/12/2024    AST 23 08/12/2024     08/12/2024    K 4.4 08/12/2024     08/12/2024    CREATININE 0.70 08/12/2024    BUN 16 08/12/2024    CO2 30 08/12/2024    TSH 0.67 02/03/2024    INR 1.3 (H) 06/28/2024    HGBA1C 5.9 03/16/2020     Results were reviewed and  addressed accordingly. Lab Results were also reviewed in [facility].     Provider Impression:   Acute hypoxic respiratory failure 2/2 recent sepsis d/t pneumonia  #recurrent pneumonia  #hx recent acute R rib fxs s/p fall   - CT chest w/ contrast: small simple left pleural effusion and LLL atelectasis  - pain regimen for acute pain. Spirometry, airway clearance.  - Blood cultures obtained 6/28 negative so far. Legionella and strep negative  - CT chest obtained in ED negative for infectious etiology  - Unremarkable CT abdomen/pelvis w/ contrast  - completed doxycycline atb duration     LLE wound and other chronic wounds   #chronic neuropathy and recurrent falls   - consult Wound care team  - Xray left foot no acute bony abnormality  -  c/w duloxetine 60 daily  - AM cortisol 28.2.   - PT/OT to eval and tx     Orthostatic hypotension, hyponatremia  #h/o HTN   - AM cortisol 28.2. Ruled out adrenal insufficiency   - Started on Florinef      CV- CAD, Afib w/ frequent falls, HTN, HFpEF  - lisinopril and metoprolol were discontinued during hospitalization  - pt reports he has been given Eliquis at [facility].   - Pt still needs eval for Watchman procedure outpatient.   - Holding metoprolol due to hypotension  - echo 50% diastolic dysfunction 2/2024.     BLE edema   - chronic per pt not increased recently.     Code Status  - full code  ----------------  Written by Shira Varela RN, acting as a scribe for Dr. Escalante. This note accurately reflects the work and decisions made by Dr. Escalante.     I, Dr. Escalante, attest all medical record entries made by the scribe were under my direction and were personally dictated by me. I have reviewed the chart and agree that the record accurately reflects my performance of the history, physical exam, and assessment and plan.       Electronically Signed By: Dagobreto Chin MD   8/20/24  8:54 AM

## 2024-07-25 NOTE — PROGRESS NOTES
Name: Chao Thao  : 1940  MRN: [id number]  Visit Date: 2024  Chief Complaint: Weekly SNF Physician Visit    HPI: 83 y/o Full Code, PMH of HTN, HLD, MDD, CAD, frequent falls, dementia, afib, hard of hearing, HFpEF 50% 2024 who presents from home after leaving [facility]. Brought in by EMS after a fall. Patient was continued on IV ceftriaxone and a 3 day course of azithromycin then started on IV vancomycin with rocephin. Podiatry and wound care were consulted for patient's non healing toe ulcer. Podiatry determined wound did not look infected and recommended patient follow-up with Dr. Hartmann outpatient as scheduled for his PAD. Patient with orthostatic hypotension He was given IV fluids and AM cortisol ruled out adrenal insufficiency. He was started on Florinef. PT/OT recommended moderate intensity therapy at discharge. He was medically stable and discharged to SNF on 24. Sent with script for Doxycycline 100mg BID x1 day to complete a 5 day course of abx and Florinef. Instructed to follow up with PCP and Cardiology on discharge. Once stabilized, pt was brought to [facility] on 2024 for ongoing med mgt and therapy services.     Subjective: Seen and examined today. Laying in bed. Reports weakness in BLE. Denies N/V/D/C/CP. No fever or chills.     The patient was counseled regarding diagnostic results, instructions for management, risk factor reductions, prognosis, patient and family education, impressions, risks and benefits of treatment options and importance of compliance with treatment. I have reviewed nursing notes since my last visit and document any significant changes Reviewed orders, medications, Labs. Reviewed chart looking at current medications, treatments, labs, x-rays etc.     ROS:  As above in subjective. Otherwise, all other systems have been reviewed and are negative for complaint.    Current Outpatient Medications   Medication Instructions    acetaminophen (TYLENOL) 1,000 mg, oral,  Every 8 hours    ascorbic acid (VITAMIN C) 500 mg, oral, Daily    aspirin 81 mg, oral, Daily    atorvastatin (LIPITOR) 20 mg, oral, Daily    cholecalciferol (VITAMIN D-3) 25 mcg, oral, Daily    cyanocobalamin (VITAMIN B-12) 250 mcg, oral, Daily    doxycycline (VIBRAMYCIN) 100 mg, oral, 2 times daily, Take with at least 8 ounces (large glass) of water, do not lie down for 30 minutes after    DULoxetine (CYMBALTA) 60 mg, oral, Daily    DULoxetine (CYMBALTA) 40 mg, oral, Daily, Do not crush or chew.    fludrocortisone (FLORINEF) 0.05 mg, oral, Daily    lactobacillus acidophilus (Acidophilus) capsule 1 capsule, oral, Daily    lidocaine 4 % patch 1 patch, transdermal, Daily, Remove & discard patch within 12 hours or as directed by MD.    miconazole (Micotin) 2 % cream 1 Application, Topical, 3 times daily    ondansetron (ZOFRAN) 4 mg, oral, Every 6 hours PRN    oxygen (O2) gas therapy 1 each, inhalation, Every 24 hours    potassium chloride CR 20 mEq ER tablet 20 mEq, oral, Daily, Do not crush or chew.    simvastatin (ZOCOR) 40 mg, oral, Every evening    zinc acetate 50 mg (zinc) capsule 1 capsule, oral, Daily    Physical Exam  Vitals reviewed.   Constitutional:       General: He is not in acute distress.     Appearance: Normal appearance. He is ill-appearing.   HENT:      Head: Normocephalic and atraumatic.      Right Ear: Tympanic membrane and external ear normal.      Left Ear: Tympanic membrane and external ear normal.      Nose: Nose normal.      Mouth/Throat:      Mouth: Mucous membranes are moist.      Pharynx: Oropharynx is clear.   Eyes:      Conjunctiva/sclera: Conjunctivae normal.      Pupils: Pupils are equal, round, and reactive to light.   Cardiovascular:      Rate and Rhythm: Normal rate and regular rhythm.      Heart sounds: Normal heart sounds. No murmur heard.  Pulmonary:      Effort: Pulmonary effort is normal. No respiratory distress.      Breath sounds: Decreased breath sounds present. No wheezing.    Abdominal:      General: There is no distension.      Palpations: Abdomen is soft. There is no mass.      Tenderness: There is no abdominal tenderness.   Musculoskeletal:         General: Normal range of motion.      Cervical back: Normal range of motion and neck supple.   Skin:     General: Skin is warm and dry.      Findings: Bruising present.   Neurological:      General: No focal deficit present.      Mental Status: He is alert and oriented to person, place, and time.      Sensory: No sensory deficit.      Motor: Weakness present.   Psychiatric:         Mood and Affect: Mood normal.         Behavior: Behavior normal. Behavior is cooperative.          Lab Results   Component Value Date    WBC 13.5 (H) 07/02/2024    HGB 9.3 (L) 07/02/2024    HCT 29.3 (L) 07/02/2024     (H) 07/02/2024    CHOL 147 03/16/2020    TRIG 95 03/16/2020    HDL 34.5 (A) 03/16/2020    ALT 29 07/02/2024    AST 49 (H) 07/02/2024     (L) 07/02/2024    K 3.8 07/02/2024    CL 95 (L) 07/02/2024    CREATININE 0.67 07/02/2024    BUN 17 07/02/2024    CO2 29 07/02/2024    TSH 0.67 02/03/2024    INR 1.3 (H) 06/28/2024    HGBA1C 5.9 03/16/2020     Results were reviewed and addressed accordingly. Lab Results were also reviewed in [facility].     Provider Impression:   Acute hypoxic respiratory failure 2/2 recent sepsis d/t pneumonia  #recurrent pneumonia  #hx recent acute R rib fxs s/p fall   - CT chest w/ contrast: small simple left pleural effusion and LLL atelectasis  - pain regimen for acute pain. Spirometry, airway clearance.  - Blood cultures obtained 6/28 negative so far. Legionella and strep negative  - CT chest obtained in ED negative for infectious etiology  - Unremarkable CT abdomen/pelvis w/ contrast  - continue with doxycycline 100mg BID to complete the duration     LLE wound and other chronic wounds   #chronic neuropathy and recurrent falls   - consult Wound care team  - Xray left foot no acute bony abnormality  -  c/w  duloxetine 60 daily  - AM cortisol 28.2.   - PT/OT to eval and tx     Orthostatic hypotension, hyponatremia  #h/o HTN   - AM cortisol 28.2. Ruled out adrenal insufficiency   - Started on Florinef      CV- CAD, Afib w/ frequent falls, HTN, HFpEF  - lisinopril and metoprolol were discontinued during hospitalization  - pt reports he has been given Eliquis at [facility].   - Pt still needs eval for Watchman procedure outpatient.   - Holding metoprolol due to hypotension  - echo 50% diastolic dysfunction 2/2024.     BLE edema   - chronic per pt not increased recently.     Code Status  - full code  ----------------  Written by Shira Varela RN, acting as a scribe for Dr. Escalante. This note accurately reflects the work and decisions made by Dr. Escalante.     I, Dr. Escalante, attest all medical record entries made by the scribe were under my direction and were personally dictated by me. I have reviewed the chart and agree that the record accurately reflects my performance of the history, physical exam, and assessment and plan.

## 2024-07-26 ENCOUNTER — APPOINTMENT (OUTPATIENT)
Dept: CARDIOLOGY | Facility: HOSPITAL | Age: 84
End: 2024-07-26
Payer: MEDICARE

## 2024-07-26 ENCOUNTER — OFFICE VISIT (OUTPATIENT)
Dept: CARDIOLOGY | Facility: HOSPITAL | Age: 84
End: 2024-07-26
Payer: MEDICARE

## 2024-07-26 ENCOUNTER — DOCUMENTATION (OUTPATIENT)
Dept: CARDIOLOGY | Facility: HOSPITAL | Age: 84
End: 2024-07-26
Payer: MEDICARE

## 2024-07-26 VITALS — HEART RATE: 130 BPM | OXYGEN SATURATION: 91 % | SYSTOLIC BLOOD PRESSURE: 88 MMHG | DIASTOLIC BLOOD PRESSURE: 60 MMHG

## 2024-07-26 DIAGNOSIS — I73.9 PVD (PERIPHERAL VASCULAR DISEASE) (CMS-HCC): ICD-10-CM

## 2024-07-26 DIAGNOSIS — I48.0 PAROXYSMAL ATRIAL FIBRILLATION (MULTI): Primary | ICD-10-CM

## 2024-07-26 DIAGNOSIS — I70.222 CRITICAL LIMB ISCHEMIA OF LEFT LOWER EXTREMITY (MULTI): ICD-10-CM

## 2024-07-26 DIAGNOSIS — I73.9 PAD (PERIPHERAL ARTERY DISEASE) (CMS-HCC): ICD-10-CM

## 2024-07-26 DIAGNOSIS — R06.02 SHORTNESS OF BREATH: ICD-10-CM

## 2024-07-26 DIAGNOSIS — I70.222 CRITICAL LIMB ISCHEMIA OF LEFT LOWER EXTREMITY (MULTI): Primary | ICD-10-CM

## 2024-07-26 LAB
ATRIAL RATE: 105 BPM
PR INTERVAL: 160 MS
Q ONSET: 218 MS
QRS COUNT: 17 BEATS
QRS DURATION: 98 MS
QT INTERVAL: 360 MS
QTC CALCULATION(BAZETT): 475 MS
QTC FREDERICIA: 433 MS
R AXIS: 38 DEGREES
T AXIS: 211 DEGREES
T OFFSET: 398 MS
VENTRICULAR RATE: 105 BPM

## 2024-07-26 PROCEDURE — 99215 OFFICE O/P EST HI 40 MIN: CPT | Performed by: INTERNAL MEDICINE

## 2024-07-26 PROCEDURE — 1160F RVW MEDS BY RX/DR IN RCRD: CPT | Performed by: INTERNAL MEDICINE

## 2024-07-26 PROCEDURE — 3078F DIAST BP <80 MM HG: CPT | Performed by: INTERNAL MEDICINE

## 2024-07-26 PROCEDURE — 93005 ELECTROCARDIOGRAM TRACING: CPT | Performed by: INTERNAL MEDICINE

## 2024-07-26 PROCEDURE — 3074F SYST BP LT 130 MM HG: CPT | Performed by: INTERNAL MEDICINE

## 2024-07-26 PROCEDURE — 1111F DSCHRG MED/CURRENT MED MERGE: CPT | Performed by: INTERNAL MEDICINE

## 2024-07-26 PROCEDURE — 1036F TOBACCO NON-USER: CPT | Performed by: INTERNAL MEDICINE

## 2024-07-26 PROCEDURE — 1159F MED LIST DOCD IN RCRD: CPT | Performed by: INTERNAL MEDICINE

## 2024-07-26 RX ORDER — METOPROLOL SUCCINATE 25 MG/1
25 TABLET, EXTENDED RELEASE ORAL DAILY
Qty: 30 TABLET | Refills: 0 | Status: ON HOLD | OUTPATIENT
Start: 2024-07-26 | End: 2024-08-25

## 2024-07-26 NOTE — PROGRESS NOTES
Chief Complaint:   No chief complaint on file.     History Of Present Illness:    Chao Thao is a 84 y.o. male presenting today due to concern for L foot nonhealing wound. Patient with hx of PAD on [facility] from April of this year. Previously reported no claudication however now states that mobility is limited. Patient states that he had knee injections d/t arthritis which helped initially but reports significant knee pain. In addition, he has been following with podiatry/wound care for a ulcer to his L 2nd toe. Previously reported pain to that digit but now reports none.       In addition, patient reports that yesterday he had an episode where he could not speak, felt his heart racing. He did not seek out care, is unclear if he was given any medications at the facility. Symptoms resolved and have not returned. He currently is mentating appropriately. Denies h/a, blurry vision, numbness or tingling of the face, hand, arms. No motor deficits, no sensory deficits, no speech or understanding deficits. Is neurologically at his baseline.  He reports he was taken off his Eliquis d/t frequent falls, concern for bleeding.      Last Recorded Vitals:  There were no vitals filed for this visit.    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    Past Surgical History:  He has a past surgical history that includes Inner ear surgery (01/06/2016); Other surgical history (03/16/2020); and Cardiac catheterization (05/14/2018).      Social History:  He reports that he has  quit smoking. His smoking use included cigarettes. He has never used smokeless tobacco. He reports that he does not currently use alcohol. He reports that he does not use drugs.    Family History:  No family history on file.     Allergies:  Gabapentin and Penicillin    Outpatient Medications:  Current Outpatient Medications   Medication Instructions    acetaminophen (TYLENOL) 1,000 mg, oral, Every 8 hours    ascorbic acid (VITAMIN C) 500 mg, oral, Daily    aspirin 81 mg, oral, Daily    atorvastatin (LIPITOR) 20 mg, oral, Daily    cholecalciferol (VITAMIN D-3) 25 mcg, oral, Daily    cyanocobalamin (VITAMIN B-12) 250 mcg, oral, Daily    doxycycline (VIBRAMYCIN) 100 mg, oral, 2 times daily, Take with at least 8 ounces (large glass) of water, do not lie down for 30 minutes after    DULoxetine (CYMBALTA) 60 mg, oral, Daily    DULoxetine (CYMBALTA) 40 mg, oral, Daily, Do not crush or chew.    fludrocortisone (FLORINEF) 0.05 mg, oral, Daily    lactobacillus acidophilus (Acidophilus) capsule 1 capsule, oral, Daily    lidocaine 4 % patch 1 patch, transdermal, Daily, Remove & discard patch within 12 hours or as directed by MD.    miconazole (Micotin) 2 % cream 1 Application, Topical, 3 times daily    ondansetron (ZOFRAN) 4 mg, oral, Every 6 hours PRN    oxygen (O2) gas therapy 1 each, inhalation, Every 24 hours    potassium chloride CR 20 mEq ER tablet 20 mEq, oral, Daily, Do not crush or chew.    simvastatin (ZOCOR) 40 mg, oral, Every evening    zinc acetate 50 mg (zinc) capsule 1 capsule, oral, Daily       Physical Exam:  Physical Exam  Constitutional:       General: He is not in acute distress.     Appearance: Normal appearance.   HENT:      Head: Normocephalic.      Nose: Nose normal.      Mouth/Throat:      Mouth: Mucous membranes are moist.   Eyes:      Conjunctiva/sclera: Conjunctivae normal.      Pupils: Pupils are equal, round, and reactive to light.   Cardiovascular:      Rate and Rhythm: Regular rhythm. Tachycardia  present.      Heart sounds: No murmur heard.  Pulmonary:      Effort: Pulmonary effort is normal.      Breath sounds: Normal breath sounds.   Abdominal:      Palpations: Abdomen is soft.   Musculoskeletal:         General: Signs of injury present.      Cervical back: Normal range of motion.      Right lower leg: Edema present.      Left lower leg: Edema present.      Comments: LLE cool to the touch, DP, PT not palpable, capillary refill delayed.   Ulceration to 2nd toe with ecchymotic area, dry flaking skig.   Sensation present though diminished. Motor function intact.     RLE, cool to the touch, DP, PT not palpable. Cap refill delayed. Sensation and motor function intact. No wounds.    Neurological:      Mental Status: He is alert and oriented to person, place, and time.   Psychiatric:         Mood and Affect: Mood normal.            Last Labs:  CBC -  Lab Results   Component Value Date    WBC 13.5 (H) 07/02/2024    HGB 9.3 (L) 07/02/2024    HCT 29.3 (L) 07/02/2024    MCV 99 07/02/2024     (H) 07/02/2024       CMP -  Lab Results   Component Value Date    CALCIUM 7.6 (L) 07/02/2024    PHOS 3.4 07/02/2024    PROT 5.9 (L) 07/02/2024    ALBUMIN 2.3 (L) 07/02/2024    AST 49 (H) 07/02/2024    ALT 29 07/02/2024    ALKPHOS 114 07/02/2024    BILITOT 0.2 07/02/2024       LIPID PANEL -   Lab Results   Component Value Date    CHOL 147 03/16/2020    TRIG 95 03/16/2020    HDL 34.5 (A) 03/16/2020    CHHDL 4.3 03/16/2020    LDLF 94 03/16/2020    VLDL 19 03/16/2020       RENAL FUNCTION PANEL -   Lab Results   Component Value Date    GLUCOSE 94 07/02/2024     (L) 07/02/2024    K 3.8 07/02/2024    CL 95 (L) 07/02/2024    CO2 29 07/02/2024    ANIONGAP 10 07/02/2024    BUN 17 07/02/2024    CREATININE 0.67 07/02/2024    GFRMALE 77 03/08/2022    CALCIUM 7.6 (L) 07/02/2024    PHOS 3.4 07/02/2024    ALBUMIN 2.3 (L) 07/02/2024        Lab Results   Component Value Date    BNP 43 06/28/2024    HGBA1C 5.9 03/16/2020       Last  Cardiology Tests:  ECG:  ECG 12 lead (Clinic Performed) 07/26/2024 (Preliminary)  Sinus tachycardia with PACs rate 105 bpmm    Echo:  Transthoracic Echo (TTE) Complete 02/05/2024  CONCLUSIONS:   1. Left ventricular systolic function is mildly decreased with a 50% estimated ejection fraction.   2. Spectral Doppler shows an impaired relaxation pattern of left ventricular diastolic filling.   3. Moderately enlarged right ventricle.   4. The estimated pulmonary artery pressure is mildly elevated with the RVSP at 41.4 mmHg.   5. Technical difficulty of study prohibits definitive assessment of valvular structures.    Transthoracic Echocardiogram (10/06/2020):   CONCLUSIONS:   1. The left ventricular systolic function is low normal with a 55% estimated ejection fraction.   2. Spectral Doppler shows an impaired relaxation pattern of left ventricular diastolic filling.   3. Mild aortic valve stenosis.   4. There is mild tricuspid regurgitation.   5. The estimated pulmonary artery pressure is mildly elevated with the RVSP at 38.9 mmHg.   6. The inferior vena cava appears to be of normal size.     Vascular Testing  Vascular US CHRIS (04/02/2024):  CONCLUSIONS:  Right Lower PVR: Evidence of mild arterial occlusive disease in the right lower extremity at rest. Decreased digital perfusion noted. Monophasic flow is noted in the right dorsalis pedis artery. Biphasic flow is noted in the right posterior tibial artery. Triphasic flow is noted in the right common femoral artery.  Left Lower PVR: Evidence of mild arterial occlusive disease in the left lower extremity at rest. Left pressures of >220 mmHg suggest no compressibility of vessels and may make absolute Segmental Limb Pressures (SLP) unreliable. Decreased digital perfusion noted. Biphasic flow is noted in the left posterior tibial artery and left dorsalis pedis artery. Triphasic flow is noted in the left common femoral artery. Disease was called by PVR tracings and Doppler  waveforms.     Comparison:  Compared with study from 8/30/2021, mild disease from normal bilaterally. Toe brachial indices not obtained due to flatline waveforms.  .     Imaging & Doppler Findings:     RIGHT Lower PVR                Pressures Ratios  Right Posterior Tibial (Ankle) 99 mmHg   0.87  Right Dorsalis Pedis (Ankle)   100 mmHg  0.88  Right Digit (Great Toe)        0 mmHg    0.00           LEFT Lower PVR                Pressures Ratios  Left Posterior Tibial (Ankle) 249 mmHg  2.18  Left Dorsalis Pedis (Ankle)   107 mmHg  0.94  Left Digit (Great Toe)        0 mmHg    0.00                           Right     Left  Brachial Pressure 114 mmHg 112 mmHg    PVR (08/30/2021):  Imaging & Doppler Findings:     RIGHT Lower PVR                Pressures Ratios  Right High Thigh               160 mmHg  1.19  Right Low Thigh                164 mmHg  1.21  Right Calf                     111 mmHg  0.82  Right Posterior Tibial (Ankle) 125 mmHg  0.93  Right Dorsalis Pedis (Ankle)   100 mmHg  0.74  Right Digit (Great Toe)        77 mmHg   0.57           LEFT Lower PVR                Pressures Ratios  Left High Thigh               161 mmHg  1.19  Left Low Thigh                147 mmHg  1.09  Left Calf                     248 mmHg  1.84  Left Posterior Tibial (Ankle) 203 mmHg  1.50  Left Dorsalis Pedis (Ankle)   134 mmHg  0.99  Left Digit (Great Toe)        85 mmHg   0.63    Lab review: I have Chemistry CMP:   Lab Results   Component Value Date    ALBUMIN 2.3 (L) 07/02/2024    CALCIUM 7.6 (L) 07/02/2024    CO2 29 07/02/2024    CREATININE 0.67 07/02/2024    GLUCOSE 94 07/02/2024    BILITOT 0.2 07/02/2024    PROT 5.9 (L) 07/02/2024    ALT 29 07/02/2024    AST 49 (H) 07/02/2024    ALKPHOS 114 07/02/2024   , Chemistry BMP   Lab Results   Component Value Date    GLUCOSE 94 07/02/2024    CALCIUM 7.6 (L) 07/02/2024    CO2 29 07/02/2024    CREATININE 0.67 07/02/2024   , CBC:  Lab Results   Component Value Date    WBC 13.5 (H) 07/02/2024     RBC 2.97 (L) 07/02/2024    HGB 9.3 (L) 07/02/2024    HCT 29.3 (L) 07/02/2024    MCV 99 07/02/2024    MCH 31.3 07/02/2024    MCHC 31.7 (L) 07/02/2024    RDW 12.4 07/02/2024    NRBC 0.0 07/02/2024   , Coags:   Lab Results   Component Value Date    APTT 30 06/11/2024    INR 1.3 (H) 06/28/2024   , and Lipids:   Lab Results   Component Value Date    CHOL 147 03/16/2020    HDL 34.5 (A) 03/16/2020    TRIG 95 03/16/2020     Diagnostic review: I have personally reviewed the result(s) of the EKG and Echocardiogram .   Imaging review: I have  personally reviewed the result(s) PVR, CHRIS    Assessment/Plan       Chao Thao is an 85 yo male with a PMH of PAD, HTN, HLD, CAD s/p PTCA >10y ago, PAF who presented for follow up with nonhealing L 2nd great toe wound.     #Severe PAD w/ critical limb ischemia, non healing wound   #HLD    -Recommend peripheral angiography d/t nonhealing wound, significant flow limitation.   -Patient continues to follow with wound care/podiatry  -Continue ASA 81mg daily, Atorvastatin   -Discontinue Simvastatin as patient does not need two statin therapies      In addition to current evaluation reached out to patient's primary cardiologist given BB and DOAC have been discontinued, pt w/ PAF, rate not well controlled. Pt would like to stay off anticoagulation. Recommended to discuss watchman vs resumption of anticoagulation at next visit. Patient's PCP (Dr. Escalante involved in conversation as well given concerning symptoms reported yesterday that have resolved. Pt educated on stroke like symptoms, risk factors. Pt PCP states will follow up.)  Yessy Wilkerson PA-C

## 2024-07-26 NOTE — PROGRESS NOTES
History Of Present Illness:    Chao Thao is a 84 y.o. male presenting today due to concern for L foot nonhealing wound. Patient with hx of PAD on CHRIS from April of this year. Previously reported no claudication however now states that mobility is limited. Patient states that he had knee injections d/t arthritis which helped initially but reports significant knee pain. In addition, he has been following with podiatry/wound care for a ulcer to his L 2nd toe. Previously reported pain to that digit but now reports none.         In addition, patient reports that yesterday he had an episode where he could not speak, felt his heart racing. He did not seek out care, is unclear if he was given any medications at the facility. Symptoms resolved and have not returned. He currently is mentating appropriately. Denies h/a, blurry vision, numbness or tingling of the face, hand, arms. No motor deficits, no sensory deficits, no speech or understanding deficits. Is neurologically at his baseline.  He reports he was taken off his Eliquis d/t frequent falls, concern for bleeding.      Last Recorded Vitals:  Vitals   There were no vitals filed for this visit.        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     Past Surgical History:  He has a past surgical history that includes Inner ear surgery (01/06/2016); Other surgical history (03/16/2020); and Cardiac catheterization (05/14/2018).      Social History:  He reports that he has quit smoking. His smoking use  included cigarettes. He has never used smokeless tobacco. He reports that he does not currently use alcohol. He reports that he does not use drugs.     Family History:  Family History   No family history on file.        Allergies:  Gabapentin and Penicillin     Outpatient Medications:       Current Outpatient Medications   Medication Instructions    acetaminophen (TYLENOL) 1,000 mg, oral, Every 8 hours    ascorbic acid (VITAMIN C) 500 mg, oral, Daily    aspirin 81 mg, oral, Daily    atorvastatin (LIPITOR) 20 mg, oral, Daily    cholecalciferol (VITAMIN D-3) 25 mcg, oral, Daily    cyanocobalamin (VITAMIN B-12) 250 mcg, oral, Daily    doxycycline (VIBRAMYCIN) 100 mg, oral, 2 times daily, Take with at least 8 ounces (large glass) of water, do not lie down for 30 minutes after    DULoxetine (CYMBALTA) 60 mg, oral, Daily    DULoxetine (CYMBALTA) 40 mg, oral, Daily, Do not crush or chew.    fludrocortisone (FLORINEF) 0.05 mg, oral, Daily    lactobacillus acidophilus (Acidophilus) capsule 1 capsule, oral, Daily    lidocaine 4 % patch 1 patch, transdermal, Daily, Remove & discard patch within 12 hours or as directed by MD.    miconazole (Micotin) 2 % cream 1 Application, Topical, 3 times daily    ondansetron (ZOFRAN) 4 mg, oral, Every 6 hours PRN    oxygen (O2) gas therapy 1 each, inhalation, Every 24 hours    potassium chloride CR 20 mEq ER tablet 20 mEq, oral, Daily, Do not crush or chew.    simvastatin (ZOCOR) 40 mg, oral, Every evening    zinc acetate 50 mg (zinc) capsule 1 capsule, oral, Daily         Physical Exam:  Physical Exam  Constitutional:       General: He is not in acute distress.     Appearance: Normal appearance.   HENT:      Head: Normocephalic.      Nose: Nose normal.      Mouth/Throat:      Mouth: Mucous membranes are moist.   Eyes:      Conjunctiva/sclera: Conjunctivae normal.      Pupils: Pupils are equal, round, and reactive to light.   Cardiovascular:      Rate and Rhythm: Regular rhythm. Tachycardia  present.      Heart sounds: No murmur heard.  Pulmonary:      Effort: Pulmonary effort is normal.      Breath sounds: Normal breath sounds.   Abdominal:      Palpations: Abdomen is soft.   Musculoskeletal:         General: Signs of injury present.      Cervical back: Normal range of motion.      Right lower leg: Edema present.      Left lower leg: Edema present.      Comments: LLE cool to the touch, DP, PT not palpable, capillary refill delayed.   Ulceration to 2nd toe with ecchymotic area, dry flaking skig.   Sensation present though diminished. Motor function intact.      RLE, cool to the touch, DP, PT not palpable. Cap refill delayed. Sensation and motor function intact. No wounds.    Neurological:      Mental Status: He is alert and oriented to person, place, and time.   Psychiatric:         Mood and Affect: Mood normal.               Last Labs:  CBC -        Lab Results   Component Value Date     WBC 13.5 (H) 07/02/2024     HGB 9.3 (L) 07/02/2024     HCT 29.3 (L) 07/02/2024     MCV 99 07/02/2024      (H) 07/02/2024         CMP -        Lab Results   Component Value Date     CALCIUM 7.6 (L) 07/02/2024     PHOS 3.4 07/02/2024     PROT 5.9 (L) 07/02/2024     ALBUMIN 2.3 (L) 07/02/2024     AST 49 (H) 07/02/2024     ALT 29 07/02/2024     ALKPHOS 114 07/02/2024     BILITOT 0.2 07/02/2024         LIPID PANEL -         Lab Results   Component Value Date     CHOL 147 03/16/2020     TRIG 95 03/16/2020     HDL 34.5 (A) 03/16/2020     CHHDL 4.3 03/16/2020     LDLF 94 03/16/2020     VLDL 19 03/16/2020         RENAL FUNCTION PANEL -         Lab Results   Component Value Date     GLUCOSE 94 07/02/2024      (L) 07/02/2024     K 3.8 07/02/2024     CL 95 (L) 07/02/2024     CO2 29 07/02/2024     ANIONGAP 10 07/02/2024     BUN 17 07/02/2024     CREATININE 0.67 07/02/2024     GFRMALE 77 03/08/2022     CALCIUM 7.6 (L) 07/02/2024     PHOS 3.4 07/02/2024     ALBUMIN 2.3 (L) 07/02/2024               Lab Results   Component  Value Date     BNP 43 06/28/2024     HGBA1C 5.9 03/16/2020         Last Cardiology Tests:  ECG:  ECG 12 lead (Clinic Performed) 07/26/2024 (Preliminary)  Sinus tachycardia with PACs rate 105 bpmm     Echo:  Transthoracic Echo (TTE) Complete 02/05/2024  CONCLUSIONS:   1. Left ventricular systolic function is mildly decreased with a 50% estimated ejection fraction.   2. Spectral Doppler shows an impaired relaxation pattern of left ventricular diastolic filling.   3. Moderately enlarged right ventricle.   4. The estimated pulmonary artery pressure is mildly elevated with the RVSP at 41.4 mmHg.   5. Technical difficulty of study prohibits definitive assessment of valvular structures.     Transthoracic Echocardiogram (10/06/2020):   CONCLUSIONS:   1. The left ventricular systolic function is low normal with a 55% estimated ejection fraction.   2. Spectral Doppler shows an impaired relaxation pattern of left ventricular diastolic filling.   3. Mild aortic valve stenosis.   4. There is mild tricuspid regurgitation.   5. The estimated pulmonary artery pressure is mildly elevated with the RVSP at 38.9 mmHg.   6. The inferior vena cava appears to be of normal size.     Vascular Testing  Vascular US CHRIS (04/02/2024):  CONCLUSIONS:  Right Lower PVR: Evidence of mild arterial occlusive disease in the right lower extremity at rest. Decreased digital perfusion noted. Monophasic flow is noted in the right dorsalis pedis artery. Biphasic flow is noted in the right posterior tibial artery. Triphasic flow is noted in the right common femoral artery.  Left Lower PVR: Evidence of mild arterial occlusive disease in the left lower extremity at rest. Left pressures of >220 mmHg suggest no compressibility of vessels and may make absolute Segmental Limb Pressures (SLP) unreliable. Decreased digital perfusion noted. Biphasic flow is noted in the left posterior tibial artery and left dorsalis pedis artery. Triphasic flow is noted in the left  common femoral artery. Disease was called by PVR tracings and Doppler waveforms.     Comparison:  Compared with study from 8/30/2021, mild disease from normal bilaterally. Toe brachial indices not obtained due to flatline waveforms.  .     Imaging & Doppler Findings:     RIGHT Lower PVR                Pressures Ratios  Right Posterior Tibial (Ankle) 99 mmHg   0.87  Right Dorsalis Pedis (Ankle)   100 mmHg  0.88  Right Digit (Great Toe)        0 mmHg    0.00           LEFT Lower PVR                Pressures Ratios  Left Posterior Tibial (Ankle) 249 mmHg  2.18  Left Dorsalis Pedis (Ankle)   107 mmHg  0.94  Left Digit (Great Toe)        0 mmHg    0.00                           Right     Left  Brachial Pressure 114 mmHg 112 mmHg     PVR (08/30/2021):  Imaging & Doppler Findings:     RIGHT Lower PVR                Pressures Ratios  Right High Thigh               160 mmHg  1.19  Right Low Thigh                164 mmHg  1.21  Right Calf                     111 mmHg  0.82  Right Posterior Tibial (Ankle) 125 mmHg  0.93  Right Dorsalis Pedis (Ankle)   100 mmHg  0.74  Right Digit (Great Toe)        77 mmHg   0.57           LEFT Lower PVR                Pressures Ratios  Left High Thigh               161 mmHg  1.19  Left Low Thigh                147 mmHg  1.09  Left Calf                     248 mmHg  1.84  Left Posterior Tibial (Ankle) 203 mmHg  1.50  Left Dorsalis Pedis (Ankle)   134 mmHg  0.99  Left Digit (Great Toe)        85 mmHg   0.63     Lab review: I have Chemistry CMP:         Lab Results   Component Value Date     ALBUMIN 2.3 (L) 07/02/2024     CALCIUM 7.6 (L) 07/02/2024     CO2 29 07/02/2024     CREATININE 0.67 07/02/2024     GLUCOSE 94 07/02/2024     BILITOT 0.2 07/02/2024     PROT 5.9 (L) 07/02/2024     ALT 29 07/02/2024     AST 49 (H) 07/02/2024     ALKPHOS 114 07/02/2024   , Chemistry BMP         Lab Results   Component Value Date     GLUCOSE 94 07/02/2024     CALCIUM 7.6 (L) 07/02/2024     CO2 29 07/02/2024      CREATININE 0.67 07/02/2024   , CBC:        Lab Results   Component Value Date     WBC 13.5 (H) 07/02/2024     RBC 2.97 (L) 07/02/2024     HGB 9.3 (L) 07/02/2024     HCT 29.3 (L) 07/02/2024     MCV 99 07/02/2024     MCH 31.3 07/02/2024     MCHC 31.7 (L) 07/02/2024     RDW 12.4 07/02/2024     NRBC 0.0 07/02/2024   , Coags:         Lab Results   Component Value Date     APTT 30 06/11/2024     INR 1.3 (H) 06/28/2024   , and Lipids:         Lab Results   Component Value Date     CHOL 147 03/16/2020     HDL 34.5 (A) 03/16/2020     TRIG 95 03/16/2020      Diagnostic review: I have personally reviewed the result(s) of the EKG and Echocardiogram .   Imaging review: I have  personally reviewed the result(s) PVR, CHRIS        Assessment/Plan  Chao Thao is an 83 yo male with a PMH of PAD, HTN, HLD, CAD s/p PTCA >10y ago, PAF who presented for follow up with nonhealing L 2nd great toe wound.      #Severe PAD w/ critical limb ischemia, non healing wound   #HLD     -Recommend peripheral angiography d/t nonhealing wound, significant flow limitation.   -Patient continues to follow with wound care/podiatry  -Continue ASA 81mg daily, Atorvastatin   -Discontinue Simvastatin as patient does not need two statin therapies        In addition to current evaluation reached out to patient's primary cardiologist given BB and DOAC have been discontinued, pt w/ PAF, rate not well controlled. Pt would like to stay off anticoagulation. Recommended to discuss watchman vs resumption of anticoagulation at next visit. Patient's PCP (Dr. Escalante involved in conversation as well given concerning symptoms reported yesterday that have resolved. Pt educated on stroke like symptoms, risk factors. Pt PCP states will follow up.)  Yessy Wilkerson PA-C                  Electronically signed by Yessy Wilkerson PA-C at 7/26/2024  5:06 PM

## 2024-07-28 ENCOUNTER — HOSPITAL ENCOUNTER (INPATIENT)
Facility: HOSPITAL | Age: 84
End: 2024-07-28
Attending: EMERGENCY MEDICINE | Admitting: STUDENT IN AN ORGANIZED HEALTH CARE EDUCATION/TRAINING PROGRAM
Payer: MEDICARE

## 2024-07-28 ENCOUNTER — APPOINTMENT (OUTPATIENT)
Dept: CARDIOLOGY | Facility: HOSPITAL | Age: 84
End: 2024-07-28
Payer: MEDICARE

## 2024-07-28 ENCOUNTER — APPOINTMENT (OUTPATIENT)
Dept: RADIOLOGY | Facility: HOSPITAL | Age: 84
End: 2024-07-28
Payer: MEDICARE

## 2024-07-28 VITALS
BODY MASS INDEX: 31.18 KG/M2 | WEIGHT: 194 LBS | DIASTOLIC BLOOD PRESSURE: 87 MMHG | OXYGEN SATURATION: 92 % | HEIGHT: 66 IN | SYSTOLIC BLOOD PRESSURE: 136 MMHG | TEMPERATURE: 99 F | RESPIRATION RATE: 20 BRPM | HEART RATE: 95 BPM

## 2024-07-28 DIAGNOSIS — R79.89 ELEVATED TROPONIN: ICD-10-CM

## 2024-07-28 DIAGNOSIS — L03.039 CELLULITIS OF TOE, UNSPECIFIED LATERALITY: ICD-10-CM

## 2024-07-28 DIAGNOSIS — E87.1 HYPONATREMIA: ICD-10-CM

## 2024-07-28 DIAGNOSIS — I95.9 HYPOTENSION: Primary | ICD-10-CM

## 2024-07-28 DIAGNOSIS — R21 RASH: ICD-10-CM

## 2024-07-28 DIAGNOSIS — I48.91 ATRIAL FIBRILLATION, UNSPECIFIED TYPE (MULTI): ICD-10-CM

## 2024-07-28 DIAGNOSIS — J90 PLEURAL EFFUSION: ICD-10-CM

## 2024-07-28 DIAGNOSIS — R41.89 COGNITIVE IMPAIRMENT: ICD-10-CM

## 2024-07-28 LAB
ALBUMIN SERPL BCP-MCNC: 2.1 G/DL (ref 3.4–5)
ALP SERPL-CCNC: 152 U/L (ref 33–136)
ALT SERPL W P-5'-P-CCNC: 11 U/L (ref 10–52)
ANION GAP BLDV CALCULATED.4IONS-SCNC: 4 MMOL/L (ref 10–25)
ANION GAP SERPL CALC-SCNC: 10 MMOL/L (ref 10–20)
ANION GAP SERPL CALC-SCNC: 11 MMOL/L (ref 10–20)
AST SERPL W P-5'-P-CCNC: 26 U/L (ref 9–39)
BACTERIA BLD CULT: NORMAL
BACTERIA BLD CULT: NORMAL
BASE EXCESS BLDV CALC-SCNC: 4.2 MMOL/L (ref -2–3)
BASOPHILS # BLD AUTO: 0.06 X10*3/UL (ref 0–0.1)
BASOPHILS NFR BLD AUTO: 0.4 %
BILIRUB SERPL-MCNC: 0.2 MG/DL (ref 0–1.2)
BODY TEMPERATURE: ABNORMAL
BUN SERPL-MCNC: 10 MG/DL (ref 6–23)
BUN SERPL-MCNC: 11 MG/DL (ref 6–23)
CA-I BLDV-SCNC: 1.08 MMOL/L (ref 1.1–1.33)
CALCIUM SERPL-MCNC: 7.5 MG/DL (ref 8.6–10.3)
CALCIUM SERPL-MCNC: 7.6 MG/DL (ref 8.6–10.3)
CARDIAC TROPONIN I PNL SERPL HS: 105 NG/L (ref 0–20)
CARDIAC TROPONIN I PNL SERPL HS: 96 NG/L (ref 0–20)
CARDIAC TROPONIN I PNL SERPL HS: 99 NG/L (ref 0–20)
CHLORIDE BLDV-SCNC: 100 MMOL/L (ref 98–107)
CHLORIDE SERPL-SCNC: 96 MMOL/L (ref 98–107)
CHLORIDE SERPL-SCNC: 99 MMOL/L (ref 98–107)
CO2 SERPL-SCNC: 28 MMOL/L (ref 21–32)
CO2 SERPL-SCNC: 28 MMOL/L (ref 21–32)
CREAT SERPL-MCNC: 0.54 MG/DL (ref 0.5–1.3)
CREAT SERPL-MCNC: 0.56 MG/DL (ref 0.5–1.3)
EGFRCR SERPLBLD CKD-EPI 2021: >90 ML/MIN/1.73M*2
EGFRCR SERPLBLD CKD-EPI 2021: >90 ML/MIN/1.73M*2
EOSINOPHIL # BLD AUTO: 0.47 X10*3/UL (ref 0–0.4)
EOSINOPHIL NFR BLD AUTO: 3.2 %
ERYTHROCYTE [DISTWIDTH] IN BLOOD BY AUTOMATED COUNT: 13.7 % (ref 11.5–14.5)
GLUCOSE BLDV-MCNC: 120 MG/DL (ref 74–99)
GLUCOSE SERPL-MCNC: 112 MG/DL (ref 74–99)
GLUCOSE SERPL-MCNC: 91 MG/DL (ref 74–99)
HCO3 BLDV-SCNC: 28.4 MMOL/L (ref 22–26)
HCT VFR BLD AUTO: 32 % (ref 41–52)
HCT VFR BLD EST: 43 % (ref 41–52)
HGB BLD-MCNC: 10.1 G/DL (ref 13.5–17.5)
HGB BLDV-MCNC: 14.4 G/DL (ref 13.5–17.5)
HOLD SPECIMEN: NORMAL
IMM GRANULOCYTES # BLD AUTO: 0.12 X10*3/UL (ref 0–0.5)
IMM GRANULOCYTES NFR BLD AUTO: 0.8 % (ref 0–0.9)
INHALED O2 CONCENTRATION: 21 %
LACTATE BLDV-SCNC: 1.2 MMOL/L (ref 0.4–2)
LACTATE SERPL-SCNC: 1.3 MMOL/L (ref 0.4–2)
LYMPHOCYTES # BLD AUTO: 1.55 X10*3/UL (ref 0.8–3)
LYMPHOCYTES NFR BLD AUTO: 10.7 %
MAGNESIUM SERPL-MCNC: 1.9 MG/DL (ref 1.6–2.4)
MCH RBC QN AUTO: 30.9 PG (ref 26–34)
MCHC RBC AUTO-ENTMCNC: 31.6 G/DL (ref 32–36)
MCV RBC AUTO: 98 FL (ref 80–100)
MONOCYTES # BLD AUTO: 0.89 X10*3/UL (ref 0.05–0.8)
MONOCYTES NFR BLD AUTO: 6.1 %
NEUTROPHILS # BLD AUTO: 11.41 X10*3/UL (ref 1.6–5.5)
NEUTROPHILS NFR BLD AUTO: 78.8 %
NRBC BLD-RTO: 0 /100 WBCS (ref 0–0)
OXYHGB MFR BLDV: 95.3 % (ref 45–75)
PCO2 BLDV: 40 MM HG (ref 41–51)
PH BLDV: 7.46 PH (ref 7.33–7.43)
PLATELET # BLD AUTO: 664 X10*3/UL (ref 150–450)
PO2 BLDV: 79 MM HG (ref 35–45)
POTASSIUM BLDV-SCNC: 4.1 MMOL/L (ref 3.5–5.3)
POTASSIUM SERPL-SCNC: 4.1 MMOL/L (ref 3.5–5.3)
POTASSIUM SERPL-SCNC: 4.2 MMOL/L (ref 3.5–5.3)
POTASSIUM SERPL-SCNC: 5.2 MMOL/L (ref 3.5–5.3)
PROT SERPL-MCNC: 6.1 G/DL (ref 6.4–8.2)
RBC # BLD AUTO: 3.27 X10*6/UL (ref 4.5–5.9)
SAO2 % BLDV: 97 % (ref 45–75)
SARS-COV-2 RNA RESP QL NAA+PROBE: NOT DETECTED
SODIUM BLDV-SCNC: 128 MMOL/L (ref 136–145)
SODIUM SERPL-SCNC: 129 MMOL/L (ref 136–145)
SODIUM SERPL-SCNC: 134 MMOL/L (ref 136–145)
WBC # BLD AUTO: 14.5 X10*3/UL (ref 4.4–11.3)

## 2024-07-28 PROCEDURE — 71045 X-RAY EXAM CHEST 1 VIEW: CPT

## 2024-07-28 PROCEDURE — 84145 PROCALCITONIN (PCT): CPT | Mod: GEALAB | Performed by: STUDENT IN AN ORGANIZED HEALTH CARE EDUCATION/TRAINING PROGRAM

## 2024-07-28 PROCEDURE — 2500000004 HC RX 250 GENERAL PHARMACY W/ HCPCS (ALT 636 FOR OP/ED): Performed by: INTERNAL MEDICINE

## 2024-07-28 PROCEDURE — 73630 X-RAY EXAM OF FOOT: CPT | Mod: LEFT SIDE | Performed by: STUDENT IN AN ORGANIZED HEALTH CARE EDUCATION/TRAINING PROGRAM

## 2024-07-28 PROCEDURE — 99285 EMERGENCY DEPT VISIT HI MDM: CPT | Mod: 25

## 2024-07-28 PROCEDURE — 70450 CT HEAD/BRAIN W/O DYE: CPT | Performed by: RADIOLOGY

## 2024-07-28 PROCEDURE — 83605 ASSAY OF LACTIC ACID: CPT | Performed by: EMERGENCY MEDICINE

## 2024-07-28 PROCEDURE — 36415 COLL VENOUS BLD VENIPUNCTURE: CPT | Performed by: EMERGENCY MEDICINE

## 2024-07-28 PROCEDURE — 84484 ASSAY OF TROPONIN QUANT: CPT | Performed by: EMERGENCY MEDICINE

## 2024-07-28 PROCEDURE — 87635 SARS-COV-2 COVID-19 AMP PRB: CPT | Performed by: EMERGENCY MEDICINE

## 2024-07-28 PROCEDURE — 83735 ASSAY OF MAGNESIUM: CPT | Performed by: EMERGENCY MEDICINE

## 2024-07-28 PROCEDURE — 87040 BLOOD CULTURE FOR BACTERIA: CPT | Mod: GEALAB | Performed by: EMERGENCY MEDICINE

## 2024-07-28 PROCEDURE — 2500000001 HC RX 250 WO HCPCS SELF ADMINISTERED DRUGS (ALT 637 FOR MEDICARE OP): Performed by: STUDENT IN AN ORGANIZED HEALTH CARE EDUCATION/TRAINING PROGRAM

## 2024-07-28 PROCEDURE — 84132 ASSAY OF SERUM POTASSIUM: CPT | Performed by: EMERGENCY MEDICINE

## 2024-07-28 PROCEDURE — 2500000004 HC RX 250 GENERAL PHARMACY W/ HCPCS (ALT 636 FOR OP/ED): Performed by: STUDENT IN AN ORGANIZED HEALTH CARE EDUCATION/TRAINING PROGRAM

## 2024-07-28 PROCEDURE — 80053 COMPREHEN METABOLIC PANEL: CPT | Performed by: EMERGENCY MEDICINE

## 2024-07-28 PROCEDURE — 84132 ASSAY OF SERUM POTASSIUM: CPT | Performed by: STUDENT IN AN ORGANIZED HEALTH CARE EDUCATION/TRAINING PROGRAM

## 2024-07-28 PROCEDURE — 1200000002 HC GENERAL ROOM WITH TELEMETRY DAILY

## 2024-07-28 PROCEDURE — 93005 ELECTROCARDIOGRAM TRACING: CPT

## 2024-07-28 PROCEDURE — 73630 X-RAY EXAM OF FOOT: CPT | Mod: LT

## 2024-07-28 PROCEDURE — 99223 1ST HOSP IP/OBS HIGH 75: CPT | Performed by: STUDENT IN AN ORGANIZED HEALTH CARE EDUCATION/TRAINING PROGRAM

## 2024-07-28 PROCEDURE — 2500000004 HC RX 250 GENERAL PHARMACY W/ HCPCS (ALT 636 FOR OP/ED): Performed by: EMERGENCY MEDICINE

## 2024-07-28 PROCEDURE — 85025 COMPLETE CBC W/AUTO DIFF WBC: CPT | Performed by: EMERGENCY MEDICINE

## 2024-07-28 PROCEDURE — 96361 HYDRATE IV INFUSION ADD-ON: CPT

## 2024-07-28 PROCEDURE — 84484 ASSAY OF TROPONIN QUANT: CPT | Performed by: STUDENT IN AN ORGANIZED HEALTH CARE EDUCATION/TRAINING PROGRAM

## 2024-07-28 PROCEDURE — 70450 CT HEAD/BRAIN W/O DYE: CPT

## 2024-07-28 PROCEDURE — 96360 HYDRATION IV INFUSION INIT: CPT

## 2024-07-28 RX ORDER — SODIUM CHLORIDE 9 MG/ML
125 INJECTION, SOLUTION INTRAVENOUS CONTINUOUS
Status: DISCONTINUED | OUTPATIENT
Start: 2024-07-28 | End: 2024-07-28

## 2024-07-28 RX ORDER — UBIDECARENONE 75 MG
250 CAPSULE ORAL DAILY
Status: DISCONTINUED | OUTPATIENT
Start: 2024-07-29 | End: 2024-08-03 | Stop reason: HOSPADM

## 2024-07-28 RX ORDER — SODIUM CHLORIDE 9 MG/ML
75 INJECTION, SOLUTION INTRAVENOUS CONTINUOUS
Status: DISCONTINUED | OUTPATIENT
Start: 2024-07-28 | End: 2024-07-29

## 2024-07-28 RX ORDER — ASPIRIN 81 MG/1
81 TABLET ORAL DAILY
Status: DISCONTINUED | OUTPATIENT
Start: 2024-07-28 | End: 2024-08-03 | Stop reason: HOSPADM

## 2024-07-28 RX ORDER — AMMONIUM LACTATE 12 G/100G
1 LOTION TOPICAL 2 TIMES DAILY
Status: DISCONTINUED | OUTPATIENT
Start: 2024-07-28 | End: 2024-08-03 | Stop reason: HOSPADM

## 2024-07-28 RX ORDER — ATORVASTATIN CALCIUM 20 MG/1
20 TABLET, FILM COATED ORAL DAILY
Status: DISCONTINUED | OUTPATIENT
Start: 2024-07-28 | End: 2024-07-29

## 2024-07-28 RX ORDER — HEPARIN SODIUM 5000 [USP'U]/ML
5000 INJECTION, SOLUTION INTRAVENOUS; SUBCUTANEOUS EVERY 8 HOURS SCHEDULED
Status: DISCONTINUED | OUTPATIENT
Start: 2024-07-28 | End: 2024-08-03 | Stop reason: HOSPADM

## 2024-07-28 RX ORDER — METOPROLOL SUCCINATE 25 MG/1
25 TABLET, EXTENDED RELEASE ORAL DAILY
Status: DISCONTINUED | OUTPATIENT
Start: 2024-07-29 | End: 2024-08-03

## 2024-07-28 RX ORDER — CHOLECALCIFEROL (VITAMIN D3) 25 MCG
1000 TABLET ORAL DAILY
Status: DISCONTINUED | OUTPATIENT
Start: 2024-07-29 | End: 2024-08-03 | Stop reason: HOSPADM

## 2024-07-28 RX ORDER — ASCORBIC ACID 500 MG
500 TABLET ORAL DAILY
Status: DISCONTINUED | OUTPATIENT
Start: 2024-07-29 | End: 2024-08-03 | Stop reason: HOSPADM

## 2024-07-28 RX ORDER — VANCOMYCIN HYDROCHLORIDE 1 G/20ML
INJECTION, POWDER, LYOPHILIZED, FOR SOLUTION INTRAVENOUS DAILY PRN
Status: DISCONTINUED | OUTPATIENT
Start: 2024-07-28 | End: 2024-08-03 | Stop reason: HOSPADM

## 2024-07-28 RX ORDER — FLUDROCORTISONE ACETATE 0.1 MG/1
0.1 TABLET ORAL DAILY
Status: DISCONTINUED | OUTPATIENT
Start: 2024-07-28 | End: 2024-08-03 | Stop reason: HOSPADM

## 2024-07-28 RX ORDER — ADHESIVE BANDAGE
30 BANDAGE TOPICAL DAILY PRN
Status: DISCONTINUED | OUTPATIENT
Start: 2024-07-28 | End: 2024-08-03 | Stop reason: HOSPADM

## 2024-07-28 RX ORDER — ADHESIVE BANDAGE
30 BANDAGE TOPICAL DAILY PRN
COMMUNITY

## 2024-07-28 RX ORDER — DULOXETIN HYDROCHLORIDE 20 MG/1
40 CAPSULE, DELAYED RELEASE ORAL DAILY
Status: DISCONTINUED | OUTPATIENT
Start: 2024-07-28 | End: 2024-08-03 | Stop reason: HOSPADM

## 2024-07-28 RX ORDER — LIDOCAINE 560 MG/1
1 PATCH PERCUTANEOUS; TOPICAL; TRANSDERMAL DAILY
Status: DISCONTINUED | OUTPATIENT
Start: 2024-07-29 | End: 2024-08-03 | Stop reason: HOSPADM

## 2024-07-28 RX ORDER — ACETAMINOPHEN 325 MG/1
1000 TABLET ORAL EVERY 8 HOURS
Status: DISCONTINUED | OUTPATIENT
Start: 2024-07-28 | End: 2024-08-03 | Stop reason: HOSPADM

## 2024-07-28 RX ADMIN — Medication 1 APPLICATION: at 21:03

## 2024-07-28 RX ADMIN — SODIUM CHLORIDE 75 ML/HR: 9 INJECTION, SOLUTION INTRAVENOUS at 17:34

## 2024-07-28 RX ADMIN — HEPARIN SODIUM 5000 UNITS: 5000 INJECTION INTRAVENOUS; SUBCUTANEOUS at 17:34

## 2024-07-28 RX ADMIN — VANCOMYCIN HYDROCHLORIDE 1.25 G: 1.25 INJECTION, POWDER, LYOPHILIZED, FOR SOLUTION INTRAVENOUS at 20:59

## 2024-07-28 RX ADMIN — SODIUM CHLORIDE 125 ML/HR: 9 INJECTION, SOLUTION INTRAVENOUS at 11:41

## 2024-07-28 SDOH — SOCIAL STABILITY: SOCIAL INSECURITY: ARE THERE ANY APPARENT SIGNS OF INJURIES/BEHAVIORS THAT COULD BE RELATED TO ABUSE/NEGLECT?: UNABLE TO ASSESS

## 2024-07-28 SDOH — SOCIAL STABILITY: SOCIAL INSECURITY: ARE YOU OR HAVE YOU BEEN THREATENED OR ABUSED PHYSICALLY, EMOTIONALLY, OR SEXUALLY BY ANYONE?: UNABLE TO ASSESS

## 2024-07-28 SDOH — SOCIAL STABILITY: SOCIAL INSECURITY: DO YOU FEEL UNSAFE GOING BACK TO THE PLACE WHERE YOU ARE LIVING?: UNABLE TO ASSESS

## 2024-07-28 SDOH — SOCIAL STABILITY: SOCIAL INSECURITY: DO YOU FEEL ANYONE HAS EXPLOITED OR TAKEN ADVANTAGE OF YOU FINANCIALLY OR OF YOUR PERSONAL PROPERTY?: UNABLE TO ASSESS

## 2024-07-28 SDOH — SOCIAL STABILITY: SOCIAL INSECURITY: DOES ANYONE TRY TO KEEP YOU FROM HAVING/CONTACTING OTHER FRIENDS OR DOING THINGS OUTSIDE YOUR HOME?: UNABLE TO ASSESS

## 2024-07-28 SDOH — SOCIAL STABILITY: SOCIAL INSECURITY: HAVE YOU HAD THOUGHTS OF HARMING ANYONE ELSE?: UNABLE TO ASSESS

## 2024-07-28 SDOH — SOCIAL STABILITY: SOCIAL INSECURITY: HAS ANYONE EVER THREATENED TO HURT YOUR FAMILY OR YOUR PETS?: UNABLE TO ASSESS

## 2024-07-28 SDOH — SOCIAL STABILITY: SOCIAL INSECURITY: ABUSE: ADULT

## 2024-07-28 SDOH — SOCIAL STABILITY: SOCIAL INSECURITY: WERE YOU ABLE TO COMPLETE ALL THE BEHAVIORAL HEALTH SCREENINGS?: NO

## 2024-07-28 ASSESSMENT — COGNITIVE AND FUNCTIONAL STATUS - GENERAL
PERSONAL GROOMING: A LITTLE
HELP NEEDED FOR BATHING: A LOT
TOILETING: A LOT
MOVING TO AND FROM BED TO CHAIR: A LOT
DRESSING REGULAR LOWER BODY CLOTHING: A LOT
DAILY ACTIVITIY SCORE: 14
CLIMB 3 TO 5 STEPS WITH RAILING: TOTAL
STANDING UP FROM CHAIR USING ARMS: A LOT
WALKING IN HOSPITAL ROOM: TOTAL
MOVING FROM LYING ON BACK TO SITTING ON SIDE OF FLAT BED WITH BEDRAILS: A LITTLE
DRESSING REGULAR LOWER BODY CLOTHING: A LOT
MOBILITY SCORE: 12
DAILY ACTIVITIY SCORE: 14
CLIMB 3 TO 5 STEPS WITH RAILING: TOTAL
EATING MEALS: A LITTLE
PERSONAL GROOMING: A LITTLE
MOVING FROM LYING ON BACK TO SITTING ON SIDE OF FLAT BED WITH BEDRAILS: A LITTLE
STANDING UP FROM CHAIR USING ARMS: A LOT
DRESSING REGULAR UPPER BODY CLOTHING: A LOT
MOVING TO AND FROM BED TO CHAIR: A LOT
MOBILITY SCORE: 12
WALKING IN HOSPITAL ROOM: TOTAL
HELP NEEDED FOR BATHING: A LOT
TOILETING: A LOT
TURNING FROM BACK TO SIDE WHILE IN FLAT BAD: A LITTLE
PATIENT BASELINE BEDBOUND: NO
TURNING FROM BACK TO SIDE WHILE IN FLAT BAD: A LITTLE
EATING MEALS: A LITTLE
DRESSING REGULAR UPPER BODY CLOTHING: A LOT

## 2024-07-28 ASSESSMENT — ACTIVITIES OF DAILY LIVING (ADL)
JUDGMENT_ADEQUATE_SAFELY_COMPLETE_DAILY_ACTIVITIES: NO
LACK_OF_TRANSPORTATION: PATIENT UNABLE TO ANSWER
HEARING - LEFT EAR: HEARING AID
HEARING - RIGHT EAR: HEARING AID
PATIENT'S MEMORY ADEQUATE TO SAFELY COMPLETE DAILY ACTIVITIES?: NO
ADEQUATE_TO_COMPLETE_ADL: YES
BATHING: NEEDS ASSISTANCE
GROOMING: NEEDS ASSISTANCE
WALKS IN HOME: DEPENDENT
DRESSING YOURSELF: NEEDS ASSISTANCE
FEEDING YOURSELF: NEEDS ASSISTANCE
TOILETING: NEEDS ASSISTANCE

## 2024-07-28 ASSESSMENT — ENCOUNTER SYMPTOMS
ACTIVITY CHANGE: 1
APPETITE CHANGE: 1

## 2024-07-28 ASSESSMENT — LIFESTYLE VARIABLES
HOW OFTEN DO YOU HAVE 6 OR MORE DRINKS ON ONE OCCASION: PATIENT UNABLE TO ANSWER
EVER FELT BAD OR GUILTY ABOUT YOUR DRINKING: NO
AUDIT-C TOTAL SCORE: -1
AUDIT-C TOTAL SCORE: -1
TOTAL SCORE: 0
HOW MANY STANDARD DRINKS CONTAINING ALCOHOL DO YOU HAVE ON A TYPICAL DAY: PATIENT UNABLE TO ANSWER
EVER HAD A DRINK FIRST THING IN THE MORNING TO STEADY YOUR NERVES TO GET RID OF A HANGOVER: NO
SKIP TO QUESTIONS 9-10: 0
HOW OFTEN DO YOU HAVE A DRINK CONTAINING ALCOHOL: PATIENT UNABLE TO ANSWER
HAVE PEOPLE ANNOYED YOU BY CRITICIZING YOUR DRINKING: NO
HAVE YOU EVER FELT YOU SHOULD CUT DOWN ON YOUR DRINKING: NO

## 2024-07-28 ASSESSMENT — PAIN SCALES - GENERAL
PAINLEVEL_OUTOF10: 0 - NO PAIN

## 2024-07-28 ASSESSMENT — PAIN - FUNCTIONAL ASSESSMENT
PAIN_FUNCTIONAL_ASSESSMENT: 0-10
PAIN_FUNCTIONAL_ASSESSMENT: 0-10

## 2024-07-28 ASSESSMENT — PATIENT HEALTH QUESTIONNAIRE - PHQ9: 2. FEELING DOWN, DEPRESSED OR HOPELESS: NOT AT ALL

## 2024-07-28 NOTE — ED PROVIDER NOTES
HPI   Chief Complaint   Patient presents with    Hypotension     Patient presents from [facility]. Per EMS: patient hypotensive 80/40, mario/VAN negative, A+O x4 on 2L oxygen at baseline, given 250mL NS. Per nurse report, patient is A+O x1 at baseline to self, suddenly was unable to move right hand/wrist and had garbled speech. History of afib, HTN, respiratory failure, and [place].       84-year-old male from [facility] facility for chief complaint of low blood pressure 80/40.  Per nursing reports the patient had a sudden onset of garbled speech when is unable to move the right hand and wrist area burn to the nurse.  Patient is now back to baseline.  He has no complaints or symptoms at this time he states.  He is not in any pain.  Patient is a very poor historian all history taken from EMS and nursing report.    According to the EMR this patient has a history of alcohol abuse in remission chronic shortness of breath on 2 L home oxygen dependent edema              Patient History   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    History of alcohol abuse    Chronic shortness of breath 06/28/2024    Drug abuse (Multi)     in remission    Edema, unspecified 11/26/2019    Dependent edema    Encounter for immunization 04/08/2016    Need for Zostavax administration    Pain in left knee 08/08/2018    Left knee pain    Personal history of other diseases of the nervous system and sense organs 09/18/2019    History of cataract    Personal history of other drug therapy 04/08/2016    History of pneumococcal vaccination    Tinea pedis 10/10/2018    Tinea pedis of both feet    Venous stasis ulcer of right calf (Multi) 06/28/2024    Vitiligo 06/28/2024     Past Surgical History:   Procedure Laterality Date    CARDIAC CATHETERIZATION  05/14/2018    Cardiac Cath Procedure Summary    INNER EAR SURGERY  01/06/2016    Inner Ear Surgery    OTHER SURGICAL HISTORY  03/16/2020    Cataract surgery     No  family history on file.  Social History     Tobacco Use    Smoking status: Former     Types: Cigarettes    Smokeless tobacco: Never   Vaping Use    Vaping status: Never Used   Substance Use Topics    Alcohol use: Not Currently    Drug use: Never       Physical Exam   ED Triage Vitals [07/28/24 0928]   Temperature Heart Rate Respirations BP   36.5 °C (97.7 °F) 83 16 87/64      Pulse Ox Temp Source Heart Rate Source Patient Position   97 % Temporal Monitor Sitting      BP Location FiO2 (%)     Right arm --       Physical Exam  Vitals and nursing note reviewed.   Constitutional:       Appearance: He is ill-appearing.      Comments: Chronically ill-appearing   HENT:      Head: Normocephalic and atraumatic.      Nose: Nose normal.      Mouth/Throat:      Mouth: Mucous membranes are moist.   Eyes:      Extraocular Movements: Extraocular movements intact.      Pupils: Pupils are equal, round, and reactive to light.   Cardiovascular:      Rate and Rhythm: Normal rate and regular rhythm.   Pulmonary:      Effort: Pulmonary effort is normal.      Breath sounds: Normal breath sounds.   Abdominal:      General: Abdomen is flat.      Palpations: Abdomen is soft.   Musculoskeletal:         General: Normal range of motion.      Cervical back: Normal range of motion.   Skin:     General: Skin is warm and dry.      Capillary Refill: Capillary refill takes less than 2 seconds.   Neurological:      Mental Status: He is alert.   Psychiatric:         Mood and Affect: Mood normal.           ED Course & MDM   Diagnoses as of 07/28/24 1312   Hypotension   Pleural effusion   Elevated troponin   Hyponatremia                       [place] Coma Scale Score: 15         NIH Stroke Scale: 1                Medical Decision Making      Medical Decision Making: Patient was worked up and found to have multiple lab abnormalities including a troponin in 99 and 105, hyponatremic at 129 leukocytosis at 14.5 with no source.  Chest x-ray shows pleural  effusions.  Negative for COVID let and lactate is actually normal.  VBG shows a pH of 7.46 with a pCO2 of 40 a pO2 of 79.  A BNP was added EKG interpreted by me shows a heart rate of 86 normal sinus rhythm with some PVCs anterolateral lead T wave abnormalities there are T wave inversions.  Patient's blood pressure much improved 116/75 after a liter from the squad.  He will be hospitalized for further management and treatment at this time.  He did set officers alert but at this time I have no source of infection.  We are still trying to obtain a urine sample.  [unfilled]     Differential Diagnoses Considered: Sepsis UTI pneumonia    Chronic Medical Conditions Significantly Affecting Care: Patient is chronically debilitated    External Records Reviewed: I reviewed recent and relevant outside records including: Previous lab work    Social Determinants of Health Significantly Affecting Care: Lives at the rehab currently    Diagnostic testing considered: CT chest abdomen pelvis    Sabra Mcconnell D.O.  Emergency Medicine          Procedure  Procedures     Sabra Mcconnell, DO  07/28/24 [id number]

## 2024-07-28 NOTE — CARE PLAN
Problem: Pain - Adult  Goal: Verbalizes/displays adequate comfort level or baseline comfort level  Outcome: Progressing     Problem: Safety - Adult  Goal: Free from fall injury  Outcome: Progressing     Problem: Discharge Planning  Goal: Discharge to home or other facility with appropriate resources  Outcome: Progressing     Problem: Chronic Conditions and Co-morbidities  Goal: Patient's chronic conditions and co-morbidity symptoms are monitored and maintained or improved  Outcome: Progressing   The patient's goals for the shift include      The clinical goals for the shift include pt will remain hemodynamically stable    Patient remains stable

## 2024-07-28 NOTE — NURSING NOTE
1615- Patient has small abrasion to left 2nd toe, orders obtained for daily dressing changes and lac hydrin for dry legs. Patients sacrum is red and blanchable, protective mepilex placed and patient turned. TruVue boots put on BLE.

## 2024-07-28 NOTE — H&P
History and Physical Note  Patient: Chao Thao   Age: 84 y.o. Gender: male     History of Present Illness:   Admission Reason:   Chief Complaint   Patient presents with    Hypotension     Patient presents from [facility]. Per EMS: patient hypotensive 80/40, mario/VAN negative, A+O x4 on 2L oxygen at baseline, given 250mL NS. Per nurse report, patient is A+O x1 at baseline to self, suddenly was unable to move right hand/wrist and had garbled speech. History of afib, HTN, respiratory failure, and [place].     HPI:   Chao Thao is a 84 y.o. year old male with PMH HTN, HLD, MDD, CAD, frequent falls, dementia, afib, hard of hearing, HFpEF 50% presented to the ED from SNF for low BP. Pt is a poor historian so majority of history was obtained from EMR. Pt had sudden onset of garbled speech and weakness that he was found to be hypotension. He was given small bolus fluids via EMS. In the ED, he was given IVF. Hospitalist is contacted for admission.     Review of Systems:  Review of Systems   Reason unable to perform ROS: pt is poor historian.   Constitutional:  Positive for activity change and appetite change.   Cardiovascular:  Negative for chest pain.     Allergies:   Allergies   Allergen Reactions    Gabapentin Other    Penicillin Unknown       Past Medical History:  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    History of alcohol abuse    Chronic shortness of breath 06/28/2024    Drug abuse (Multi)     in remission    Edema, unspecified 11/26/2019    Dependent edema    Encounter for immunization 04/08/2016    Need for Zostavax administration    Pain in left knee 08/08/2018    Left knee pain    Personal history of other diseases of the nervous system and sense organs 09/18/2019    History of cataract    Personal history of other drug therapy 04/08/2016    History of pneumococcal vaccination    Tinea pedis 10/10/2018    Tinea pedis of both feet    Venous stasis ulcer of right calf  (Multi) 06/28/2024    Vitiligo 06/28/2024       Past Surgical History:   Past Surgical History:   Procedure Laterality Date    CARDIAC CATHETERIZATION  05/14/2018    Cardiac Cath Procedure Summary    INNER EAR SURGERY  01/06/2016    Inner Ear Surgery    OTHER SURGICAL HISTORY  03/16/2020    Cataract surgery       Family History:  No family history on file.    Social History:  Social History     Tobacco Use   Smoking Status Former    Types: Cigarettes   Smokeless Tobacco Never       Social History     Substance and Sexual Activity   Alcohol Use Not Currently       Social History     Substance and Sexual Activity   Drug Use Never       Home Medications:  Current Outpatient Medications   Medication Instructions    acetaminophen (TYLENOL) 1,000 mg, oral, Every 8 hours    ascorbic acid (VITAMIN C) 500 mg, oral, Daily    aspirin 81 mg, oral, Daily    atorvastatin (LIPITOR) 20 mg, oral, Daily    cholecalciferol (VITAMIN D-3) 25 mcg, oral, Daily    cyanocobalamin (VITAMIN B-12) 250 mcg, oral, Daily    doxycycline (VIBRAMYCIN) 100 mg, oral, 2 times daily, Take with at least 8 ounces (large glass) of water, do not lie down for 30 minutes after    DULoxetine (CYMBALTA) 60 mg, oral, Daily    DULoxetine (CYMBALTA) 40 mg, oral, Daily, Do not crush or chew.    fludrocortisone (FLORINEF) 0.05 mg, oral, Daily    lactobacillus acidophilus (Acidophilus) capsule 1 capsule, oral, Daily    lidocaine 4 % patch 1 patch, transdermal, Daily, Remove & discard patch within 12 hours or as directed by MD.    metoprolol succinate XL (TOPROL-XL) 25 mg, oral, Daily, Do not crush or chew.    miconazole (Micotin) 2 % cream 1 Application, Topical, 3 times daily    ondansetron (ZOFRAN) 4 mg, oral, Every 6 hours PRN    oxygen (O2) gas therapy 1 each, inhalation, Every 24 hours    potassium chloride CR 20 mEq ER tablet 20 mEq, oral, Daily, Do not crush or chew.    zinc acetate 50 mg (zinc) capsule 1 capsule, oral, Daily       Vitals:  Visit Vitals  BP  "107/78   Pulse 77   Temp 36.5 °C (97.7 °F) (Temporal)   Resp (!) 26   Ht 1.676 m (5' 6\")   Wt 83.6 kg (184 lb 3.2 oz)   SpO2 99%   BMI 29.73 kg/m²   Smoking Status Former   BSA 1.97 m²       Physical Exam  Vitals and nursing note reviewed.   Constitutional:       General: He is not in acute distress.     Appearance: Normal appearance. He is not toxic-appearing.      Comments: Chronically ill appearing   HENT:      Head: Normocephalic and atraumatic.      Nose: Nose normal.      Mouth/Throat:      Mouth: Mucous membranes are moist.   Eyes:      Extraocular Movements: Extraocular movements intact.      Conjunctiva/sclera: Conjunctivae normal.      Pupils: Pupils are equal, round, and reactive to light.   Cardiovascular:      Rate and Rhythm: Normal rate and regular rhythm.      Heart sounds: No murmur heard.     No gallop.   Pulmonary:      Effort: Pulmonary effort is normal. No respiratory distress.      Breath sounds: Rhonchi present. No wheezing or rales.   Abdominal:      General: Abdomen is flat. Bowel sounds are normal. There is no distension.      Palpations: Abdomen is soft. There is no mass.      Tenderness: There is no abdominal tenderness.   Musculoskeletal:         General: No swelling or tenderness. Normal range of motion.      Cervical back: Normal range of motion and neck supple.   Skin:     General: Skin is warm and dry.   Neurological:      Mental Status: He is alert and oriented to person, place, and time.      Motor: Weakness present.      Comments: Very hard of hearing   Psychiatric:         Mood and Affect: Mood normal.         Behavior: Behavior normal.         Thought Content: Thought content normal.         Judgment: Judgment normal.         Labs and Imaging Results:  Lab Results   Component Value Date    WBC 14.5 (H) 07/28/2024       CT head wo IV contrast  Narrative: Interpreted By:  Marya Sharma,   STUDY:  CT HEAD WO IV CONTRAST;  7/28/2024 12:15 pm    "   INDICATION:  Signs/Symptoms:stroke symptoms earlier.      COMPARISON:  06/28/2024.      ACCESSION NUMBER(S):  [id number]      ORDERING CLINICIAN:  MALLY VELOZ      TECHNIQUE:  Noncontrast axial CT scan of head was performed. Multiplanar  reconstructions      FINDINGS:  No acute intracranial hemorrhage, mass effect, midline shift, or  herniation. No evidence of hydrocephalus. Mild global cerebral  atrophy with concordant prominence of the ventricles and sulci. There  is a relative increase in volume loss involving the frontal lobe,  similar to prior exam. Mild periventricular and subcortical deep  white matter hypodensity suggestive of chronic microangiopathic  change. Postoperative changes of the right mastoid air cells.  Partially effusion left mastoid air cells. No acute osseous  abnormality of the calvarium. No destructive bone lesion.      Impression: No acute intracranial abnormality. Consider follow-up with MRI as  warranted.          Signed by: Marya Sharma 7/28/2024 12:22 PM  Dictation workstation:   [id number]  XR chest 1 view  Narrative: Interpreted By:  Love Gallardo,   STUDY:  XR CHEST 1 VIEW;  7/28/2024 9:55 am      INDICATION:  Signs/Symptoms:sob.      COMPARISON:  06/27/2024      ACCESSION NUMBER(S):  [id number]      ORDERING CLINICIAN:  MALLY VELOZ      TECHNIQUE:  Portable AP upright      FINDINGS:  Cardiac silhouette is upper normal in size. Aorta is atherosclerotic.  Bilateral pleural effusions and bilateral basilar atelectasis  represent an interval change from the prior study. Right appears  worse than left. Fractures of the lateral 8th and 9th ribs are at the  margin of the image and not as well demonstrated as on the prior  study.      Impression: Pleural effusions and basilar atelectasis, right worse than left and  worse than the prior study      Right 8th and 9th rib fractures are not as well seen as on the prior  exam      MACRO:  None.      Signed by:  Love Lyleus 7/28/2024 10:11 AM  Dictation workstation:   [id number]      Assessment and Plan:    Principal Problem:    Hypotension  Active Problems:    Pleural effusion    Elevated troponin    Hyponatremia      Patient Active Problem List   Diagnosis    Cognitive impairment    Sensorineural hearing loss (SNHL) of both ears    Pain in extremity at multiple sites    Impairment of balance    Closed compression fracture of second lumbar vertebra (Multi)    Primary hypertension    Coronary artery disease involving native coronary artery of native heart with unstable angina pectoris (Multi)    PVD (peripheral vascular disease) (CMS[facility])    Anemia    B12 deficiency    Vitamin D deficiency    Health care maintenance    Chronic atrial fibrillation (Multi)    Fall, initial encounter    Acute gout    Chronic mastoiditis    Bilateral hearing loss    History of alcohol abuse    Impacted cerumen of left ear    Mixed hyperlipidemia    Acute pneumonia    Onychomycosis    Acute hypoxemic respiratory failure (Multi)    Hypotension    Pleural effusion    Elevated troponin    Hyponatremia     Hypotension  Concern for infection UTI  Was recently treated for pneumonia  - admit to tele  - IVF  - florinef  - ID consult  - UA/UCX pending  - cardio consult  - check procal    HFpEF  There is pleural effusion on CXR but unable to diurese at this time  - monitor  - cardio consult    Poor PO intake  - nutrition consult    HypoNa  - IVF    Leukocytosis  - monitor    Normocytic anemia  - monitor    Elevated trop likely demand ischemia  - monitor    HLD  - statin  - ASA    A fib  - hold metoprolol given low BP  - holding eliquis given frequent falls    Depression/anxiety  - cymbalta    Frequent admission  Palliative consult    Diet: regular  DVT ppx: SQ hep  Dispo: admit under inpatient status anticipating greater than forty-eight hours or two midnights stay.     Kristal Jordan MD  Hospitalist

## 2024-07-28 NOTE — ED TRIAGE NOTES
Patient presents from [facility]. Per EMS: patient hypotensive 80/40, mario/VAN negative, A+O x4 on 2L oxygen at baseline, given 250mL NS. Per nurse report, patient is A+O x1 at baseline to self, suddenly was unable to move right hand/wrist and had garbled speech. History of afib, HTN, respiratory failure, and [place].

## 2024-07-28 NOTE — CONSULTS
Consults  Referred by JAHAIRA Jordan MD: Babita Blackburn MD    Reason For Consult  Hypotension, ? infection    History Of Present Illness  Chao Thao is a 84 y.o. male, .hxof HTN, hx of AF, hx of CAD, hx of CHF, hx of hearing loss, hx of alcohol abuse, hx of recurrent falls, hx of recent rib fracture, he was readmitted from the SNF for altered mentation and hypotension, the WBC were up, the head CT without acute changes, the cxr with Rt rib fractures / effusion / atelectasis, he is unable to provide a hx, no fever, no change on his breathing, no emesis or diarrhea, no rash, no hematuria     Past Medical History  He has a past medical history of Alcohol abuse, 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    Surgical History  He has a past surgical history that includes Inner ear surgery (01/06/2016); Other surgical history (03/16/2020); and Cardiac catheterization (05/14/2018).     Social History     Occupational History    Not on file   Tobacco Use    Smoking status: Former     Types: Cigarettes    Smokeless tobacco: Never   Vaping Use    Vaping status: Never Used   Substance and Sexual Activity    Alcohol use: Not Currently    Drug use: Never    Sexual activity: Not on file     Travel History   Travel since 06/28/24    No documented travel since 06/28/24       Family History  No family history on file., no immunodeficiency  Allergies  Gabapentin and Penicillin     Immunization History   Administered Date(s) Administered    Influenza, High Dose Seasonal, Preservative Free 09/18/2019    Pneumococcal conjugate  vaccine, 13-valent (PREVNAR 13) 08/08/2018    Tdap vaccine, age 7 year and older (BOOSTRIX, ADACEL) 06/11/2024    Zoster vaccine, recombinant, adult (SHINGRIX) 03/16/2020    Zoster, live 03/16/2020   Pneumonia vaccine is up to date  Burt fall risk 75, preventive protocol was iimplemented  Depression screen is negative    Medications  Home medications:  Medications Prior to Admission   Medication Sig Dispense Refill Last Dose    acetaminophen (Tylenol) 500 mg tablet Take 2 tablets (1,000 mg) by mouth every 8 hours.       ascorbic acid (Vitamin C) 500 mg tablet Take 1 tablet (500 mg) by mouth once daily.       aspirin 81 mg EC tablet Take 1 tablet (81 mg) by mouth once daily.       atorvastatin (Lipitor) 20 mg tablet Take 1 tablet (20 mg) by mouth once daily.       cholecalciferol (Vitamin D-3) 25 MCG (1000 UT) capsule Take 1 capsule (25 mcg) by mouth once daily.       cyanocobalamin (Vitamin B-12) 250 mcg tablet Take 1 tablet (250 mcg) by mouth once daily.       DULoxetine (Cymbalta) 20 mg DR capsule Take 2 capsules (40 mg) by mouth once daily. Do not crush or chew.       fludrocortisone (Florinef) 0.1 mg tablet Take 0.5 tablets (0.05 mg) by mouth once daily.       lactobacillus acidophilus (Acidophilus) capsule Take 1 capsule by mouth once daily.       lidocaine 4 % patch Place 1 patch over 12 hours on the skin once daily. Remove & discard patch within 12 hours or as directed by MD.       magnesium hydroxide (Milk of Magnesia) 400 mg/5 mL suspension Take 30 mL by mouth once daily as needed for constipation.       metoprolol succinate XL (Toprol-XL) 25 mg 24 hr tablet Take 1 tablet (25 mg) by mouth once daily. Do not crush or chew. 30 tablet 0     miconazole (Micotin) 2 % cream Apply 1 Application topically 3 times a day.       ondansetron (Zofran) 4 mg tablet Take 1 tablet (4 mg) by mouth every 4 hours if needed for nausea or vomiting.       oxygen (O2) gas therapy Inhale 1 each once every 24 hours.       potassium  "chloride CR 20 mEq ER tablet Take 1 tablet (20 mEq) by mouth once daily. Do not crush or chew.       zinc acetate 50 mg (zinc) capsule Take 1 capsule by mouth once daily.        Current medications:  Scheduled medications  ammonium lactate, 1 Application, Topical, BID  aspirin, 81 mg, oral, Daily  atorvastatin, 20 mg, oral, Daily  DULoxetine, 40 mg, oral, Daily  fludrocortisone, 0.1 mg, oral, Daily  heparin (porcine), 5,000 Units, subcutaneous, q8h JIGNESH      Continuous medications  sodium chloride 0.9%, 75 mL/hr, Last Rate: 75 mL/hr (07/28/24 1848)      PRN medications      Review of Systems   All other systems reviewed and are negative.       Objective  Range of Vitals (last 24 hours)  Heart Rate:  []   Temp:  [36.5 °C (97.7 °F)-36.7 °C (98.1 °F)]   Resp:  [16-33]   BP: ()/(57-86)   Height:  [167.6 cm (5' 6\")]   Weight:  [83.6 kg (184 lb 3.2 oz)-88 kg (194 lb 0.1 oz)]   SpO2:  [93 %-99 %]   Daily Weight  07/28/24 : 88 kg (194 lb 0.1 oz)    Body mass index is 31.31 kg/m².   Nutritional consult    Physical Exam  Constitutional:       Appearance: Normal appearance.   HENT:      Head: Normocephalic and atraumatic.      Mouth/Throat:      Mouth: Mucous membranes are moist.      Pharynx: Oropharynx is clear.   Eyes:      Pupils: Pupils are equal, round, and reactive to light.   Cardiovascular:      Rate and Rhythm: Normal rate and regular rhythm.      Heart sounds: Normal heart sounds.   Pulmonary:      Effort: Pulmonary effort is normal.      Breath sounds: Rales present.   Abdominal:      General: Abdomen is flat. Bowel sounds are normal.      Palpations: Abdomen is soft.   Musculoskeletal:         General: Swelling present.      Cervical back: Normal range of motion.      Comments: Lt 2nd toe wound, some redness   Neurological:      Mental Status: He is alert.          Relevant Results  Outside Hospital Results  reviewed  Labs  Results from last 72 hours   Lab Units 07/28/24  0954   WBC AUTO x10*3/uL 14.5* " "  HEMOGLOBIN g/dL 10.1*   HEMATOCRIT % 32.0*   PLATELETS AUTO x10*3/uL 664*   NEUTROS PCT AUTO % 78.8   LYMPHS PCT AUTO % 10.7   MONOS PCT AUTO % 6.1   EOS PCT AUTO % 3.2     Results from last 72 hours   Lab Units 07/28/24  1647 07/28/24  1130 07/28/24  0954   SODIUM mmol/L 134*  --  129*   POTASSIUM mmol/L 4.1 4.2 5.2   CHLORIDE mmol/L 99  --  96*   CO2 mmol/L 28  --  28   BUN mg/dL 10  --  11   CREATININE mg/dL 0.54  --  0.56   GLUCOSE mg/dL 91  --  112*   CALCIUM mg/dL 7.5*  --  7.6*   ANION GAP mmol/L 11  --  10   EGFR mL/min/1.73m*2 >90  --  >90     Results from last 72 hours   Lab Units 07/28/24  0954   ALK PHOS U/L 152*   BILIRUBIN TOTAL mg/dL 0.2   PROTEIN TOTAL g/dL 6.1*   ALT U/L 11   AST U/L 26   ALBUMIN g/dL 2.1*     Estimated Creatinine Clearance: 109.6 mL/min (by C-G formula based on SCr of 0.54 mg/dL).  C-Reactive Protein   Date Value Ref Range Status   06/30/2024 9.28 (H) <1.00 mg/dL Final     Sedimentation Rate   Date Value Ref Range Status   06/30/2024 78 (H) 0 - 20 mm/h Final   05/26/2020 46 (H) 0 - 20 mm/h Final     No results found for: \"HIV1X2\", \"HIVCONF\", \"TLPPPD4YD\"  No results found for: \"HEPCABINIT\", \"HEPCAB\", \"HCVPCRQUANT\"  Microbiology  Reviewed  Imaging  Reviewed       Assessment/Plan   Hypotension  Respiratory failure / rib fracture / atelectasis / effusion   Left 2nd toe wound / likely cellulitis  Leukocytosis  Encephalopathy    Recommendations :  Vancomycin  Cultures  Foot xray  Follow the WBC  Chest PT / incentive spirometry  Wound care    I spent minutes in the professional and overall care of this patient.      Wale Townsend MD  "

## 2024-07-28 NOTE — PROGRESS NOTES
"Vancomycin Dosing by Pharmacy- INITIAL    Chao Thao is a 84 y.o. year old male who Pharmacy has been consulted for vancomycin dosing for cellulitis, skin and soft tissue. Based on the patient's indication and renal status this patient will be dosed based on a goal AUC of 400-600.     Renal function is currently stable, SCR 0.54, CrCl 109.6.  Q 12 hr dosing was the only option that provided sufficient AUC.     Visit Vitals  /86 (BP Location: Right arm, Patient Position: Lying)   Pulse 77   Temp 36.7 °C (98.1 °F) (Temporal)   Resp 19        Lab Results   Component Value Date    CREATININE 0.54 2024    CREATININE 0.56 2024    CREATININE 0.67 2024    CREATININE 0.65 2024        Patient weight is as follows:   Vitals:    24 1612   Weight: 88 kg (194 lb 0.1 oz)       Cultures:  No results found for the encounter in last 14 days.        I/O last 3 completed shifts:  In: 92.5 (1.1 mL/kg) [I.V.:92.5 (1.1 mL/kg)]  Out: - (0 mL/kg)   Weight: 88 kg   I/O during current shift:  No intake/output data recorded.    Temp (24hrs), Av.6 °C (97.9 °F), Min:36.5 °C (97.7 °F), Max:36.7 °C (98.1 °F)         Assessment/Plan     Patient will not be given a loading dose.  Will initiate vancomycin maintenance,  1250 mg every 12 hours.    This dosing regimen is predicted by [employer] to result in the following pharmacokinetic parameters:    \"Loading dose: N/A  Regimen: 1250 mg IV every 12 hours.  Start time: 19:16 on 2024  Exposure target: AUC24 (range)400-600 mg/L.hr   AUC24,ss: 509 mg/L.hr  Probability of AUC24 > 400: 75 %  Ctrough,ss: 15.5 mg/L  Probability of Ctrough,ss > 20: 29   Probability of nephrotoxicity (Lodise VITO ): 11 %\"    Follow-up level will be ordered on  at 0500hrs unless clinically indicated sooner.  Will continue to monitor renal function daily while on vancomycin and order serum creatinine at least every 48 hours if not already ordered.  Follow for continued " vancomycin needs, clinical response, and signs/symptoms of toxicity.       Jayce Ross, PharmD

## 2024-07-29 LAB
APPEARANCE UR: CLEAR
BACTERIA #/AREA URNS AUTO: ABNORMAL /HPF
BILIRUB UR STRIP.AUTO-MCNC: NEGATIVE MG/DL
COLOR UR: ABNORMAL
ERYTHROCYTE [DISTWIDTH] IN BLOOD BY AUTOMATED COUNT: 13.7 % (ref 11.5–14.5)
GLUCOSE UR STRIP.AUTO-MCNC: NORMAL MG/DL
HCT VFR BLD AUTO: 28.7 % (ref 41–52)
HGB BLD-MCNC: 9.2 G/DL (ref 13.5–17.5)
KETONES UR STRIP.AUTO-MCNC: NEGATIVE MG/DL
LEUKOCYTE ESTERASE UR QL STRIP.AUTO: ABNORMAL
MCH RBC QN AUTO: 30.9 PG (ref 26–34)
MCHC RBC AUTO-ENTMCNC: 32.1 G/DL (ref 32–36)
MCV RBC AUTO: 96 FL (ref 80–100)
MUCOUS THREADS #/AREA URNS AUTO: ABNORMAL /LPF
NITRITE UR QL STRIP.AUTO: NEGATIVE
NRBC BLD-RTO: 0 /100 WBCS (ref 0–0)
PH UR STRIP.AUTO: 6 [PH]
PLATELET # BLD AUTO: 600 X10*3/UL (ref 150–450)
PROCALCITONIN SERPL-MCNC: 0.06 NG/ML
PROT UR STRIP.AUTO-MCNC: NEGATIVE MG/DL
RBC # BLD AUTO: 2.98 X10*6/UL (ref 4.5–5.9)
RBC # UR STRIP.AUTO: NEGATIVE /UL
RBC #/AREA URNS AUTO: ABNORMAL /HPF
SP GR UR STRIP.AUTO: 1.01
SQUAMOUS #/AREA URNS AUTO: ABNORMAL /HPF
UROBILINOGEN UR STRIP.AUTO-MCNC: NORMAL MG/DL
VANCOMYCIN SERPL-MCNC: 15.9 UG/ML (ref 5–20)
WBC # BLD AUTO: 12.5 X10*3/UL (ref 4.4–11.3)
WBC #/AREA URNS AUTO: ABNORMAL /HPF
WBC CLUMPS #/AREA URNS AUTO: ABNORMAL /HPF

## 2024-07-29 PROCEDURE — 1200000002 HC GENERAL ROOM WITH TELEMETRY DAILY

## 2024-07-29 PROCEDURE — 81003 URINALYSIS AUTO W/O SCOPE: CPT | Performed by: STUDENT IN AN ORGANIZED HEALTH CARE EDUCATION/TRAINING PROGRAM

## 2024-07-29 PROCEDURE — 80202 ASSAY OF VANCOMYCIN: CPT | Performed by: INTERNAL MEDICINE

## 2024-07-29 PROCEDURE — 99233 SBSQ HOSP IP/OBS HIGH 50: CPT | Performed by: STUDENT IN AN ORGANIZED HEALTH CARE EDUCATION/TRAINING PROGRAM

## 2024-07-29 PROCEDURE — 36415 COLL VENOUS BLD VENIPUNCTURE: CPT | Performed by: STUDENT IN AN ORGANIZED HEALTH CARE EDUCATION/TRAINING PROGRAM

## 2024-07-29 PROCEDURE — 36415 COLL VENOUS BLD VENIPUNCTURE: CPT | Performed by: INTERNAL MEDICINE

## 2024-07-29 PROCEDURE — 99222 1ST HOSP IP/OBS MODERATE 55: CPT | Performed by: STUDENT IN AN ORGANIZED HEALTH CARE EDUCATION/TRAINING PROGRAM

## 2024-07-29 PROCEDURE — 2500000001 HC RX 250 WO HCPCS SELF ADMINISTERED DRUGS (ALT 637 FOR MEDICARE OP): Performed by: INTERNAL MEDICINE

## 2024-07-29 PROCEDURE — 85027 COMPLETE CBC AUTOMATED: CPT | Performed by: STUDENT IN AN ORGANIZED HEALTH CARE EDUCATION/TRAINING PROGRAM

## 2024-07-29 PROCEDURE — 2500000004 HC RX 250 GENERAL PHARMACY W/ HCPCS (ALT 636 FOR OP/ED): Mod: JZ | Performed by: INTERNAL MEDICINE

## 2024-07-29 PROCEDURE — 2500000004 HC RX 250 GENERAL PHARMACY W/ HCPCS (ALT 636 FOR OP/ED): Performed by: STUDENT IN AN ORGANIZED HEALTH CARE EDUCATION/TRAINING PROGRAM

## 2024-07-29 PROCEDURE — 2500000001 HC RX 250 WO HCPCS SELF ADMINISTERED DRUGS (ALT 637 FOR MEDICARE OP): Performed by: STUDENT IN AN ORGANIZED HEALTH CARE EDUCATION/TRAINING PROGRAM

## 2024-07-29 PROCEDURE — 87086 URINE CULTURE/COLONY COUNT: CPT | Mod: GEALAB | Performed by: STUDENT IN AN ORGANIZED HEALTH CARE EDUCATION/TRAINING PROGRAM

## 2024-07-29 PROCEDURE — 2500000005 HC RX 250 GENERAL PHARMACY W/O HCPCS: Performed by: INTERNAL MEDICINE

## 2024-07-29 RX ORDER — VANCOMYCIN HYDROCHLORIDE 1 G/200ML
1000 INJECTION, SOLUTION INTRAVENOUS EVERY 12 HOURS
Status: DISCONTINUED | OUTPATIENT
Start: 2024-07-29 | End: 2024-07-30

## 2024-07-29 RX ORDER — ATORVASTATIN CALCIUM 40 MG/1
40 TABLET, FILM COATED ORAL DAILY
Status: DISCONTINUED | OUTPATIENT
Start: 2024-07-30 | End: 2024-08-03 | Stop reason: HOSPADM

## 2024-07-29 RX ADMIN — CYANOCOBALAMIN TAB 500 MCG 250 MCG: 500 TAB at 08:01

## 2024-07-29 RX ADMIN — ATORVASTATIN CALCIUM 20 MG: 20 TABLET, FILM COATED ORAL at 08:01

## 2024-07-29 RX ADMIN — ACETAMINOPHEN 975 MG: 325 TABLET ORAL at 05:19

## 2024-07-29 RX ADMIN — OXYCODONE HYDROCHLORIDE AND ACETAMINOPHEN 500 MG: 500 TABLET ORAL at 08:01

## 2024-07-29 RX ADMIN — HEPARIN SODIUM 5000 UNITS: 5000 INJECTION INTRAVENOUS; SUBCUTANEOUS at 05:19

## 2024-07-29 RX ADMIN — Medication 1 CAPSULE: at 08:01

## 2024-07-29 RX ADMIN — HEPARIN SODIUM 5000 UNITS: 5000 INJECTION INTRAVENOUS; SUBCUTANEOUS at 20:43

## 2024-07-29 RX ADMIN — METOPROLOL SUCCINATE 25 MG: 25 TABLET, EXTENDED RELEASE ORAL at 08:01

## 2024-07-29 RX ADMIN — Medication 1 APPLICATION: at 08:07

## 2024-07-29 RX ADMIN — LIDOCAINE 4% 1 PATCH: 40 PATCH TOPICAL at 08:01

## 2024-07-29 RX ADMIN — Medication 1 APPLICATION: at 20:41

## 2024-07-29 RX ADMIN — ACETAMINOPHEN 975 MG: 325 TABLET ORAL at 20:42

## 2024-07-29 RX ADMIN — Medication 1000 UNITS: at 08:01

## 2024-07-29 RX ADMIN — ACETAMINOPHEN 975 MG: 325 TABLET ORAL at 13:48

## 2024-07-29 RX ADMIN — ASPIRIN 81 MG: 81 TABLET, COATED ORAL at 08:01

## 2024-07-29 RX ADMIN — VANCOMYCIN HYDROCHLORIDE 1000 MG: 1 INJECTION, SOLUTION INTRAVENOUS at 20:30

## 2024-07-29 RX ADMIN — VANCOMYCIN HYDROCHLORIDE 1.25 G: 1.25 INJECTION, POWDER, LYOPHILIZED, FOR SOLUTION INTRAVENOUS at 07:59

## 2024-07-29 RX ADMIN — FLUDROCORTISONE ACETATE 0.1 MG: 0.1 TABLET ORAL at 08:01

## 2024-07-29 RX ADMIN — HEPARIN SODIUM 5000 UNITS: 5000 INJECTION INTRAVENOUS; SUBCUTANEOUS at 13:48

## 2024-07-29 RX ADMIN — DULOXETINE HYDROCHLORIDE 40 MG: 20 CAPSULE, DELAYED RELEASE ORAL at 08:01

## 2024-07-29 ASSESSMENT — COGNITIVE AND FUNCTIONAL STATUS - GENERAL
DRESSING REGULAR LOWER BODY CLOTHING: A LOT
EATING MEALS: A LITTLE
TOILETING: A LOT
TURNING FROM BACK TO SIDE WHILE IN FLAT BAD: A LITTLE
STANDING UP FROM CHAIR USING ARMS: A LOT
HELP NEEDED FOR BATHING: A LOT
CLIMB 3 TO 5 STEPS WITH RAILING: TOTAL
WALKING IN HOSPITAL ROOM: TOTAL
DAILY ACTIVITIY SCORE: 14
DRESSING REGULAR UPPER BODY CLOTHING: A LOT
MOVING FROM LYING ON BACK TO SITTING ON SIDE OF FLAT BED WITH BEDRAILS: A LITTLE
MOBILITY SCORE: 12
MOVING TO AND FROM BED TO CHAIR: A LOT
PERSONAL GROOMING: A LITTLE

## 2024-07-29 ASSESSMENT — PAIN SCALES - GENERAL
PAINLEVEL_OUTOF10: 0 - NO PAIN
PAINLEVEL_OUTOF10: 0 - NO PAIN

## 2024-07-29 NOTE — PROGRESS NOTES
Vancomycin Dosing by Pharmacy- FOLLOW UP    Chao Thao is a 84 y.o. year old male who Pharmacy has been consulted for vancomycin dosing for cellulitis, skin and soft tissue. Based on the patient's indication and renal status this patient is being dosed based on a goal AUC of 400-600.     Renal function is currently stable.    Current vancomycin dose: 1250 mg given every 12 hours    Estimated vancomycin AUC on current dose: 656 mg/L.hr     Visit Vitals  /64   Pulse 92   Temp 37.4 °C (99.3 °F) (Temporal)   Resp 20        Lab Results   Component Value Date    CREATININE 0.54 2024    CREATININE 0.56 2024    CREATININE 0.67 2024    CREATININE 0.65 2024        Patient weight is as follows:   Vitals:    24 1612   Weight: 88 kg (194 lb 0.1 oz)       Cultures:  No results found for the encounter in last 14 days.       I/O last 3 completed shifts:  In: 367.5 (4.2 mL/kg) [I.V.:92.5 (1.1 mL/kg); IV Piggyback:275]  Out: 650 (7.4 mL/kg) [Urine:650 (0.2 mL/kg/hr)]  Weight: 88 kg   I/O during current shift:  I/O this shift:  In: 120 [P.O.:120]  Out: -     Temp (24hrs), Av.1 °C (98.8 °F), Min:36.7 °C (98.1 °F), Max:37.4 °C (99.3 °F)      Assessment/Plan    Above goal AUC. Orders placed for new vancomcyin regimen of 1000 every 12 hours to begin at .    This dosing regimen is predicted by [employer] to result in the following pharmacokinetic parameters:  <<<<<PASTE [employer] DATA HERE>>>>>  Loading dose: N/A  Regimen: 1000 mg IV every 12 hours.  Start time: 19:59 on 2024  Exposure target: AUC24 (range)400-600 mg/L.hr   AUC24,ss: 526 mg/L.hr  Probability of AUC24 > 400: 89 %  Ctrough,ss: 14.1 mg/L  Probability of Ctrough,ss > 20: 16 %  Probability of nephrotoxicity (Lodise VITO ): 9 %  The next level will be obtained on  at 0500. May be obtained sooner if clinically indicated.   Will continue to monitor renal function daily while on vancomycin and order serum creatinine at  least every 48 hours if not already ordered.  Follow for continued vancomycin needs, clinical response, and signs/symptoms of toxicity.       Marita Oates, PharmD

## 2024-07-29 NOTE — PROGRESS NOTES
"Chao Thao is a 84 y.o. male on day 1 of admission presenting with Hypotension.    Subjective   Pt is hard of hearing. Limited history. No overnight events per nursing.        Objective     Physical Exam  Vitals and nursing note reviewed.   Constitutional:       General: He is not in acute distress.     Appearance: Normal appearance. He is not toxic-appearing.      Comments: Chronically ill appearing   HENT:      Head: Normocephalic and atraumatic.      Nose: Nose normal.      Mouth/Throat:      Mouth: Mucous membranes are moist.   Eyes:      Extraocular Movements: Extraocular movements intact.      Conjunctiva/sclera: Conjunctivae normal.      Pupils: Pupils are equal, round, and reactive to light.   Cardiovascular:      Rate and Rhythm: Normal rate and regular rhythm.      Heart sounds: No murmur heard.     No gallop.   Pulmonary:      Effort: Pulmonary effort is normal. No respiratory distress.      Breath sounds: Rhonchi present. No wheezing or rales.   Abdominal:      General: Abdomen is flat. Bowel sounds are normal. There is no distension.      Palpations: Abdomen is soft. There is no mass.      Tenderness: There is no abdominal tenderness.   Musculoskeletal:         General: No swelling or tenderness. Normal range of motion.      Cervical back: Normal range of motion and neck supple.   Left toe wound appreciated  Skin:     General: Skin is warm and dry.   Neurological:      Mental Status: He is alert and oriented to person, place, and time.      Motor: Weakness present.      Comments: Very hard of hearing   Psychiatric:         Mood and Affect: Mood normal.         Behavior: Behavior normal.         Thought Content: Thought content normal.         Judgment: Judgment normal.     Last Recorded Vitals:  /64   Pulse 92   Temp 37.4 °C (99.3 °F) (Temporal)   Resp 20   Ht 1.676 m (5' 6\")   Wt 88 kg (194 lb 0.1 oz)   SpO2 92%   BMI 31.31 kg/m²      Scheduled medications:  acetaminophen, 975 mg, " oral, q8h  ammonium lactate, 1 Application, Topical, BID  ascorbic acid, 500 mg, oral, Daily  aspirin, 81 mg, oral, Daily  [START ON 7/30/2024] atorvastatin, 40 mg, oral, Daily  cholecalciferol, 1,000 Units, oral, Daily  cyanocobalamin, 250 mcg, oral, Daily  DULoxetine, 40 mg, oral, Daily  fludrocortisone, 0.1 mg, oral, Daily  heparin (porcine), 5,000 Units, subcutaneous, q8h JIGNESH  lactobacillus acidophilus, 1 capsule, oral, Daily  lidocaine, 1 patch, transdermal, Daily  metoprolol succinate XL, 25 mg, oral, Daily  vancomycin, 1,000 mg, intravenous, q12h      Continuous medications:  sodium chloride 0.9%, 75 mL/hr, Last Rate: 75 mL/hr (07/28/24 1848)      PRN medications:  PRN medications: magnesium hydroxide, vancomycin     Relevant Results:  Results for orders placed or performed during the hospital encounter of 07/28/24 (from the past 24 hour(s))   Troponin I, High Sensitivity   Result Value Ref Range    Troponin I, High Sensitivity 105 (HH) 0 - 20 ng/L   Potassium   Result Value Ref Range    Potassium 4.2 3.5 - 5.3 mmol/L   Blood Culture    Specimen: Peripheral Venipuncture; Blood culture   Result Value Ref Range    Blood Culture Loaded on Instrument - Culture in progress    Blood Culture    Specimen: Peripheral Venipuncture; Blood culture   Result Value Ref Range    Blood Culture Loaded on Instrument - Culture in progress    Lactate   Result Value Ref Range    Lactate 1.3 0.4 - 2.0 mmol/L   BLOOD GAS VENOUS FULL PANEL   Result Value Ref Range    POCT pH, Venous 7.46 (H) 7.33 - 7.43 pH    POCT pCO2, Venous 40 (L) 41 - 51 mm Hg    POCT pO2, Venous 79 (H) 35 - 45 mm Hg    POCT SO2, Venous 97 (H) 45 - 75 %    POCT Oxy Hemoglobin, Venous 95.3 (H) 45.0 - 75.0 %    POCT Hematocrit Calculated, Venous 43.0 41.0 - 52.0 %    POCT Sodium, Venous 128 (L) 136 - 145 mmol/L    POCT Potassium, Venous 4.1 3.5 - 5.3 mmol/L    POCT Chloride, Venous 100 98 - 107 mmol/L    POCT Ionized Calicum, Venous 1.08 (L) 1.10 - 1.33 mmol/L     POCT Glucose, Venous 120 (H) 74 - 99 mg/dL    POCT Lactate, Venous 1.2 0.4 - 2.0 mmol/L    POCT Base Excess, Venous 4.2 (H) -2.0 - 3.0 mmol/L    POCT HCO3 Calculated, Venous 28.4 (H) 22.0 - 26.0 mmol/L    POCT Hemoglobin, Venous 14.4 13.5 - 17.5 g/dL    POCT Anion Gap, Venous 4.0 (L) 10.0 - 25.0 mmol/L    Patient Temperature      FiO2 21 %   Procalcitonin   Result Value Ref Range    Procalcitonin 0.06 <=0.07 ng/mL   Basic metabolic panel   Result Value Ref Range    Glucose 91 74 - 99 mg/dL    Sodium 134 (L) 136 - 145 mmol/L    Potassium 4.1 3.5 - 5.3 mmol/L    Chloride 99 98 - 107 mmol/L    Bicarbonate 28 21 - 32 mmol/L    Anion Gap 11 10 - 20 mmol/L    Urea Nitrogen 10 6 - 23 mg/dL    Creatinine 0.54 0.50 - 1.30 mg/dL    eGFR >90 >60 mL/min/1.73m*2    Calcium 7.5 (L) 8.6 - 10.3 mg/dL   Troponin I, High Sensitivity   Result Value Ref Range    Troponin I, High Sensitivity 96 (HH) 0 - 20 ng/L   Vancomycin   Result Value Ref Range    Vancomycin 15.9 5.0 - 20.0 ug/mL       XR foot left 3+ views    Result Date: 7/29/2024  Interpreted By:  María Wood, STUDY: XR FOOT LEFT 3+ VIEWS; ;  7/28/2024 7:56 pm   INDICATION: Signs/Symptoms:2nd toe wound / infection.   COMPARISON: Left foot radiographs dated 06/28/2024   ACCESSION NUMBER(S): [id number]   ORDERING CLINICIAN: REYES REYNOSO   FINDINGS: Three views of left foot were performed.   There is soft tissue ulceration along the dorsal aspect of the 2nd digit, likely located over the expected location of proximal interphalangeal joint. There is diffuse soft tissue swelling about the 2nd digit. There is no definite cortical destruction of the 2nd digit to definitively suggest osteomyelitis. There are moderate degenerative changes in the great toe metatarsophalangeal and interphalangeal joints with joint space narrowing and prominent marginal osteophytes. There are also mild-to-moderate degenerative changes in the proximal and distal interphalangeal joints of 2nd through  5th digits.       Diffuse soft tissue swelling of the 2nd digit with ulceration over the dorsal aspect of proximal interphalangeal joint. No definite radiographic evidence of osteomyelitis, however please note that radiographs are limited for evaluation of early osteomyelitis. Recommend MRI for definitive evaluation.     MACRO: None   Signed by: María Wood 7/29/2024 9:55 AM Dictation workstation:   [id number]    CT head wo IV contrast    Result Date: 7/28/2024  Interpreted By:  Marya Shamra, STUDY: CT HEAD WO IV CONTRAST;  7/28/2024 12:15 pm   INDICATION: Signs/Symptoms:stroke symptoms earlier.   COMPARISON: 06/28/2024.   ACCESSION NUMBER(S): [id number]   ORDERING CLINICIAN: MALLY VELOZ   TECHNIQUE: Noncontrast axial CT scan of head was performed. Multiplanar reconstructions   FINDINGS: No acute intracranial hemorrhage, mass effect, midline shift, or herniation. No evidence of hydrocephalus. Mild global cerebral atrophy with concordant prominence of the ventricles and sulci. There is a relative increase in volume loss involving the frontal lobe, similar to prior exam. Mild periventricular and subcortical deep white matter hypodensity suggestive of chronic microangiopathic change. Postoperative changes of the right mastoid air cells. Partially effusion left mastoid air cells. No acute osseous abnormality of the calvarium. No destructive bone lesion.       No acute intracranial abnormality. Consider follow-up with MRI as warranted.     Signed by: Marya Sharma 7/28/2024 12:22 PM Dictation workstation:   [id number]    XR chest 1 view    Result Date: 7/28/2024  Interpreted By:  Love Gallardo, STUDY: XR CHEST 1 VIEW;  7/28/2024 9:55 am   INDICATION: Signs/Symptoms:sob.   COMPARISON: 06/27/2024   ACCESSION NUMBER(S): [id number]   ORDERING CLINICIAN: MALLY VELOZ   TECHNIQUE: Portable AP upright   FINDINGS: Cardiac silhouette is upper normal in size. Aorta is atherosclerotic.  Bilateral pleural effusions and bilateral basilar atelectasis represent an interval change from the prior study. Right appears worse than left. Fractures of the lateral 8th and 9th ribs are at the margin of the image and not as well demonstrated as on the prior study.       Pleural effusions and basilar atelectasis, right worse than left and worse than the prior study   Right 8th and 9th rib fractures are not as well seen as on the prior exam   MACRO: None.   Signed by: Love Gallardo 7/28/2024 10:11 AM Dictation workstation:   [id number]           Assessment/Plan   Principal Problem:    Hypotension  Active Problems:    Pleural effusion    Elevated troponin    Hyponatremia    Hypotension  Likely from left toe cellulitis  improved  - dc IVF  - florinef  - ID following  - UA/UCX pending  - cardio consulted  - procal pending  - podiatry consult    HFpEF  There is pleural effusion on CXR defer diuresis to cardio   - monitor  - cardio consulted     Poor PO intake  - nutrition consult     HypoNa  improved  - IVF     Leukocytosis  - monitor     Normocytic anemia  - monitor     Elevated trop likely demand ischemia  Stop trend     HLD  - statin  - ASA     A fib  - hold metoprolol given low BP  - holding eliquis given frequent falls     Depression/anxiety  - cymbalta     Frequent admission  Palliative consulted     Diet: regular  DVT ppx: SQ hep  Dispo: monitor clinically          Kristal Jordan MD  Hospitalist

## 2024-07-29 NOTE — CONSULTS
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 Patient is very hard of hearing and communicates best with questions wrote out for him to read. Patient is unable to answer questions and history is gathered from chart. Patient reports that left 2nd toe is tender when moved. Patient is a [facility] of [facility]. According to chart it seems that wound started in February of 2024.      Wound Team Plan: Continue current dressing change orders of Xeroform and guaze. Continue use of Truevu boots bilaterally and turning every two hours.    Please message wound nurse with any questions        Michelle Guzmán RN  7/29/2024  9:47 AM

## 2024-07-29 NOTE — CONSULTS
"Inpatient consult to Cardiology  Consult performed by: Yessy Wilkerson PA-C  Consult ordered by: Kristal Jordan MD        History Of Present Illness:    Chao Thao is a 84 y.o. male presenting with garbled speech, unresponsiveness per chart review. Patient has been unable to fully detail why he presented but notes that he was in the cafeteria at the skilled rehab and \"went out\". States that he fell down and broke two ribs. Unable to corroborate history.   Patient is oriented to self, hard of hearing and not cooperative with ROS question but denies any chest pain or pressure at this time.      Last Recorded Vitals:  Vitals:    07/28/24 1612 07/28/24 2013 07/29/24 0501 07/29/24 0759   BP: 127/86 136/87 112/63 101/64   BP Location: Right arm Right arm Left arm    Patient Position: Lying Lying Lying    Pulse: 77 95 90 92   Resp: 19 20 20    Temp: 36.7 °C (98.1 °F) 37.2 °C (99 °F) 37.4 °C (99.3 °F)    TempSrc: Temporal Temporal Temporal    SpO2: 93% 92% 90% 92%   Weight: 88 kg (194 lb 0.1 oz)      Height: 1.676 m (5' 6\")          Last Labs:  CBC - 7/28/2024:  9:54 AM  14.5 10.1 664    32.0      CMP - 7/28/2024:  4:47 PM  7.5 6.1 26 --- 0.2   3.4 2.1 11 152      PTT - 6/11/2024:  9:01 AM  1.3   14.8 30     Troponin I, High Sensitivity   Date/Time Value Ref Range Status   07/28/2024 04:47 PM 96 (HH) 0 - 20 ng/L Final     Comment:     Previous result verified on 7/28/2024 1033 on specimen/case [id number] called with component TRPHS for procedure Troponin I, High Sensitivity with value 99 ng/L.   07/28/2024 11:30  (HH) 0 - 20 ng/L Final     Comment:     Previous result verified on 7/28/2024 1033 on specimen/case [id number] called with component TRPHS for procedure Troponin I, High Sensitivity with value 99 ng/L.   07/28/2024 09:54 AM 99 (HH) 0 - 20 ng/L Final     BNP   Date/Time Value Ref Range Status   06/28/2024 12:32 AM 43 0 - 99 pg/mL Final   02/03/2024 06:44 AM 56 0 - 99 pg/mL Final     Hemoglobin " "A1C   Date/Time Value Ref Range Status   03/16/2020 12:08 PM 5.9 % Final     Comment:          Diagnosis of Diabetes-Adults   Non-Diabetic: < or = 5.6%   Increased risk for developing diabetes: 5.7-6.4%   Diagnostic of diabetes: > or = 6.5%  .       Monitoring of Diabetes                Age (y)     Therapeutic Goal (%)   Adults:          >18           <7.0   Pediatrics:    13-18           <7.5                   7-12           <8.0                   0- 6            7.5-8.5   American Diabetes Association. Diabetes Care 33(S1), Jan 2010.       VLDL   Date/Time Value Ref Range Status   03/16/2020 12:08 PM 19 0 - 40 mg/dL Final   05/07/2019 11:06 AM 20 0 - 40 mg/dL Final   08/08/2018 11:42 AM 21 0 - 40 mg/dL Final      Last I/O:  I/O last 3 completed shifts:  In: 367.5 (4.2 mL/kg) [I.V.:92.5 (1.1 mL/kg); IV Piggyback:275]  Out: 650 (7.4 mL/kg) [Urine:650 (0.2 mL/kg/hr)]  Weight: 88 kg     Past Cardiology Tests (Last 3 Years):  EKG:  EKG 07/28/2024 NSR 86 bpm with TWI V4-V6      Echo:  Transthoracic Echo (TTE) Complete 02/05/2024  CONCLUSIONS:   1. Left ventricular systolic function is mildly decreased with a 50% estimated ejection fraction.   2. Spectral Doppler shows an impaired relaxation pattern of left ventricular diastolic filling.   3. Moderately enlarged right ventricle.   4. The estimated pulmonary artery pressure is mildly elevated with the RVSP at 41.4 mmHg.   5. Technical difficulty of study prohibits definitive assessment of valvular structures.    Ejection Fractions:  No results found for: \"EF\"  Cath:  No results found for this or any previous visit from the past 1095 days.    Stress Test:  No results found for this or any previous visit from the past 1095 days.    Imaging:  XR Foot L (07/28/2024):  IMPRESSION:  Diffuse soft tissue swelling of the 2nd digit with ulceration over  the dorsal aspect of proximal interphalangeal joint. No definite  radiographic evidence of osteomyelitis, however please note " that  radiographs are limited for evaluation of early osteomyelitis.  Recommend MRI for definitive evaluation.          CT Head w/o (07/28/2024):  IMPRESSION:  No acute intracranial abnormality. Consider follow-up with MRI as  warranted.    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    Past Surgical History:  He has a past surgical history that includes Inner ear surgery (01/06/2016); Other surgical history (03/16/2020); and Cardiac catheterization (05/14/2018).      Social History:  He reports that he has quit smoking. His smoking use included cigarettes. He has never used smokeless tobacco. He reports that he does not currently use alcohol. He reports that he does not use drugs.    Family History:  No family history on file.     Allergies:  Gabapentin and Penicillin    Inpatient Medications:  Scheduled medications   Medication Dose Route Frequency    acetaminophen  975 mg oral q8h    ammonium lactate  1 Application Topical BID    ascorbic acid  500 mg oral Daily    aspirin  81 mg oral Daily    [START ON 7/30/2024] atorvastatin  40 mg oral Daily    cholecalciferol  1,000 Units oral Daily    cyanocobalamin  250 mcg oral Daily    DULoxetine  40 mg oral Daily    fludrocortisone  0.1 mg oral Daily    heparin (porcine)  5,000 Units subcutaneous  q8h [facility]    lactobacillus acidophilus  1 capsule oral Daily    lidocaine  1 patch transdermal Daily    metoprolol succinate XL  25 mg oral Daily    vancomycin  1,000 mg intravenous q12h     PRN medications   Medication    magnesium hydroxide    vancomycin     Continuous Medications   Medication Dose Last Rate    sodium chloride 0.9%  75 mL/hr 75 mL/hr (07/28/24 5385)     Outpatient Medications:  Current Outpatient Medications   Medication Instructions    acetaminophen (TYLENOL) 1,000 mg, oral, Every 8 hours    ascorbic acid (VITAMIN C) 500 mg, oral, Daily    aspirin 81 mg, oral, Daily    atorvastatin (LIPITOR) 20 mg, oral, Daily    cholecalciferol (VITAMIN D-3) 25 mcg, oral, Daily    cyanocobalamin (VITAMIN B-12) 250 mcg, oral, Daily    DULoxetine (CYMBALTA) 40 mg, oral, Daily, Do not crush or chew.    fludrocortisone (FLORINEF) 0.05 mg, oral, Daily    lactobacillus acidophilus (Acidophilus) capsule 1 capsule, oral, Daily    lidocaine 4 % patch 1 patch, transdermal, Daily, Remove & discard patch within 12 hours or as directed by MD.    magnesium hydroxide (Milk of Magnesia) 400 mg/5 mL suspension 30 mL, oral, Daily PRN    metoprolol succinate XL (TOPROL-XL) 25 mg, oral, Daily, Do not crush or chew.    miconazole (Micotin) 2 % cream 1 Application, Topical, 3 times daily    ondansetron (ZOFRAN) 4 mg, oral, Every 4 hours PRN    oxygen (O2) gas therapy 1 each, inhalation, Every 24 hours    potassium chloride CR 20 mEq ER tablet 20 mEq, oral, Daily, Do not crush or chew.    zinc acetate 50 mg (zinc) capsule 1 capsule, oral, Daily       Physical Exam:  Physical Exam  Constitutional:       Comments: Elderly male lying in bed in NAD.    HENT:      Head: Normocephalic.      Nose: Nose normal.      Mouth/Throat:      Mouth: Mucous membranes are moist.   Eyes:      Conjunctiva/sclera: Conjunctivae normal.   Cardiovascular:      Rate and Rhythm: Normal rate and regular rhythm.      Heart sounds: No murmur heard.  Pulmonary:       Effort: Pulmonary effort is normal.   Abdominal:      Palpations: Abdomen is soft.   Musculoskeletal:         General: Signs of injury present. Normal range of motion.      Comments: B/l LE dressings c/d/I.      Skin:     General: Skin is warm and dry.   Neurological:      Mental Status: He is disoriented.            Assessment/Plan   Chao Thao is an 85 yo male with a PMH of CAD s/p PTCA >10y ago @ OSH, HTN, HLD, PAF, severe PAD w/ non healing wound who presented with disorientation, hypotension c/f sepsis 2/2 cellulitis of the foot. ID following recs IV Abx, Xray to r/o osteomyelitis.     #Hypotension in the setting of infection, Hx of HTN  #Troponin elevation 2/2 demand ischemia in the setting of sepsis   #PAF  #HLD  #CAD s/p remote PTCA  #Severe PAD    -Trop flat 99> 105> 96, pt is w/o chest pain  -CXR showing b/l pleural effusions. Pt being hydrated per sepsis protocol 2/2 hypotension, also in the setting of poor oral intake  -Inc Atorvastatin to 80mg daily, c/w ASA, Toprol  -Will folow    Peripheral IV 07/28/24 18 G Left Antecubital (Active)   Site Assessment Clean;Dry;Intact 07/29/24 0732   Dressing Status Clean;Dry 07/29/24 0732   Number of days: 1       Peripheral IV 07/28/24 20 G Right;Anterior Forearm (Active)   Site Assessment Clean;Dry;Intact 07/29/24 0733   Dressing Status Clean;Dry 07/29/24 0733   Number of days: 1       Code Status:  Full Code    I spent  minutes in the professional and overall care of this patient.        Yessy Wilkerson PA-C

## 2024-07-29 NOTE — PROGRESS NOTES
07/29/24 1137   Discharge Planning   Living Arrangements Other (Comment)  ([facility])   Support Systems Spouse/significant other   Assistance Needed patient is A&Ox3, dependent for ADL's, wheelchair x2 max assist per [facility] and wears O2 at baseline- per [facility] they had been trying to prepare for DC planning with spouse who is unwilling to speak on the phone, will only communicate through email.   Type of Residence Skilled nursing facility   Do you have animals or pets at home? Yes   Who is requesting discharge planning? Provider   Home or Post Acute Services Post acute facilities (Rehab/SNF/etc)   Type of Post Acute Facility Services Skilled nursing   Expected Discharge Disposition SNF   Does the patient need discharge transport arranged? Yes   RoundTrip coordination needed? Yes   Has discharge transport been arranged? No     7/29/2024 1140: Per [facility] staff patient has recently been cut by insurance d/t poor progression, patient spouse is unwilling to make discharge plans with [facility], according to staff- she is hard to get in touch with (her preferred communication method is email), does not visit during business hours and is unrealistic about his care needs going forward.

## 2024-07-29 NOTE — PROGRESS NOTES
"Chao Thao is a 84 y.o. male on day 1 of admission presenting with Hypotension.    Subjective   Interval History: no fever, no new complaints        Review of Systems    Objective   Range of Vitals (last 24 hours)  Heart Rate:  []   Temp:  [36.7 °C (98.1 °F)-37.4 °C (99.3 °F)]   Resp:  [19-33]   BP: (101-136)/(63-87)   Height:  [167.6 cm (5' 6\")]   Weight:  [88 kg (194 lb 0.1 oz)]   SpO2:  [90 %-99 %]   Daily Weight  07/28/24 : 88 kg (194 lb 0.1 oz)    Body mass index is 31.31 kg/m².    Physical Exam  Constitutional:       Appearance: Normal appearance.   HENT:      Head: Normocephalic and atraumatic.      Mouth/Throat:      Mouth: Mucous membranes are moist.      Pharynx: Oropharynx is clear.   Eyes:      Pupils: Pupils are equal, round, and reactive to light.   Cardiovascular:      Rate and Rhythm: Normal rate and regular rhythm.      Heart sounds: Normal heart sounds.   Pulmonary:      Effort: Pulmonary effort is normal.      Breath sounds: Normal breath sounds.   Abdominal:      General: Abdomen is flat. Bowel sounds are normal.      Palpations: Abdomen is soft.   Musculoskeletal:      Cervical back: Normal range of motion.      Comments: Lt 2nd toe wound, less redness   Neurological:      Mental Status: He is alert.         Antibiotics  vancomycin - 1 gram/200 mL    Relevant Results  Labs  Results from last 72 hours   Lab Units 07/28/24  0954   WBC AUTO x10*3/uL 14.5*   HEMOGLOBIN g/dL 10.1*   HEMATOCRIT % 32.0*   PLATELETS AUTO x10*3/uL 664*   NEUTROS PCT AUTO % 78.8   LYMPHS PCT AUTO % 10.7   MONOS PCT AUTO % 6.1   EOS PCT AUTO % 3.2     Results from last 72 hours   Lab Units 07/28/24  1647 07/28/24  1130 07/28/24  0954   SODIUM mmol/L 134*  --  129*   POTASSIUM mmol/L 4.1 4.2 5.2   CHLORIDE mmol/L 99  --  96*   CO2 mmol/L 28  --  28   BUN mg/dL 10  --  11   CREATININE mg/dL 0.54  --  0.56   GLUCOSE mg/dL 91  --  112*   CALCIUM mg/dL 7.5*  --  7.6*   ANION GAP mmol/L 11  --  10   EGFR mL/min/1.73m*2 " >90  --  >90     Results from last 72 hours   Lab Units 07/28/24  0954   ALK PHOS U/L 152*   BILIRUBIN TOTAL mg/dL 0.2   PROTEIN TOTAL g/dL 6.1*   ALT U/L 11   AST U/L 26   ALBUMIN g/dL 2.1*     Estimated Creatinine Clearance: 109.6 mL/min (by C-G formula based on SCr of 0.54 mg/dL).  C-Reactive Protein   Date Value Ref Range Status   06/30/2024 9.28 (H) <1.00 mg/dL Final     Microbiology  Reviewed  Imaging   Reviewed       Assessment/Plan   Hypotension, the BC is pending  Respiratory failure / rib fracture / atelectasis / effusion   Left 2nd toe wound / likely cellulitis  Leukocytosis  Encephalopathy, likely metabolic     Recommendations :  Continue Vancomycin  Discussed with the medical team     I spent minutes in the professional and overall care of this patient.      Wale Townsend MD

## 2024-07-29 NOTE — CARE PLAN
The patient's goals for the shift include  pt will remain HDS     clinical goals for the shift include pt will remain hemodynamically stable      Problem: Pain - Adult  Goal: Verbalizes/displays adequate comfort level or baseline comfort level  Outcome: Progressing     Problem: Safety - Adult  Goal: Free from fall injury  Outcome: Progressing     Problem: Discharge Planning  Goal: Discharge to home or other facility with appropriate resources  Outcome: Progressing     Problem: Chronic Conditions and Co-morbidities  Goal: Patient's chronic conditions and co-morbidity symptoms are monitored and maintained or improved  Outcome: Progressing     Problem: Skin  Goal: Decreased wound size/increased tissue granulation at next dressing change  Outcome: Progressing  Goal: Participates in plan/prevention/treatment measures  Outcome: Progressing  Goal: Prevent/manage excess moisture  Outcome: Progressing  Goal: Prevent/minimize sheer/friction injuries  Outcome: Progressing  Goal: Promote/optimize nutrition  Outcome: Progressing  Goal: Promote skin healing  Outcome: Progressing  Flowsheets (Taken 7/29/2024 7653)  Promote skin healing: Turn/reposition every 2 hours/use positioning/transfer devices     Problem: Pain  Goal: Takes deep breaths with improved pain control throughout the shift  Outcome: Progressing  Goal: Turns in bed with improved pain control throughout the shift  Outcome: Progressing  Goal: Walks with improved pain control throughout the shift  Outcome: Progressing  Goal: Performs ADL's with improved pain control throughout shift  Outcome: Progressing  Goal: Participates in PT with improved pain control throughout the shift  Outcome: Progressing  Goal: Free from opioid side effects throughout the shift  Outcome: Progressing  Goal: Free from acute confusion related to pain meds throughout the shift  Outcome: Progressing     Problem: Fall/Injury  Goal: Not fall by end of shift  Outcome: Progressing  Goal: Be free from  injury by end of the shift  Outcome: Progressing  Goal: Verbalize understanding of personal risk factors for fall in the hospital  Outcome: Progressing  Goal: Verbalize understanding of risk factor reduction measures to prevent injury from fall in the home  Outcome: Progressing  Goal: Use assistive devices by end of the shift  Outcome: Progressing  Goal: Pace activities to prevent fatigue by end of the shift  Outcome: Progressing

## 2024-07-30 LAB
ALBUMIN SERPL BCP-MCNC: 2.2 G/DL (ref 3.4–5)
ALP SERPL-CCNC: 155 U/L (ref 33–136)
ALT SERPL W P-5'-P-CCNC: 12 U/L (ref 10–52)
ANION GAP SERPL CALC-SCNC: 10 MMOL/L (ref 10–20)
AST SERPL W P-5'-P-CCNC: 16 U/L (ref 9–39)
ATRIAL RATE: 86 BPM
BACTERIA UR CULT: NORMAL
BASOPHILS # BLD AUTO: 0.07 X10*3/UL (ref 0–0.1)
BASOPHILS NFR BLD AUTO: 0.5 %
BILIRUB SERPL-MCNC: 0.2 MG/DL (ref 0–1.2)
BUN SERPL-MCNC: 12 MG/DL (ref 6–23)
CALCIUM SERPL-MCNC: 7.7 MG/DL (ref 8.6–10.3)
CHLORIDE SERPL-SCNC: 99 MMOL/L (ref 98–107)
CO2 SERPL-SCNC: 27 MMOL/L (ref 21–32)
CREAT SERPL-MCNC: 0.55 MG/DL (ref 0.5–1.3)
EGFRCR SERPLBLD CKD-EPI 2021: >90 ML/MIN/1.73M*2
EOSINOPHIL # BLD AUTO: 0.41 X10*3/UL (ref 0–0.4)
EOSINOPHIL NFR BLD AUTO: 3 %
ERYTHROCYTE [DISTWIDTH] IN BLOOD BY AUTOMATED COUNT: 13.7 % (ref 11.5–14.5)
GLUCOSE SERPL-MCNC: 84 MG/DL (ref 74–99)
HCT VFR BLD AUTO: 31.7 % (ref 41–52)
HGB BLD-MCNC: 10.2 G/DL (ref 13.5–17.5)
IMM GRANULOCYTES # BLD AUTO: 0.29 X10*3/UL (ref 0–0.5)
IMM GRANULOCYTES NFR BLD AUTO: 2.1 % (ref 0–0.9)
LYMPHOCYTES # BLD AUTO: 1.84 X10*3/UL (ref 0.8–3)
LYMPHOCYTES NFR BLD AUTO: 13.5 %
MCH RBC QN AUTO: 30.4 PG (ref 26–34)
MCHC RBC AUTO-ENTMCNC: 32.2 G/DL (ref 32–36)
MCV RBC AUTO: 95 FL (ref 80–100)
MONOCYTES # BLD AUTO: 0.95 X10*3/UL (ref 0.05–0.8)
MONOCYTES NFR BLD AUTO: 7 %
NEUTROPHILS # BLD AUTO: 10.09 X10*3/UL (ref 1.6–5.5)
NEUTROPHILS NFR BLD AUTO: 73.9 %
NRBC BLD-RTO: 0 /100 WBCS (ref 0–0)
P AXIS: 41 DEGREES
P OFFSET: 205 MS
P ONSET: 154 MS
PLATELET # BLD AUTO: 633 X10*3/UL (ref 150–450)
POTASSIUM SERPL-SCNC: 3.9 MMOL/L (ref 3.5–5.3)
PR INTERVAL: 134 MS
PROT SERPL-MCNC: 5.9 G/DL (ref 6.4–8.2)
Q ONSET: 221 MS
QRS COUNT: 14 BEATS
QRS DURATION: 98 MS
QT INTERVAL: 384 MS
QTC CALCULATION(BAZETT): 459 MS
QTC FREDERICIA: 433 MS
R AXIS: 20 DEGREES
RBC # BLD AUTO: 3.35 X10*6/UL (ref 4.5–5.9)
SODIUM SERPL-SCNC: 132 MMOL/L (ref 136–145)
T AXIS: 237 DEGREES
T OFFSET: 413 MS
VANCOMYCIN SERPL-MCNC: 20.6 UG/ML (ref 5–20)
VENTRICULAR RATE: 86 BPM
WBC # BLD AUTO: 13.7 X10*3/UL (ref 4.4–11.3)

## 2024-07-30 PROCEDURE — 36415 COLL VENOUS BLD VENIPUNCTURE: CPT | Performed by: STUDENT IN AN ORGANIZED HEALTH CARE EDUCATION/TRAINING PROGRAM

## 2024-07-30 PROCEDURE — 99233 SBSQ HOSP IP/OBS HIGH 50: CPT | Performed by: NURSE PRACTITIONER

## 2024-07-30 PROCEDURE — 2500000004 HC RX 250 GENERAL PHARMACY W/ HCPCS (ALT 636 FOR OP/ED): Performed by: INTERNAL MEDICINE

## 2024-07-30 PROCEDURE — 84132 ASSAY OF SERUM POTASSIUM: CPT | Performed by: STUDENT IN AN ORGANIZED HEALTH CARE EDUCATION/TRAINING PROGRAM

## 2024-07-30 PROCEDURE — 2500000001 HC RX 250 WO HCPCS SELF ADMINISTERED DRUGS (ALT 637 FOR MEDICARE OP): Performed by: STUDENT IN AN ORGANIZED HEALTH CARE EDUCATION/TRAINING PROGRAM

## 2024-07-30 PROCEDURE — 1200000002 HC GENERAL ROOM WITH TELEMETRY DAILY

## 2024-07-30 PROCEDURE — 2500000001 HC RX 250 WO HCPCS SELF ADMINISTERED DRUGS (ALT 637 FOR MEDICARE OP): Performed by: INTERNAL MEDICINE

## 2024-07-30 PROCEDURE — 2500000005 HC RX 250 GENERAL PHARMACY W/O HCPCS: Performed by: INTERNAL MEDICINE

## 2024-07-30 PROCEDURE — 80202 ASSAY OF VANCOMYCIN: CPT | Performed by: INTERNAL MEDICINE

## 2024-07-30 PROCEDURE — 85025 COMPLETE CBC W/AUTO DIFF WBC: CPT | Performed by: STUDENT IN AN ORGANIZED HEALTH CARE EDUCATION/TRAINING PROGRAM

## 2024-07-30 PROCEDURE — 2500000004 HC RX 250 GENERAL PHARMACY W/ HCPCS (ALT 636 FOR OP/ED): Performed by: STUDENT IN AN ORGANIZED HEALTH CARE EDUCATION/TRAINING PROGRAM

## 2024-07-30 PROCEDURE — 2500000001 HC RX 250 WO HCPCS SELF ADMINISTERED DRUGS (ALT 637 FOR MEDICARE OP): Performed by: PHYSICIAN ASSISTANT

## 2024-07-30 RX ORDER — VANCOMYCIN HYDROCHLORIDE 750 MG/150ML
750 INJECTION, SOLUTION INTRAVENOUS EVERY 12 HOURS
Status: DISCONTINUED | OUTPATIENT
Start: 2024-07-30 | End: 2024-08-01

## 2024-07-30 RX ORDER — EAR PLUGS
1 EACH OTIC (EAR) 2 TIMES DAILY PRN
Status: DISCONTINUED | OUTPATIENT
Start: 2024-07-30 | End: 2024-08-03 | Stop reason: HOSPADM

## 2024-07-30 RX ADMIN — DULOXETINE HYDROCHLORIDE 40 MG: 20 CAPSULE, DELAYED RELEASE ORAL at 09:03

## 2024-07-30 RX ADMIN — METOPROLOL SUCCINATE 25 MG: 25 TABLET, EXTENDED RELEASE ORAL at 09:03

## 2024-07-30 RX ADMIN — Medication 1 CAPSULE: at 09:03

## 2024-07-30 RX ADMIN — VANCOMYCIN HYDROCHLORIDE 750 MG: 750 INJECTION, SOLUTION INTRAVENOUS at 10:07

## 2024-07-30 RX ADMIN — HEPARIN SODIUM 5000 UNITS: 5000 INJECTION INTRAVENOUS; SUBCUTANEOUS at 13:34

## 2024-07-30 RX ADMIN — HEPARIN SODIUM 5000 UNITS: 5000 INJECTION INTRAVENOUS; SUBCUTANEOUS at 21:15

## 2024-07-30 RX ADMIN — CYANOCOBALAMIN TAB 500 MCG 250 MCG: 500 TAB at 09:03

## 2024-07-30 RX ADMIN — ACETAMINOPHEN 975 MG: 325 TABLET ORAL at 21:15

## 2024-07-30 RX ADMIN — ATORVASTATIN CALCIUM 40 MG: 40 TABLET, FILM COATED ORAL at 09:03

## 2024-07-30 RX ADMIN — Medication 1 APPLICATION: at 21:17

## 2024-07-30 RX ADMIN — ACETAMINOPHEN 975 MG: 325 TABLET ORAL at 13:34

## 2024-07-30 RX ADMIN — LIDOCAINE 4% 1 PATCH: 40 PATCH TOPICAL at 09:03

## 2024-07-30 RX ADMIN — FLUDROCORTISONE ACETATE 0.1 MG: 0.1 TABLET ORAL at 09:03

## 2024-07-30 RX ADMIN — ASPIRIN 81 MG: 81 TABLET, COATED ORAL at 09:03

## 2024-07-30 RX ADMIN — Medication 1 APPLICATION: at 09:04

## 2024-07-30 RX ADMIN — Medication 1000 UNITS: at 09:03

## 2024-07-30 RX ADMIN — OXYCODONE HYDROCHLORIDE AND ACETAMINOPHEN 500 MG: 500 TABLET ORAL at 09:03

## 2024-07-30 RX ADMIN — VANCOMYCIN HYDROCHLORIDE 750 MG: 750 INJECTION, SOLUTION INTRAVENOUS at 21:15

## 2024-07-30 ASSESSMENT — COGNITIVE AND FUNCTIONAL STATUS - GENERAL
HELP NEEDED FOR BATHING: A LOT
TURNING FROM BACK TO SIDE WHILE IN FLAT BAD: A LITTLE
MOVING TO AND FROM BED TO CHAIR: A LOT
PERSONAL GROOMING: A LITTLE
DAILY ACTIVITIY SCORE: 14
EATING MEALS: A LITTLE
STANDING UP FROM CHAIR USING ARMS: A LOT
WALKING IN HOSPITAL ROOM: TOTAL
DRESSING REGULAR LOWER BODY CLOTHING: A LOT
TOILETING: A LOT
CLIMB 3 TO 5 STEPS WITH RAILING: TOTAL
DRESSING REGULAR UPPER BODY CLOTHING: A LOT
MOBILITY SCORE: 12
MOVING FROM LYING ON BACK TO SITTING ON SIDE OF FLAT BED WITH BEDRAILS: A LITTLE

## 2024-07-30 ASSESSMENT — PAIN SCALES - GENERAL
PAINLEVEL_OUTOF10: 0 - NO PAIN
PAINLEVEL_OUTOF10: 0 - NO PAIN

## 2024-07-30 ASSESSMENT — PAIN - FUNCTIONAL ASSESSMENT: PAIN_FUNCTIONAL_ASSESSMENT: 0-10

## 2024-07-30 NOTE — CARE PLAN
Problem: Nutrition  Goal: Oral intake greater 75%  Outcome: Progressing  Goal: Consume prescribed supplement  Outcome: Progressing

## 2024-07-30 NOTE — PROGRESS NOTES
Chao Thao is a 84 y.o. male on day 2 of admission presenting with Hypotension.      Subjective   The patient is sleepy this morning. He denies any complaints of pain. He does not want to answer many questions.     The patient's wife arrived with multiple complaints about his condition. She says that the right upper extremity weakness started Saturday and did not go away. She also says he is very confused and this is not his baseline. He does have some ataxia of the right upper extremity. His hearing aid is not functioning well. He answers questions appropriately when they are written down.        Objective     Last Recorded Vitals  /71 (BP Location: Right arm, Patient Position: Lying)   Pulse 79   Temp 36.5 °C (97.7 °F) (Temporal)   Resp 18   Wt 88 kg (194 lb 0.1 oz)   SpO2 91%   Intake/Output last 3 Shifts:    Intake/Output Summary (Last 24 hours) at 7/30/2024 1041  Last data filed at 7/30/2024 0940  Gross per 24 hour   Intake 372 ml   Output 1000 ml   Net -628 ml       Admission Weight  Weight: 83.6 kg (184 lb 3.2 oz) (07/28/24 0928)    Daily Weight  07/28/24 : 88 kg (194 lb 0.1 oz)    Image Results  ECG 12 lead  Sinus rhythm with Premature supraventricular complexes  Low voltage QRS  T wave abnormality, consider anterolateral ischemia  Abnormal ECG  When compared with ECG of 26-JUL-2024 09:20, (unconfirmed)  No significant change was found  XR foot left 3+ views  Narrative: Interpreted By:  María Wood,   STUDY:  XR FOOT LEFT 3+ VIEWS; ;  7/28/2024 7:56 pm      INDICATION:  Signs/Symptoms:2nd toe wound / infection.      COMPARISON:  Left foot radiographs dated 06/28/2024      ACCESSION NUMBER(S):  [id number]      ORDERING CLINICIAN:  REYES REYNOSO      FINDINGS:  Three views of left foot were performed.      There is soft tissue ulceration along the dorsal aspect of the 2nd  digit, likely located over the expected location of proximal  interphalangeal joint. There is diffuse soft tissue  swelling about  the 2nd digit. There is no definite cortical destruction of the 2nd  digit to definitively suggest osteomyelitis. There are moderate  degenerative changes in the great toe metatarsophalangeal and  interphalangeal joints with joint space narrowing and prominent  marginal osteophytes. There are also mild-to-moderate degenerative  changes in the proximal and distal interphalangeal joints of 2nd  through 5th digits.      Impression: Diffuse soft tissue swelling of the 2nd digit with ulceration over  the dorsal aspect of proximal interphalangeal joint. No definite  radiographic evidence of osteomyelitis, however please note that  radiographs are limited for evaluation of early osteomyelitis.  Recommend MRI for definitive evaluation.          MACRO:  None      Signed by: María Wood 7/29/2024 9:55 AM  Dictation workstation:   [id number]      Physical Exam  Constitutional:       Appearance: He is ill-appearing. He is not toxic-appearing.   HENT:      Mouth/Throat:      Mouth: Mucous membranes are dry.   Eyes:      Extraocular Movements: Extraocular movements intact.   Cardiovascular:      Rate and Rhythm: Regular rhythm.   Pulmonary:      Breath sounds: Normal breath sounds.   Abdominal:      Palpations: Abdomen is soft.   Musculoskeletal:         General: Swelling present.      Comments: RUE   Skin:     General: Skin is dry.   Neurological:      Mental Status: He is alert and oriented to person, place, and time.      Motor: Weakness present.      Coordination: Finger-Nose-Finger Test abnormal.      Comments: RUE weakness          Relevant Results               NIH Stroke Scale      Interval: Baseline  Time: 2:11 PM  Person Administering Scale: LORNE Farah    Administer stroke scale items in the order listed. Record performance in each category after each subscale exam. Do not go back and change scores. Follow directions provided for each exam technique. Scores should reflect what the  patient does, not what the clinician thinks the patient can do. The clinician should record answers while administering the exam and work quickly. Except where indicated, the patient should not be coached (i.e., repeated requests to patient to make a special effort).      1a  Level of consciousness: 0=alert; keenly responsive   1b. LOC questions:  0=Performs both tasks correctly   1c. LOC commands: 0=Performs both tasks correctly   2.  Best Gaze: 0=normal   3.  Visual: 0=No visual loss   4. Facial Palsy: 0=Normal symmetric movement   5a.  Motor left arm: 0=No drift, limb holds 90 (or 45) degrees for full 10 seconds   5b.  Motor right arm: 0=No drift, limb holds 90 (or 45) degrees for full 10 seconds   6a. motor left le=No drift, limb holds 90 (or 45) degrees for full 10 seconds   6b  Motor right le=No drift, limb holds 90 (or 45) degrees for full 10 seconds   7. Limb Ataxia: 1=Present in one limb   8.  Sensory: 0=Normal; no sensory loss   9. Best Language:  0=No aphasia, normal   10. Dysarthria: 0=Normal   11. Extinction and Inattention: 0=No abnormality   12. Distal motor function: 0=Normal    Total:   1     Assessment/Plan                  Principal Problem:    Hypotension  Active Problems:    Pleural effusion    Elevated troponin    Hyponatremia    Chao Thao is a 84 y.o. year old male with PMH HTN, HLD, MDD, CAD, frequent falls, dementia, afib, hard of hearing, HFpEF 50% presented to the ED from SNF for low BP.     Hypotension (resolved)  Likely from left toe cellulitis  improved  - dc IVF  - florinef  - ID following  - UCX showed no growth  - cardio consulted  - procal 0.06  - podiatry consult pending    Stroke-like symptoms   -Right upper extremity weakness and ataxia present  -LKW 24 per wife. She adds that this in not his baseline.  -PT/OT eval   -Neurology consult defer MRI     HFpEF  There is pleural effusion on CXR defer diuresis to cardio   - monitor  -Unsure, if this is acute there is  JVD  -Not on diuretic   -Cardio consult pending    Poor PO intake  - nutrition consult     HypoNa  improved  - IVF     Leukocytosis  - monitor     Normocytic anemia  - monitor     Elevated trop likely demand ischemia  Stop trend     HLD  - statin  - ASA     A fib  - hold metoprolol given low BP  - holding eliquis given frequent falls     Depression/anxiety  - cymbalta     Frequent admission  -Palliative consulted       Dispo: Patient will likely be ready for discharge after the neurology evaluation.        Mindy Tang, APRN-CNP

## 2024-07-30 NOTE — CARE PLAN
DANIELLE spoke to Flor (DANIELLE at [facility] [phone]); per Flor pt's spouse Karissa has been difficult to contact.  Confirmed phone number.  Flor stated that Karissa refused to provide updated address or phone number to arrange C and to set up transport.  [facility] was planning to send pt home.  DANIELLE attempted to contact Karissa via phone, no answer and VM is full.  DANIELLE sent an email to [email or web address] to request assistance with coordination needed for discharge planning.

## 2024-07-30 NOTE — PROGRESS NOTES
"Vancomycin Dosing by Pharmacy- FOLLOW UP    Chao Thao is a 84 y.o. year old male who Pharmacy has been consulted for vancomycin dosing for cellulitis, skin and soft tissue. Based on the patient's indication and renal status this patient is being dosed based on a goal AUC of 400-600.     Renal function is currently stable, SCR 0.55, CRCL 107.7.    Current vancomycin dose: 1000 mg given every 12 hours    Most recent random level: 20.6 mcg/mL,  at 0650hrs.    Visit Vitals  /71 (BP Location: Right arm, Patient Position: Lying)   Pulse 79   Temp 36.5 °C (97.7 °F) (Temporal)   Resp 18        Lab Results   Component Value Date    CREATININE 0.55 2024    CREATININE 0.54 2024    CREATININE 0.56 2024    CREATININE 0.67 2024        Patient weight is as follows:   Vitals:    24 1612   Weight: 88 kg (194 lb 0.1 oz)       Cultures:  No results found for the encounter in last 14 days.       I/O last 3 completed shifts:  In: 587 (6.7 mL/kg) [P.O.:312; IV Piggyback:275]  Out: 1200 (13.6 mL/kg) [Urine:1200 (0.4 mL/kg/hr)]  Weight: 88 kg   I/O during current shift:  No intake/output data recorded.    Temp (24hrs), Av.7 °C (98 °F), Min:36.5 °C (97.7 °F), Max:36.9 °C (98.4 °F)      Assessment/Plan    Above goal AUC. Orders placed for new vancomcyin regimen of 750 mg every 12 hours to begin at  at 0900hrs.    This dosing regimen is predicted by [employer] to result in the following pharmacokinetic parameters:    \"Loading dose: N/A  Regimen: 750 mg IV every 12 hours.  Start time: 08:30 on 2024  Exposure target: AUC24 (range)400-600 mg/L.hr   AUC24,ss: 493 mg/L.hr  Probability of AUC24 > 400: 94 %  Ctrough,ss: 14.9 mg/L  Probability of Ctrough,ss > 20: 5 %  Probability of nephrotoxicity (Lodise VITO ): 10 %\"    The next level will be obtained on  at 0500hrs. May be obtained sooner if clinically indicated.   Will continue to monitor renal function daily while on vancomycin and " order serum creatinine at least every 48 hours if not already ordered.  Follow for continued vancomycin needs, clinical response, and signs/symptoms of toxicity.       Jayce Ross, PharmD

## 2024-07-30 NOTE — PROGRESS NOTES
Chao Thao is a 84 y.o. male on day 2 of admission presenting with Hypotension.    Subjective   Interval History: no fever, no new complaints        Review of Systems    Objective   Range of Vitals (last 24 hours)  Heart Rate:  [67-82]   Temp:  [36.5 °C (97.7 °F)-36.9 °C (98.4 °F)]   Resp:  [18-19]   BP: (122-126)/(71-73)   SpO2:  [91 %-94 %]   Daily Weight  07/28/24 : 88 kg (194 lb 0.1 oz)    Body mass index is 31.31 kg/m².    Physical Exam  Constitutional:       Appearance: Normal appearance.   HENT:      Head: Normocephalic and atraumatic.      Mouth/Throat:      Mouth: Mucous membranes are moist.      Pharynx: Oropharynx is clear.   Eyes:      Pupils: Pupils are equal, round, and reactive to light.   Cardiovascular:      Rate and Rhythm: Normal rate and regular rhythm.      Heart sounds: Normal heart sounds.   Pulmonary:      Effort: Pulmonary effort is normal.      Breath sounds: Normal breath sounds.   Abdominal:      General: Abdomen is flat. Bowel sounds are normal.      Palpations: Abdomen is soft.   Musculoskeletal:      Cervical back: Normal range of motion.      Comments: Lt 2nd toe wound, less redness   Neurological:      Mental Status: He is alert.         Antibiotics  vancomycin - 750 mg/150 mL    Relevant Results  Labs  Results from last 72 hours   Lab Units 07/30/24  0650 07/29/24  1124 07/28/24  0954   WBC AUTO x10*3/uL 13.7* 12.5* 14.5*   HEMOGLOBIN g/dL 10.2* 9.2* 10.1*   HEMATOCRIT % 31.7* 28.7* 32.0*   PLATELETS AUTO x10*3/uL 633* 600* 664*   NEUTROS PCT AUTO % 73.9  --  78.8   LYMPHS PCT AUTO % 13.5  --  10.7   MONOS PCT AUTO % 7.0  --  6.1   EOS PCT AUTO % 3.0  --  3.2     Results from last 72 hours   Lab Units 07/30/24  0650 07/28/24  1647 07/28/24  1130 07/28/24  0954   SODIUM mmol/L 132* 134*  --  129*   POTASSIUM mmol/L 3.9 4.1 4.2 5.2   CHLORIDE mmol/L 99 99  --  96*   CO2 mmol/L 27 28  --  28   BUN mg/dL 12 10  --  11   CREATININE mg/dL 0.55 0.54  --  0.56   GLUCOSE mg/dL 84 91  --   112*   CALCIUM mg/dL 7.7* 7.5*  --  7.6*   ANION GAP mmol/L 10 11  --  10   EGFR mL/min/1.73m*2 >90 >90  --  >90     Results from last 72 hours   Lab Units 07/30/24  0650 07/28/24  0954   ALK PHOS U/L 155* 152*   BILIRUBIN TOTAL mg/dL 0.2 0.2   PROTEIN TOTAL g/dL 5.9* 6.1*   ALT U/L 12 11   AST U/L 16 26   ALBUMIN g/dL 2.2* 2.1*     Estimated Creatinine Clearance: 107.7 mL/min (by C-G formula based on SCr of 0.55 mg/dL).  C-Reactive Protein   Date Value Ref Range Status   06/30/2024 9.28 (H) <1.00 mg/dL Final     Microbiology  Reviewed  Imaging   Reviewed       Assessment/Plan   Hypotension, the BC is negative  Respiratory failure / rib fracture / atelectasis / effusion   Left 2nd toe wound / likely cellulitis  Leukocytosis, fluctuating  Encephalopathy, likely metabolic     Recommendations :  Continue Vancomycin, plan on oral Doxycycline x 1 week with discharge  Discussed with the medical team     I spent minutes in the professional and overall care of this patient.      Wale Townsend MD

## 2024-07-30 NOTE — CONSULTS
"Patient has Malnutrition Diagnosis:  (Unable to determine; weights stable, nursing documentation indicates pt eating % of most meals)  Nutrition Assessment    Reason for Assessment: Provider consult order    Patient is a 84 y.o. 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    History of alcohol abuse    Chronic shortness of breath 06/28/2024    Drug abuse (Multi)     in remission    Edema, unspecified 11/26/2019    Dependent edema    Encounter for immunization 04/08/2016    Need for Zostavax administration    Pain in left knee 08/08/2018    Left knee pain    Personal history of other diseases of the nervous system and sense organs 09/18/2019    History of cataract    Personal history of other drug therapy 04/08/2016    History of pneumococcal vaccination    Tinea pedis 10/10/2018    Tinea pedis of both feet    Venous stasis ulcer of right calf (Multi) 06/28/2024    Vitiligo 06/28/2024      Past Surgical History:   Procedure Laterality Date    CARDIAC CATHETERIZATION  05/14/2018    Cardiac Cath Procedure Summary    INNER EAR SURGERY  01/06/2016    Inner Ear Surgery    OTHER SURGICAL HISTORY  03/16/2020    Cataract surgery      Nutrition History:  Food and Nutrient History: Pt eating % of meals per nursing documentation. Wife present, she reports pt needs to be fed at meals and cardiologist encouraged pt to not eat red meat. Wife receptive to try ONS- gelatein and magic cups.  Energy Intake: Good > 75 %  Allergies   Allergen Reactions    Gabapentin Other    Penicillin Unknown      GI Symptoms: None  Vitamin/Herbal Supplement Use: none noted  Oral Problems: None- pt edentulous, but per wife, manages food textures/consistencies without difficulty           Anthropometrics:  Height: 167.6 cm (5' 6\")   Weight: 88 kg (194 lb 0.1 oz)   BMI (Calculated): 31.33  IBW/kg (Dietitian Calculated): 64.5 kg  Percent of IBW: 136 %       Weight " History:     Daily Weight  07/28/24 : 88 kg (194 lb 0.1 oz)  07/06/24 : 83.5 kg (184 lb)  07/02/24 : 82.6 kg (182 lb 0.9 oz)  06/12/24 : 86.4 kg (190 lb 7.6 oz)  02/02/24 : 84.5 kg (186 lb 4.6 oz)  01/09/24 : 88.5 kg (195 lb)  07/05/22 : 90.5 kg (199 lb 8 oz)  03/08/22 : 91.2 kg (201 lb)  11/02/21 : 95.4 kg (210 lb 5.1 oz)  08/03/21 : 94.1 kg (207 lb 7.3 oz)    Weight         7/28/2024  0928 7/28/2024  1612          Weight: 83.6 kg (184 lb 3.2 oz) 88 kg (194 lb 0.1 oz)              Weight Change %:  Weight History / % Weight Change: 88 kg (7/28/24); 80.7 kg (6/28/24); 88.5 kg (1/9/24) - suspect weight shifts may be d/t fluid- pt with +1 BLE edema this admission  Significant Weight Loss: No          Nutrition Focused Physical Exam Findings:    Subcutaneous Fat Loss  Orbital Fat Pads: Mild-Moderate (slight dark circles and slight hollowing)  Buccal Fat Pads: Mild-Moderate (flat cheeks, minimal bounce)  Muscle Wasting  Temporalis: Mild-Moderate (slight depression)  Deltoid/Trapezius: Mild-Moderate (slight protrusion of acromion process)  Interosseous: Mild-Moderate (slightly depressed area between thumb and forefinger)  Edema  Edema: +1 trace  Edema Location: BLE edema  Physical Findings (Nutrition Deficiency/Toxicity)  Skin: Positive (L foot wound)    Nutrition Significant Labs:    Results from last 7 days   Lab Units 07/30/24  0650 07/28/24  1647 07/28/24  1130 07/28/24  0954   GLUCOSE mg/dL 84 91  --  112*   SODIUM mmol/L 132* 134*  --  129*   POTASSIUM mmol/L 3.9 4.1 4.2 5.2   CHLORIDE mmol/L 99 99  --  96*   CO2 mmol/L 27 28  --  28   BUN mg/dL 12 10  --  11   CREATININE mg/dL 0.55 0.54  --  0.56   EGFR mL/min/1.73m*2 >90 >90  --  >90   CALCIUM mg/dL 7.7* 7.5*  --  7.6*   MAGNESIUM mg/dL  --   --   --  1.90     Lab Results   Component Value Date    HGBA1C 5.9 03/16/2020         Lab Results   Component Value Date    ALBUMIN 2.2 (L) 07/30/2024      Lab Results   Component Value Date    CRP 9.28 (H) 06/30/2024        Nutrition Specific Medications:   Scheduled medications:  acetaminophen, 975 mg, oral, q8h  ammonium lactate, 1 Application, Topical, BID  ascorbic acid, 500 mg, oral, Daily  aspirin, 81 mg, oral, Daily  atorvastatin, 40 mg, oral, Daily  cholecalciferol, 1,000 Units, oral, Daily  cyanocobalamin, 250 mcg, oral, Daily  DULoxetine, 40 mg, oral, Daily  fludrocortisone, 0.1 mg, oral, Daily  heparin (porcine), 5,000 Units, subcutaneous, q8h JIGNESH  lactobacillus acidophilus, 1 capsule, oral, Daily  lidocaine, 1 patch, transdermal, Daily  metoprolol succinate XL, 25 mg, oral, Daily  vancomycin, 750 mg, intravenous, q12h      Continuous medications:     PRN medications:  PRN medications: magnesium hydroxide, vancomycin     Nursing Data Per flowsheet:   Stool Appearance: Hard, Formed (07/30/24 0940)  Gastrointestinal  Gastrointestinal (WDL): Within Defined Limits  Stool Appearance: Hard, Formed  Stool Color: Brown  Feeding assistance level: Independent after set-up    Intake/Output Summary (Last 24 hours) at 7/30/2024 1444  Last data filed at 7/30/2024 1328  Gross per 24 hour   Intake 642 ml   Output 750 ml   Net -108 ml      0-10 (Numeric) Pain Score: 0 - No pain   Dietary Orders (From admission, onward)       Start     Ordered    07/30/24 1435  Oral nutritional supplements  Until discontinued        Question Answer Comment   Deliver with Breakfast    Select supplement: Gelatein Plus        07/30/24 1434    07/30/24 1434  Oral nutritional supplements  Until discontinued        Question Answer Comment   Deliver with Dinner    Select supplement: Magic Cup        07/30/24 1434    07/28/24 1437  Adult diet Regular  Diet effective now        Question:  Diet type  Answer:  Regular    07/28/24 1436                     Estimated Needs:   Total Energy Estimated Needs (kCal):  (~2200 kcal (25 kcal/kg))     Total Protein Estimated Needs (g):  (105 g protein (1.2 g/kg))     Total Fluid Estimated Needs (mL):  (1 ml/kcal)           Nutrition Diagnosis   Malnutrition Diagnosis  Patient has Malnutrition Diagnosis:  (Unable to determine; weights stable, nursing documentation indicates pt eating % of most meals)    Nutrition Diagnosis  Patient has Nutrition Diagnosis: Yes  Diagnosis Status (1): New  Nutrition Diagnosis 1: Increased nutrient needs  Related to (1): foot wound  As Evidenced by (1): increased nutrient demand for healing  Additional Assessment Information (1): ONS added for additional kcal and protein       Nutrition Interventions/Recommendations   Nutrition Prescription:  diet, fluids    Nutrition Interventions:   Interventions: Medical food supplement  Goal: Gelatein Plus daily= 160 kcal, 20 g protein; Magic cup daily= 290 kcal, 9 g protein    Coordination of Care: n/a  Nutrition Education:   N/A  Recommendations:  If oral intakes decline, consider trial of appetite stimulant  Weights: daily or every other day  RFP, Mg daily; replete lytes prn   GOC per palliative care      Nutrition Monitoring and Evaluation   Food/Nutrient Related History Monitoring  Monitoring and Evaluation Plan: Amount of food  Criteria: Pt will consume 50-75% of meals and ONS provided    Body Composition/Growth/Weight History  Monitoring and Evaluation Plan: Weight  Weight: Measured weight  Criteria: Monitor weights as available    Biochemical Data, Medical Tests and Procedures  Monitoring and Evaluation Plan: Electrolyte/renal panel, Glucose/endocrine profile  Criteria: Monitor labs as available      Time Spent (min): 60 minutes

## 2024-07-30 NOTE — CONSULTS
Inpatient consult to Podiatry  Consult performed by: Delilah Thompson DPM  Consult ordered by: Kristal Jordan MD  Reason for consult: Left 2nd toe ulceration          Reason For Consult  Left 2nd toe ulceration.    History Of Present Illness  Chao Thao is a 84 y.o. male presenting with a chronic ulceration to his left 2nd toe present for several months. Wife at bedside relating that patient is not at baseline and his concerned about his lack of mental acuity. Saw Dr. Hartmann's PA this past week and LLE angiogram was recommended.     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    Surgical History  He has a past surgical history that includes Inner ear surgery (01/06/2016); Other surgical history (03/16/2020); and Cardiac catheterization (05/14/2018).     Social History  He reports that he has quit smoking. His smoking use included cigarettes. He has never used smokeless tobacco. He reports that he does not currently use alcohol. He reports that he does not use drugs.    Family History  No family history on file.     Allergies  Gabapentin and Penicillin    Review of Systems  Per above       Physical Exam   CON: awake, alert, minimal distress;   EYES: conjunctiva wnl; PERRL; EOMI  ENMT: hearing intact; MMM;   NECK: symmetric; thyroid and cervical nodes wnl;   CV: S1 S2 - irregular with no m/g/r; no lower extremity edema; normal JVP; symmetric pulses  RESP: normal work of breathing; lungs CTAB;   GI: abdomen nontender; no organomegaly;   SKIN:  "Diffuse ecchymoses, lower extremity stasis dermatitis and abrasions, onychomycosis of all toenails;  non-palpable pedal pulses B/L;  ulceration to left 2nd toe PIPJ - no erythema or active drainage.  Measures 0.9 x 1.0 x 0.3 cm.  Probable bone exposure at PIPJ  MSK: ROM wnl; digits wnl;   NEURO: language and speech wnl; sensation and motor function grossly intact   PSYCH: oriented to situation; affect normal;  Last Recorded Vitals  Blood pressure 128/83, pulse 79, temperature 36.6 °C (97.9 °F), temperature source Temporal, resp. rate 20, height 1.676 m (5' 6\"), weight 88 kg (194 lb 0.1 oz), SpO2 92%.    Relevant Results  ECG 12 lead    Result Date: 7/30/2024  Sinus rhythm with Premature supraventricular complexes Low voltage QRS T wave abnormality, consider anterolateral ischemia Abnormal ECG When compared with ECG of 26-JUL-2024 09:20, (unconfirmed) No significant change was found See ED provider note for full interpretation and clinical correlation Confirmed by Niyah Duval ([id number]) on 7/30/2024 6:03:37 PM    XR foot left 3+ views    Result Date: 7/29/2024  Interpreted By:  María Wood, STUDY: XR FOOT LEFT 3+ VIEWS; ;  7/28/2024 7:56 pm   INDICATION: Signs/Symptoms:2nd toe wound / infection.   COMPARISON: Left foot radiographs dated 06/28/2024   ACCESSION NUMBER(S): [id number]   ORDERING CLINICIAN: REYES REYNOSO   FINDINGS: Three views of left foot were performed.   There is soft tissue ulceration along the dorsal aspect of the 2nd digit, likely located over the expected location of proximal interphalangeal joint. There is diffuse soft tissue swelling about the 2nd digit. There is no definite cortical destruction of the 2nd digit to definitively suggest osteomyelitis. There are moderate degenerative changes in the great toe metatarsophalangeal and interphalangeal joints with joint space narrowing and prominent marginal osteophytes. There are also mild-to-moderate degenerative changes in the proximal and distal " interphalangeal joints of 2nd through 5th digits.       Diffuse soft tissue swelling of the 2nd digit with ulceration over the dorsal aspect of proximal interphalangeal joint. No definite radiographic evidence of osteomyelitis, however please note that radiographs are limited for evaluation of early osteomyelitis. Recommend MRI for definitive evaluation.     MACRO: None   Signed by: María Wood 7/29/2024 9:55 AM Dictation workstation:   [id number]    CT head wo IV contrast    Result Date: 7/28/2024  Interpreted By:  Marya Sharma, STUDY: CT HEAD WO IV CONTRAST;  7/28/2024 12:15 pm   INDICATION: Signs/Symptoms:stroke symptoms earlier.   COMPARISON: 06/28/2024.   ACCESSION NUMBER(S): [id number]   ORDERING CLINICIAN: MALLY VELOZ   TECHNIQUE: Noncontrast axial CT scan of head was performed. Multiplanar reconstructions   FINDINGS: No acute intracranial hemorrhage, mass effect, midline shift, or herniation. No evidence of hydrocephalus. Mild global cerebral atrophy with concordant prominence of the ventricles and sulci. There is a relative increase in volume loss involving the frontal lobe, similar to prior exam. Mild periventricular and subcortical deep white matter hypodensity suggestive of chronic microangiopathic change. Postoperative changes of the right mastoid air cells. Partially effusion left mastoid air cells. No acute osseous abnormality of the calvarium. No destructive bone lesion.       No acute intracranial abnormality. Consider follow-up with MRI as warranted.     Signed by: Marya Sharma 7/28/2024 12:22 PM Dictation workstation:   [id number]    XR chest 1 view    Result Date: 7/28/2024  Interpreted By:  Love Gallardo, STUDY: XR CHEST 1 VIEW;  7/28/2024 9:55 am   INDICATION: Signs/Symptoms:sob.   COMPARISON: 06/27/2024   ACCESSION NUMBER(S): [id number]   ORDERING CLINICIAN: MALLY VELOZ   TECHNIQUE: Portable AP upright   FINDINGS: Cardiac silhouette is upper  normal in size. Aorta is atherosclerotic. Bilateral pleural effusions and bilateral basilar atelectasis represent an interval change from the prior study. Right appears worse than left. Fractures of the lateral 8th and 9th ribs are at the margin of the image and not as well demonstrated as on the prior study.       Pleural effusions and basilar atelectasis, right worse than left and worse than the prior study   Right 8th and 9th rib fractures are not as well seen as on the prior exam   MACRO: None.   Signed by: Love Gallardo 7/28/2024 10:11 AM Dictation workstation:   [id number]    ECG 12 lead (Clinic Performed)    Result Date: 7/26/2024  Sinus tachycardia with Premature atrial complexes ST & T wave abnormality, consider inferior ischemia ST & T wave abnormality, consider anterolateral ischemia Abnormal ECG When compared with ECG of 28-JUN-2024 08:47, (unconfirmed) Previous ECG has undetermined rhythm, needs review Inverted T waves have replaced nonspecific T wave abnormality in Inferior leads T wave inversion now evident in Anterior leads   Results for orders placed or performed during the hospital encounter of 07/28/24 (from the past 24 hour(s))   Vancomycin   Result Value Ref Range    Vancomycin 20.6 (H) 5.0 - 20.0 ug/mL   Comprehensive Metabolic Panel   Result Value Ref Range    Glucose 84 74 - 99 mg/dL    Sodium 132 (L) 136 - 145 mmol/L    Potassium 3.9 3.5 - 5.3 mmol/L    Chloride 99 98 - 107 mmol/L    Bicarbonate 27 21 - 32 mmol/L    Anion Gap 10 10 - 20 mmol/L    Urea Nitrogen 12 6 - 23 mg/dL    Creatinine 0.55 0.50 - 1.30 mg/dL    eGFR >90 >60 mL/min/1.73m*2    Calcium 7.7 (L) 8.6 - 10.3 mg/dL    Albumin 2.2 (L) 3.4 - 5.0 g/dL    Alkaline Phosphatase 155 (H) 33 - 136 U/L    Total Protein 5.9 (L) 6.4 - 8.2 g/dL    AST 16 9 - 39 U/L    Bilirubin, Total 0.2 0.0 - 1.2 mg/dL    ALT 12 10 - 52 U/L   CBC and Auto Differential   Result Value Ref Range    WBC 13.7 (H) 4.4 - 11.3 x10*3/uL    nRBC 0.0 0.0 - 0.0  /100 WBCs    RBC 3.35 (L) 4.50 - 5.90 x10*6/uL    Hemoglobin 10.2 (L) 13.5 - 17.5 g/dL    Hematocrit 31.7 (L) 41.0 - 52.0 %    MCV 95 80 - 100 fL    MCH 30.4 26.0 - 34.0 pg    MCHC 32.2 32.0 - 36.0 g/dL    RDW 13.7 11.5 - 14.5 %    Platelets 633 (H) 150 - 450 x10*3/uL    Neutrophils % 73.9 40.0 - 80.0 %    Immature Granulocytes %, Automated 2.1 (H) 0.0 - 0.9 %    Lymphocytes % 13.5 13.0 - 44.0 %    Monocytes % 7.0 2.0 - 10.0 %    Eosinophils % 3.0 0.0 - 6.0 %    Basophils % 0.5 0.0 - 2.0 %    Neutrophils Absolute 10.09 (H) 1.60 - 5.50 x10*3/uL    Immature Granulocytes Absolute, Automated 0.29 0.00 - 0.50 x10*3/uL    Lymphocytes Absolute 1.84 0.80 - 3.00 x10*3/uL    Monocytes Absolute 0.95 (H) 0.05 - 0.80 x10*3/uL    Eosinophils Absolute 0.41 (H) 0.00 - 0.40 x10*3/uL    Basophils Absolute 0.07 0.00 - 0.10 x10*3/uL        Assessment/Plan      Left 2nd toe ulceration  Will need LLE vascular intervention prior to any debridement.  Betadine, xeroform, and gauze to toe.  IV antibiotics per ID.  Will follow    I spent 35 minutes in the professional and overall care of this patient.

## 2024-07-30 NOTE — CARE PLAN
The patient's goals for the shift include  free from injury throughout shift    The clinical goals for the shift include pt will rest comfortably this shift      Problem: Pain - Adult  Goal: Verbalizes/displays adequate comfort level or baseline comfort level  Outcome: Progressing     Problem: Safety - Adult  Goal: Free from fall injury  Outcome: Progressing     Problem: Discharge Planning  Goal: Discharge to home or other facility with appropriate resources  Outcome: Progressing     Problem: Chronic Conditions and Co-morbidities  Goal: Patient's chronic conditions and co-morbidity symptoms are monitored and maintained or improved  Outcome: Progressing     Problem: Skin  Goal: Decreased wound size/increased tissue granulation at next dressing change  Outcome: Progressing  Goal: Participates in plan/prevention/treatment measures  Outcome: Progressing  Goal: Prevent/manage excess moisture  Outcome: Progressing  Goal: Prevent/minimize sheer/friction injuries  Outcome: Progressing  Goal: Promote/optimize nutrition  Outcome: Progressing  Goal: Promote skin healing  Outcome: Progressing  Flowsheets (Taken 7/30/2024 0918)  Promote skin healing: Turn/reposition every 2 hours/use positioning/transfer devices     Problem: Pain  Goal: Takes deep breaths with improved pain control throughout the shift  Outcome: Progressing  Goal: Turns in bed with improved pain control throughout the shift  Outcome: Progressing  Goal: Walks with improved pain control throughout the shift  Outcome: Progressing  Goal: Performs ADL's with improved pain control throughout shift  Outcome: Progressing  Goal: Participates in PT with improved pain control throughout the shift  Outcome: Progressing  Goal: Free from opioid side effects throughout the shift  Outcome: Progressing  Goal: Free from acute confusion related to pain meds throughout the shift  Outcome: Progressing     Problem: Fall/Injury  Goal: Not fall by end of shift  Outcome:  Progressing  Goal: Be free from injury by end of the shift  Outcome: Progressing  Goal: Verbalize understanding of personal risk factors for fall in the hospital  Outcome: Progressing  Goal: Verbalize understanding of risk factor reduction measures to prevent injury from fall in the home  Outcome: Progressing  Goal: Use assistive devices by end of the shift  Outcome: Progressing  Goal: Pace activities to prevent fatigue by end of the shift  Outcome: Progressing

## 2024-07-30 NOTE — SIGNIFICANT EVENT
Per the team, the wife is requesting a consultation with neurology regarding  concern for new  neurologic changes.  Palliative will follow up with the team re needs s/p neuro consultation.       07/30/24 at 1:48 PM - MOSHE Dhillon-CNP

## 2024-07-31 LAB
ALBUMIN SERPL BCP-MCNC: 2 G/DL (ref 3.4–5)
ALP SERPL-CCNC: 138 U/L (ref 33–136)
ALT SERPL W P-5'-P-CCNC: 10 U/L (ref 10–52)
ANION GAP SERPL CALC-SCNC: 10 MMOL/L (ref 10–20)
AST SERPL W P-5'-P-CCNC: 13 U/L (ref 9–39)
BASOPHILS # BLD AUTO: 0.08 X10*3/UL (ref 0–0.1)
BASOPHILS NFR BLD AUTO: 0.6 %
BILIRUB SERPL-MCNC: 0.2 MG/DL (ref 0–1.2)
BUN SERPL-MCNC: 13 MG/DL (ref 6–23)
CALCIUM SERPL-MCNC: 7.5 MG/DL (ref 8.6–10.3)
CHLORIDE SERPL-SCNC: 101 MMOL/L (ref 98–107)
CO2 SERPL-SCNC: 27 MMOL/L (ref 21–32)
CREAT SERPL-MCNC: 0.66 MG/DL (ref 0.5–1.3)
EGFRCR SERPLBLD CKD-EPI 2021: >90 ML/MIN/1.73M*2
EOSINOPHIL # BLD AUTO: 0.31 X10*3/UL (ref 0–0.4)
EOSINOPHIL NFR BLD AUTO: 2.5 %
ERYTHROCYTE [DISTWIDTH] IN BLOOD BY AUTOMATED COUNT: 13.8 % (ref 11.5–14.5)
GLUCOSE SERPL-MCNC: 93 MG/DL (ref 74–99)
HCT VFR BLD AUTO: 29.6 % (ref 41–52)
HGB BLD-MCNC: 9.5 G/DL (ref 13.5–17.5)
IMM GRANULOCYTES # BLD AUTO: 0.13 X10*3/UL (ref 0–0.5)
IMM GRANULOCYTES NFR BLD AUTO: 1 % (ref 0–0.9)
LYMPHOCYTES # BLD AUTO: 1.62 X10*3/UL (ref 0.8–3)
LYMPHOCYTES NFR BLD AUTO: 12.9 %
MCH RBC QN AUTO: 30.4 PG (ref 26–34)
MCHC RBC AUTO-ENTMCNC: 32.1 G/DL (ref 32–36)
MCV RBC AUTO: 95 FL (ref 80–100)
MONOCYTES # BLD AUTO: 0.91 X10*3/UL (ref 0.05–0.8)
MONOCYTES NFR BLD AUTO: 7.2 %
NEUTROPHILS # BLD AUTO: 9.54 X10*3/UL (ref 1.6–5.5)
NEUTROPHILS NFR BLD AUTO: 75.8 %
NRBC BLD-RTO: 0 /100 WBCS (ref 0–0)
PLATELET # BLD AUTO: 665 X10*3/UL (ref 150–450)
POTASSIUM SERPL-SCNC: 3.7 MMOL/L (ref 3.5–5.3)
PROT SERPL-MCNC: 5.3 G/DL (ref 6.4–8.2)
RBC # BLD AUTO: 3.13 X10*6/UL (ref 4.5–5.9)
SODIUM SERPL-SCNC: 134 MMOL/L (ref 136–145)
WBC # BLD AUTO: 12.6 X10*3/UL (ref 4.4–11.3)

## 2024-07-31 PROCEDURE — 2500000004 HC RX 250 GENERAL PHARMACY W/ HCPCS (ALT 636 FOR OP/ED): Performed by: STUDENT IN AN ORGANIZED HEALTH CARE EDUCATION/TRAINING PROGRAM

## 2024-07-31 PROCEDURE — 1200000002 HC GENERAL ROOM WITH TELEMETRY DAILY

## 2024-07-31 PROCEDURE — 80053 COMPREHEN METABOLIC PANEL: CPT | Performed by: STUDENT IN AN ORGANIZED HEALTH CARE EDUCATION/TRAINING PROGRAM

## 2024-07-31 PROCEDURE — 2500000001 HC RX 250 WO HCPCS SELF ADMINISTERED DRUGS (ALT 637 FOR MEDICARE OP): Performed by: INTERNAL MEDICINE

## 2024-07-31 PROCEDURE — 99233 SBSQ HOSP IP/OBS HIGH 50: CPT | Performed by: NURSE PRACTITIONER

## 2024-07-31 PROCEDURE — 97165 OT EVAL LOW COMPLEX 30 MIN: CPT | Mod: GO

## 2024-07-31 PROCEDURE — 2500000001 HC RX 250 WO HCPCS SELF ADMINISTERED DRUGS (ALT 637 FOR MEDICARE OP): Performed by: PHYSICIAN ASSISTANT

## 2024-07-31 PROCEDURE — 36415 COLL VENOUS BLD VENIPUNCTURE: CPT | Performed by: STUDENT IN AN ORGANIZED HEALTH CARE EDUCATION/TRAINING PROGRAM

## 2024-07-31 PROCEDURE — 2500000005 HC RX 250 GENERAL PHARMACY W/O HCPCS: Performed by: INTERNAL MEDICINE

## 2024-07-31 PROCEDURE — 2500000001 HC RX 250 WO HCPCS SELF ADMINISTERED DRUGS (ALT 637 FOR MEDICARE OP): Performed by: STUDENT IN AN ORGANIZED HEALTH CARE EDUCATION/TRAINING PROGRAM

## 2024-07-31 PROCEDURE — 97161 PT EVAL LOW COMPLEX 20 MIN: CPT | Mod: GP

## 2024-07-31 PROCEDURE — 85025 COMPLETE CBC W/AUTO DIFF WBC: CPT | Performed by: STUDENT IN AN ORGANIZED HEALTH CARE EDUCATION/TRAINING PROGRAM

## 2024-07-31 PROCEDURE — 2500000004 HC RX 250 GENERAL PHARMACY W/ HCPCS (ALT 636 FOR OP/ED): Performed by: INTERNAL MEDICINE

## 2024-07-31 PROCEDURE — 99222 1ST HOSP IP/OBS MODERATE 55: CPT | Performed by: STUDENT IN AN ORGANIZED HEALTH CARE EDUCATION/TRAINING PROGRAM

## 2024-07-31 RX ADMIN — Medication 1 CAPSULE: at 08:41

## 2024-07-31 RX ADMIN — DULOXETINE HYDROCHLORIDE 40 MG: 20 CAPSULE, DELAYED RELEASE ORAL at 08:41

## 2024-07-31 RX ADMIN — VANCOMYCIN HYDROCHLORIDE 750 MG: 750 INJECTION, SOLUTION INTRAVENOUS at 08:55

## 2024-07-31 RX ADMIN — ATORVASTATIN CALCIUM 40 MG: 40 TABLET, FILM COATED ORAL at 08:41

## 2024-07-31 RX ADMIN — FLUDROCORTISONE ACETATE 0.1 MG: 0.1 TABLET ORAL at 08:42

## 2024-07-31 RX ADMIN — Medication 1 APPLICATION: at 21:05

## 2024-07-31 RX ADMIN — ACETAMINOPHEN 975 MG: 325 TABLET ORAL at 06:00

## 2024-07-31 RX ADMIN — VANCOMYCIN HYDROCHLORIDE 750 MG: 750 INJECTION, SOLUTION INTRAVENOUS at 21:08

## 2024-07-31 RX ADMIN — HEPARIN SODIUM 5000 UNITS: 5000 INJECTION INTRAVENOUS; SUBCUTANEOUS at 21:31

## 2024-07-31 RX ADMIN — ASPIRIN 81 MG: 81 TABLET, COATED ORAL at 08:41

## 2024-07-31 RX ADMIN — CYANOCOBALAMIN TAB 500 MCG 250 MCG: 500 TAB at 08:41

## 2024-07-31 RX ADMIN — HEPARIN SODIUM 5000 UNITS: 5000 INJECTION INTRAVENOUS; SUBCUTANEOUS at 06:00

## 2024-07-31 RX ADMIN — Medication 1 APPLICATION: at 08:42

## 2024-07-31 RX ADMIN — Medication 1 APPLICATION: at 06:20

## 2024-07-31 RX ADMIN — Medication 1000 UNITS: at 08:42

## 2024-07-31 RX ADMIN — ACETAMINOPHEN 975 MG: 325 TABLET ORAL at 21:05

## 2024-07-31 RX ADMIN — ACETAMINOPHEN 975 MG: 325 TABLET ORAL at 13:27

## 2024-07-31 RX ADMIN — OXYCODONE HYDROCHLORIDE AND ACETAMINOPHEN 500 MG: 500 TABLET ORAL at 08:41

## 2024-07-31 RX ADMIN — LIDOCAINE 4% 1 PATCH: 40 PATCH TOPICAL at 08:42

## 2024-07-31 RX ADMIN — HEPARIN SODIUM 5000 UNITS: 5000 INJECTION INTRAVENOUS; SUBCUTANEOUS at 13:29

## 2024-07-31 ASSESSMENT — ACTIVITIES OF DAILY LIVING (ADL)
ADL_ASSISTANCE: INDEPENDENT
ADL_ASSISTANCE: INDEPENDENT
BATHING_ASSISTANCE: MODERATE

## 2024-07-31 ASSESSMENT — PAIN - FUNCTIONAL ASSESSMENT
PAIN_FUNCTIONAL_ASSESSMENT: 0-10

## 2024-07-31 ASSESSMENT — COGNITIVE AND FUNCTIONAL STATUS - GENERAL
TURNING FROM BACK TO SIDE WHILE IN FLAT BAD: A LOT
PERSONAL GROOMING: A LITTLE
MOVING FROM LYING ON BACK TO SITTING ON SIDE OF FLAT BED WITH BEDRAILS: A LOT
MOVING FROM LYING ON BACK TO SITTING ON SIDE OF FLAT BED WITH BEDRAILS: A LOT
EATING MEALS: A LITTLE
HELP NEEDED FOR BATHING: A LOT
TOILETING: TOTAL
MOVING TO AND FROM BED TO CHAIR: A LOT
STANDING UP FROM CHAIR USING ARMS: TOTAL
TOILETING: TOTAL
HELP NEEDED FOR BATHING: A LOT
DRESSING REGULAR LOWER BODY CLOTHING: TOTAL
STANDING UP FROM CHAIR USING ARMS: TOTAL
MOVING FROM LYING ON BACK TO SITTING ON SIDE OF FLAT BED WITH BEDRAILS: A LITTLE
MOBILITY SCORE: 17
MOBILITY SCORE: 8
WALKING IN HOSPITAL ROOM: TOTAL
EATING MEALS: A LITTLE
DRESSING REGULAR UPPER BODY CLOTHING: A LOT
MOBILITY SCORE: 8
MOVING TO AND FROM BED TO CHAIR: TOTAL
TOILETING: TOTAL
DAILY ACTIVITIY SCORE: 12
DRESSING REGULAR LOWER BODY CLOTHING: A LOT
HELP NEEDED FOR BATHING: A LOT
DRESSING REGULAR LOWER BODY CLOTHING: TOTAL
STANDING UP FROM CHAIR USING ARMS: A LOT
TURNING FROM BACK TO SIDE WHILE IN FLAT BAD: A LOT
DAILY ACTIVITIY SCORE: 14
EATING MEALS: A LITTLE
CLIMB 3 TO 5 STEPS WITH RAILING: TOTAL
PERSONAL GROOMING: A LITTLE
CLIMB 3 TO 5 STEPS WITH RAILING: TOTAL
MOVING TO AND FROM BED TO CHAIR: TOTAL
WALKING IN HOSPITAL ROOM: TOTAL
DRESSING REGULAR UPPER BODY CLOTHING: A LOT
DRESSING REGULAR UPPER BODY CLOTHING: A LITTLE
DAILY ACTIVITIY SCORE: 12
PERSONAL GROOMING: A LITTLE
TURNING FROM BACK TO SIDE WHILE IN FLAT BAD: A LOT

## 2024-07-31 ASSESSMENT — PAIN SCALES - GENERAL
PAINLEVEL_OUTOF10: 0 - NO PAIN

## 2024-07-31 NOTE — PROGRESS NOTES
Physical Therapy    Physical Therapy Evaluation    Patient Name: Chao Thao  MRN: [id number]  Today's Date: 7/31/2024   Time Calculation  Start Time: 1006  Stop Time: 1027  Time Calculation (min): 21 min    Assessment/Plan   PT Assessment  PT Assessment Results: Decreased strength, Decreased endurance, Impaired balance, Decreased mobility  Rehab Prognosis: Good  Barriers to Discharge: None  Evaluation/Treatment Tolerance: Patient tolerated treatment well  Medical Staff Made Aware: Yes  Strengths: Ability to acquire knowledge  Barriers to Participation: Comorbidities, Housing layout  End of Session Communication: Bedside nurse  Assessment Comment: Patient tolerates mobility with 2 person assist and use of walker, right sided lean noted in sitting and standing with decreaesd ability to self correct. Patient would continue to benefit from mod intensity PT intervention.  End of Session Patient Position: Up in chair, Alarm on  IP OR SWING BED PT PLAN  Inpatient or Swing Bed: Inpatient  PT Plan  Treatment/Interventions: Bed mobility, Transfer training, Gait training, Stair training, Balance training, Strengthening, Endurance training  PT Plan: Ongoing PT  PT Frequency: 3 times per week  PT Discharge Recommendations: Moderate intensity level of continued care  Equipment Recommended upon Discharge: Wheeled walker (owns)  PT Recommended Transfer Status: Assist x2  PT - OK to Discharge: Yes (per PT POC)      Subjective   General Visit Information:  General  Reason for Referral: Impaired functional mobility; hypotension  Referred By: Ruby Chakraborty  Past Medical History Relevant to Rehab: HTN, HLD, MDD, CAD, frequent falls, dementia, afib, hard of hearing, HFpEF 50%  Family/Caregiver Present: Yes  Co-Treatment: OT  Co-Treatment Reason: Maximize patients functional mobility and safety  Prior to Session Communication: Bedside nurse  Patient Position Received: Bed, 3 rail up, Alarm on  General Comment: Patient pleasant,  cooperative and therapy assessment  Home Living:  Home Living  Type of Home: House  Lives With: Spouse  Home Adaptive Equipment: Walker rolling or standard  Home Layout: Two level, Stairs to alternate level with rails  Alternate Level Stairs-Rails: Both  Alternate Level Stairs-Number of Steps: 12  Home Access: Stairs to enter without rails  Entrance Stairs-Rails: None  Entrance Stairs-Number of Steps: 6  Home Living Comments: Patient most recently at SNF x 2 weeks  Prior Level of Function:  Prior Function Per Pt/Caregiver Report  Level of [place]: Independent with ADLs and functional transfers, Independent with homemaking with ambulation  Receives Help From: Family  ADL Assistance: Independent  Homemaking Assistance: Needs assistance  Ambulatory Assistance: Independent (cane and 2WW)  Precautions:  Precautions  Medical Precautions: Fall precautions (IV)  Precautions Comment: CT head: No acute intracranial abnormality    Objective   Pain:  Pain Assessment  Pain Assessment: 0-10  0-10 (Numeric) Pain Score: 0 - No pain  Cognition:  Cognition  Overall Cognitive Status: Within Functional Limits  Orientation Level: Oriented X4    General Assessments:    Activity Tolerance  Endurance: Tolerates 10 - 20 min exercise with multiple rests    Sensation  Light Touch: No apparent deficits    Strength  Strength Comments: B LE 4/5    Coordination  Movements are Fluid and Coordinated: Yes    Postural Control  Postural Control: Within Functional Limits    Static Sitting Balance  Static Sitting-Balance Support: No upper extremity supported, Feet supported  Static Sitting-Level of Assistance: Contact guard    Static Standing Balance  Static Standing-Balance Support: Bilateral upper extremity supported  Static Standing-Level of Assistance: Minimum assistance (+ mod A x 1)  Functional Assessments:       Bed Mobility  Bed Mobility: Yes  Bed Mobility 1  Bed Mobility 1: Supine to sitting  Level of Assistance 1: Minimum  assistance    Transfers  Transfer: Yes  Transfer 1  Transfer From 1: Sit to, Stand to  Transfer to 1: Sit, Stand  Technique 1: Sit to stand, Stand to sit  Transfer Device 1: Walker  Transfer Level of Assistance 1: Minimum assistance (+ mod A x 1)    Ambulation/Gait Training  Ambulation/Gait Training Performed: Yes  Ambulation/Gait Training 1  Surface 1: Level tile  Device 1: Rolling walker  Assistance 1: Minimum assistance (+ mod A x 1)  Quality of Gait 1:  (flexed posture, R sided lean, decreased step length/height)  Comments/Distance (ft) 1: 3' (bed>chair)    Outcome Measures:  [facility] Basic Mobility  Turning from your back to your side while in a flat bed without using bedrails: A lot  Moving from lying on your back to sitting on the side of a flat bed without using bedrails: A lot  Moving to and from bed to chair (including a wheelchair): Total  Standing up from a chair using your arms (e.g. wheelchair or bedside chair): Total  To walk in hospital room: Total  Climbing 3-5 steps with railing: Total  Basic Mobility - Total Score: 8    Encounter Problems       Encounter Problems (Active)       Balance       STG - Maintains static standing balance without upper extremity support x 5' supervision  (Progressing)       Start:  07/31/24    Expected End:  08/14/24               Mobility       STG - Patient will ambulate with 2WW 25' min A   (Progressing)       Start:  07/31/24    Expected End:  08/14/24            STG - Patient will ascend and descend four stairs B rails min A  (Progressing)       Start:  07/31/24    Expected End:  08/14/24               PT Transfers       STG - Patient will perform bed mobility CGA  (Progressing)       Start:  07/31/24    Expected End:  08/14/24            STG - Patient will transfer sit to and from stand min A  (Progressing)       Start:  07/31/24    Expected End:  08/14/24                   Education Documentation  Precautions, taught by Tabitha Young, PT at 7/31/2024 10:54  AM.  Learner: Patient  Readiness: Acceptance  Method: Explanation  Response: Verbalizes Understanding    Body Mechanics, taught by Tabitha Young, PT at 7/31/2024 10:54 AM.  Learner: Patient  Readiness: Acceptance  Method: Explanation  Response: Verbalizes Understanding    Mobility Training, taught by Tabitha Young, PT at 7/31/2024 10:54 AM.  Learner: Patient  Readiness: Acceptance  Method: Explanation  Response: Verbalizes Understanding    Education Comments  No comments found.

## 2024-07-31 NOTE — CARE PLAN
"DANIELLE met with pt who is aox3 today.  Pt agreeable for SNF and prefers to return to [facility].  Referral was sent.  SW has rec'd multiple demanding and aggressive emails from pt's spouse in regards to pt's hospitalization.  Spouse is demanding that SW provide clinical information and clinical updates.  SW advised spouse that medical team would provide any clinical updates and spouse continued to demand information.  SW updated by Patrica Hill that spouse has emailed them and advised that pt is not to return to their facility.  SW communication via email from spouse that she wants pt to go to [facility] or [facility].  SW met with pt & spouse at bedside.  Pt is [place] and prefers to communicate via writing question and pt will answer.  SW inquired if pt would like to return to [facility] and pt stated he did, as he liked it there. Spouse demanded SW tell pt that if he goes to [facility], he will never see her again. Pt visibly upset and ultimately selected [facility].  SW encouraged spouse to speak with pt directly; spouse stated that pt never listens.  SW did tell spouse that pt is capable of making his own decisions despite her wishes.  Spouse can be argumentative and confrontational; SW contacted [facility] and confirmed that there is no open case at this time.  Spouse stated that pt needs \"long term rehab,\" SW explained that rehab is short term and covered by insurance, if spouse wishes for long term that is paid by Medicaid or private pay.  Spouse then stated, if pt can not go to [facility] she will take pt home. If pt returns home, APS referral should be made.  DANIELLE updated RN, TCC & physician.  "

## 2024-07-31 NOTE — SIGNIFICANT EVENT
Discussed with medical and SW team. Palliative care consultation is deferred until further notice.   Will follow peripherally and check in with the primary team periodically.

## 2024-07-31 NOTE — PROGRESS NOTES
Chao Thao is a 84 y.o. male on day 3 of admission presenting with Hypotension.    Subjective   Interval History: no fever, no new complaints        Review of Systems    Objective   Range of Vitals (last 24 hours)  Heart Rate:  [76-88]   Temp:  [36.3 °C (97.3 °F)-36.6 °C (97.9 °F)]   Resp:  [19-20]   BP: ()/(56-83)   SpO2:  [90 %-92 %]   Daily Weight  07/28/24 : 88 kg (194 lb 0.1 oz)    Body mass index is 31.31 kg/m².    Physical Exam  Constitutional:       Appearance: Normal appearance.   HENT:      Head: Normocephalic and atraumatic.      Mouth/Throat:      Mouth: Mucous membranes are moist.      Pharynx: Oropharynx is clear.   Eyes:      Pupils: Pupils are equal, round, and reactive to light.   Cardiovascular:      Rate and Rhythm: Normal rate and regular rhythm.      Heart sounds: Normal heart sounds.   Pulmonary:      Effort: Pulmonary effort is normal.      Breath sounds: Normal breath sounds.   Abdominal:      General: Abdomen is flat. Bowel sounds are normal.      Palpations: Abdomen is soft.   Musculoskeletal:      Cervical back: Normal range of motion.      Comments: Lt 2nd toe wound, less redness   Neurological:      Mental Status: He is alert.         Antibiotics  vancomycin - 750 mg/150 mL    Relevant Results  Labs  Results from last 72 hours   Lab Units 07/31/24  0709 07/30/24  0650 07/29/24  1124   WBC AUTO x10*3/uL 12.6* 13.7* 12.5*   HEMOGLOBIN g/dL 9.5* 10.2* 9.2*   HEMATOCRIT % 29.6* 31.7* 28.7*   PLATELETS AUTO x10*3/uL 665* 633* 600*   NEUTROS PCT AUTO % 75.8 73.9  --    LYMPHS PCT AUTO % 12.9 13.5  --    MONOS PCT AUTO % 7.2 7.0  --    EOS PCT AUTO % 2.5 3.0  --      Results from last 72 hours   Lab Units 07/31/24  0709 07/30/24  0650 07/28/24  1647   SODIUM mmol/L 134* 132* 134*   POTASSIUM mmol/L 3.7 3.9 4.1   CHLORIDE mmol/L 101 99 99   CO2 mmol/L 27 27 28   BUN mg/dL 13 12 10   CREATININE mg/dL 0.66 0.55 0.54   GLUCOSE mg/dL 93 84 91   CALCIUM mg/dL 7.5* 7.7* 7.5*   ANION GAP  mmol/L 10 10 11   EGFR mL/min/1.73m*2 >90 >90 >90     Results from last 72 hours   Lab Units 07/31/24  0709 07/30/24  0650   ALK PHOS U/L 138* 155*   BILIRUBIN TOTAL mg/dL 0.2 0.2   PROTEIN TOTAL g/dL 5.3* 5.9*   ALT U/L 10 12   AST U/L 13 16   ALBUMIN g/dL 2.0* 2.2*     Estimated Creatinine Clearance: 89.7 mL/min (by C-G formula based on SCr of 0.66 mg/dL).  C-Reactive Protein   Date Value Ref Range Status   06/30/2024 9.28 (H) <1.00 mg/dL Final     Microbiology  Reviewed  Imaging   Reviewed       Assessment/Plan   Hypotension, the BC is negative  Respiratory failure / rib fracture / atelectasis / effusion   Left 2nd toe wound / likely cellulitis  Leukocytosis, fluctuating  Encephalopathy, likely metabolic     Recommendations :  Continue Vancomycin, plan on oral Doxycycline x 1 week with discharge  Discussed with the medical team     I spent minutes in the professional and overall care of this patient.      Wale Townsend MD

## 2024-07-31 NOTE — PROGRESS NOTES
"Capacity Assessment Tool    \"Capacity\" is the \"ability\" to make a decision.  The decision in question must be specific (one decision), relevant to a patient's current condition (appropriate), and timely (neither prospective nor retrospective).    Capacity varies based on knowledge base (explanation/understanding of clinical information), cognitive processing, acute psychiatric illness, and other clinical conditions.    In order to be deemed \"capacitated\" to make a single decision at one point in time, a patient must demonstrate all 4 of the following elements:    *Ability to consistently communicate a choice (consistent over time with adequate information)  *Ability to understand the relevant information (accurate knowledge of condition)  *Ability to appreciate the situation and its consequences (risks/benefits, pros/cons)  *Ability to reason about treatment options (without undue influence of a person or condition, eg. suicidality or acute psychosis)      Current Decision    Clinical issue:   Left toe ulceration, rib fracture and leukocytosis    Did the appropriate team address relevant information with the patient:  Yes    Date: 7/31/24    If \"NO\" is selected for appropriate team, then please discuss with the appropriate team.  The appropriate team should be encouraged to address relevant information with the patient AND reevaluate capacity when appropriate.    Capacity Evaluation    Patient demonstrates ability to consistently communicate choice:  Yes He is alert and oriented X3    Patient demonstrates ability to understand the relevant information:  Yes     Patient demonstrates ability to appreciate the situation and its consequences:  Yes     Patient demonstrates ability to reason about treatment options:  Yes     If ANY of the above items are answered \"NO,\" the patient LACKS CAPACITY for that specific decision at hand, at that specific time.  Further capacity evaluations can be done as needed.         "

## 2024-07-31 NOTE — PROGRESS NOTES
07/31/24 1219   Discharge Planning   Assistance Needed Pt agreeable to return to [facility] if insurance will cover.  Referral sent.     DANIELLE spoke to pt who is aox3.  Pt agreeable for SNF per PT/OT recommendations and would prefer to return to [facility] if insurance will cover.  Referral sent.  DANIELLE did advise pt that spouse (Karissa) did not want pt to return, however pt liked being at the facility and is capable of making decisions.  DANIELLE will follow.

## 2024-07-31 NOTE — PROGRESS NOTES
Occupational Therapy    Evaluation    Patient Name: Chao Thao  MRN: [id number]  Today's Date: 7/31/2024  Time Calculation  Start Time: 1005  Stop Time: 1026  Time Calculation (min): 21 min    Assessment  IP OT Assessment  OT Assessment: Pt presents with decreased ADL performance, impaired mobility and generalized weakness, Will benefit from skilled OT services to improve functional outcomes  Prognosis: Good  Evaluation/Treatment Tolerance: Patient tolerated treatment well  Medical Staff Made Aware: Yes  End of Session Communication: Bedside nurse  End of Session Patient Position: Up in chair, Alarm on  Plan:  Treatment Interventions: ADL retraining, Functional transfer training, UE strengthening/ROM, Endurance training, Compensatory technique education  OT Frequency: 3 times per week  OT Discharge Recommendations: Moderate intensity level of continued care  Equipment Recommended upon Discharge: Wheeled walker (owns)  OT Recommended Transfer Status: Assist of 2  OT - OK to Discharge: Yes (per OT POC)    Subjective     General:  General  Reason for Referral: Admitted with hypotension, referred to OT for impaired ADL  Referred By: Ruby Chakraborty  Past Medical History Relevant to Rehab: HTN, HLD, MDD, CAD, frequent falls, dementia, afib, hard of hearing, HFpEF 50%  Family/Caregiver Present: No  Co-Treatment: PT  Co-Treatment Reason: To maximize pt outcomes  Prior to Session Communication: Bedside nurse  Patient Position Received: Bed, 3 rail up, Alarm on  General Comment: Pleasant and cooperative with evaluation  Precautions:  Medical Precautions: Fall precautions (IV)  Precautions Comment: CT head: No acute intracranial abnormality    Pain:  Pain Assessment  Pain Assessment: 0-10  0-10 (Numeric) Pain Score: 0 - No pain    Objective   Cognition:  Overall Cognitive Status: Within Functional Limits  Orientation Level: Oriented X4           Home Living:  Type of Home: House  Lives With: Spouse  Home Adaptive  Equipment: Walker rolling or standard  Home Layout: Two level, Stairs to alternate level with rails  Alternate Level Stairs-Rails: Both  Alternate Level Stairs-Number of Steps: 12  Home Access: Stairs to enter without rails  Entrance Stairs-Rails: None  Entrance Stairs-Number of Steps: 6  Home Living Comments: Pt has been at [facility] for at least 2 weeks   Prior Function:  Level of [place]: Independent with ADLs and functional transfers, Independent with homemaking with ambulation  Receives Help From: Family  ADL Assistance: Independent  Homemaking Assistance: Needs assistance  Ambulatory Assistance: Independent (cane vs FWW)  Prior Function Comments: Per pt: since at [facility], ambulating with x2 assist FWW and WC follow. Describes a likely modA + for all ADL    ADL:  Eating Assistance: Minimal  Eating Deficit: Setup (reports diminished sensation in B hands and requests assist with banana)  Grooming Assistance: Minimal  Grooming Deficit: Setup, Increased time to complete, Supervision/safety  Bathing Assistance: Moderate  UE Dressing Assistance: Moderate  LE Dressing Assistance: Total  LE Dressing Deficit: Don/doff R sock, Don/doff L sock  Toileting Assistance with Device: Total  Toileting Deficit: Increased time to complete  ADL Comments: Performance of ADL tasks not observed is anticipated based on pt's clinical presentation  Activity Tolerance:  Endurance: Tolerates less than 10 min exercise, no significant change in vital signs  Bed Mobility/Transfers: Bed Mobility  Bed Mobility: Yes  Bed Mobility 1  Bed Mobility 1: Supine to sitting  Level of Assistance 1: Minimum assistance    Transfers  Transfer: Yes  Transfer 1  Transfer From 1: Bed to  Transfer to 1: Chair with arms  Technique 1: Sit to stand, Stand to sit  Transfer Device 1: Walker  Transfer Level of Assistance 1: Moderate assistance (+ [place] x1)  Trials/Comments 1: cues for hand placement      Functional Mobility:  Functional Mobility  Functional Mobility  Performed: Yes  Functional Mobility 1  Surface 1: Level tile  Device 1: Rolling walker  Assistance 1: Moderate assistance (+ Carly X1)  Comments 1: x3 steps from bed>chair. Retropulsive and assistance for FWW proximity  Sensation:  Sensation Comment: Pt reports diminished sensation in B hands, does not elaborate  Strength:  Strength Comments: Functional strength deficit observed with mobility and ADL  Extremities: RUE   RUE : Within Functional Limits and LUE   LUE: Within Functional Limits    Outcome Measures: [facility] Daily Activity  Putting on and taking off regular lower body clothing: Total  Bathing (including washing, rinsing, drying): A lot  Putting on and taking off regular upper body clothing: A lot  Toileting, which includes using toilet, bedpan or urinal: Total  Taking care of personal grooming such as brushing teeth: A little  Eating Meals: A little  Daily Activity - Total Score: 12      Education Documentation  Body Mechanics, taught by Amna Mcconnell, OT at 7/31/2024 12:46 PM.  Learner: Patient  Readiness: Acceptance  Method: Explanation  Response: Needs Reinforcement    Education Comments  No comments found.      Goals:   Encounter Problems       Encounter Problems (Active)       OT Goals       Pt will complete bed mobility with supervision (Progressing)       Start:  07/31/24    Expected End:  08/14/24            Pt will demo functional transfers to/ from EOB, chair and commode with LRD and Carly (Progressing)       Start:  07/31/24    Expected End:  08/14/24            Pt will demo functional mobility necessary to complete ADL routine with LRD and Carly (Progressing)       Start:  07/31/24    Expected End:  08/14/24            Pt will demo ADL routine and meaningful daily activities with Carly using modifications as needed  (Progressing)       Start:  07/31/24    Expected End:  08/14/24            Pt will demo improved activity tolerance evidenced by participation in BADL and/or functional mobility x10  mins with </=1 rest break.   (Progressing)       Start:  07/31/24    Expected End:  08/14/24

## 2024-07-31 NOTE — CARE PLAN
The patient's goals for the shift include  free from injury throughout shift    The clinical goals for the shift include pt will remain free from injury throughout shift      Problem: Pain - Adult  Goal: Verbalizes/displays adequate comfort level or baseline comfort level  Outcome: Progressing     Problem: Safety - Adult  Goal: Free from fall injury  Outcome: Progressing     Problem: Discharge Planning  Goal: Discharge to home or other facility with appropriate resources  Outcome: Progressing     Problem: Chronic Conditions and Co-morbidities  Goal: Patient's chronic conditions and co-morbidity symptoms are monitored and maintained or improved  Outcome: Progressing     Problem: Skin  Goal: Decreased wound size/increased tissue granulation at next dressing change  Outcome: Progressing  Goal: Participates in plan/prevention/treatment measures  Outcome: Progressing  Goal: Prevent/manage excess moisture  Outcome: Progressing  Goal: Prevent/minimize sheer/friction injuries  Outcome: Progressing  Goal: Promote/optimize nutrition  Outcome: Progressing  Goal: Promote skin healing  Outcome: Progressing  Flowsheets (Taken 7/31/2024 1021)  Promote skin healing: Turn/reposition every 2 hours/use positioning/transfer devices     Problem: Pain  Goal: Takes deep breaths with improved pain control throughout the shift  Outcome: Progressing  Goal: Turns in bed with improved pain control throughout the shift  Outcome: Progressing  Goal: Walks with improved pain control throughout the shift  Outcome: Progressing  Goal: Performs ADL's with improved pain control throughout shift  Outcome: Progressing  Goal: Participates in PT with improved pain control throughout the shift  Outcome: Progressing  Goal: Free from opioid side effects throughout the shift  Outcome: Progressing  Goal: Free from acute confusion related to pain meds throughout the shift  Outcome: Progressing     Problem: Fall/Injury  Goal: Not fall by end of shift  Outcome:  Progressing  Goal: Be free from injury by end of the shift  Outcome: Progressing  Goal: Verbalize understanding of personal risk factors for fall in the hospital  Outcome: Progressing  Goal: Verbalize understanding of risk factor reduction measures to prevent injury from fall in the home  Outcome: Progressing  Goal: Use assistive devices by end of the shift  Outcome: Progressing  Goal: Pace activities to prevent fatigue by end of the shift  Outcome: Progressing     Problem: Nutrition  Goal: Oral intake greater 75%  Outcome: Progressing  Goal: Consume prescribed supplement  Outcome: Progressing

## 2024-07-31 NOTE — PROGRESS NOTES
Chao Thao is a 84 y.o. male on day 3 of admission presenting with Hypotension.      Subjective   The patient is alert and oriented X3 and able to move all extremities this morning.     Objective     Last Recorded Vitals  BP 91/57   Pulse 88   Temp 36.3 °C (97.3 °F) (Temporal)   Resp 19   Wt 88 kg (194 lb 0.1 oz)   SpO2 91%   Intake/Output last 3 Shifts:    Intake/Output Summary (Last 24 hours) at 7/31/2024 1416  Last data filed at 7/31/2024 1236  Gross per 24 hour   Intake 690 ml   Output --   Net 690 ml       Admission Weight  Weight: 83.6 kg (184 lb 3.2 oz) (07/28/24 0928)    Daily Weight  07/28/24 : 88 kg (194 lb 0.1 oz)    Image Results  ECG 12 lead  Sinus rhythm with Premature supraventricular complexes  Low voltage QRS  T wave abnormality, consider anterolateral ischemia  Abnormal ECG  When compared with ECG of 26-JUL-2024 09:20, (unconfirmed)  No significant change was found  See ED provider note for full interpretation and clinical correlation  Confirmed by Niyah Duval ([id number]) on 7/30/2024 6:03:37 PM      Physical Exam  Constitutional:       General: He is not in acute distress.     Appearance: He is not toxic-appearing or diaphoretic.   HENT:      Mouth/Throat:      Mouth: Mucous membranes are dry.   Eyes:      Extraocular Movements: Extraocular movements intact.   Cardiovascular:      Rate and Rhythm: Regular rhythm.   Pulmonary:      Breath sounds: Normal breath sounds.   Abdominal:      Palpations: Abdomen is soft.   Musculoskeletal:         General: Swelling present.   Skin:     General: Skin is dry.   Neurological:      Mental Status: He is alert and oriented to person, place, and time.      Comments: RUE weakness resolved   Psychiatric:         Mood and Affect: Mood normal.         Behavior: Behavior normal.         Relevant Results  Results for orders placed or performed during the hospital encounter of 07/28/24 (from the past 24 hour(s))   Comprehensive Metabolic Panel   Result Value Ref  Range    Glucose 93 74 - 99 mg/dL    Sodium 134 (L) 136 - 145 mmol/L    Potassium 3.7 3.5 - 5.3 mmol/L    Chloride 101 98 - 107 mmol/L    Bicarbonate 27 21 - 32 mmol/L    Anion Gap 10 10 - 20 mmol/L    Urea Nitrogen 13 6 - 23 mg/dL    Creatinine 0.66 0.50 - 1.30 mg/dL    eGFR >90 >60 mL/min/1.73m*2    Calcium 7.5 (L) 8.6 - 10.3 mg/dL    Albumin 2.0 (L) 3.4 - 5.0 g/dL    Alkaline Phosphatase 138 (H) 33 - 136 U/L    Total Protein 5.3 (L) 6.4 - 8.2 g/dL    AST 13 9 - 39 U/L    Bilirubin, Total 0.2 0.0 - 1.2 mg/dL    ALT 10 10 - 52 U/L   CBC and Auto Differential   Result Value Ref Range    WBC 12.6 (H) 4.4 - 11.3 x10*3/uL    nRBC 0.0 0.0 - 0.0 /100 WBCs    RBC 3.13 (L) 4.50 - 5.90 x10*6/uL    Hemoglobin 9.5 (L) 13.5 - 17.5 g/dL    Hematocrit 29.6 (L) 41.0 - 52.0 %    MCV 95 80 - 100 fL    MCH 30.4 26.0 - 34.0 pg    MCHC 32.1 32.0 - 36.0 g/dL    RDW 13.8 11.5 - 14.5 %    Platelets 665 (H) 150 - 450 x10*3/uL    Neutrophils % 75.8 40.0 - 80.0 %    Immature Granulocytes %, Automated 1.0 (H) 0.0 - 0.9 %    Lymphocytes % 12.9 13.0 - 44.0 %    Monocytes % 7.2 2.0 - 10.0 %    Eosinophils % 2.5 0.0 - 6.0 %    Basophils % 0.6 0.0 - 2.0 %    Neutrophils Absolute 9.54 (H) 1.60 - 5.50 x10*3/uL    Immature Granulocytes Absolute, Automated 0.13 0.00 - 0.50 x10*3/uL    Lymphocytes Absolute 1.62 0.80 - 3.00 x10*3/uL    Monocytes Absolute 0.91 (H) 0.05 - 0.80 x10*3/uL    Eosinophils Absolute 0.31 0.00 - 0.40 x10*3/uL    Basophils Absolute 0.08 0.00 - 0.10 x10*3/uL               Assessment/Plan                  Principal Problem:    Hypotension  Active Problems:    Pleural effusion    Elevated troponin    Hyponatremia    Chao Thao is a 84 y.o. year old male with PMH HTN, HLD, MDD, CAD, frequent falls, dementia, afib, hard of hearing, HFpEF 50% presented to the ED from SNF for low BP.     #Left toe ulceration  #Hypotension in the setting of infection  - florinef continued   - IV fluids adm hypotension resolved  - UCX showed no  growth  -Leukocytosis   - Cardio consulted  - Procal 0.06  - Podiatry recommends LLE angiogram outpatient, prior to debridement.    - ID following will continue vancomycin, plan on oral Doxycycline x 1 week with discharge     #Acute delirium   #Stroke-like symptoms (resolved)  -Resolved this morning  -Right upper extremity weakness and ataxia present on 7/30/24  -CT head shows no acute stroke  -Neurology added recommendations for prevention and management  -Will minimize use of benzodiazepines, anti-cholinergic medications, and opiates   -SW following  -PT/OT recommend moderate intensity therapy      #Fractures of the lateral 8th and 9th ribs   -Stable, denies pain     #HFpEF  #Elevated trop likely demand ischemia  -Chest x-ray showed: Pleural effusions and basilar atelectasis, right worse than left.   -Cardiology recommended against aggressive diuresis   -Stopped trend  -No respiratory distress present        #A fib  - hold metoprolol given low BP  - Anticoagulation does not appear on the MAR from     #Normocytic anemia  - monitor     #Frequent admission  -Palliative consulted. Will hold off on the consult for now, due to social issues.        Dispo: The patient is medically ready for discharge to SNF. Family and patient have selected [facility].      Mindy Tang, APRN-CNP

## 2024-07-31 NOTE — CONSULTS
Inpatient consult to Neurology  Consult performed by: Enrique Lazaro MD  Consult ordered by: Mindy Tang, MOSHE-CNP        History Of Present Illness  Chao Thao is a 84 y.o. male presenting with slurred speech and weakness in the setting of hypotension. Past medical history notable for HTN, HLD, MDD, CAD, frequent falls, dementia, afib, hard of hearing, HFpEF 50%.    The patient present to the ED from SNF for hypotension. The patient reportedly had acute onset garbled speech and weakness and he was found to be hypotensive. He received fluid resuscitation in the ED with improvement in symptoms. He was found to have left 2nd toe wound, likely cellulitis as well. Per nursing he was moving the right upper extremity well on Monday and then much less yesterday. This morning he is moving his right upper extremity without an issues. He has been answering questions appropriately, but had less verbal output yesterday. This morning he is answering questions and conversing well.    CTH was obtained and did not show any acute intracranial abnormalities.     He was admitted early July for a fall. During that admission he was found to be hypotensive with positive orthostatics. He was started on florinef.    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    History of alcohol abuse    Chronic shortness of breath 06/28/2024    Drug abuse (Multi)     in remission    Edema, unspecified 11/26/2019    Dependent edema    Encounter for immunization 04/08/2016    Need for Zostavax administration    Pain in left knee 08/08/2018    Left knee pain    Personal history of other diseases of the nervous system and sense organs 09/18/2019    History of cataract    Personal history of other drug therapy 04/08/2016    History of pneumococcal vaccination    Tinea pedis 10/10/2018    Tinea pedis of both feet    Venous stasis ulcer of right calf (Multi) 06/28/2024    Vitiligo 06/28/2024      Surgical History  Past Surgical History:   Procedure Laterality Date    CARDIAC CATHETERIZATION  05/14/2018    Cardiac Cath Procedure Summary    INNER EAR SURGERY  01/06/2016    Inner Ear Surgery    OTHER SURGICAL HISTORY  03/16/2020    Cataract surgery     Social History  Social History     Tobacco Use    Smoking status: Former     Types: Cigarettes    Smokeless tobacco: Never   Vaping Use    Vaping status: Never Used   Substance Use Topics    Alcohol use: Not Currently    Drug use: Never     Allergies  Gabapentin and Penicillin  Home Medications  Medications Prior to Admission   Medication Sig Dispense Refill Last Dose    acetaminophen (Tylenol) 500 mg tablet Take 2 tablets (1,000 mg) by mouth every 8 hours.       ascorbic acid (Vitamin C) 500 mg tablet Take 1 tablet (500 mg) by mouth once daily.       aspirin 81 mg EC tablet Take 1 tablet (81 mg) by mouth once daily.       atorvastatin (Lipitor) 20 mg tablet Take 1 tablet (20 mg) by mouth once daily.       cholecalciferol (Vitamin D-3) 25 MCG (1000 UT) capsule Take 1 capsule (25 mcg) by mouth once daily.       cyanocobalamin (Vitamin B-12) 250 mcg tablet Take 1 tablet (250 mcg) by mouth once daily.       DULoxetine (Cymbalta) 20 mg DR capsule Take 2 capsules (40 mg) by mouth once daily. Do not crush or chew.       fludrocortisone (Florinef) 0.1 mg tablet Take 0.5 tablets (0.05 mg) by mouth once daily.       lactobacillus acidophilus (Acidophilus) capsule Take 1 capsule by mouth once daily.       lidocaine 4 % patch Place 1 patch over 12 hours on the skin once daily. Remove & discard patch within 12 hours or as directed by MD.       magnesium hydroxide (Milk of Magnesia) 400 mg/5 mL suspension Take 30 mL by mouth once daily as needed for constipation.       metoprolol succinate XL (Toprol-XL) 25 mg 24 hr tablet Take 1 tablet (25 mg) by mouth once daily. Do not crush or chew. 30 tablet 0     miconazole (Micotin) 2 % cream Apply 1 Application topically 3 times a  day.       ondansetron (Zofran) 4 mg tablet Take 1 tablet (4 mg) by mouth every 4 hours if needed for nausea or vomiting.       oxygen (O2) gas therapy Inhale 1 each once every 24 hours.       potassium chloride CR 20 mEq ER tablet Take 1 tablet (20 mEq) by mouth once daily. Do not crush or chew.       zinc acetate 50 mg (zinc) capsule Take 1 capsule by mouth once daily.        Review of Systems    Neurological Exam  GENERAL APPEARANCE:  No distress, alert, interactive and cooperative.      MENTAL STATE:   Orientation was normal to time, place and person. Recent and remote memory was intact.  Attention span and concentration were normal. Language testing was normal for comprehension, expression, repetition and naming.    CRANIAL NERVES:   CN 2   Visual fields full to confrontation.   CN 3, 4, 6   Pupils round, 2 mm in diameter, equally reactive to light. Lids symmetric; no ptosis. EOMs normal alignment, full range.   No nystagmus.   CN 5   Facial sensation intact bilaterally.   CN 7   Normal and symmetric facial strength. Nasolabial folds symmetric.   CN 8   Very hard of hearing  CN 9   Palate elevates symmetrically.   CN 11   Normal strength of shoulder shrug and neck turning.   CN 12   Tongue midline, with normal bulk and strength; no fasciculations.     MOTOR:      MMT reveals the following MRC grades:     R L   5 5 Shoulder abduction  5 5 Elbow flexion  5 5 Elbow extension  5 5 Finger abduction  5- 5- Hip flexion  5 5 Knee flexion  5 5 Knee extension  5 5 Ankle dorsiflexion  5 5 Ankle plantarflexion    REFLEXES:                       R          L  BR:               2         2  Biceps:         2          2  Triceps:        2          2  Knee:            1          1  Ankle:          0          0    Babinski: right toe downgoing to plantar stimulation. Not tested on left due to cellulitis.    SENSORY:   In both upper and lower extremities, sensation was intact to light touch    COORDINATION:    In both upper  "extremities, finger-nose-finger was intact without dysmetria or overshoot.     GAIT:   Deferred for safety    Last Recorded Vitals  Blood pressure 126/67, pulse 76, temperature 36.3 °C (97.3 °F), temperature source Temporal, resp. rate 19, height 1.676 m (5' 6\"), weight 88 kg (194 lb 0.1 oz), SpO2 91%.    Relevant Results  Results for orders placed or performed during the hospital encounter of 07/28/24 (from the past 24 hour(s))   Comprehensive Metabolic Panel   Result Value Ref Range    Glucose 93 74 - 99 mg/dL    Sodium 134 (L) 136 - 145 mmol/L    Potassium 3.7 3.5 - 5.3 mmol/L    Chloride 101 98 - 107 mmol/L    Bicarbonate 27 21 - 32 mmol/L    Anion Gap 10 10 - 20 mmol/L    Urea Nitrogen 13 6 - 23 mg/dL    Creatinine 0.66 0.50 - 1.30 mg/dL    eGFR >90 >60 mL/min/1.73m*2    Calcium 7.5 (L) 8.6 - 10.3 mg/dL    Albumin 2.0 (L) 3.4 - 5.0 g/dL    Alkaline Phosphatase 138 (H) 33 - 136 U/L    Total Protein 5.3 (L) 6.4 - 8.2 g/dL    AST 13 9 - 39 U/L    Bilirubin, Total 0.2 0.0 - 1.2 mg/dL    ALT 10 10 - 52 U/L   CBC and Auto Differential   Result Value Ref Range    WBC 12.6 (H) 4.4 - 11.3 x10*3/uL    nRBC 0.0 0.0 - 0.0 /100 WBCs    RBC 3.13 (L) 4.50 - 5.90 x10*6/uL    Hemoglobin 9.5 (L) 13.5 - 17.5 g/dL    Hematocrit 29.6 (L) 41.0 - 52.0 %    MCV 95 80 - 100 fL    MCH 30.4 26.0 - 34.0 pg    MCHC 32.1 32.0 - 36.0 g/dL    RDW 13.8 11.5 - 14.5 %    Platelets 665 (H) 150 - 450 x10*3/uL    Neutrophils % 75.8 40.0 - 80.0 %    Immature Granulocytes %, Automated 1.0 (H) 0.0 - 0.9 %    Lymphocytes % 12.9 13.0 - 44.0 %    Monocytes % 7.2 2.0 - 10.0 %    Eosinophils % 2.5 0.0 - 6.0 %    Basophils % 0.6 0.0 - 2.0 %    Neutrophils Absolute 9.54 (H) 1.60 - 5.50 x10*3/uL    Immature Granulocytes Absolute, Automated 0.13 0.00 - 0.50 x10*3/uL    Lymphocytes Absolute 1.62 0.80 - 3.00 x10*3/uL    Monocytes Absolute 0.91 (H) 0.05 - 0.80 x10*3/uL    Eosinophils Absolute 0.31 0.00 - 0.40 x10*3/uL    Basophils Absolute 0.08 0.00 - 0.10 " x10*3/uL      IMAGING  TTE  No echocardiogram results found for the past 14 days     MR brain wo IV contrast  No MRI head results found for the past 14 days     CT head wo IV contrast  CT head wo IV contrast    Result Date: 7/28/2024  No acute intracranial abnormality. Consider follow-up with MRI as warranted.     Signed by: Marya Sharma 7/28/2024 12:22 PM Dictation workstation:   [id number]      Assessment/Plan     Chao Thao is a 84 y.o. male presenting with slurred speech and weakness in the setting of hypotension. This morning his neurological exam reveals normal mentation and language. No slurred speech or aphasia present. Strength is symmetric throughout and there is no evidence of right upper extremity weakness. Sensation is normal throughout. Nursing staff has reported some fluctuation in mentation over the past few days which is likely due to delirium on top of longer standing cognitive impairment which has been reported by his wife. CTH completed 7/28 did not show evidence of acute infarct or hemorrhage. In light of his non focal neurological exam today, low suspicion for stroke.    Delirium Prevention and Management Recommendations:    Non-Pharmacologic:  Promotion of Regular Sleep-Wake Cycle:  - Ensure blinds are open during the day to allow sufficient daylight to enter the room.   - Maintain dark and quiet room at night with minimal interruptions.  - Minimize daytime naps.     Orientation:  - Encourage interaction with familiar faces and objects.  - Provide frequent orientation.   - Synthesize information for patient and provide more frequent updates as segmentation of information can exacerbate confusion    Pharmacologic:  - Minimize use of benzodiazepines, anti-cholinergic medications, and opiates, as these can all exacerbate delirium.    I spent 60 minutes in the professional and overall care of this patient.    Enrique Lazaro MD

## 2024-08-01 PROBLEM — J90 PLEURAL EFFUSION: Status: RESOLVED | Noted: 2024-07-28 | Resolved: 2024-08-01

## 2024-08-01 PROBLEM — I95.9 HYPOTENSION: Status: RESOLVED | Noted: 2024-07-28 | Resolved: 2024-08-01

## 2024-08-01 PROBLEM — R79.89 ELEVATED TROPONIN: Status: RESOLVED | Noted: 2024-07-28 | Resolved: 2024-08-01

## 2024-08-01 PROBLEM — E87.1 HYPONATREMIA: Status: RESOLVED | Noted: 2024-07-28 | Resolved: 2024-08-01

## 2024-08-01 LAB
ANION GAP SERPL CALC-SCNC: 10 MMOL/L (ref 10–20)
BACTERIA BLD CULT: NORMAL
BACTERIA BLD CULT: NORMAL
BUN SERPL-MCNC: 14 MG/DL (ref 6–23)
CALCIUM SERPL-MCNC: 7.4 MG/DL (ref 8.6–10.3)
CHLORIDE SERPL-SCNC: 100 MMOL/L (ref 98–107)
CO2 SERPL-SCNC: 29 MMOL/L (ref 21–32)
CREAT SERPL-MCNC: 0.62 MG/DL (ref 0.5–1.3)
EGFRCR SERPLBLD CKD-EPI 2021: >90 ML/MIN/1.73M*2
ERYTHROCYTE [DISTWIDTH] IN BLOOD BY AUTOMATED COUNT: 13.9 % (ref 11.5–14.5)
GLUCOSE BLD MANUAL STRIP-MCNC: 105 MG/DL (ref 74–99)
GLUCOSE SERPL-MCNC: 87 MG/DL (ref 74–99)
HCT VFR BLD AUTO: 29.5 % (ref 41–52)
HGB BLD-MCNC: 9.5 G/DL (ref 13.5–17.5)
MCH RBC QN AUTO: 30.4 PG (ref 26–34)
MCHC RBC AUTO-ENTMCNC: 32.2 G/DL (ref 32–36)
MCV RBC AUTO: 95 FL (ref 80–100)
NRBC BLD-RTO: 0 /100 WBCS (ref 0–0)
PLATELET # BLD AUTO: 634 X10*3/UL (ref 150–450)
POTASSIUM SERPL-SCNC: 3.7 MMOL/L (ref 3.5–5.3)
RBC # BLD AUTO: 3.12 X10*6/UL (ref 4.5–5.9)
SODIUM SERPL-SCNC: 135 MMOL/L (ref 136–145)
VANCOMYCIN SERPL-MCNC: 23.5 UG/ML (ref 5–20)
WBC # BLD AUTO: 10.7 X10*3/UL (ref 4.4–11.3)

## 2024-08-01 PROCEDURE — 80202 ASSAY OF VANCOMYCIN: CPT | Performed by: INTERNAL MEDICINE

## 2024-08-01 PROCEDURE — 2500000001 HC RX 250 WO HCPCS SELF ADMINISTERED DRUGS (ALT 637 FOR MEDICARE OP): Performed by: PHYSICIAN ASSISTANT

## 2024-08-01 PROCEDURE — 2500000004 HC RX 250 GENERAL PHARMACY W/ HCPCS (ALT 636 FOR OP/ED): Performed by: STUDENT IN AN ORGANIZED HEALTH CARE EDUCATION/TRAINING PROGRAM

## 2024-08-01 PROCEDURE — 1200000002 HC GENERAL ROOM WITH TELEMETRY DAILY

## 2024-08-01 PROCEDURE — 2500000004 HC RX 250 GENERAL PHARMACY W/ HCPCS (ALT 636 FOR OP/ED): Mod: JZ | Performed by: INTERNAL MEDICINE

## 2024-08-01 PROCEDURE — 80048 BASIC METABOLIC PNL TOTAL CA: CPT | Performed by: NURSE PRACTITIONER

## 2024-08-01 PROCEDURE — 2500000005 HC RX 250 GENERAL PHARMACY W/O HCPCS: Performed by: INTERNAL MEDICINE

## 2024-08-01 PROCEDURE — 2500000001 HC RX 250 WO HCPCS SELF ADMINISTERED DRUGS (ALT 637 FOR MEDICARE OP): Performed by: STUDENT IN AN ORGANIZED HEALTH CARE EDUCATION/TRAINING PROGRAM

## 2024-08-01 PROCEDURE — 82947 ASSAY GLUCOSE BLOOD QUANT: CPT

## 2024-08-01 PROCEDURE — 36415 COLL VENOUS BLD VENIPUNCTURE: CPT | Performed by: NURSE PRACTITIONER

## 2024-08-01 PROCEDURE — 85027 COMPLETE CBC AUTOMATED: CPT | Performed by: NURSE PRACTITIONER

## 2024-08-01 PROCEDURE — 2500000001 HC RX 250 WO HCPCS SELF ADMINISTERED DRUGS (ALT 637 FOR MEDICARE OP): Performed by: INTERNAL MEDICINE

## 2024-08-01 PROCEDURE — 99233 SBSQ HOSP IP/OBS HIGH 50: CPT | Performed by: NURSE PRACTITIONER

## 2024-08-01 RX ORDER — EAR PLUGS
1 EACH OTIC (EAR) 2 TIMES DAILY PRN
Start: 2024-08-01

## 2024-08-01 RX ORDER — DOXYCYCLINE 100 MG/1
100 CAPSULE ORAL 2 TIMES DAILY
Start: 2024-08-01 | End: 2024-08-08

## 2024-08-01 RX ORDER — AMMONIUM LACTATE 12 G/100G
1 LOTION TOPICAL 2 TIMES DAILY
Start: 2024-08-01 | End: 2024-08-31

## 2024-08-01 RX ADMIN — ACETAMINOPHEN 975 MG: 325 TABLET ORAL at 14:25

## 2024-08-01 RX ADMIN — LIDOCAINE 4% 1 PATCH: 40 PATCH TOPICAL at 09:32

## 2024-08-01 RX ADMIN — FLUDROCORTISONE ACETATE 0.1 MG: 0.1 TABLET ORAL at 09:31

## 2024-08-01 RX ADMIN — HEPARIN SODIUM 5000 UNITS: 5000 INJECTION INTRAVENOUS; SUBCUTANEOUS at 21:24

## 2024-08-01 RX ADMIN — VANCOMYCIN HYDROCHLORIDE 1.25 G: 1.25 INJECTION, POWDER, LYOPHILIZED, FOR SOLUTION INTRAVENOUS at 13:15

## 2024-08-01 RX ADMIN — OXYCODONE HYDROCHLORIDE AND ACETAMINOPHEN 500 MG: 500 TABLET ORAL at 09:31

## 2024-08-01 RX ADMIN — Medication 1000 UNITS: at 09:31

## 2024-08-01 RX ADMIN — DULOXETINE HYDROCHLORIDE 40 MG: 20 CAPSULE, DELAYED RELEASE ORAL at 09:31

## 2024-08-01 RX ADMIN — Medication 1 CAPSULE: at 09:31

## 2024-08-01 RX ADMIN — ACETAMINOPHEN 975 MG: 325 TABLET ORAL at 20:45

## 2024-08-01 RX ADMIN — METOPROLOL SUCCINATE 25 MG: 25 TABLET, EXTENDED RELEASE ORAL at 09:31

## 2024-08-01 RX ADMIN — HEPARIN SODIUM 5000 UNITS: 5000 INJECTION INTRAVENOUS; SUBCUTANEOUS at 14:25

## 2024-08-01 RX ADMIN — ATORVASTATIN CALCIUM 40 MG: 40 TABLET, FILM COATED ORAL at 09:31

## 2024-08-01 RX ADMIN — ASPIRIN 81 MG: 81 TABLET, COATED ORAL at 09:31

## 2024-08-01 RX ADMIN — HEPARIN SODIUM 5000 UNITS: 5000 INJECTION INTRAVENOUS; SUBCUTANEOUS at 05:33

## 2024-08-01 RX ADMIN — Medication 1 APPLICATION: at 09:33

## 2024-08-01 RX ADMIN — ACETAMINOPHEN 975 MG: 325 TABLET ORAL at 05:33

## 2024-08-01 RX ADMIN — CYANOCOBALAMIN TAB 500 MCG 250 MCG: 500 TAB at 09:31

## 2024-08-01 RX ADMIN — Medication 1 APPLICATION: at 20:53

## 2024-08-01 ASSESSMENT — COGNITIVE AND FUNCTIONAL STATUS - GENERAL
HELP NEEDED FOR BATHING: TOTAL
DRESSING REGULAR LOWER BODY CLOTHING: TOTAL
MOBILITY SCORE: 12
STANDING UP FROM CHAIR USING ARMS: A LOT
TOILETING: TOTAL
DAILY ACTIVITIY SCORE: 11
PERSONAL GROOMING: A LOT
MOVING TO AND FROM BED TO CHAIR: A LOT
CLIMB 3 TO 5 STEPS WITH RAILING: TOTAL
DRESSING REGULAR UPPER BODY CLOTHING: A LOT
WALKING IN HOSPITAL ROOM: TOTAL
TURNING FROM BACK TO SIDE WHILE IN FLAT BAD: A LITTLE
MOVING FROM LYING ON BACK TO SITTING ON SIDE OF FLAT BED WITH BEDRAILS: A LITTLE

## 2024-08-01 ASSESSMENT — PAIN SCALES - GENERAL
PAINLEVEL_OUTOF10: 0 - NO PAIN

## 2024-08-01 ASSESSMENT — PAIN - FUNCTIONAL ASSESSMENT: PAIN_FUNCTIONAL_ASSESSMENT: 0-10

## 2024-08-01 NOTE — PROGRESS NOTES
Chao Thao is a 84 y.o. male on day 4 of admission presenting with Hypotension.      Subjective   The patient is alert and oriented X3. Sitting up to a chair eating without difficulty. Denies any pain.     Objective     Last Recorded Vitals  /73 (BP Location: Left arm, Patient Position: Lying)   Pulse 69   Temp 36.7 °C (98.1 °F) (Temporal)   Resp 18   Wt 88 kg (194 lb 0.1 oz)   SpO2 90%   Intake/Output last 3 Shifts:    Intake/Output Summary (Last 24 hours) at 8/1/2024 1320  Last data filed at 8/1/2024 0819  Gross per 24 hour   Intake 630 ml   Output --   Net 630 ml       Admission Weight  Weight: 83.6 kg (184 lb 3.2 oz) (07/28/24 0928)    Daily Weight  07/28/24 : 88 kg (194 lb 0.1 oz)    Image Results  ECG 12 lead  Sinus rhythm with Premature supraventricular complexes  Low voltage QRS  T wave abnormality, consider anterolateral ischemia  Abnormal ECG  When compared with ECG of 26-JUL-2024 09:20, (unconfirmed)  No significant change was found  See ED provider note for full interpretation and clinical correlation  Confirmed by Niyah Duval ([id number]) on 7/30/2024 6:03:37 PM      Physical Exam  Constitutional:       General: He is not in acute distress.     Appearance: He is not toxic-appearing or diaphoretic.   HENT:      Mouth/Throat:      Mouth: Mucous membranes are moist.   Eyes:      Extraocular Movements: Extraocular movements intact.   Cardiovascular:      Rate and Rhythm: Regular rhythm.   Pulmonary:      Breath sounds: Normal breath sounds.   Abdominal:      Palpations: Abdomen is soft.   Musculoskeletal:         General: Swelling present.      Left foot: Swelling present.      Comments: 2nd toe ulceration   Skin:     General: Skin is dry.   Neurological:      Mental Status: He is alert and oriented to person, place, and time.      Comments: RUE weakness resolved   Psychiatric:         Mood and Affect: Mood normal.         Behavior: Behavior normal.         Relevant Results  Results for orders  placed or performed during the hospital encounter of 07/28/24 (from the past 24 hour(s))   CBC   Result Value Ref Range    WBC 10.7 4.4 - 11.3 x10*3/uL    nRBC 0.0 0.0 - 0.0 /100 WBCs    RBC 3.12 (L) 4.50 - 5.90 x10*6/uL    Hemoglobin 9.5 (L) 13.5 - 17.5 g/dL    Hematocrit 29.5 (L) 41.0 - 52.0 %    MCV 95 80 - 100 fL    MCH 30.4 26.0 - 34.0 pg    MCHC 32.2 32.0 - 36.0 g/dL    RDW 13.9 11.5 - 14.5 %    Platelets 634 (H) 150 - 450 x10*3/uL   Vancomycin   Result Value Ref Range    Vancomycin 23.5 (H) 5.0 - 20.0 ug/mL   Basic Metabolic Panel   Result Value Ref Range    Glucose 87 74 - 99 mg/dL    Sodium 135 (L) 136 - 145 mmol/L    Potassium 3.7 3.5 - 5.3 mmol/L    Chloride 100 98 - 107 mmol/L    Bicarbonate 29 21 - 32 mmol/L    Anion Gap 10 10 - 20 mmol/L    Urea Nitrogen 14 6 - 23 mg/dL    Creatinine 0.62 0.50 - 1.30 mg/dL    eGFR >90 >60 mL/min/1.73m*2    Calcium 7.4 (L) 8.6 - 10.3 mg/dL               Assessment/Plan                  Active Problems:  There are no active Hospital Problems.    Chao Thao is a 84 y.o. year old male with PMH HTN, HLD, MDD, CAD, frequent falls, dementia, afib, hard of hearing, HFpEF 50% presented to the ED from SNF for low BP.     #Left toe ulceration  #Hypotension in the setting of infection  - florinef continued   - IV fluids adm hypotension resolved  - UCX showed no growth  -Leukocytosis   - Cardio consulted  - Procal 0.06  - Podiatry recommends LLE angiogram outpatient, prior to debridement.    - ID following will continue vancomycin, plan on oral Doxycycline x 1 week with discharge     #Acute delirium   #Stroke-like symptoms (resolved)  -Resolved this morning  -Right upper extremity weakness and ataxia present on 7/30/24  -CT head shows no acute stroke  -Neurology added recommendations for prevention and management  -Will minimize use of benzodiazepines, anti-cholinergic medications, and opiates   -SW following  -PT/OT recommend moderate intensity therapy      #Fractures of the  lateral 8th and 9th ribs   -Stable, denies pain     #HFpEF  #Elevated trop likely demand ischemia  -Chest x-ray showed: Pleural effusions and basilar atelectasis, right worse than left.   -Cardiology recommended against aggressive diuresis   -Stopped trend  -No respiratory distress present        #A fib  - hold metoprolol given low BP  - Anticoagulation does not appear on the MAR from     #Normocytic anemia  - monitor     #Frequent admission  -Palliative consulted. Will hold off on the consult for now, due to social issues.        Dispo: The patient is medically ready for discharge to SNF. Family and patient have selected [facility].  Pending precert.    Mindy Tang, APRN-CNP

## 2024-08-01 NOTE — NURSING NOTE
0730 Assumed care of pt.     0931 Medications given and assessment completed on pt. No complaints of pain or discomfort. Vitals stable.     1240 Wound care completed by nightshift nurse at 0600.

## 2024-08-01 NOTE — SIGNIFICANT EVENT
Discussed case with the primary and  team.  Palliative consultation is no longer desired in this case.   Please re consult if needed.   Will sign off .

## 2024-08-01 NOTE — PROGRESS NOTES
08/01/24 1123   Discharge Planning   Assistance Needed Pt accepted at [facility] (pt's wife prefers [facility], pt agreed); internal pre-cert initiated 8/1     SW confirmed with team pt is stable for discharge; internal pre-cert initiated today.  Will follow.

## 2024-08-01 NOTE — PROGRESS NOTES
Chao Thao is a 84 y.o. male on day 4 of admission presenting with Hypotension.    Subjective   Interval History: no fever, no new complaints        Review of Systems    Objective   Range of Vitals (last 24 hours)  Heart Rate:  [73-88]   Temp:  [36.7 °C (98.1 °F)-37.2 °C (99 °F)]   Resp:  [18]   BP: ()/(57-75)   SpO2:  [91 %-93 %]   Daily Weight  07/28/24 : 88 kg (194 lb 0.1 oz)    Body mass index is 31.31 kg/m².    Physical Exam  Constitutional:       Appearance: Normal appearance.   HENT:      Head: Normocephalic and atraumatic.      Mouth/Throat:      Mouth: Mucous membranes are moist.      Pharynx: Oropharynx is clear.   Eyes:      Pupils: Pupils are equal, round, and reactive to light.   Cardiovascular:      Rate and Rhythm: Normal rate and regular rhythm.      Heart sounds: Normal heart sounds.   Pulmonary:      Effort: Pulmonary effort is normal.      Breath sounds: Normal breath sounds.   Abdominal:      General: Abdomen is flat. Bowel sounds are normal.      Palpations: Abdomen is soft.   Musculoskeletal:      Cervical back: Normal range of motion.      Comments: Lt 2nd toe wound, less redness   Neurological:      Mental Status: He is alert.         Antibiotics  vancomycin - 750 mg/150 mL    Relevant Results  Labs  Results from last 72 hours   Lab Units 08/01/24  0623 07/31/24  0709 07/30/24  0650   WBC AUTO x10*3/uL 10.7 12.6* 13.7*   HEMOGLOBIN g/dL 9.5* 9.5* 10.2*   HEMATOCRIT % 29.5* 29.6* 31.7*   PLATELETS AUTO x10*3/uL 634* 665* 633*   NEUTROS PCT AUTO %  --  75.8 73.9   LYMPHS PCT AUTO %  --  12.9 13.5   MONOS PCT AUTO %  --  7.2 7.0   EOS PCT AUTO %  --  2.5 3.0     Results from last 72 hours   Lab Units 08/01/24  0624 07/31/24  0709 07/30/24  0650   SODIUM mmol/L 135* 134* 132*   POTASSIUM mmol/L 3.7 3.7 3.9   CHLORIDE mmol/L 100 101 99   CO2 mmol/L 29 27 27   BUN mg/dL 14 13 12   CREATININE mg/dL 0.62 0.66 0.55   GLUCOSE mg/dL 87 93 84   CALCIUM mg/dL 7.4* 7.5* 7.7*   ANION GAP mmol/L 10  10 10   EGFR mL/min/1.73m*2 >90 >90 >90     Results from last 72 hours   Lab Units 07/31/24  0709 07/30/24  0650   ALK PHOS U/L 138* 155*   BILIRUBIN TOTAL mg/dL 0.2 0.2   PROTEIN TOTAL g/dL 5.3* 5.9*   ALT U/L 10 12   AST U/L 13 16   ALBUMIN g/dL 2.0* 2.2*     Estimated Creatinine Clearance: 95.5 mL/min (by C-G formula based on SCr of 0.62 mg/dL).  C-Reactive Protein   Date Value Ref Range Status   06/30/2024 9.28 (H) <1.00 mg/dL Final     Microbiology  Reviewed  Imaging   Reviewed       Assessment/Plan   Hypotension, the BC is negative  Respiratory failure / rib fracture / atelectasis / effusion   Left 2nd toe wound / likely cellulitis  Leukocytosis, resolved  Encephalopathy, likely metabolic     Recommendations :  Continue Vancomycin, plan on oral Doxycycline x 1 week with discharge  Discussed with the wound care team     I spent minutes in the professional and overall care of this patient.      Wale Townsend MD

## 2024-08-01 NOTE — PROGRESS NOTES
Vancomycin Dosing by Pharmacy- FOLLOW UP    Chao Thao is a 84 y.o. year old male who Pharmacy has been consulted for vancomycin dosing for cellulitis, skin and soft tissue. Based on the patient's indication and renal status this patient is being dosed based on a goal AUC of 400-600.     Renal function is currently stable.    Current vancomycin dose: 750 mg given every 12 hours    Estimated vancomycin AUC on current dose: 588 mg/L.hr     Visit Vitals  /74 (BP Location: Left arm, Patient Position: Lying)   Pulse 73   Temp 36.8 °C (98.2 °F) (Temporal)   Resp 18        Lab Results   Component Value Date    CREATININE 0.62 2024    CREATININE 0.66 2024    CREATININE 0.55 2024    CREATININE 0.54 2024        Patient weight is as follows:   Vitals:    24 1612   Weight: 88 kg (194 lb 0.1 oz)       Cultures:  No results found for the encounter in last 14 days.       I/O last 3 completed shifts:  In: 1170 (13.3 mL/kg) [P.O.:720; IV Piggyback:450]  Out: - (0 mL/kg)   Weight: 88 kg   I/O during current shift:  I/O this shift:  In: 120 [P.O.:120]  Out: -     Temp (24hrs), Av.9 °C (98.4 °F), Min:36.7 °C (98.1 °F), Max:37.2 °C (99 °F)      Assessment/Plan    Above goal AUC. Orders placed for new vancomcyin regimen of 1250 every 24 hours to begin at 1000.    This dosing regimen is predicted by [employer] to result in the following pharmacokinetic parameters:  Loading dose: N/A  Regimen: 1250 mg IV every 24 hours.  Start time: 09:08 on 2024  Exposure target: AUC24 (range)400-600 mg/L.hr   AUC24,ss: 502 mg/L.hr  Probability of AUC24 > 400: 98 %  Ctrough,ss: 13.7 mg/L  Probability of Ctrough,ss > 20: 2 %  Probability of nephrotoxicity (Lodise VITO ): 9 %  /  The next level will be obtained on 8/3 at am labs. May be obtained sooner if clinically indicated.   Will continue to monitor renal function daily while on vancomycin and order serum creatinine at least every 48 hours if not  already ordered.  Follow for continued vancomycin needs, clinical response, and signs/symptoms of toxicity.       He Tam, PharmD  PGY1 Pharmacy Resident

## 2024-08-01 NOTE — DISCHARGE INSTRUCTIONS
Follow up with facility provider  Follow up with vascular for LLE angiogram  Follow up with podiatry

## 2024-08-02 ENCOUNTER — DOCUMENTATION (OUTPATIENT)
Dept: CARDIOLOGY | Facility: HOSPITAL | Age: 84
End: 2024-08-02
Payer: MEDICARE

## 2024-08-02 LAB
ANION GAP SERPL CALC-SCNC: 10 MMOL/L (ref 10–20)
BUN SERPL-MCNC: 14 MG/DL (ref 6–23)
CALCIUM SERPL-MCNC: 7.7 MG/DL (ref 8.6–10.3)
CHLORIDE SERPL-SCNC: 101 MMOL/L (ref 98–107)
CO2 SERPL-SCNC: 29 MMOL/L (ref 21–32)
CREAT SERPL-MCNC: 0.6 MG/DL (ref 0.5–1.3)
EGFRCR SERPLBLD CKD-EPI 2021: >90 ML/MIN/1.73M*2
ERYTHROCYTE [DISTWIDTH] IN BLOOD BY AUTOMATED COUNT: 13.9 % (ref 11.5–14.5)
GLUCOSE SERPL-MCNC: 82 MG/DL (ref 74–99)
HCT VFR BLD AUTO: 30 % (ref 41–52)
HGB BLD-MCNC: 9.5 G/DL (ref 13.5–17.5)
MCH RBC QN AUTO: 30.3 PG (ref 26–34)
MCHC RBC AUTO-ENTMCNC: 31.7 G/DL (ref 32–36)
MCV RBC AUTO: 96 FL (ref 80–100)
NRBC BLD-RTO: 0 /100 WBCS (ref 0–0)
PLATELET # BLD AUTO: 675 X10*3/UL (ref 150–450)
POTASSIUM SERPL-SCNC: 3.5 MMOL/L (ref 3.5–5.3)
RBC # BLD AUTO: 3.14 X10*6/UL (ref 4.5–5.9)
SODIUM SERPL-SCNC: 136 MMOL/L (ref 136–145)
WBC # BLD AUTO: 11.5 X10*3/UL (ref 4.4–11.3)

## 2024-08-02 PROCEDURE — 1200000002 HC GENERAL ROOM WITH TELEMETRY DAILY

## 2024-08-02 PROCEDURE — 2500000002 HC RX 250 W HCPCS SELF ADMINISTERED DRUGS (ALT 637 FOR MEDICARE OP, ALT 636 FOR OP/ED): Performed by: NURSE PRACTITIONER

## 2024-08-02 PROCEDURE — 2500000001 HC RX 250 WO HCPCS SELF ADMINISTERED DRUGS (ALT 637 FOR MEDICARE OP): Performed by: PHYSICIAN ASSISTANT

## 2024-08-02 PROCEDURE — 2500000001 HC RX 250 WO HCPCS SELF ADMINISTERED DRUGS (ALT 637 FOR MEDICARE OP): Performed by: INTERNAL MEDICINE

## 2024-08-02 PROCEDURE — 97116 GAIT TRAINING THERAPY: CPT | Mod: GP

## 2024-08-02 PROCEDURE — 99233 SBSQ HOSP IP/OBS HIGH 50: CPT | Performed by: NURSE PRACTITIONER

## 2024-08-02 PROCEDURE — 36415 COLL VENOUS BLD VENIPUNCTURE: CPT | Performed by: NURSE PRACTITIONER

## 2024-08-02 PROCEDURE — 97110 THERAPEUTIC EXERCISES: CPT | Mod: GP

## 2024-08-02 PROCEDURE — 2500000004 HC RX 250 GENERAL PHARMACY W/ HCPCS (ALT 636 FOR OP/ED): Performed by: STUDENT IN AN ORGANIZED HEALTH CARE EDUCATION/TRAINING PROGRAM

## 2024-08-02 PROCEDURE — 85027 COMPLETE CBC AUTOMATED: CPT | Performed by: NURSE PRACTITIONER

## 2024-08-02 PROCEDURE — 80048 BASIC METABOLIC PNL TOTAL CA: CPT | Performed by: NURSE PRACTITIONER

## 2024-08-02 PROCEDURE — 2500000005 HC RX 250 GENERAL PHARMACY W/O HCPCS: Performed by: INTERNAL MEDICINE

## 2024-08-02 PROCEDURE — 2500000001 HC RX 250 WO HCPCS SELF ADMINISTERED DRUGS (ALT 637 FOR MEDICARE OP): Performed by: STUDENT IN AN ORGANIZED HEALTH CARE EDUCATION/TRAINING PROGRAM

## 2024-08-02 PROCEDURE — 2500000004 HC RX 250 GENERAL PHARMACY W/ HCPCS (ALT 636 FOR OP/ED): Mod: JZ | Performed by: INTERNAL MEDICINE

## 2024-08-02 RX ORDER — OLANZAPINE 5 MG/1
5 TABLET ORAL NIGHTLY
Status: DISCONTINUED | OUTPATIENT
Start: 2024-08-02 | End: 2024-08-03 | Stop reason: HOSPADM

## 2024-08-02 RX ORDER — OLANZAPINE 5 MG/1
5 TABLET ORAL ONCE
Status: COMPLETED | OUTPATIENT
Start: 2024-08-02 | End: 2024-08-02

## 2024-08-02 RX ADMIN — VANCOMYCIN HYDROCHLORIDE 1.25 G: 1.25 INJECTION, POWDER, LYOPHILIZED, FOR SOLUTION INTRAVENOUS at 10:08

## 2024-08-02 RX ADMIN — HEPARIN SODIUM 5000 UNITS: 5000 INJECTION INTRAVENOUS; SUBCUTANEOUS at 22:09

## 2024-08-02 RX ADMIN — Medication 1 CAPSULE: at 08:46

## 2024-08-02 RX ADMIN — ACETAMINOPHEN 975 MG: 325 TABLET ORAL at 13:14

## 2024-08-02 RX ADMIN — ATORVASTATIN CALCIUM 40 MG: 40 TABLET, FILM COATED ORAL at 08:47

## 2024-08-02 RX ADMIN — METOPROLOL SUCCINATE 25 MG: 25 TABLET, EXTENDED RELEASE ORAL at 08:46

## 2024-08-02 RX ADMIN — LIDOCAINE 4% 1 PATCH: 40 PATCH TOPICAL at 08:46

## 2024-08-02 RX ADMIN — Medication 1 APPLICATION: at 23:51

## 2024-08-02 RX ADMIN — CYANOCOBALAMIN TAB 500 MCG 250 MCG: 500 TAB at 08:47

## 2024-08-02 RX ADMIN — ASPIRIN 81 MG: 81 TABLET, COATED ORAL at 08:47

## 2024-08-02 RX ADMIN — DULOXETINE HYDROCHLORIDE 40 MG: 20 CAPSULE, DELAYED RELEASE ORAL at 08:47

## 2024-08-02 RX ADMIN — OLANZAPINE 5 MG: 5 TABLET, FILM COATED ORAL at 17:08

## 2024-08-02 RX ADMIN — ACETAMINOPHEN 975 MG: 325 TABLET ORAL at 06:04

## 2024-08-02 RX ADMIN — Medication 1000 UNITS: at 08:46

## 2024-08-02 RX ADMIN — OXYCODONE HYDROCHLORIDE AND ACETAMINOPHEN 500 MG: 500 TABLET ORAL at 08:47

## 2024-08-02 RX ADMIN — Medication 1 APPLICATION: at 08:47

## 2024-08-02 RX ADMIN — HEPARIN SODIUM 5000 UNITS: 5000 INJECTION INTRAVENOUS; SUBCUTANEOUS at 06:04

## 2024-08-02 RX ADMIN — FLUDROCORTISONE ACETATE 0.1 MG: 0.1 TABLET ORAL at 08:47

## 2024-08-02 RX ADMIN — HEPARIN SODIUM 5000 UNITS: 5000 INJECTION INTRAVENOUS; SUBCUTANEOUS at 13:14

## 2024-08-02 RX ADMIN — Medication 1 APPLICATION: at 22:09

## 2024-08-02 RX ADMIN — ACETAMINOPHEN 975 MG: 325 TABLET ORAL at 22:04

## 2024-08-02 ASSESSMENT — COGNITIVE AND FUNCTIONAL STATUS - GENERAL
CLIMB 3 TO 5 STEPS WITH RAILING: TOTAL
WALKING IN HOSPITAL ROOM: TOTAL
MOVING TO AND FROM BED TO CHAIR: A LOT
MOVING FROM LYING ON BACK TO SITTING ON SIDE OF FLAT BED WITH BEDRAILS: A LITTLE
WALKING IN HOSPITAL ROOM: TOTAL
HELP NEEDED FOR BATHING: TOTAL
EATING MEALS: A LITTLE
STANDING UP FROM CHAIR USING ARMS: TOTAL
MOBILITY SCORE: 11
MOVING FROM LYING ON BACK TO SITTING ON SIDE OF FLAT BED WITH BEDRAILS: A LOT
TURNING FROM BACK TO SIDE WHILE IN FLAT BAD: A LOT
CLIMB 3 TO 5 STEPS WITH RAILING: TOTAL
DAILY ACTIVITIY SCORE: 11
DRESSING REGULAR UPPER BODY CLOTHING: A LOT
TOILETING: TOTAL
PERSONAL GROOMING: A LOT
TURNING FROM BACK TO SIDE WHILE IN FLAT BAD: A LOT
STANDING UP FROM CHAIR USING ARMS: A LOT
DRESSING REGULAR LOWER BODY CLOTHING: A LOT
MOBILITY SCORE: 8
MOVING TO AND FROM BED TO CHAIR: TOTAL

## 2024-08-02 ASSESSMENT — PAIN SCALES - GENERAL
PAINLEVEL_OUTOF10: 0 - NO PAIN
PAINLEVEL_OUTOF10: 0 - NO PAIN

## 2024-08-02 ASSESSMENT — PAIN - FUNCTIONAL ASSESSMENT
PAIN_FUNCTIONAL_ASSESSMENT: 0-10
PAIN_FUNCTIONAL_ASSESSMENT: 0-10

## 2024-08-02 NOTE — PROGRESS NOTES
Patient:    Bharti Hinojosa is a 77 year old female  Seen on September 17, 2018    Subjective:    Nursing notes reviewed and accepted    Medications:    Current Outpatient Prescriptions   Medication   • montelukast (SINGULAIR) 10 MG tablet   • VENTOLIN  (90 Base) MCG/ACT inhaler   • acetic acid-hydroCORTisone (ACETASOL HC) otic solution   • losartan (COZAAR) 100 MG tablet   • levothyroxine (SYNTHROID, LEVOTHROID) 75 MCG tablet   • spironolactone (ALDACTONE) 50 MG tablet   • hydrochlorothiazide (HYDRODIURIL) 50 MG tablet   • Potassium Chloride ER 20 MEQ Tab CR   • Turmeric Curcumin 500 MG Cap   • fluticasone (FLONASE ALLERGY RELIEF) 50 MCG/ACT nasal spray   • cephALEXin (KEFLEX) 500 MG capsule   • Carboxymethylcellul-Glycerin (OPTIVE) 0.5-0.9 % Solution   • LUTEIN PO   • Multiple Vitamin (MULTI VITAMIN DAILY PO)   • Coenzyme Q10 (COQ10) 100 MG Cap   • Calcium 500 MG CHEW     No current facility-administered medications for this visit.      Facility-Administered Medications Ordered in Other Visits   Medication   • propofol (DIPRIVAN) infusion   • lactated ringers infusion       Allergies:     ALLERGIES:   Allergen Reactions   • Metoprolol Tartrate RASH   • Clonidine NAUSEA   • Ramipril DIARRHEA   • Statins Other (See Comments)     Tingling hands and feet.       Interval History:    This office note has been dictated.    Objective:    There were no vitals taken for this visit.    Alert in no distress  Oriented to person, place and time  Skin: clear with no noted lesions  Nodes: no significant adenopathy  HEENT: Sclerae and conjunctivae are clear  Right tympanic membrane appears normal  Left tympanic membrane appears normal  Nasal mucosa pink  Turbinates appear normal  Septum midline  No polyps or unusual secretions  Throat is clear without erythema or exudate  No sinus or facial tenderness  Neck: Supple  Lungs: Clear to auscultation  Heart: Regular rate and rhythm without murmer    Orders:    Orders Placed  Chao Thao is a 84 y.o. male on day 5 of admission presenting with Hypotension.    Subjective   Interval History: no fever, no new complaints        Review of Systems    Objective   Range of Vitals (last 24 hours)  Heart Rate:  [69-92]   Temp:  [36.1 °C (97 °F)-37.2 °C (99 °F)]   Resp:  [17-18]   BP: (111-132)/(70-82)   SpO2:  [90 %-94 %]   Daily Weight  07/28/24 : 88 kg (194 lb 0.1 oz)    Body mass index is 31.31 kg/m².    Physical Exam  Constitutional:       Appearance: Normal appearance.   HENT:      Head: Normocephalic and atraumatic.      Mouth/Throat:      Mouth: Mucous membranes are moist.      Pharynx: Oropharynx is clear.   Eyes:      Pupils: Pupils are equal, round, and reactive to light.   Cardiovascular:      Rate and Rhythm: Normal rate and regular rhythm.      Heart sounds: Normal heart sounds.   Pulmonary:      Effort: Pulmonary effort is normal.      Breath sounds: Normal breath sounds.   Abdominal:      General: Abdomen is flat. Bowel sounds are normal.      Palpations: Abdomen is soft.   Musculoskeletal:      Cervical back: Normal range of motion.      Comments: Lt 2nd toe wound, less redness   Neurological:      Mental Status: He is alert.         Antibiotics  doxycycline - 100 mg  vancomycin  vancomycin IV 1250 mg in 250 mL D5W    Relevant Results  Labs  Results from last 72 hours   Lab Units 08/02/24  0606 08/01/24  0623 07/31/24  0709   WBC AUTO x10*3/uL 11.5* 10.7 12.6*   HEMOGLOBIN g/dL 9.5* 9.5* 9.5*   HEMATOCRIT % 30.0* 29.5* 29.6*   PLATELETS AUTO x10*3/uL 675* 634* 665*   NEUTROS PCT AUTO %  --   --  75.8   LYMPHS PCT AUTO %  --   --  12.9   MONOS PCT AUTO %  --   --  7.2   EOS PCT AUTO %  --   --  2.5     Results from last 72 hours   Lab Units 08/02/24  0606 08/01/24  0624 07/31/24  0709   SODIUM mmol/L 136 135* 134*   POTASSIUM mmol/L 3.5 3.7 3.7   CHLORIDE mmol/L 101 100 101   CO2 mmol/L 29 29 27   BUN mg/dL 14 14 13   CREATININE mg/dL 0.60 0.62 0.66   GLUCOSE mg/dL 82 87 93    CALCIUM mg/dL 7.7* 7.4* 7.5*   ANION GAP mmol/L 10 10 10   EGFR mL/min/1.73m*2 >90 >90 >90     Results from last 72 hours   Lab Units 07/31/24  0709   ALK PHOS U/L 138*   BILIRUBIN TOTAL mg/dL 0.2   PROTEIN TOTAL g/dL 5.3*   ALT U/L 10   AST U/L 13   ALBUMIN g/dL 2.0*     Estimated Creatinine Clearance: 98.7 mL/min (by C-G formula based on SCr of 0.6 mg/dL).  C-Reactive Protein   Date Value Ref Range Status   06/30/2024 9.28 (H) <1.00 mg/dL Final     Microbiology  Reviewed  Imaging   Reviewed       Assessment/Plan   Hypotension, the BC is negative  Respiratory failure / rib fracture / atelectasis / effusion   Left 2nd toe wound / likely cellulitis  Leukocytosis, resolved  Encephalopathy, likely metabolic     Recommendations :  Continue Vancomycin, plan on oral Doxycycline x 1 week with discharge  Discussed with the wound care team     I spent minutes in the professional and overall care of this patient.      Wale Townsend MD   This Encounter   • montelukast (SINGULAIR) 10 MG tablet   • VENTOLIN  (90 Base) MCG/ACT inhaler   • acetic acid-hydroCORTisone (ACETASOL HC) otic solution       Diagnosis:    1. Mild intermittent asthma without complication    2. Non-seasonal allergic rhinitis due to other allergic trigger

## 2024-08-02 NOTE — PROGRESS NOTES
Physical Therapy    Physical Therapy Treatment    Patient Name: Chao Thao  MRN: [id number]  Today's Date: 8/2/2024  Time Calculation  Start Time: 1511  Stop Time: 1534  Time Calculation (min): 23 min    Assessment/Plan   PT Assessment  PT Assessment Results: Decreased strength, Decreased endurance, Impaired balance, Decreased mobility  Rehab Prognosis: Good  Barriers to Discharge: None  Evaluation/Treatment Tolerance: Patient tolerated treatment well  Medical Staff Made Aware: Yes  Strengths: Support of Caregivers  Barriers to Participation: Comorbidities, Ability to acquire knowledge, Housing layout  End of Session Communication: Bedside nurse  Assessment Comment: Patient tolerates mobility well, requires two person assist for safety, decreased activity tolerance however staff confirms patient has spent majority of the day up in chair. Patient would continue to benefit from mod intensity PT intervention.  End of Session Patient Position: Bed, 3 rail up, Alarm on  PT Plan  Inpatient/Swing Bed or Outpatient: Inpatient  PT Plan  Treatment/Interventions: Bed mobility, Transfer training, Gait training, Stair training, Balance training, Strengthening, Endurance training  PT Plan: Ongoing PT  PT Frequency: 3 times per week  PT Discharge Recommendations: Moderate intensity level of continued care  Equipment Recommended upon Discharge: Wheeled walker (owns)  PT Recommended Transfer Status: Assist x2  PT - OK to Discharge: Yes (per PT POC)      General Visit Information:   PT  Visit  PT Received On: 08/02/24  Response to Previous Treatment: Patient with no complaints from previous session.  General  Reason for Referral: Impaired functional mobility; hypotension  Referred By: Ruby Chakraborty  Past Medical History Relevant to Rehab: HTN, HLD, MDD, CAD, frequent falls, dementia, afib, hard of hearing, HFpEF 50%  Family/Caregiver Present: No  Prior to Session Communication: Bedside nurse  Patient Position Received: Bed, 3  rail up, Alarm on  General Comment: Patient pleasantly confused, agreeable to PT eval    Subjective   Precautions:  Precautions  Medical Precautions: Fall precautions  Precautions Comment: Heel lift boots donned following tx    Objective   Pain:  Pain Assessment  Pain Assessment: 0-10  0-10 (Numeric) Pain Score: 0 - No pain  Cognition:  Cognition  Overall Cognitive Status: Impaired  Orientation Level: Disoriented to situation, Disoriented to time  Coordination:  Movements are Fluid and Coordinated: Yes  Postural Control:  Postural Control  Postural Control: Within Functional Limits  Static Sitting Balance  Static Sitting-Balance Support: Bilateral upper extremity supported, Feet supported  Static Sitting-Level of Assistance: Contact guard  Static Standing Balance  Static Standing-Balance Support: Bilateral upper extremity supported  Static Standing-Level of Assistance: Minimum assistance (x 2)    Activity Tolerance:  Activity Tolerance  Endurance: Tolerates 10 - 20 min exercise with multiple rests  Treatments:  Therapeutic Exercise  Therapeutic Exercise Performed: Yes  Therapeutic Exercise Activity 1: sitting: ankle pumps B 2 x 20; LAQ B 2 x 20; Marching B 2 x 20      Bed Mobility  Bed Mobility: Yes  Bed Mobility 1  Bed Mobility 1: Supine to sitting  Level of Assistance 1: Moderate assistance (trunk)  Bed Mobility 2  Bed Mobility  2: Sitting to supine  Level of Assistance 2: Minimum assistance (B LE)    Ambulation/Gait Training  Ambulation/Gait Training Performed: Yes  Ambulation/Gait Training 1  Surface 1: Level tile  Device 1: Rolling walker  Assistance 1: Minimum assistance (x 2)  Quality of Gait 1:  (B knee flexion throughout all phases of gait, decreased step length, decreased step height)  Comments/Distance (ft) 1: 3' x 2 (sidestepping to HOB)  Transfers  Transfer: Yes  Transfer 1  Transfer From 1: Sit to, Stand to  Transfer to 1: Sit, Stand  Technique 1: Sit to stand, Stand to sit  Transfer Device 1:  Walker  Transfer Level of Assistance 1: Minimum assistance (x 2)  Trials/Comments 1: 2 trials         Outcome Measures:  [facility] Basic Mobility  Turning from your back to your side while in a flat bed without using bedrails: A lot  Moving from lying on your back to sitting on the side of a flat bed without using bedrails: A lot  Moving to and from bed to chair (including a wheelchair): Total  Standing up from a chair using your arms (e.g. wheelchair or bedside chair): Total  To walk in hospital room: Total  Climbing 3-5 steps with railing: Total  Basic Mobility - Total Score: 8    Education Documentation  Handouts, taught by Tabitha Young PT at 8/2/2024  3:43 PM.  Learner: Patient  Readiness: Acceptance  Method: Explanation  Response: Verbalizes Understanding, Needs Reinforcement    Home Exercise Program, taught by Tabitha Young PT at 8/2/2024  3:43 PM.  Learner: Patient  Readiness: Acceptance  Method: Explanation  Response: Verbalizes Understanding, Needs Reinforcement    Precautions, taught by Tabitha Young PT at 8/2/2024  3:43 PM.  Learner: Patient  Readiness: Acceptance  Method: Explanation  Response: Verbalizes Understanding, Needs Reinforcement    Body Mechanics, taught by Tabitha Young PT at 8/2/2024  3:43 PM.  Learner: Patient  Readiness: Acceptance  Method: Explanation  Response: Verbalizes Understanding, Needs Reinforcement    Mobility Training, taught by Tabitha Young PT at 8/2/2024  3:43 PM.  Learner: Patient  Readiness: Acceptance  Method: Explanation  Response: Verbalizes Understanding, Needs Reinforcement    Education Comments  No comments found.        OP EDUCATION:       Encounter Problems       Encounter Problems (Active)       Balance       STG - Maintains static standing balance without upper extremity support x 5' supervision  (Progressing)       Start:  07/31/24    Expected End:  08/14/24               Mobility       STG - Patient will ambulate with 2WW 25' min A   (Progressing)        Start:  07/31/24    Expected End:  08/14/24            STG - Patient will ascend and descend four stairs B rails min A  (Progressing)       Start:  07/31/24    Expected End:  08/14/24               PT Transfers       STG - Patient will perform bed mobility CGA  (Progressing)       Start:  07/31/24    Expected End:  08/14/24            STG - Patient will transfer sit to and from stand min A  (Progressing)       Start:  07/31/24    Expected End:  08/14/24

## 2024-08-02 NOTE — PROGRESS NOTES
Chao Thao is a 84 y.o. male on day 5 of admission presenting with Hypotension.      Subjective   The patient is slightly confused this afternoon.       Objective     Last Recorded Vitals  /79 (BP Location: Left arm, Patient Position: Sitting)   Pulse 77   Temp 36.3 °C (97.3 °F) (Temporal)   Resp 18   Wt 88 kg (194 lb 0.1 oz)   SpO2 92%   Intake/Output last 3 Shifts:    Intake/Output Summary (Last 24 hours) at 8/2/2024 1532  Last data filed at 8/2/2024 1259  Gross per 24 hour   Intake 515 ml   Output --   Net 515 ml       Admission Weight  Weight: 83.6 kg (184 lb 3.2 oz) (07/28/24 0928)    Daily Weight  07/28/24 : 88 kg (194 lb 0.1 oz)    Image Results  ECG 12 lead  Sinus rhythm with Premature supraventricular complexes  Low voltage QRS  T wave abnormality, consider anterolateral ischemia  Abnormal ECG  When compared with ECG of 26-JUL-2024 09:20, (unconfirmed)  No significant change was found  See ED provider note for full interpretation and clinical correlation  Confirmed by Niyah Duval ([id number]) on 7/30/2024 6:03:37 PM      Physical Exam  Constitutional:       General: He is not in acute distress.     Appearance: He is not toxic-appearing or diaphoretic.   HENT:      Mouth/Throat:      Mouth: Mucous membranes are moist.   Eyes:      Extraocular Movements: Extraocular movements intact.   Cardiovascular:      Rate and Rhythm: Regular rhythm.   Pulmonary:      Breath sounds: Normal breath sounds.   Abdominal:      Palpations: Abdomen is soft.   Musculoskeletal:         General: Swelling present.      Left foot: Swelling present.      Comments: 2nd toe ulceration   Skin:     General: Skin is dry.   Neurological:      Mental Status: He is alert and oriented to person, place, and time.      Comments: RUE weakness resolved   Psychiatric:         Mood and Affect: Mood normal.         Behavior: Behavior normal.         Relevant Results  Results for orders placed or performed during the hospital encounter of  07/28/24 (from the past 24 hour(s))   POCT GLUCOSE   Result Value Ref Range    POCT Glucose 105 (H) 74 - 99 mg/dL   CBC   Result Value Ref Range    WBC 11.5 (H) 4.4 - 11.3 x10*3/uL    nRBC 0.0 0.0 - 0.0 /100 WBCs    RBC 3.14 (L) 4.50 - 5.90 x10*6/uL    Hemoglobin 9.5 (L) 13.5 - 17.5 g/dL    Hematocrit 30.0 (L) 41.0 - 52.0 %    MCV 96 80 - 100 fL    MCH 30.3 26.0 - 34.0 pg    MCHC 31.7 (L) 32.0 - 36.0 g/dL    RDW 13.9 11.5 - 14.5 %    Platelets 675 (H) 150 - 450 x10*3/uL   Basic Metabolic Panel   Result Value Ref Range    Glucose 82 74 - 99 mg/dL    Sodium 136 136 - 145 mmol/L    Potassium 3.5 3.5 - 5.3 mmol/L    Chloride 101 98 - 107 mmol/L    Bicarbonate 29 21 - 32 mmol/L    Anion Gap 10 10 - 20 mmol/L    Urea Nitrogen 14 6 - 23 mg/dL    Creatinine 0.60 0.50 - 1.30 mg/dL    eGFR >90 >60 mL/min/1.73m*2    Calcium 7.7 (L) 8.6 - 10.3 mg/dL               Assessment/Plan                  Active Problems:  There are no active Hospital Problems.    Chao Thao is a 84 y.o. year old male with PMH HTN, HLD, MDD, CAD, frequent falls, dementia, afib, hard of hearing, HFpEF 50% presented to the ED from SNF for low BP.     #Left toe ulceration  #Hypotension in the setting of infection  - florinef continued   - IV fluids adm hypotension resolved  - UCX showed no growth  -Leukocytosis   - Cardio consulted  - Procal 0.06  - Podiatry recommends LLE angiogram outpatient, prior to debridement.    - ID following will continue vancomycin, plan on oral Doxycycline x 1 week with discharge     #Acute delirium   #Stroke-like symptoms (resolved)  -Resolved this morning  -Right upper extremity weakness and ataxia present on 7/30/24  -CT head shows no acute stroke  -Neurology added recommendations for prevention and management  -Will minimize use of benzodiazepines, anti-cholinergic medications, and opiates   -SW following  -Adding Zyprexa now  -PT/OT recommend moderate intensity therapy      #Fractures of the lateral 8th and 9th ribs    -Stable, denies pain     #HFpEF  #Elevated trop likely demand ischemia  -Chest x-ray showed: Pleural effusions and basilar atelectasis, right worse than left.   -Cardiology recommended against aggressive diuresis   -Stopped trend  -No respiratory distress present        #A fib  - hold metoprolol given low BP  - Anticoagulation does not appear on the MAR from . Per cardiology notes, the anticoagulation was discontinued due to recurrent falls and bleeding risk. He declined to resume the anticoagulation and is addressing the issue with his PCP.    #Normocytic anemia  - monitor     #Frequent admission  -Palliative consulted. Will hold off on the consult for now, due to social issues.        Dispo: Plan to discharge to SNF on 8/3/24. Transport ETA 1130 am. Wife made aware of care plan.     Mindy Tang, MOSHE-CNP

## 2024-08-02 NOTE — PROGRESS NOTES
08/02/24 1348   Discharge Planning   Living Arrangements Other (Comment)  (from Patrica bejarnao)   Support Systems Spouse/significant other   Assistance Needed assistance with all ADLs   Type of Residence Skilled nursing facility   Home or Post Acute Services Post acute facilities (Rehab/SNF/etc)   Type of Post Acute Facility Services Skilled nursing   Expected Discharge Disposition SNF   RoundTrip coordination needed? Yes   Has discharge transport been arranged? No   Patient Choice   Provider Choice list and CMS website (https://medicare.gov/care-compare#search) for post-acute Quality and Resource Measure Data were provided and reviewed with: Family;Patient   Patient / Family choosing to utilize agency / facility established prior to hospitalization No     08/02/2024 1349: Precert initiated 8/1, remains pending.     08/02/2024 1559: Precert obtained, transport arranged for 8/3 @ 11:30am.

## 2024-08-02 NOTE — NURSING NOTE
0730 Assumed care of pt.    0847 Medications given and assessment completed on pt. No complaints of pain or discomfort. Vitals stable.    1304 Pt seems to be more confused than usual. Provider made aware and at bedside to assess.

## 2024-08-02 NOTE — CARE PLAN
SW updated that pre-cert has been rec'd and pt will discharge on 8/3 @ 1130am.  Pt's spouse notified via email as is spouse's preferred communication method.

## 2024-08-03 VITALS
OXYGEN SATURATION: 92 % | SYSTOLIC BLOOD PRESSURE: 101 MMHG | HEART RATE: 78 BPM | TEMPERATURE: 97.5 F | HEIGHT: 66 IN | BODY MASS INDEX: 31.18 KG/M2 | WEIGHT: 194 LBS | RESPIRATION RATE: 16 BRPM | DIASTOLIC BLOOD PRESSURE: 67 MMHG

## 2024-08-03 LAB
CREAT SERPL-MCNC: 0.6 MG/DL (ref 0.5–1.3)
EGFRCR SERPLBLD CKD-EPI 2021: >90 ML/MIN/1.73M*2
VANCOMYCIN SERPL-MCNC: 17.2 UG/ML (ref 5–20)

## 2024-08-03 PROCEDURE — 2500000001 HC RX 250 WO HCPCS SELF ADMINISTERED DRUGS (ALT 637 FOR MEDICARE OP): Performed by: INTERNAL MEDICINE

## 2024-08-03 PROCEDURE — 99239 HOSP IP/OBS DSCHRG MGMT >30: CPT | Performed by: NURSE PRACTITIONER

## 2024-08-03 PROCEDURE — 2500000001 HC RX 250 WO HCPCS SELF ADMINISTERED DRUGS (ALT 637 FOR MEDICARE OP): Performed by: STUDENT IN AN ORGANIZED HEALTH CARE EDUCATION/TRAINING PROGRAM

## 2024-08-03 PROCEDURE — 2500000005 HC RX 250 GENERAL PHARMACY W/O HCPCS: Performed by: FAMILY MEDICINE

## 2024-08-03 PROCEDURE — 82565 ASSAY OF CREATININE: CPT | Performed by: INTERNAL MEDICINE

## 2024-08-03 PROCEDURE — 2500000004 HC RX 250 GENERAL PHARMACY W/ HCPCS (ALT 636 FOR OP/ED): Performed by: NURSE PRACTITIONER

## 2024-08-03 PROCEDURE — 2500000004 HC RX 250 GENERAL PHARMACY W/ HCPCS (ALT 636 FOR OP/ED): Performed by: STUDENT IN AN ORGANIZED HEALTH CARE EDUCATION/TRAINING PROGRAM

## 2024-08-03 PROCEDURE — 2500000001 HC RX 250 WO HCPCS SELF ADMINISTERED DRUGS (ALT 637 FOR MEDICARE OP): Performed by: PHYSICIAN ASSISTANT

## 2024-08-03 PROCEDURE — 36415 COLL VENOUS BLD VENIPUNCTURE: CPT | Performed by: INTERNAL MEDICINE

## 2024-08-03 PROCEDURE — 94760 N-INVAS EAR/PLS OXIMETRY 1: CPT

## 2024-08-03 PROCEDURE — 80202 ASSAY OF VANCOMYCIN: CPT | Performed by: INTERNAL MEDICINE

## 2024-08-03 PROCEDURE — 2500000005 HC RX 250 GENERAL PHARMACY W/O HCPCS: Performed by: INTERNAL MEDICINE

## 2024-08-03 PROCEDURE — 2500000004 HC RX 250 GENERAL PHARMACY W/ HCPCS (ALT 636 FOR OP/ED): Mod: JZ | Performed by: INTERNAL MEDICINE

## 2024-08-03 RX ORDER — METOPROLOL SUCCINATE 50 MG/1
50 TABLET, EXTENDED RELEASE ORAL DAILY
Status: DISCONTINUED | OUTPATIENT
Start: 2024-08-04 | End: 2024-08-03 | Stop reason: HOSPADM

## 2024-08-03 RX ORDER — METOPROLOL SUCCINATE 50 MG/1
50 TABLET, EXTENDED RELEASE ORAL DAILY
Start: 2024-08-04

## 2024-08-03 RX ORDER — FUROSEMIDE 10 MG/ML
20 INJECTION INTRAMUSCULAR; INTRAVENOUS ONCE
Status: COMPLETED | OUTPATIENT
Start: 2024-08-03 | End: 2024-08-03

## 2024-08-03 RX ORDER — OLANZAPINE 5 MG/1
5 TABLET ORAL NIGHTLY
Start: 2024-08-03 | End: 2024-09-02

## 2024-08-03 RX ADMIN — FLUDROCORTISONE ACETATE 0.1 MG: 0.1 TABLET ORAL at 08:20

## 2024-08-03 RX ADMIN — LIDOCAINE 4% 1 PATCH: 40 PATCH TOPICAL at 08:20

## 2024-08-03 RX ADMIN — Medication 2 L/MIN: at 08:30

## 2024-08-03 RX ADMIN — METOPROLOL SUCCINATE 25 MG: 25 TABLET, EXTENDED RELEASE ORAL at 08:20

## 2024-08-03 RX ADMIN — ASPIRIN 81 MG: 81 TABLET, COATED ORAL at 08:21

## 2024-08-03 RX ADMIN — ATORVASTATIN CALCIUM 40 MG: 40 TABLET, FILM COATED ORAL at 08:20

## 2024-08-03 RX ADMIN — VANCOMYCIN HYDROCHLORIDE 1.25 G: 1.25 INJECTION, POWDER, LYOPHILIZED, FOR SOLUTION INTRAVENOUS at 09:36

## 2024-08-03 RX ADMIN — HEPARIN SODIUM 5000 UNITS: 5000 INJECTION INTRAVENOUS; SUBCUTANEOUS at 05:11

## 2024-08-03 RX ADMIN — ACETAMINOPHEN 975 MG: 325 TABLET ORAL at 05:10

## 2024-08-03 RX ADMIN — Medication 1 CAPSULE: at 08:20

## 2024-08-03 RX ADMIN — HEPARIN SODIUM 5000 UNITS: 5000 INJECTION INTRAVENOUS; SUBCUTANEOUS at 13:03

## 2024-08-03 RX ADMIN — CYANOCOBALAMIN TAB 500 MCG 250 MCG: 500 TAB at 09:00

## 2024-08-03 RX ADMIN — DULOXETINE HYDROCHLORIDE 40 MG: 20 CAPSULE, DELAYED RELEASE ORAL at 08:20

## 2024-08-03 RX ADMIN — FUROSEMIDE 20 MG: 10 INJECTION, SOLUTION INTRAMUSCULAR; INTRAVENOUS at 09:36

## 2024-08-03 RX ADMIN — Medication 1000 UNITS: at 08:20

## 2024-08-03 RX ADMIN — OXYCODONE HYDROCHLORIDE AND ACETAMINOPHEN 500 MG: 500 TABLET ORAL at 08:20

## 2024-08-03 RX ADMIN — ACETAMINOPHEN 975 MG: 325 TABLET ORAL at 13:03

## 2024-08-03 RX ADMIN — Medication 1 APPLICATION: at 08:23

## 2024-08-03 ASSESSMENT — COGNITIVE AND FUNCTIONAL STATUS - GENERAL
DRESSING REGULAR UPPER BODY CLOTHING: A LOT
STANDING UP FROM CHAIR USING ARMS: TOTAL
MOBILITY SCORE: 8
TURNING FROM BACK TO SIDE WHILE IN FLAT BAD: A LOT
WALKING IN HOSPITAL ROOM: TOTAL
PERSONAL GROOMING: A LOT
MOVING TO AND FROM BED TO CHAIR: TOTAL
TOILETING: TOTAL
HELP NEEDED FOR BATHING: TOTAL
DAILY ACTIVITIY SCORE: 11
MOVING FROM LYING ON BACK TO SITTING ON SIDE OF FLAT BED WITH BEDRAILS: A LOT
EATING MEALS: A LITTLE
DRESSING REGULAR LOWER BODY CLOTHING: A LOT
CLIMB 3 TO 5 STEPS WITH RAILING: TOTAL

## 2024-08-03 ASSESSMENT — PAIN - FUNCTIONAL ASSESSMENT: PAIN_FUNCTIONAL_ASSESSMENT: 0-10

## 2024-08-03 ASSESSMENT — PAIN SCALES - GENERAL
PAINLEVEL_OUTOF10: 0 - NO PAIN
PAINLEVEL_OUTOF10: 0 - NO PAIN

## 2024-08-03 NOTE — PROGRESS NOTES
Chao Thao is a 84 y.o. male on day 6 of admission presenting with Hypotension.    Subjective   No new complaints.  Feels improved.    Meds:  Scheduled medications  acetaminophen, 975 mg, oral, q8h  ammonium lactate, 1 Application, Topical, BID  ascorbic acid, 500 mg, oral, Daily  aspirin, 81 mg, oral, Daily  atorvastatin, 40 mg, oral, Daily  cholecalciferol, 1,000 Units, oral, Daily  cyanocobalamin, 250 mcg, oral, Daily  DULoxetine, 40 mg, oral, Daily  fludrocortisone, 0.1 mg, oral, Daily  heparin (porcine), 5,000 Units, subcutaneous, q8h JIGNESH  lactobacillus acidophilus, 1 capsule, oral, Daily  lidocaine, 1 patch, transdermal, Daily  metoprolol succinate XL, 25 mg, oral, Daily  OLANZapine, 5 mg, oral, Nightly  oxygen, , inhalation, Continuous - Inhalation  vancomycin, 1,250 mg, intravenous, q24h      Continuous medications     PRN medications  PRN medications: magnesium hydroxide, vancomycin, zinc oxide       Physical Exam     Expand All Collapse All    Inpatient consult to Podiatry  Consult performed by: Delilah Thompson DPM  Consult ordered by: Kristal Jordan MD  Reason for consult: Left 2nd toe ulceration              Reason For Consult  Left 2nd toe ulceration.     History Of Present Illness  Chao Thao is a 84 y.o. male presenting with a chronic ulceration to his left 2nd toe present for several months. Wife at bedside relating that patient is not at baseline and his concerned about his lack of mental acuity. Saw Dr. Hartmann's PA this past week and LLE angiogram was recommended.     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     Surgical History  He has a past surgical history that includes Inner ear surgery (01/06/2016); Other surgical history (03/16/2020); and Cardiac catheterization (05/14/2018).     Social History  He reports that he has quit smoking. His smoking use included cigarettes. He has never used smokeless tobacco. He reports that he does not currently use alcohol. He reports that he does not use drugs.     Family History  Family History   No family history on file.        Allergies  Gabapentin and Penicillin     Review of Systems  Per above        Physical Exam   CON: awake, alert, minimal distress;   EYES: conjunctiva wnl; PERRL; EOMI  ENMT: hearing intact; MMM;   NECK: symmetric; thyroid and cervical nodes wnl;   CV: S1 S2 - irregular with no m/g/r; no lower extremity edema; normal JVP; symmetric pulses  RESP: normal work of breathing; lungs CTAB;   GI: abdomen nontender; no organomegaly;   SKIN: Diffuse ecchymoses, lower extremity stasis dermatitis and abrasions, onychomycosis of all toenails;  non-palpable pedal pulses B/L;  ulceration to left 2nd toe PIPJ - no erythema or active drainage.  Appears almost 100% epithelialized..  No bone exposure at PIPJ  MSK: ROM wnl; digits wnl;   NEURO: language and speech wnl; sensation and motor function grossly intact   PSYCH: oriented to situation; affect normal;       Last Recorded Vitals  Blood pressure 111/70, pulse 80, temperature 37 °C (98.6 °F), temperature source Temporal, resp. rate 16, height 1.676 m (5' 6\"), weight 88 kg (194 lb 0.1 oz), SpO2 93%.    Intake/Output last 3 Shifts:  I/O last 3 completed shifts:  In: 515 (5.9 mL/kg) [P.O.:240; IV Piggyback:275]  Out: - (0 mL/kg)   Weight: 88 kg     Relevant Results   ECG 12 lead    Result Date: 7/30/2024  Sinus rhythm with Premature supraventricular complexes Low voltage QRS T wave abnormality, consider anterolateral ischemia " Abnormal ECG When compared with ECG of 26-JUL-2024 09:20, (unconfirmed) No significant change was found See ED provider note for full interpretation and clinical correlation Confirmed by Niyah Duval ([id number]) on 7/30/2024 6:03:37 PM    XR foot left 3+ views    Result Date: 7/29/2024  Interpreted By:  María Wood, STUDY: XR FOOT LEFT 3+ VIEWS; ;  7/28/2024 7:56 pm   INDICATION: Signs/Symptoms:2nd toe wound / infection.   COMPARISON: Left foot radiographs dated 06/28/2024   ACCESSION NUMBER(S): [id number]   ORDERING CLINICIAN: REYES REYNOSO   FINDINGS: Three views of left foot were performed.   There is soft tissue ulceration along the dorsal aspect of the 2nd digit, likely located over the expected location of proximal interphalangeal joint. There is diffuse soft tissue swelling about the 2nd digit. There is no definite cortical destruction of the 2nd digit to definitively suggest osteomyelitis. There are moderate degenerative changes in the great toe metatarsophalangeal and interphalangeal joints with joint space narrowing and prominent marginal osteophytes. There are also mild-to-moderate degenerative changes in the proximal and distal interphalangeal joints of 2nd through 5th digits.       Diffuse soft tissue swelling of the 2nd digit with ulceration over the dorsal aspect of proximal interphalangeal joint. No definite radiographic evidence of osteomyelitis, however please note that radiographs are limited for evaluation of early osteomyelitis. Recommend MRI for definitive evaluation.     MACRO: None   Signed by: María Wood 7/29/2024 9:55 AM Dictation workstation:   [id number]    CT head wo IV contrast    Result Date: 7/28/2024  Interpreted By:  Marya Sharma, STUDY: CT HEAD WO IV CONTRAST;  7/28/2024 12:15 pm   INDICATION: Signs/Symptoms:stroke symptoms earlier.   COMPARISON: 06/28/2024.   ACCESSION NUMBER(S): [id number]   ORDERING CLINICIAN: MALLY VELOZ   TECHNIQUE: Noncontrast axial  CT scan of head was performed. Multiplanar reconstructions   FINDINGS: No acute intracranial hemorrhage, mass effect, midline shift, or herniation. No evidence of hydrocephalus. Mild global cerebral atrophy with concordant prominence of the ventricles and sulci. There is a relative increase in volume loss involving the frontal lobe, similar to prior exam. Mild periventricular and subcortical deep white matter hypodensity suggestive of chronic microangiopathic change. Postoperative changes of the right mastoid air cells. Partially effusion left mastoid air cells. No acute osseous abnormality of the calvarium. No destructive bone lesion.       No acute intracranial abnormality. Consider follow-up with MRI as warranted.     Signed by: Marya Sharma 7/28/2024 12:22 PM Dictation workstation:   [id number]    XR chest 1 view    Result Date: 7/28/2024  Interpreted By:  Love Gallardo, STUDY: XR CHEST 1 VIEW;  7/28/2024 9:55 am   INDICATION: Signs/Symptoms:sob.   COMPARISON: 06/27/2024   ACCESSION NUMBER(S): [id number]   ORDERING CLINICIAN: MALLY VELOZ   TECHNIQUE: Portable AP upright   FINDINGS: Cardiac silhouette is upper normal in size. Aorta is atherosclerotic. Bilateral pleural effusions and bilateral basilar atelectasis represent an interval change from the prior study. Right appears worse than left. Fractures of the lateral 8th and 9th ribs are at the margin of the image and not as well demonstrated as on the prior study.       Pleural effusions and basilar atelectasis, right worse than left and worse than the prior study   Right 8th and 9th rib fractures are not as well seen as on the prior exam   MACRO: None.   Signed by: Love Gallardo 7/28/2024 10:11 AM Dictation workstation:   [id number]    ECG 12 lead (Clinic Performed)    Result Date: 7/26/2024  Sinus tachycardia with Premature atrial complexes ST & T wave abnormality, consider inferior ischemia ST & T wave abnormality, consider  anterolateral ischemia Abnormal ECG When compared with ECG of 28-JUN-2024 08:47, (unconfirmed) Previous ECG has undetermined rhythm, needs review Inverted T waves have replaced nonspecific T wave abnormality in Inferior leads T wave inversion now evident in Anterior leads   Results for orders placed or performed during the hospital encounter of 07/28/24 (from the past 24 hour(s))   Vancomycin   Result Value Ref Range    Vancomycin 17.2 5.0 - 20.0 ug/mL   Creatinine, Serum   Result Value Ref Range    Creatinine 0.60 0.50 - 1.30 mg/dL    eGFR >90 >60 mL/min/1.73m*2       Assessment/Plan   Active Problems:  There are no active Hospital Problems.    Left 2nd toe ulceration  Much improved.  Xeroform and bandaid is okay.  Monitor for any change.  IV antibiotics per ID.  Planning on oral doxycycline with discharge.   Will follow.  Planning discharge to SNF today.         Delilah Thompson DPM

## 2024-08-03 NOTE — PROGRESS NOTES
Vancomycin Dosing by Pharmacy- FOLLOW UP    Chao Thao is a 84 y.o. year old male who Pharmacy has been consulted for vancomycin dosing for cellulitis, skin and soft tissue. Based on the patient's indication and renal status this patient is being dosed based on a goal AUC of 400-600.     Renal function is currently stable.    Current vancomycin dose: 1250 mg given every 24 hours    Estimated vancomycin AUC on current dose: 481 mg/L.hr     Visit Vitals  /71   Pulse 90   Temp 36.6 °C (97.9 °F)   Resp 19        Lab Results   Component Value Date    CREATININE 0.60 2024    CREATININE 0.60 2024    CREATININE 0.62 2024    CREATININE 0.66 2024        Patient weight is as follows:   Vitals:    24 1612   Weight: 88 kg (194 lb 0.1 oz)       Cultures:  No results found for the encounter in last 14 days.       I/O last 3 completed shifts:  In: 515 (5.9 mL/kg) [P.O.:240; IV Piggyback:275]  Out: - (0 mL/kg)   Weight: 88 kg   I/O during current shift:  No intake/output data recorded.    Temp (24hrs), Av.6 °C (97.8 °F), Min:36.3 °C (97.3 °F), Max:36.7 °C (98.1 °F)      Assessment/Plan    Within goal AUC range. Continue current vancomycin regimen.    This dosing regimen is predicted by [employer] to result in the following pharmacokinetic parameters:  Loading dose: N/A  Regimen: 1250 mg IV every 24 hours.  Start time: 10:08 on 2024  Exposure target: AUC24 (range)400-600 mg/L.hr   AUC24,ss: 481 mg/L.hr  Probability of AUC24 > 400: 98 %  Ctrough,ss: 13.5 mg/L  Probability of Ctrough,ss > 20: 0 %  Probability of nephrotoxicity (Lodise VITO ): 9 %      The next level will be obtained on 8/6 at am labs. May be obtained sooner if clinically indicated.   Will continue to monitor renal function daily while on vancomycin and order serum creatinine at least every 48 hours if not already ordered.  Follow for continued vancomycin needs, clinical response, and signs/symptoms of toxicity.        William Fong, PharmD

## 2024-08-03 NOTE — PROGRESS NOTES
Chao Thao is a 84 y.o. male on day 6 of admission presenting with Hypotension.    Subjective   Interval History: no fever, no new complaints        Review of Systems    Objective   Range of Vitals (last 24 hours)  Heart Rate:  [73-92]   Temp:  [36.3 °C (97.3 °F)-37 °C (98.6 °F)]   Resp:  [16-19]   BP: (111-126)/(70-79)   SpO2:  [90 %-96 %]   Daily Weight  07/28/24 : 88 kg (194 lb 0.1 oz)    Body mass index is 31.31 kg/m².    Physical Exam  Constitutional:       Appearance: Normal appearance.   HENT:      Head: Normocephalic and atraumatic.      Mouth/Throat:      Mouth: Mucous membranes are moist.      Pharynx: Oropharynx is clear.   Eyes:      Pupils: Pupils are equal, round, and reactive to light.   Cardiovascular:      Rate and Rhythm: Normal rate and regular rhythm.      Heart sounds: Normal heart sounds.   Pulmonary:      Effort: Pulmonary effort is normal.      Breath sounds: Normal breath sounds.   Abdominal:      General: Abdomen is flat. Bowel sounds are normal.      Palpations: Abdomen is soft.   Musculoskeletal:      Cervical back: Normal range of motion.      Comments: Lt 2nd toe redness and edema have improved, the wound is dry   Neurological:      Mental Status: He is alert.         Antibiotics  doxycycline - 100 mg  vancomycin  vancomycin IV 1250 mg in 250 mL D5W    Relevant Results  Labs  Results from last 72 hours   Lab Units 08/02/24  0606 08/01/24  0623   WBC AUTO x10*3/uL 11.5* 10.7   HEMOGLOBIN g/dL 9.5* 9.5*   HEMATOCRIT % 30.0* 29.5*   PLATELETS AUTO x10*3/uL 675* 634*     Results from last 72 hours   Lab Units 08/03/24  0728 08/02/24  0606 08/01/24  0624   SODIUM mmol/L  --  136 135*   POTASSIUM mmol/L  --  3.5 3.7   CHLORIDE mmol/L  --  101 100   CO2 mmol/L  --  29 29   BUN mg/dL  --  14 14   CREATININE mg/dL 0.60 0.60 0.62   GLUCOSE mg/dL  --  82 87   CALCIUM mg/dL  --  7.7* 7.4*   ANION GAP mmol/L  --  10 10   EGFR mL/min/1.73m*2 >90 >90 >90           Estimated Creatinine Clearance:  98.7 mL/min (by C-G formula based on SCr of 0.6 mg/dL).  C-Reactive Protein   Date Value Ref Range Status   06/30/2024 9.28 (H) <1.00 mg/dL Final     Microbiology  Reviewed  Imaging   Reviewed       Assessment/Plan   Hypotension, the BC is negative  Respiratory failure / rib fracture / atelectasis / effusion   Left 2nd toe wound / likely cellulitis  Leukocytosis, resolved  Encephalopathy, likely metabolic     Recommendations :  Continue Vancomycin, plan on oral Doxycycline x 1 week with discharge  Discussed with the medical team     I spent minutes in the professional and overall care of this patient.      Wale Townsend MD

## 2024-08-03 NOTE — PROGRESS NOTES
08/03/24 1222   Discharge Planning   Living Arrangements Other (Comment)  (from [facility])   Support Systems Spouse/significant other   Assistance Needed assistance with all ADLs   Type of Residence Skilled nursing facility   Home or Post Acute Services Post acute facilities (Rehab/SNF/etc)   Type of Post Acute Facility Services Skilled nursing   Expected Discharge Disposition SNF   Does the patient need discharge transport arranged? Yes   RoundTrip coordination needed? Yes   Has discharge transport been arranged? Yes   What day is the transport expected? 08/03/24   What time is the transport expected? [id number]

## 2024-08-03 NOTE — NURSING NOTE
Attempted to call report on patient to [facility]. No answer on the unit. Will try again later.

## 2024-08-04 NOTE — DISCHARGE SUMMARY
Discharge Diagnosis  Hypotension    Issues Requiring Follow-Up  Follow up with facility provider  Follow up with vascular for LLE angiogram  Follow up with podiatry    Discharge Meds     Your medication list        START taking these medications        Instructions Last Dose Given Next Dose Due   ammonium lactate 12 % lotion  Commonly known as: Lac-Hydrin      Apply 1 Application topically 2 times a day.       doxycycline 100 mg capsule  Commonly known as: Vibramycin      Take 1 capsule (100 mg) by mouth 2 times a day for 7 days. Take with at least 8 ounces (large glass) of water, do not lie down for 30 minutes after       OLANZapine 5 mg tablet  Commonly known as: ZyPREXA      Take 1 tablet (5 mg) by mouth once daily at bedtime.       zinc oxide 40 % ointment ointment      Apply 1 Application topically 2 times a day as needed (apply to groin).              CHANGE how you take these medications        Instructions Last Dose Given Next Dose Due   metoprolol succinate XL 25 mg 24 hr tablet  Commonly known as: Toprol-XL  What changed: Another medication with the same name was added. Make sure you understand how and when to take each.      Take 1 tablet (25 mg) by mouth once daily. Do not crush or chew.       metoprolol succinate XL 50 mg 24 hr tablet  Commonly known as: Toprol-XL  Start taking on: August 4, 2024  What changed: You were already taking a medication with the same name, and this prescription was added. Make sure you understand how and when to take each.      Take 1 tablet (50 mg) by mouth once daily. Do not crush or chew. Do not fill before August 4, 2024.              CONTINUE taking these medications        Instructions Last Dose Given Next Dose Due   acetaminophen 500 mg tablet  Commonly known as: Tylenol      Take 2 tablets (1,000 mg) by mouth every 8 hours.       Acidophilus capsule  Generic drug: lactobacillus acidophilus           ascorbic acid 500 mg tablet  Commonly known as: Vitamin C            aspirin 81 mg EC tablet           atorvastatin 20 mg tablet  Commonly known as: Lipitor           cholecalciferol 25 MCG (1000 UT) capsule  Commonly known as: Vitamin D-3           cyanocobalamin 250 mcg tablet  Commonly known as: Vitamin B-12           DULoxetine 20 mg DR capsule  Commonly known as: Cymbalta           fludrocortisone 0.1 mg tablet  Commonly known as: Florinef      Take 0.5 tablets (0.05 mg) by mouth once daily.       lidocaine 4 % patch      Place 1 patch over 12 hours on the skin once daily. Remove & discard patch within 12 hours or as directed by MD.       magnesium hydroxide 400 mg/5 mL suspension  Commonly known as: Milk of Magnesia           miconazole 2 % cream  Commonly known as: Micotin           ondansetron 4 mg tablet  Commonly known as: Zofran           oxygen gas therapy  Commonly known as: O2      Inhale 1 each once every 24 hours.       potassium chloride CR 20 mEq ER tablet  Commonly known as: Klor-Con M20           zinc acetate 50 mg (zinc) capsule                     Where to Get Your Medications        Information about where to get these medications is not yet available    Ask your nurse or doctor about these medications  ammonium lactate 12 % lotion  doxycycline 100 mg capsule  metoprolol succinate XL 50 mg 24 hr tablet  OLANZapine 5 mg tablet  zinc oxide 40 % ointment ointment         Test Results Pending At Discharge  Pending Labs       No current pending labs.            Hospital Course   Chao Thao is a 84 y.o. year old male with PMH HTN, HLD, MDD, CAD, frequent falls, dementia, afib, hard of hearing, HFpEF 50% presented to the ED from SNF for low BP.      #Left toe ulceration  #Hypotension in the setting of infection  - florinef continued   - IV fluids adm hypotension resolved  - UCX showed no growth  - Procal 0.06  - Podiatry recommends LLE angiogram outpatient, prior to debridement.    -Discharged on oral Doxycycline x 1 week   #Acute delirium  (resolved)  -Intermittent confusion persist at baseline  -Right upper extremity weakness and ataxia present on 7/30/24 (resolved)  -CT head shows no acute stroke  -Neurology added recommendations for prevention and management of delirium   -Minimize use of benzodiazepines, anti-cholinergic medications, and opiates   -SW following  -Zyprexa continued  #Fractures of the lateral 8th and 9th ribs   -PT/OT recommend moderate intensity therapy. #HFpEF  #Elevated trop likely demand ischemia  -Chest x-ray showed: Pleural effusions and basilar atelectasis, right worse than left.   -Cardiology recommended against aggressive diuresis   -Lasix 20mg IV given X1  #A fib  -Increased metoprolol succinate XL to 50mg daily  - Anticoagulation does not appear on the MAR from . Per cardiology notes, the anticoagulation was discontinued due to recurrent falls and bleeding risk. He declined to resume the anticoagulation and is addressing the issue with his PCP.     Discharged to [facility].   Pertinent Physical Exam At Time of Discharge  Physical Exam  Constitutional:       General: He is not in acute distress.     Appearance: He is not toxic-appearing or diaphoretic.   HENT:      Mouth/Throat:      Mouth: Mucous membranes are moist.   Eyes:      Extraocular Movements: Extraocular movements intact.   Cardiovascular:      Rate and Rhythm: Regular rhythm.   Pulmonary:      Breath sounds: Normal breath sounds.   Abdominal:      Palpations: Abdomen is soft.   Musculoskeletal:         General: Swelling present.      Left foot: Swelling present.      Comments: 2nd toe ulceration   Skin:     General: Skin is dry.   Neurological:      Mental Status: He is alert and oriented to person, place, and time.      Comments: RUE weakness resolved   Psychiatric:         Mood and Affect: Mood normal.         Behavior: Behavior normal.     Outpatient Follow-Up  Future Appointments   Date Time Provider Department Center   9/10/2024  3:40 PM Stephen Mahoney  MD [facility]   10/25/2024  1:30 PM MD [facility]         Mindy Tnag, APRN-CNP

## 2024-08-07 ENCOUNTER — NURSING HOME VISIT (OUTPATIENT)
Dept: POST ACUTE CARE | Facility: EXTERNAL LOCATION | Age: 84
End: 2024-08-07
Payer: MEDICARE

## 2024-08-07 DIAGNOSIS — D64.9 ANEMIA, UNSPECIFIED TYPE: ICD-10-CM

## 2024-08-07 DIAGNOSIS — I48.20 CHRONIC ATRIAL FIBRILLATION (MULTI): ICD-10-CM

## 2024-08-07 DIAGNOSIS — I10 PRIMARY HYPERTENSION: ICD-10-CM

## 2024-08-07 DIAGNOSIS — I73.9 PVD (PERIPHERAL VASCULAR DISEASE) (CMS-HCC): Primary | ICD-10-CM

## 2024-08-07 DIAGNOSIS — R41.89 COGNITIVE IMPAIRMENT: ICD-10-CM

## 2024-08-07 DIAGNOSIS — E78.2 MIXED HYPERLIPIDEMIA: ICD-10-CM

## 2024-08-07 DIAGNOSIS — I25.110 CORONARY ARTERY DISEASE INVOLVING NATIVE CORONARY ARTERY OF NATIVE HEART WITH UNSTABLE ANGINA PECTORIS (MULTI): ICD-10-CM

## 2024-08-07 NOTE — LETTER
Patient: Chao Thao  : 1940    Encounter Date: 2024    Subjective  Patient ID: Chao Thao is a 84 y.o. male who is acute skilled care being seen and evaluated for multiple medical problems.    HPI Chao Thao is a 84 y.o. year old male with PMH HTN, HLD, MDD, CAD, frequent falls, dementia, afib, hard of hearing, HFpEF 50% presented to the ED from SNF for low BP.    he was seen by podiatry for toe ulceration and given ivf for low bp.  He needs lle angiogram scheduled. He completed po doxycycline. He had some pleural effusions noted and cardio consulted, minimal diuresis recommended. He did have toprol increased to 50mg.  No anticoagulation due to recurrent falls and bleeding risk.       Review of Systems   Constitutional:  Positive for fatigue. Negative for fever.   HENT:  Negative for congestion and sore throat.    Eyes:  Negative for visual disturbance.   Respiratory:  Positive for cough and shortness of breath. Negative for chest tightness.    Cardiovascular:  Positive for leg swelling. Negative for chest pain and palpitations.   Gastrointestinal:  Negative for abdominal pain, constipation, diarrhea, nausea and vomiting.   Genitourinary:  Positive for frequency. Negative for dysuria and urgency.   Musculoskeletal:  Positive for gait problem. Negative for arthralgias.   Skin:  Negative for rash.   Neurological:  Negative for dizziness, tremors, syncope and headaches.   Psychiatric/Behavioral:  Positive for confusion and dysphoric mood. The patient is not nervous/anxious.    Objective  There were no vitals taken for this visit.    Physical Exam  Vitals (wt 173 131/70 98.3 71 18) reviewed.   Constitutional:       General: He is not in acute distress.     Appearance: Normal appearance. He is not ill-appearing.   HENT:      Head: Normocephalic and atraumatic.      Right Ear: Tympanic membrane and external ear normal.      Left Ear: Tympanic membrane and external ear normal.      Nose: Nose  normal.      Mouth/Throat:      Mouth: Mucous membranes are moist.      Pharynx: Oropharynx is clear.   Eyes:      Conjunctiva/sclera: Conjunctivae normal.      Pupils: Pupils are equal, round, and reactive to light.   Cardiovascular:      Rate and Rhythm: Normal rate and regular rhythm.      Heart sounds: Normal heart sounds. No murmur heard.  Pulmonary:      Effort: Pulmonary effort is normal. No respiratory distress.      Breath sounds: Decreased breath sounds present. No wheezing.   Abdominal:      General: There is no distension.      Palpations: Abdomen is soft. There is no mass.      Tenderness: There is no abdominal tenderness.   Musculoskeletal:         General: Normal range of motion.      Cervical back: Normal range of motion and neck supple.   Skin:     General: Skin is warm and dry.      Findings: Bruising present.   Neurological:      General: No focal deficit present.      Mental Status: He is alert and oriented to person, place, and time.      Sensory: No sensory deficit.      Motor: Weakness present.   Psychiatric:         Mood and Affect: Mood normal.         Behavior: Behavior normal. Behavior is cooperative.         Assessment/Plan  Problem List Items Addressed This Visit             ICD-10-CM    Cognitive impairment R41.89    Primary hypertension I10    Coronary artery disease involving native coronary artery of native heart with unstable angina pectoris (Multi) I25.110    PVD (peripheral vascular disease) (CMS-[facility]) - Primary I73.9    Anemia D64.9    Chronic atrial fibrillation (Multi) I48.20    Mixed hyperlipidemia E78.2   Cbc cmp due, continue with wound care, needs LLE angio scheduled     Goals    None           Electronically Signed By: Beverly Torres MD   8/9/24  2:49 PM

## 2024-08-09 ENCOUNTER — LAB REQUISITION (OUTPATIENT)
Dept: LAB | Facility: HOSPITAL | Age: 84
End: 2024-08-09

## 2024-08-09 DIAGNOSIS — I48.0 PAROXYSMAL ATRIAL FIBRILLATION (MULTI): ICD-10-CM

## 2024-08-09 DIAGNOSIS — E78.5 HYPERLIPIDEMIA, UNSPECIFIED: ICD-10-CM

## 2024-08-09 DIAGNOSIS — I10 ESSENTIAL (PRIMARY) HYPERTENSION: ICD-10-CM

## 2024-08-09 DIAGNOSIS — D64.9 ANEMIA, UNSPECIFIED: ICD-10-CM

## 2024-08-09 LAB
ALBUMIN SERPL BCP-MCNC: 2.5 G/DL (ref 3.4–5)
ALP SERPL-CCNC: 170 U/L (ref 33–136)
ALT SERPL W P-5'-P-CCNC: 26 U/L (ref 10–52)
ANION GAP SERPL CALC-SCNC: 13 MMOL/L (ref 10–20)
AST SERPL W P-5'-P-CCNC: 41 U/L (ref 9–39)
ATRIAL RATE: 81 BPM
BILIRUB SERPL-MCNC: 0.3 MG/DL (ref 0–1.2)
BUN SERPL-MCNC: 16 MG/DL (ref 6–23)
CALCIUM SERPL-MCNC: 8.3 MG/DL (ref 8.6–10.3)
CHLORIDE SERPL-SCNC: 103 MMOL/L (ref 98–107)
CO2 SERPL-SCNC: 30 MMOL/L (ref 21–32)
CREAT SERPL-MCNC: 0.71 MG/DL (ref 0.5–1.3)
EGFRCR SERPLBLD CKD-EPI 2021: >90 ML/MIN/1.73M*2
ERYTHROCYTE [DISTWIDTH] IN BLOOD BY AUTOMATED COUNT: 15.4 % (ref 11.5–14.5)
GLUCOSE SERPL-MCNC: 65 MG/DL (ref 74–99)
HCT VFR BLD AUTO: 34 % (ref 41–52)
HGB BLD-MCNC: 10.3 G/DL (ref 13.5–17.5)
MCH RBC QN AUTO: 30.4 PG (ref 26–34)
MCHC RBC AUTO-ENTMCNC: 30.3 G/DL (ref 32–36)
MCV RBC AUTO: 100 FL (ref 80–100)
NRBC BLD-RTO: 0 /100 WBCS (ref 0–0)
PLATELET # BLD AUTO: 680 X10*3/UL (ref 150–450)
POTASSIUM SERPL-SCNC: 4 MMOL/L (ref 3.5–5.3)
PROT SERPL-MCNC: 6 G/DL (ref 6.4–8.2)
Q ONSET: 216 MS
QRS COUNT: 15 BEATS
QRS DURATION: 100 MS
QT INTERVAL: 372 MS
QTC CALCULATION(BAZETT): 462 MS
QTC FREDERICIA: 430 MS
R AXIS: 27 DEGREES
RBC # BLD AUTO: 3.39 X10*6/UL (ref 4.5–5.9)
SODIUM SERPL-SCNC: 142 MMOL/L (ref 136–145)
T AXIS: 143 DEGREES
T OFFSET: 402 MS
VENTRICULAR RATE: 93 BPM
WBC # BLD AUTO: 13 X10*3/UL (ref 4.4–11.3)

## 2024-08-09 PROCEDURE — 84075 ASSAY ALKALINE PHOSPHATASE: CPT | Mod: OUT | Performed by: FAMILY MEDICINE

## 2024-08-09 PROCEDURE — 85027 COMPLETE CBC AUTOMATED: CPT | Mod: OUT | Performed by: FAMILY MEDICINE

## 2024-08-09 NOTE — PROGRESS NOTES
Subjective   Patient ID: Chao Thao is a 84 y.o. male who is acute skilled care being seen and evaluated for multiple medical problems.    HPI Chao Thao is a 84 y.o. year old male with PMH HTN, HLD, MDD, CAD, frequent falls, dementia, afib, hard of hearing, HFpEF 50% presented to the ED from SNF for low BP.    he was seen by podiatry for toe ulceration and given ivf for low bp.  He needs lle angiogram scheduled. He completed po doxycycline. He had some pleural effusions noted and cardio consulted, minimal diuresis recommended. He did have toprol increased to 50mg.  No anticoagulation due to recurrent falls and bleeding risk.       Review of Systems   Constitutional:  Positive for fatigue. Negative for fever.   HENT:  Negative for congestion and sore throat.    Eyes:  Negative for visual disturbance.   Respiratory:  Positive for cough and shortness of breath. Negative for chest tightness.    Cardiovascular:  Positive for leg swelling. Negative for chest pain and palpitations.   Gastrointestinal:  Negative for abdominal pain, constipation, diarrhea, nausea and vomiting.   Genitourinary:  Positive for frequency. Negative for dysuria and urgency.   Musculoskeletal:  Positive for gait problem. Negative for arthralgias.   Skin:  Negative for rash.   Neurological:  Negative for dizziness, tremors, syncope and headaches.   Psychiatric/Behavioral:  Positive for confusion and dysphoric mood. The patient is not nervous/anxious.    Objective   There were no vitals taken for this visit.    Physical Exam  Vitals (wt 173 131/70 98.3 71 18) reviewed.   Constitutional:       General: He is not in acute distress.     Appearance: Normal appearance. He is not ill-appearing.   HENT:      Head: Normocephalic and atraumatic.      Right Ear: Tympanic membrane and external ear normal.      Left Ear: Tympanic membrane and external ear normal.      Nose: Nose normal.      Mouth/Throat:      Mouth: Mucous membranes are moist.       Pharynx: Oropharynx is clear.   Eyes:      Conjunctiva/sclera: Conjunctivae normal.      Pupils: Pupils are equal, round, and reactive to light.   Cardiovascular:      Rate and Rhythm: Normal rate and regular rhythm.      Heart sounds: Normal heart sounds. No murmur heard.  Pulmonary:      Effort: Pulmonary effort is normal. No respiratory distress.      Breath sounds: Decreased breath sounds present. No wheezing.   Abdominal:      General: There is no distension.      Palpations: Abdomen is soft. There is no mass.      Tenderness: There is no abdominal tenderness.   Musculoskeletal:         General: Normal range of motion.      Cervical back: Normal range of motion and neck supple.   Skin:     General: Skin is warm and dry.      Findings: Bruising present.   Neurological:      General: No focal deficit present.      Mental Status: He is alert and oriented to person, place, and time.      Sensory: No sensory deficit.      Motor: Weakness present.   Psychiatric:         Mood and Affect: Mood normal.         Behavior: Behavior normal. Behavior is cooperative.         Assessment/Plan   Problem List Items Addressed This Visit             ICD-10-CM    Cognitive impairment R41.89    Primary hypertension I10    Coronary artery disease involving native coronary artery of native heart with unstable angina pectoris (Multi) I25.110    PVD (peripheral vascular disease) (CMS-[facility]) - Primary I73.9    Anemia D64.9    Chronic atrial fibrillation (Multi) I48.20    Mixed hyperlipidemia E78.2   Cbc cmp due, continue with wound care, needs LLE angio scheduled     Goals    None

## 2024-08-12 RX ORDER — METOPROLOL SUCCINATE 25 MG/1
25 TABLET, EXTENDED RELEASE ORAL DAILY
Qty: 90 TABLET | Refills: 1 | Status: SHIPPED | OUTPATIENT
Start: 2024-08-12

## 2024-08-19 ENCOUNTER — TELEPHONE (OUTPATIENT)
Dept: NEUROLOGY | Facility: CLINIC | Age: 84
End: 2024-08-19
Payer: MEDICARE

## 2024-08-19 NOTE — PROGRESS NOTES
Name: Chao Thao  : 1940  MRN: [id number]  Visit Date: 2024  Chief Complaint: Weekly SNF Physician Visit    HPI: 85 y/o Full Code, PMH of HTN, HLD, MDD, CAD, frequent falls, dementia, afib, hard of hearing, HFpEF 50% 2024 who presents from home after leaving Replaced by [facility]. Brought in by EMS after a fall. Patient was continued on IV ceftriaxone and a 3 day course of azithromycin then started on IV vancomycin with rocephin. Podiatry and wound care were consulted for patient's non healing toe ulcer. Podiatry determined wound did not look infected and recommended patient follow-up with Dr. Hartmann outpatient as scheduled for his PAD. Patient with orthostatic hypotension He was given IV fluids and AM cortisol ruled out adrenal insufficiency. He was started on Florinef. PT/OT recommended moderate intensity therapy at discharge. He was medically stable and discharged to SNF on 24. Sent with script for Doxycycline 100mg BID x1 day to complete a 5 day course of abx and Florinef. Instructed to follow up with PCP and Cardiology on discharge. Once stabilized, pt was brought to [facility] on 2024 for ongoing med mgt and therapy services.     Subjective: Seen and examined today. Laying in bed. Still on . Denies N/V/D/C/CP. No fever or chills.     The patient was counseled regarding diagnostic results, instructions for management, risk factor reductions, prognosis, patient and family education, impressions, risks and benefits of treatment options and importance of compliance with treatment. I have reviewed nursing notes since my last visit and document any significant changes Reviewed orders, medications, Labs. Reviewed chart looking at current medications, treatments, labs, x-rays etc.     ROS:  As above in subjective. Otherwise, all other systems have been reviewed and are negative for complaint.    Current Outpatient Medications   Medication Instructions    acetaminophen (TYLENOL) 1,000 mg, oral, Every 8  hours    ammonium lactate (Lac-Hydrin) 12 % lotion 1 Application, Topical, 2 times daily    ascorbic acid (VITAMIN C) 500 mg, oral, Daily    aspirin 81 mg, oral, Daily    atorvastatin (LIPITOR) 20 mg, oral, Daily    cholecalciferol (VITAMIN D-3) 25 mcg, oral, Daily    cyanocobalamin (VITAMIN B-12) 250 mcg, oral, Daily    DULoxetine (CYMBALTA) 40 mg, oral, Daily, Do not crush or chew.    fludrocortisone (FLORINEF) 0.05 mg, oral, Daily    lactobacillus acidophilus (Acidophilus) capsule 1 capsule, oral, Daily    lidocaine 4 % patch 1 patch, transdermal, Daily, Remove & discard patch within 12 hours or as directed by MD.    magnesium hydroxide (Milk of Magnesia) 400 mg/5 mL suspension 30 mL, oral, Daily PRN    metoprolol succinate XL (TOPROL-XL) 25 mg, oral, Daily, Swallow whole. Do not chew or crush    miconazole (Micotin) 2 % cream 1 Application, Topical, 3 times daily    OLANZapine (ZYPREXA) 5 mg, oral, Nightly    ondansetron (ZOFRAN) 4 mg, oral, Every 4 hours PRN    oxygen (O2) gas therapy 1 each, inhalation, Every 24 hours    potassium chloride CR 20 mEq ER tablet 20 mEq, oral, Daily, Do not crush or chew.    zinc acetate 50 mg (zinc) capsule 1 capsule, oral, Daily    zinc oxide 40 % ointment ointment 1 Application, Topical, 2 times daily PRN    Physical Exam  Vitals reviewed.   Constitutional:       General: He is not in acute distress.     Appearance: Normal appearance. He is ill-appearing.   HENT:      Head: Normocephalic and atraumatic.      Right Ear: Tympanic membrane and external ear normal.      Left Ear: Tympanic membrane and external ear normal.      Nose: Nose normal.      Mouth/Throat:      Mouth: Mucous membranes are moist.      Pharynx: Oropharynx is clear.   Eyes:      Conjunctiva/sclera: Conjunctivae normal.      Pupils: Pupils are equal, round, and reactive to light.   Cardiovascular:      Rate and Rhythm: Normal rate and regular rhythm.      Heart sounds: Normal heart sounds. No murmur  heard.  Pulmonary:      Effort: Pulmonary effort is normal. No respiratory distress.      Breath sounds: Decreased breath sounds present. No wheezing.   Abdominal:      General: There is no distension.      Palpations: Abdomen is soft. There is no mass.      Tenderness: There is no abdominal tenderness.   Musculoskeletal:         General: Normal range of motion.      Cervical back: Normal range of motion and neck supple.   Skin:     General: Skin is warm and dry.      Findings: Bruising present.   Neurological:      General: No focal deficit present.      Mental Status: He is alert and oriented to person, place, and time.      Sensory: No sensory deficit.      Motor: Weakness present.   Psychiatric:         Mood and Affect: Mood normal.         Behavior: Behavior normal. Behavior is cooperative.          Lab Results   Component Value Date    WBC 14.3 (H) 08/12/2024    HGB 10.6 (L) 08/12/2024    HCT 34.3 (L) 08/12/2024     (H) 08/12/2024    CHOL 147 03/16/2020    TRIG 95 03/16/2020    HDL 34.5 (A) 03/16/2020    ALT 17 08/12/2024    AST 23 08/12/2024     08/12/2024    K 4.4 08/12/2024     08/12/2024    CREATININE 0.70 08/12/2024    BUN 16 08/12/2024    CO2 30 08/12/2024    TSH 0.67 02/03/2024    INR 1.3 (H) 06/28/2024    HGBA1C 5.9 03/16/2020     Results were reviewed and addressed accordingly. Lab Results were also reviewed in [facility].     Provider Impression:   Acute hypoxic respiratory failure 2/2 recent sepsis d/t pneumonia  #recurrent pneumonia  #hx recent acute R rib fxs s/p fall   - CT chest w/ contrast: small simple left pleural effusion and LLL atelectasis  - pain regimen for acute pain. Spirometry, airway clearance.  - Blood cultures obtained 6/28 negative so far. Legionella and strep negative  - CT chest obtained in ED negative for infectious etiology  - Unremarkable CT abdomen/pelvis w/ contrast  - completed doxycycline atb duration     LLE wound and other chronic wounds   #chronic  neuropathy and recurrent falls   - consult Wound care team  - Xray left foot no acute bony abnormality  -  c/w duloxetine 60 daily  - AM cortisol 28.2.   - PT/OT to eval and tx     Orthostatic hypotension, hyponatremia  #h/o HTN   - AM cortisol 28.2. Ruled out adrenal insufficiency   - Started on Florinef      CV- CAD, Afib w/ frequent falls, HTN, HFpEF  - lisinopril and metoprolol were discontinued during hospitalization  - pt reports he has been given Eliquis at [facility].   - Pt still needs eval for Watchman procedure outpatient.   - Holding metoprolol due to hypotension  - echo 50% diastolic dysfunction 2/2024.     BLE edema   - chronic per pt not increased recently.     Code Status  - full code  ----------------  Written by Shira Varela RN, acting as a scribe for Dr. Escalante. This note accurately reflects the work and decisions made by Dr. Escalante.     I, Dr. Escalante, attest all medical record entries made by the scribe were under my direction and were personally dictated by me. I have reviewed the chart and agree that the record accurately reflects my performance of the history, physical exam, and assessment and plan.

## 2024-08-19 NOTE — PROGRESS NOTES
Name: Chao Thao  : 1940  MRN: [id number]  Visit Date: 2024  Chief Complaint: Weekly SNF Physician Visit    HPI: 83 y/o Full Code, PMH of HTN, HLD, MDD, CAD, frequent falls, dementia, afib, hard of hearing, HFpEF 50% 2024 who presents from home after leaving [facility]. Brought in by EMS after a fall. Patient was continued on IV ceftriaxone and a 3 day course of azithromycin then started on IV vancomycin with rocephin. Podiatry and wound care were consulted for patient's non healing toe ulcer. Podiatry determined wound did not look infected and recommended patient follow-up with Dr. Hartmann outpatient as scheduled for his PAD. Patient with orthostatic hypotension He was given IV fluids and AM cortisol ruled out adrenal insufficiency. He was started on Florinef. PT/OT recommended moderate intensity therapy at discharge. He was medically stable and discharged to SNF on 24. Sent with script for Doxycycline 100mg BID x1 day to complete a 5 day course of abx and Florinef. Instructed to follow up with PCP and Cardiology on discharge. Once stabilized, pt was brought to [facility] on 2024 for ongoing med mgt and therapy services.     Subjective: Seen and examined today. Laying in bed. Still on . Denies N/V/D/C/CP. No fever or chills.     The patient was counseled regarding diagnostic results, instructions for management, risk factor reductions, prognosis, patient and family education, impressions, risks and benefits of treatment options and importance of compliance with treatment. I have reviewed nursing notes since my last visit and document any significant changes Reviewed orders, medications, Labs. Reviewed chart looking at current medications, treatments, labs, x-rays etc.     ROS:  As above in subjective. Otherwise, all other systems have been reviewed and are negative for complaint.     Physical Exam  Vitals reviewed.   Constitutional:       General: He is not in acute distress.     Appearance:  Normal appearance. He is ill-appearing.   HENT:      Head: Normocephalic and atraumatic.      Right Ear: Tympanic membrane and external ear normal.      Left Ear: Tympanic membrane and external ear normal.      Nose: Nose normal.      Mouth/Throat:      Mouth: Mucous membranes are moist.      Pharynx: Oropharynx is clear.   Eyes:      Conjunctiva/sclera: Conjunctivae normal.      Pupils: Pupils are equal, round, and reactive to light.   Cardiovascular:      Rate and Rhythm: Normal rate and regular rhythm.      Heart sounds: Normal heart sounds. No murmur heard.  Pulmonary:      Effort: Pulmonary effort is normal. No respiratory distress.      Breath sounds: Decreased breath sounds present. No wheezing.   Abdominal:      General: There is no distension.      Palpations: Abdomen is soft. There is no mass.      Tenderness: There is no abdominal tenderness.   Musculoskeletal:         General: Normal range of motion.      Cervical back: Normal range of motion and neck supple.   Skin:     General: Skin is warm and dry.      Findings: Bruising present.   Neurological:      General: No focal deficit present.      Mental Status: He is alert and oriented to person, place, and time.      Sensory: No sensory deficit.      Motor: Weakness present.   Psychiatric:         Mood and Affect: Mood normal.         Behavior: Behavior normal. Behavior is cooperative.          Lab Results   Component Value Date    WBC 14.3 (H) 08/12/2024    HGB 10.6 (L) 08/12/2024    HCT 34.3 (L) 08/12/2024     (H) 08/12/2024    CHOL 147 03/16/2020    TRIG 95 03/16/2020    HDL 34.5 (A) 03/16/2020    ALT 17 08/12/2024    AST 23 08/12/2024     08/12/2024    K 4.4 08/12/2024     08/12/2024    CREATININE 0.70 08/12/2024    BUN 16 08/12/2024    CO2 30 08/12/2024    TSH 0.67 02/03/2024    INR 1.3 (H) 06/28/2024    HGBA1C 5.9 03/16/2020     Results were reviewed and addressed accordingly. Lab Results were also reviewed in [facility].     Provider  Impression:   Acute hypoxic respiratory failure 2/2 recent sepsis d/t pneumonia  #recurrent pneumonia  #hx recent acute R rib fxs s/p fall   - CT chest w/ contrast: small simple left pleural effusion and LLL atelectasis  - pain regimen for acute pain. Spirometry, airway clearance.  - Blood cultures obtained 6/28 negative so far. Legionella and strep negative  - CT chest obtained in ED negative for infectious etiology  - Unremarkable CT abdomen/pelvis w/ contrast  - completed doxycycline atb duration     LLE wound and other chronic wounds   #chronic neuropathy and recurrent falls   - consult Wound care team  - Xray left foot no acute bony abnormality  -  c/w duloxetine 60 daily  - AM cortisol 28.2.   - PT/OT to eval and tx     Orthostatic hypotension, hyponatremia  #h/o HTN   - AM cortisol 28.2. Ruled out adrenal insufficiency   - Started on Florinef      CV- CAD, Afib w/ frequent falls, HTN, HFpEF  - lisinopril and metoprolol were discontinued during hospitalization  - pt reports he has been given Eliquis at [facility].   - Pt still needs eval for Watchman procedure outpatient.   - Holding metoprolol due to hypotension  - echo 50% diastolic dysfunction 2/2024.     BLE edema   - chronic per pt not increased recently.     Code Status  - full code  ----------------  Written by Shira Varela RN, acting as a scribe for Dr. Escalante. This note accurately reflects the work and decisions made by Dr. Escalante.     I, Dr. Escalante, attest all medical record entries made by the scribe were under my direction and were personally dictated by me. I have reviewed the chart and agree that the record accurately reflects my performance of the history, physical exam, and assessment and plan.

## 2024-08-20 ENCOUNTER — TELEPHONE (OUTPATIENT)
Dept: NEUROLOGY | Facility: CLINIC | Age: 84
End: 2024-08-20
Payer: MEDICARE

## 2024-08-30 ENCOUNTER — TELEPHONE (OUTPATIENT)
Dept: GERIATRIC MEDICINE | Facility: HOSPITAL | Age: 84
End: 2024-08-30
Payer: MEDICARE

## 2024-08-30 DIAGNOSIS — I10 PRIMARY HYPERTENSION: ICD-10-CM

## 2024-08-30 DIAGNOSIS — R53.81 PHYSICAL DECONDITIONING: ICD-10-CM

## 2024-08-30 DIAGNOSIS — I25.110 CORONARY ARTERY DISEASE INVOLVING NATIVE CORONARY ARTERY OF NATIVE HEART WITH UNSTABLE ANGINA PECTORIS (MULTI): Primary | ICD-10-CM

## 2024-08-30 DIAGNOSIS — I73.9 PVD (PERIPHERAL VASCULAR DISEASE) (CMS-HCC): ICD-10-CM

## 2024-08-30 DIAGNOSIS — D64.9 ANEMIA, UNSPECIFIED TYPE: ICD-10-CM

## 2024-09-03 ENCOUNTER — DOCUMENTATION (OUTPATIENT)
Dept: HOME HEALTH SERVICES | Facility: HOME HEALTH | Age: 84
End: 2024-09-03
Payer: MEDICARE

## 2024-09-03 ENCOUNTER — TELEPHONE (OUTPATIENT)
Dept: HOME HEALTH SERVICES | Facility: HOME HEALTH | Age: 84
End: 2024-09-03
Payer: MEDICARE

## 2024-09-03 DIAGNOSIS — S32.020D CLOSED COMPRESSION FRACTURE OF L2 LUMBAR VERTEBRA WITH ROUTINE HEALING, SUBSEQUENT ENCOUNTER: ICD-10-CM

## 2024-09-03 DIAGNOSIS — R26.89 IMPAIRMENT OF BALANCE: ICD-10-CM

## 2024-09-03 DIAGNOSIS — M79.609 PAIN IN EXTREMITY AT MULTIPLE SITES: Primary | ICD-10-CM

## 2024-09-03 DIAGNOSIS — I73.9 PVD (PERIPHERAL VASCULAR DISEASE) (CMS-HCC): ICD-10-CM

## 2024-09-03 NOTE — TELEPHONE ENCOUNTER
Good morning,  Home Care has a referral for this patient. I confirmed with [facility] that he is active with them so we will close our referral.     Thank you,   [facility] Intake

## 2024-09-10 ENCOUNTER — APPOINTMENT (OUTPATIENT)
Dept: CARDIOLOGY | Facility: HOSPITAL | Age: 84
End: 2024-09-10
Payer: MEDICARE

## 2024-09-16 NOTE — TELEPHONE ENCOUNTER
Hello there!  Patient was discharged from hospice services on 09/09. Wife called and is requesting for PT services. If you're in agreement can we please have a PT referral for home health?     Thank you,  Intake department

## 2024-09-18 ENCOUNTER — HOME HEALTH ADMISSION (OUTPATIENT)
Dept: HOME HEALTH SERVICES | Facility: HOME HEALTH | Age: 84
End: 2024-09-18
Payer: MEDICARE

## 2024-09-18 ENCOUNTER — DOCUMENTATION (OUTPATIENT)
Dept: HOME HEALTH SERVICES | Facility: HOME HEALTH | Age: 84
End: 2024-09-18
Payer: MEDICARE

## 2024-09-18 NOTE — HH CARE COORDINATION
Home Care received a Referral for Physical Therapy. We have processed the referral for a Start of Care on 09/19-09/20.     If you have any questions or concerns, please feel free to contact us at [phone]. Follow the prompts, enter your five digit zip code, and you will be directed to your care team on [facility].

## 2024-09-19 ENCOUNTER — TELEPHONE (OUTPATIENT)
Dept: GERIATRIC MEDICINE | Facility: CLINIC | Age: 84
End: 2024-09-19
Payer: MEDICARE

## 2024-09-20 ENCOUNTER — TELEPHONE (OUTPATIENT)
Dept: PRIMARY CARE | Facility: CLINIC | Age: 84
End: 2024-09-20
Payer: MEDICARE

## 2024-09-20 NOTE — TELEPHONE ENCOUNTER
Patient was referred to [facility] Medical House Calls program, information forwarded to the [facility] House Calls program as this is an area our program services.  [facility] House Calls will reach out in effort to establish contact and offer services for patient is homebound with cognitive impairment, chronic Afib, PVD amongst other dx.

## 2024-09-20 NOTE — TELEPHONE ENCOUNTER
[facility] House Calls Program provides service to this area.  I will reach out to caller in attempt to establish contact and offer services. Thank you for the information.

## 2024-09-22 ENCOUNTER — TELEPHONE (OUTPATIENT)
Dept: HOME HEALTH SERVICES | Facility: HOME HEALTH | Age: 84
End: 2024-09-22
Payer: MEDICARE

## 2024-09-22 NOTE — TELEPHONE ENCOUNTER
Hello,    Your recent home care referral for Chao Thao has been made a Non Admit with  Home Care due to Inability to Contact Patient. If you have further questions, feel free to reach out to our office at [phone].     Thank you,   [facility]

## 2024-09-24 ENCOUNTER — TELEPHONE (OUTPATIENT)
Dept: CARDIOLOGY | Facility: HOSPITAL | Age: 84
End: 2024-09-24
Payer: MEDICARE

## 2024-09-24 NOTE — TELEPHONE ENCOUNTER
Call placed to patient number as listed, [phone], no answer, left message requesting return call along with office telephone number, awaiting return call.   It is noted patient is active with [facility] services as of 8/27/24, House Calls will not see this patient.   Sandra with  Medical House Calls team made aware as she sent patient information to the [facility] House Calls team.

## 2024-09-30 ENCOUNTER — HOME CARE VISIT (OUTPATIENT)
Dept: HOME HEALTH SERVICES | Facility: HOME HEALTH | Age: 84
End: 2024-09-30
Payer: MEDICARE

## 2024-09-30 VITALS
HEIGHT: 67 IN | TEMPERATURE: 98.8 F | OXYGEN SATURATION: 92 % | BODY MASS INDEX: 27.15 KG/M2 | HEART RATE: 85 BPM | DIASTOLIC BLOOD PRESSURE: 74 MMHG | RESPIRATION RATE: 18 BRPM | WEIGHT: 173 LBS | SYSTOLIC BLOOD PRESSURE: 128 MMHG

## 2024-09-30 PROCEDURE — G0151 HHCP-SERV OF PT,EA 15 MIN: HCPCS

## 2024-09-30 SDOH — HEALTH STABILITY: PHYSICAL HEALTH
EXERCISE COMMENTS: BILAT. LE EXER.S IN SITTING POS., 10 REPS X 1 SET EA EXER. WITH INSTR. FOR PROPER PACE AND FULL AVAILABLE ROM FOR EACH EXERCISE.

## 2024-09-30 ASSESSMENT — ACTIVITIES OF DAILY LIVING (ADL)
PHYSICAL TRANSFERS ASSESSED: 1
CURRENT_FUNCTION: ONE PERSON
ENTERING_EXITING_HOME: TOTAL DEPENDENCE
OASIS_M1830: 05
CURRENT_FUNCTION: MAXIMUM ASSIST

## 2024-09-30 ASSESSMENT — ENCOUNTER SYMPTOMS
PAIN LOCATION: RIGHT LEG
PAIN SEVERITY GOAL: 3/10
HYPERTENSION: 1
SUBJECTIVE PAIN PROGRESSION: WAXING AND WANING
PAIN LOCATION: LEFT LEG
HIGHEST PAIN SEVERITY IN PAST 24 HOURS: 6/10
PAIN: 1
OCCASIONAL FEELINGS OF UNSTEADINESS: 0
PERSON REPORTING PAIN: PATIENT
MUSCLE WEAKNESS: 1
LOWEST PAIN SEVERITY IN PAST 24 HOURS: 3/10

## 2024-10-01 ENCOUNTER — HOME CARE VISIT (OUTPATIENT)
Dept: HOME HEALTH SERVICES | Facility: HOME HEALTH | Age: 84
End: 2024-10-01
Payer: MEDICARE

## 2024-10-01 VITALS
RESPIRATION RATE: 18 BRPM | HEART RATE: 88 BPM | SYSTOLIC BLOOD PRESSURE: 132 MMHG | TEMPERATURE: 98.8 F | OXYGEN SATURATION: 93 % | DIASTOLIC BLOOD PRESSURE: 86 MMHG

## 2024-10-01 ASSESSMENT — ENCOUNTER SYMPTOMS: PAIN LOCATION: LEFT LEG

## 2024-10-03 ENCOUNTER — HOME CARE VISIT (OUTPATIENT)
Dept: HOME HEALTH SERVICES | Facility: HOME HEALTH | Age: 84
End: 2024-10-03
Payer: MEDICARE

## 2024-10-08 ENCOUNTER — HOME CARE VISIT (OUTPATIENT)
Dept: HOME HEALTH SERVICES | Facility: HOME HEALTH | Age: 84
End: 2024-10-08
Payer: MEDICARE

## 2024-10-08 VITALS
OXYGEN SATURATION: 96 % | HEART RATE: 84 BPM | SYSTOLIC BLOOD PRESSURE: 124 MMHG | DIASTOLIC BLOOD PRESSURE: 84 MMHG | RESPIRATION RATE: 18 BRPM | TEMPERATURE: 98.2 F

## 2024-10-08 PROCEDURE — G0151 HHCP-SERV OF PT,EA 15 MIN: HCPCS

## 2024-10-08 SDOH — HEALTH STABILITY: PHYSICAL HEALTH
EXERCISE COMMENTS: HIP FLEX., KNEE EXT AND ANKLE PUMPS IN SITTING POS., 10 REPS X 1 SET EA EXER. WITH INSTR. FOR PROPER PACE AND FULL AVAILABLE ROM FOR EACH EXERCISE.

## 2024-10-08 ASSESSMENT — ENCOUNTER SYMPTOMS
HIGHEST PAIN SEVERITY IN PAST 24 HOURS: 6/10
OCCASIONAL FEELINGS OF UNSTEADINESS: 0
SUBJECTIVE PAIN PROGRESSION: WAXING AND WANING
LOWEST PAIN SEVERITY IN PAST 24 HOURS: 3/10
MUSCLE WEAKNESS: 1
PERSON REPORTING PAIN: PATIENT
PAIN: 1
PAIN LOCATION: LEFT LEG
PAIN SEVERITY GOAL: 3/10

## 2024-10-08 ASSESSMENT — ACTIVITIES OF DAILY LIVING (ADL)
PHYSICAL_TRANSFERS_DEVICES: STANDARD WALKER
CURRENT_FUNCTION: ONE PERSON
AMBULATION ASSISTANCE: MAXIMUM ASSIST
PHYSICAL TRANSFERS ASSESSED: 1
AMBULATION ASSISTANCE: 1
AMBULATION ASSISTANCE ON FLAT SURFACES: 1
CURRENT_FUNCTION: MODERATE ASSIST
AMBULATION ASSISTANCE: ONE PERSON

## 2024-10-11 ENCOUNTER — HOME CARE VISIT (OUTPATIENT)
Dept: HOME HEALTH SERVICES | Facility: HOME HEALTH | Age: 84
End: 2024-10-11
Payer: MEDICARE

## 2024-10-11 VITALS
SYSTOLIC BLOOD PRESSURE: 122 MMHG | HEART RATE: 80 BPM | OXYGEN SATURATION: 93 % | RESPIRATION RATE: 18 BRPM | DIASTOLIC BLOOD PRESSURE: 84 MMHG | TEMPERATURE: 98.8 F

## 2024-10-11 PROCEDURE — G0151 HHCP-SERV OF PT,EA 15 MIN: HCPCS

## 2024-10-11 SDOH — HEALTH STABILITY: PHYSICAL HEALTH
EXERCISE COMMENTS: BILAT. LE EXERCISES IN SITTING AND STANDING POS., 10 TO 15 REPS X 1 SET EA EXERCISE WITH INSTR. FOR PROPER PACE AND FULL AVAILABLE ROM FOR EACH EXERCISE.

## 2024-10-11 ASSESSMENT — ACTIVITIES OF DAILY LIVING (ADL)
CURRENT_FUNCTION: MAXIMUM ASSIST
CURRENT_FUNCTION: ONE PERSON
AMBULATION ASSISTANCE: MAXIMUM ASSIST
CURRENT_FUNCTION: MODERATE ASSIST
AMBULATION ASSISTANCE: ONE PERSON
PHYSICAL_TRANSFERS_DEVICES: WHEELED WALKER
AMBULATION ASSISTANCE: MODERATE ASSIST
AMBULATION ASSISTANCE ON FLAT SURFACES: 1
AMBULATION ASSISTANCE: 1
PHYSICAL TRANSFERS ASSESSED: 1

## 2024-10-11 ASSESSMENT — ENCOUNTER SYMPTOMS
PAIN: 1
PAIN SEVERITY GOAL: 3/10
PERSON REPORTING PAIN: PATIENT
LOWEST PAIN SEVERITY IN PAST 24 HOURS: 3/10
MUSCLE WEAKNESS: 1
SUBJECTIVE PAIN PROGRESSION: GRADUALLY IMPROVING
HIGHEST PAIN SEVERITY IN PAST 24 HOURS: 6/10
PAIN LOCATION: LEFT KNEE
OCCASIONAL FEELINGS OF UNSTEADINESS: 0

## 2024-10-15 ENCOUNTER — HOME CARE VISIT (OUTPATIENT)
Dept: HOME HEALTH SERVICES | Facility: HOME HEALTH | Age: 84
End: 2024-10-15
Payer: MEDICARE

## 2024-10-15 VITALS
HEART RATE: 80 BPM | RESPIRATION RATE: 18 BRPM | TEMPERATURE: 98 F | SYSTOLIC BLOOD PRESSURE: 124 MMHG | DIASTOLIC BLOOD PRESSURE: 74 MMHG | OXYGEN SATURATION: 92 %

## 2024-10-15 PROCEDURE — G0151 HHCP-SERV OF PT,EA 15 MIN: HCPCS

## 2024-10-15 SDOH — HEALTH STABILITY: PHYSICAL HEALTH: PHYSICAL EXERCISE: 12

## 2024-10-15 SDOH — HEALTH STABILITY: PHYSICAL HEALTH: PHYSICAL EXERCISE: SITTING

## 2024-10-15 SDOH — HEALTH STABILITY: PHYSICAL HEALTH: EXERCISE ACTIVITY: ANKLE PUMPS

## 2024-10-15 SDOH — HEALTH STABILITY: PHYSICAL HEALTH: EXERCISE COMMENTS: PT. REQUIRED INSTR. FOR PROPER PACE AND FULL AVAILABLE ROM FOR EACH EXERCISE.

## 2024-10-15 SDOH — HEALTH STABILITY: PHYSICAL HEALTH: EXERCISE ACTIVITIES SETS: 1

## 2024-10-15 SDOH — HEALTH STABILITY: PHYSICAL HEALTH

## 2024-10-15 SDOH — HEALTH STABILITY: PHYSICAL HEALTH: EXERCISE ACTIVITY: KNEE EXT.

## 2024-10-15 SDOH — HEALTH STABILITY: PHYSICAL HEALTH: EXERCISE ACTIVITY: HIP FLEX.

## 2024-10-15 ASSESSMENT — ACTIVITIES OF DAILY LIVING (ADL)
PHYSICAL_TRANSFERS_DEVICES: STANDARD WALKER
AMBULATION ASSISTANCE: ONE PERSON
AMBULATION_DISTANCE/DURATION_TOLERATED: 15 FEET X 2
AMBULATION ASSISTANCE ON FLAT SURFACES: 1
PHYSICAL TRANSFERS ASSESSED: 1
AMBULATION ASSISTANCE: 1
CURRENT_FUNCTION: ONE PERSON
CURRENT_FUNCTION: MODERATE ASSIST
AMBULATION ASSISTANCE: MAXIMUM ASSIST
AMBULATION ASSISTANCE: MODERATE ASSIST

## 2024-10-15 ASSESSMENT — ENCOUNTER SYMPTOMS
OCCASIONAL FEELINGS OF UNSTEADINESS: 0
HIGHEST PAIN SEVERITY IN PAST 24 HOURS: 5/10
PERSON REPORTING PAIN: PATIENT
SUBJECTIVE PAIN PROGRESSION: WAXING AND WANING
LOWEST PAIN SEVERITY IN PAST 24 HOURS: 3/10
PAIN SEVERITY GOAL: 3/10
PAIN LOCATION: LEFT KNEE
PAIN: 1

## 2024-10-17 ENCOUNTER — HOME CARE VISIT (OUTPATIENT)
Dept: HOME HEALTH SERVICES | Facility: HOME HEALTH | Age: 84
End: 2024-10-17
Payer: MEDICARE

## 2024-10-17 VITALS — HEART RATE: 88 BPM | OXYGEN SATURATION: 92 % | RESPIRATION RATE: 18 BRPM | TEMPERATURE: 98.2 F

## 2024-10-17 PROCEDURE — G0151 HHCP-SERV OF PT,EA 15 MIN: HCPCS

## 2024-10-17 SDOH — HEALTH STABILITY: PHYSICAL HEALTH
EXERCISE COMMENTS: BILAT. LE EXER.S IN SITTING AND SUPINE POS., 10 TO 15 REPS X 1 SET EA EXER. WITH INSTR. FOR PROPER PACE AND FULL AVAILABLE ROM FOR EACH EXERCISE.

## 2024-10-17 ASSESSMENT — ENCOUNTER SYMPTOMS
OCCASIONAL FEELINGS OF UNSTEADINESS: 0
LOWEST PAIN SEVERITY IN PAST 24 HOURS: 4/10
PERSON REPORTING PAIN: PATIENT
SUBJECTIVE PAIN PROGRESSION: WAXING AND WANING
PAIN LOCATION: LEFT KNEE
PAIN SEVERITY GOAL: 3/10
PAIN: 1
MUSCLE WEAKNESS: 1
HIGHEST PAIN SEVERITY IN PAST 24 HOURS: 7/10

## 2024-10-17 ASSESSMENT — ACTIVITIES OF DAILY LIVING (ADL)
AMBULATION ASSISTANCE: ONE PERSON
CURRENT_FUNCTION: MAXIMUM ASSIST
CURRENT_FUNCTION: MODERATE ASSIST
AMBULATION ASSISTANCE: 1
AMBULATION ASSISTANCE: MODERATE ASSIST
AMBULATION ASSISTANCE ON FLAT SURFACES: 1
PHYSICAL TRANSFERS ASSESSED: 1
AMBULATION ASSISTANCE: MAXIMUM ASSIST
PHYSICAL_TRANSFERS_DEVICES: STANDARD WALKER
CURRENT_FUNCTION: ONE PERSON

## 2024-10-20 ENCOUNTER — HOSPITAL ENCOUNTER (EMERGENCY)
Facility: HOSPITAL | Age: 84
Discharge: HOME | End: 2024-10-21
Attending: STUDENT IN AN ORGANIZED HEALTH CARE EDUCATION/TRAINING PROGRAM
Payer: MEDICARE

## 2024-10-20 ENCOUNTER — APPOINTMENT (OUTPATIENT)
Dept: RADIOLOGY | Facility: HOSPITAL | Age: 84
End: 2024-10-20
Payer: MEDICARE

## 2024-10-20 ENCOUNTER — APPOINTMENT (OUTPATIENT)
Dept: CARDIOLOGY | Facility: HOSPITAL | Age: 84
End: 2024-10-20
Payer: MEDICARE

## 2024-10-20 DIAGNOSIS — N30.90 CYSTITIS: ICD-10-CM

## 2024-10-20 DIAGNOSIS — R41.0 CONFUSION: Primary | ICD-10-CM

## 2024-10-20 LAB
ALBUMIN SERPL BCP-MCNC: 2.7 G/DL (ref 3.4–5)
ALP SERPL-CCNC: 138 U/L (ref 33–136)
ALT SERPL W P-5'-P-CCNC: 20 U/L (ref 10–52)
AMMONIA PLAS-SCNC: 34 UMOL/L (ref 16–53)
ANION GAP SERPL CALC-SCNC: 10 MMOL/L (ref 10–20)
APAP SERPL-MCNC: 12.9 UG/ML
APPEARANCE UR: CLEAR
AST SERPL W P-5'-P-CCNC: 27 U/L (ref 9–39)
BASOPHILS # BLD AUTO: 0.08 X10*3/UL (ref 0–0.1)
BASOPHILS NFR BLD AUTO: 0.6 %
BILIRUB SERPL-MCNC: 0.2 MG/DL (ref 0–1.2)
BILIRUB UR STRIP.AUTO-MCNC: NEGATIVE MG/DL
BNP SERPL-MCNC: 201 PG/ML (ref 0–99)
BUN SERPL-MCNC: 22 MG/DL (ref 6–23)
CALCIUM SERPL-MCNC: 8.3 MG/DL (ref 8.6–10.3)
CARDIAC TROPONIN I PNL SERPL HS: 7 NG/L (ref 0–20)
CARDIAC TROPONIN I PNL SERPL HS: 8 NG/L (ref 0–20)
CHLORIDE SERPL-SCNC: 102 MMOL/L (ref 98–107)
CO2 SERPL-SCNC: 27 MMOL/L (ref 21–32)
COLOR UR: YELLOW
CREAT SERPL-MCNC: 0.89 MG/DL (ref 0.5–1.3)
EGFRCR SERPLBLD CKD-EPI 2021: 86 ML/MIN/1.73M*2
EOSINOPHIL # BLD AUTO: 0.42 X10*3/UL (ref 0–0.4)
EOSINOPHIL NFR BLD AUTO: 3.3 %
ERYTHROCYTE [DISTWIDTH] IN BLOOD BY AUTOMATED COUNT: 16.2 % (ref 11.5–14.5)
ETHANOL SERPL-MCNC: <10 MG/DL
GLUCOSE SERPL-MCNC: 102 MG/DL (ref 74–99)
GLUCOSE UR STRIP.AUTO-MCNC: NORMAL MG/DL
HCT VFR BLD AUTO: 32.1 % (ref 41–52)
HGB BLD-MCNC: 10.2 G/DL (ref 13.5–17.5)
IMM GRANULOCYTES # BLD AUTO: 0.14 X10*3/UL (ref 0–0.5)
IMM GRANULOCYTES NFR BLD AUTO: 1.1 % (ref 0–0.9)
KETONES UR STRIP.AUTO-MCNC: NEGATIVE MG/DL
LEUKOCYTE ESTERASE UR QL STRIP.AUTO: ABNORMAL
LYMPHOCYTES # BLD AUTO: 2.29 X10*3/UL (ref 0.8–3)
LYMPHOCYTES NFR BLD AUTO: 17.9 %
MCH RBC QN AUTO: 30.5 PG (ref 26–34)
MCHC RBC AUTO-ENTMCNC: 31.8 G/DL (ref 32–36)
MCV RBC AUTO: 96 FL (ref 80–100)
MONOCYTES # BLD AUTO: 0.99 X10*3/UL (ref 0.05–0.8)
MONOCYTES NFR BLD AUTO: 7.7 %
MUCOUS THREADS #/AREA URNS AUTO: ABNORMAL /LPF
NEUTROPHILS # BLD AUTO: 8.88 X10*3/UL (ref 1.6–5.5)
NEUTROPHILS NFR BLD AUTO: 69.4 %
NITRITE UR QL STRIP.AUTO: NEGATIVE
NRBC BLD-RTO: 0 /100 WBCS (ref 0–0)
PH UR STRIP.AUTO: 5 [PH]
PLATELET # BLD AUTO: 515 X10*3/UL (ref 150–450)
POTASSIUM SERPL-SCNC: 4.1 MMOL/L (ref 3.5–5.3)
PROT SERPL-MCNC: 6.5 G/DL (ref 6.4–8.2)
PROT UR STRIP.AUTO-MCNC: ABNORMAL MG/DL
RBC # BLD AUTO: 3.34 X10*6/UL (ref 4.5–5.9)
RBC # UR STRIP.AUTO: NEGATIVE /UL
RBC #/AREA URNS AUTO: ABNORMAL /HPF
SALICYLATES SERPL-MCNC: <3 MG/DL
SODIUM SERPL-SCNC: 135 MMOL/L (ref 136–145)
SP GR UR STRIP.AUTO: 1.03
SQUAMOUS #/AREA URNS AUTO: ABNORMAL /HPF
TSH SERPL-ACNC: 1.56 MIU/L (ref 0.44–3.98)
UROBILINOGEN UR STRIP.AUTO-MCNC: NORMAL MG/DL
WBC # BLD AUTO: 12.8 X10*3/UL (ref 4.4–11.3)
WBC #/AREA URNS AUTO: >50 /HPF
WBC CLUMPS #/AREA URNS AUTO: ABNORMAL /HPF
YEAST BUDDING #/AREA UR COMP ASSIST: ABNORMAL /[HPF]

## 2024-10-20 PROCEDURE — 80143 DRUG ASSAY ACETAMINOPHEN: CPT | Performed by: STUDENT IN AN ORGANIZED HEALTH CARE EDUCATION/TRAINING PROGRAM

## 2024-10-20 PROCEDURE — 85025 COMPLETE CBC W/AUTO DIFF WBC: CPT | Performed by: STUDENT IN AN ORGANIZED HEALTH CARE EDUCATION/TRAINING PROGRAM

## 2024-10-20 PROCEDURE — 93005 ELECTROCARDIOGRAM TRACING: CPT

## 2024-10-20 PROCEDURE — 84484 ASSAY OF TROPONIN QUANT: CPT | Performed by: STUDENT IN AN ORGANIZED HEALTH CARE EDUCATION/TRAINING PROGRAM

## 2024-10-20 PROCEDURE — 81001 URINALYSIS AUTO W/SCOPE: CPT | Mod: 91 | Performed by: STUDENT IN AN ORGANIZED HEALTH CARE EDUCATION/TRAINING PROGRAM

## 2024-10-20 PROCEDURE — 99285 EMERGENCY DEPT VISIT HI MDM: CPT

## 2024-10-20 PROCEDURE — 83880 ASSAY OF NATRIURETIC PEPTIDE: CPT | Performed by: STUDENT IN AN ORGANIZED HEALTH CARE EDUCATION/TRAINING PROGRAM

## 2024-10-20 PROCEDURE — 36415 COLL VENOUS BLD VENIPUNCTURE: CPT | Performed by: STUDENT IN AN ORGANIZED HEALTH CARE EDUCATION/TRAINING PROGRAM

## 2024-10-20 PROCEDURE — 80053 COMPREHEN METABOLIC PANEL: CPT | Performed by: STUDENT IN AN ORGANIZED HEALTH CARE EDUCATION/TRAINING PROGRAM

## 2024-10-20 PROCEDURE — 82140 ASSAY OF AMMONIA: CPT | Performed by: STUDENT IN AN ORGANIZED HEALTH CARE EDUCATION/TRAINING PROGRAM

## 2024-10-20 PROCEDURE — 84443 ASSAY THYROID STIM HORMONE: CPT | Performed by: STUDENT IN AN ORGANIZED HEALTH CARE EDUCATION/TRAINING PROGRAM

## 2024-10-20 PROCEDURE — 70450 CT HEAD/BRAIN W/O DYE: CPT | Performed by: STUDENT IN AN ORGANIZED HEALTH CARE EDUCATION/TRAINING PROGRAM

## 2024-10-20 PROCEDURE — 70450 CT HEAD/BRAIN W/O DYE: CPT

## 2024-10-20 PROCEDURE — 80320 DRUG SCREEN QUANTALCOHOLS: CPT | Performed by: STUDENT IN AN ORGANIZED HEALTH CARE EDUCATION/TRAINING PROGRAM

## 2024-10-20 RX ORDER — CEFTRIAXONE 1 G/50ML
1 INJECTION, SOLUTION INTRAVENOUS ONCE
Status: COMPLETED | OUTPATIENT
Start: 2024-10-21 | End: 2024-10-21

## 2024-10-20 ASSESSMENT — PAIN DESCRIPTION - ORIENTATION: ORIENTATION: LEFT

## 2024-10-20 ASSESSMENT — LIFESTYLE VARIABLES
HAVE PEOPLE ANNOYED YOU BY CRITICIZING YOUR DRINKING: NO
TOTAL SCORE: 0
EVER HAD A DRINK FIRST THING IN THE MORNING TO STEADY YOUR NERVES TO GET RID OF A HANGOVER: NO
HAVE YOU EVER FELT YOU SHOULD CUT DOWN ON YOUR DRINKING: NO
EVER FELT BAD OR GUILTY ABOUT YOUR DRINKING: NO

## 2024-10-20 ASSESSMENT — PAIN DESCRIPTION - LOCATION: LOCATION: JAW

## 2024-10-20 ASSESSMENT — PAIN DESCRIPTION - PAIN TYPE: TYPE: ACUTE PAIN

## 2024-10-20 ASSESSMENT — PAIN - FUNCTIONAL ASSESSMENT: PAIN_FUNCTIONAL_ASSESSMENT: 0-10

## 2024-10-20 ASSESSMENT — PAIN SCALES - GENERAL: PAINLEVEL_OUTOF10: 4

## 2024-10-21 VITALS
HEART RATE: 59 BPM | DIASTOLIC BLOOD PRESSURE: 68 MMHG | TEMPERATURE: 97.9 F | OXYGEN SATURATION: 96 % | HEIGHT: 66 IN | RESPIRATION RATE: 18 BRPM | WEIGHT: 170 LBS | SYSTOLIC BLOOD PRESSURE: 117 MMHG | BODY MASS INDEX: 27.32 KG/M2

## 2024-10-21 LAB
ATRIAL RATE: 71 BPM
P AXIS: 109 DEGREES
P OFFSET: 182 MS
P ONSET: 151 MS
PR INTERVAL: 146 MS
Q ONSET: 224 MS
QRS COUNT: 12 BEATS
QRS DURATION: 84 MS
QT INTERVAL: 378 MS
QTC CALCULATION(BAZETT): 410 MS
QTC FREDERICIA: 400 MS
R AXIS: 45 DEGREES
T AXIS: 132 DEGREES
T OFFSET: 413 MS
VENTRICULAR RATE: 71 BPM

## 2024-10-21 PROCEDURE — 96365 THER/PROPH/DIAG IV INF INIT: CPT

## 2024-10-21 PROCEDURE — 2500000004 HC RX 250 GENERAL PHARMACY W/ HCPCS (ALT 636 FOR OP/ED): Performed by: STUDENT IN AN ORGANIZED HEALTH CARE EDUCATION/TRAINING PROGRAM

## 2024-10-21 RX ORDER — CEPHALEXIN 500 MG/1
500 CAPSULE ORAL 4 TIMES DAILY
Qty: 28 CAPSULE | Refills: 0 | Status: SHIPPED | OUTPATIENT
Start: 2024-10-21 | End: 2024-10-28

## 2024-10-21 RX ADMIN — CEFTRIAXONE SODIUM 1 G: 1 INJECTION, SOLUTION INTRAVENOUS at 00:16

## 2024-10-21 NOTE — ED NOTES
Pt BIBS for reported AMS. PT appears to be A&OX4 upon arrival. Pt does reports left sided jaw pain. PT additionally reports a recent fall approx 1-2 weeks PTA. Pt's BP is low upon arrival at 93/75. Pt in no obvious distress.      Davi Warren RN  10/20/24 [id number]

## 2024-10-21 NOTE — ED PROVIDER NOTES
History/Exam limitations: History of dementia per wife's  HPI was provided by patient, EMS, family    HPI:    Chief Complaint   Patient presents with    Altered Mental Status        Chao Thao is a 84 y.o. male presents with chief complaint of altered mental status, confusion.  Patient brought in by EMS.  Alert and oriented in no acute distress and no complaints here.  Does have a guardian but states today unspecified what time been having confusing thoughts and acting differently.  Denies auditory hallucinations. Denies visual hallucinations  Denies suicidal ideation. Denies homicidal ideation   Patient's complaints have been constant without any alleviating or exacerbating factors.       ROS: (Bolded text if patient is positive for) All other review of systems are negative except as noted above and HPI or ROS.   CONSTITUTIONAL fever, chills.  ENT sore throat, congestion, rhinorrhea.  CARDIOVASCULAR chest pain, palpitations.  RESPIRATORY cough, shortness of breath, wheeze.  GI abdominal pain, nausea, vomiting, diarrhea.  GENITOURINARY dysuria, hematuria, frequency.  MUSCULOSKELETAL deformity, neck pain.  SKIN rash, color change.  NEUROLOGIC headache, numbness, focal weakness.  NOTES: All systems reviewed, negative except as described above.       Physical Exam:  GENERAL: Alert, oriented , cooperative,  in no acute distress.  HEAD: normocephalic, atraumatic  SKIN: Intact,  dry skin, no lesions.  EYES: PERRL, EOMs intact,  Conjunctiva pink with no erythema or exudates. No scleral icterus.   ENT: No external deformities. Nares patent, mucus membranes moist.  Pharynx clear, uvula midline.   NECK: Supple, without meningismus. Trachea at midline. No lymphadenopathy.  PULMONARY: Clear bilaterally. No rales, rhonchi or wheezing.   CARDIAC: Regular rate and rhythm. good pulses.  ABDOMEN: Soft, nontender, active bowel sounds.  No palpable organomegaly.  No rebound or guarding.  No CVA tenderness.  : Exam  deferred.  MUSCULOSKELETAL: Full range of motion, no deformity. Pulses full and equal. No EDEMA  NEUROLOGICAL:  CN II through XII are grossly intact, no focal neuro deficits.   neurovascular intact in bilateral upper and lower extremities  PSYCHIATRIC: Appropriate mood and affect. Calm.     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    History of alcohol abuse    Chronic shortness of breath 06/28/2024    Drug abuse (Multi)     in remission    Edema, unspecified 11/26/2019    Dependent edema    Encounter for immunization 04/08/2016    Need for Zostavax administration    Pain in left knee 08/08/2018    Left knee pain    Personal history of other diseases of the nervous system and sense organs 09/18/2019    History of cataract    Personal history of other drug therapy 04/08/2016    History of pneumococcal vaccination    Tinea pedis 10/10/2018    Tinea pedis of both feet    Venous stasis ulcer of right calf (Multi) 06/28/2024    Vitiligo 06/28/2024      Social History     Socioeconomic History    Marital status:    Tobacco Use    Smoking status: Former     Types: Cigarettes    Smokeless tobacco: Never   Vaping Use    Vaping status: Never Used   Substance and Sexual Activity    Alcohol use: Not Currently    Drug use: Never     Social Drivers of Health     Financial Resource Strain: Patient Unable To Answer (7/28/2024)    Overall Financial Resource Strain (CARDIA)     Difficulty of Paying Living Expenses: Patient unable to answer   Transportation Needs: No Transportation Needs (9/30/2024)    OASIS : Transportation     Lack of Transportation (Medical): No     Lack of Transportation (Non-Medical): No     Patient Unable or Declines to Respond: No   Social Connections: Feeling Socially Integrated (9/30/2024)    OASIS : Social Isolation     Frequency of experiencing loneliness or isolation: Never   Housing Stability: Patient Unable To Answer (7/28/2024)    Housing  Stability Vital Sign     Unable to Pay for Housing in the Last Year: Patient unable to answer     Number of Times Moved in the Last Year: 1     Homeless in the Last Year: Patient unable to answer     Current Outpatient Medications   Medication Instructions    acetaminophen (TYLENOL) 1,000 mg, oral, Every 8 hours    ascorbic acid (Vitamin C) 500 mg tablet 1 tablet, oral, Daily    aspirin 81 mg EC tablet 1 tablet, oral, Daily    atorvastatin (Lipitor) 20 mg tablet 1 tablet, oral, Daily    cephalexin (KEFLEX) 500 mg, oral, 4 times daily    cholecalciferol (Vitamin D-3) 25 MCG (1000 UT) capsule 1 capsule, oral, Daily    cyanocobalamin (Vitamin B-12) 250 mcg tablet 1 tablet, oral, Daily    DULoxetine (Cymbalta) 20 mg DR capsule 2 capsules, oral, Daily, Do not crush or chew.    fludrocortisone (FLORINEF) 0.05 mg, oral, Daily    lactobacillus acidophilus (Acidophilus) capsule 1 capsule, oral, Daily    lidocaine 4 % patch 1 patch, transdermal, Daily, Remove & discard patch within 12 hours or as directed by MD.    magnesium hydroxide (Milk of Magnesia) 400 mg/5 mL suspension 30 mL, oral, Daily PRN    metoprolol succinate XL (TOPROL-XL) 25 mg, oral, Daily, Swallow whole. Do not chew or crush    miconazole (Micotin) 2 % cream 1 Application, Topical, 3 times daily    OLANZapine (ZYPREXA) 5 mg, oral, Nightly    ondansetron (Zofran) 4 mg tablet 1 tablet, oral, Every 4 hours PRN    oxygen (O2) gas therapy 1 each, inhalation, Every 24 hours    potassium chloride CR 20 mEq ER tablet 1 tablet, oral, Daily, Do not crush or chew.    zinc acetate 50 mg (zinc) capsule 1 capsule, oral, Daily    zinc oxide 40 % ointment ointment 1 Application, Topical, 2 times daily PRN     Allergies   Allergen Reactions    Gabapentin Other    Penicillin Unknown         ED Triage Vitals [10/20/24 2134]   Temperature Heart Rate Respirations BP   36.2 °C (97.2 °F) 78 18 93/75      Pulse Ox Temp Source Heart Rate Source Patient Position   95 % Oral Monitor --       BP Location FiO2 (%)     -- --                   Labs and Imaging  CT head wo IV contrast   Final Result   1. No evidence of hemorrhage, CT apparent transcortical infarct, or   other acute intracranial abnormality.   2. Patchy attenuation changes present in the periventricular and   subcortical white matter of bilateral cerebral hemispheres are   nonspecific, but favored to represent sequela of microvascular   disease.             MACRO:   None        Signed by: Sean Lema 10/20/2024 11:42 PM   Dictation workstation:   [id number]        Labs Reviewed   CBC WITH AUTO DIFFERENTIAL - Abnormal       Result Value    WBC 12.8 (*)     nRBC 0.0      RBC 3.34 (*)     Hemoglobin 10.2 (*)     Hematocrit 32.1 (*)     MCV 96      MCH 30.5      MCHC 31.8 (*)     RDW 16.2 (*)     Platelets 515 (*)     Neutrophils % 69.4      Immature Granulocytes %, Automated 1.1 (*)     Lymphocytes % 17.9      Monocytes % 7.7      Eosinophils % 3.3      Basophils % 0.6      Neutrophils Absolute 8.88 (*)     Immature Granulocytes Absolute, Automated 0.14      Lymphocytes Absolute 2.29      Monocytes Absolute 0.99 (*)     Eosinophils Absolute 0.42 (*)     Basophils Absolute 0.08     COMPREHENSIVE METABOLIC PANEL - Abnormal    Glucose 102 (*)     Sodium 135 (*)     Potassium 4.1      Chloride 102      Bicarbonate 27      Anion Gap 10      Urea Nitrogen 22      Creatinine 0.89      eGFR 86      Calcium 8.3 (*)     Albumin 2.7 (*)     Alkaline Phosphatase 138 (*)     Total Protein 6.5      AST 27      Bilirubin, Total 0.2      ALT 20     B-TYPE NATRIURETIC PEPTIDE - Abnormal     (*)     Narrative:        <100 pg/mL - Heart failure unlikely  100-299 pg/mL - Intermediate probability of acute heart                  failure exacerbation. Correlate with clinical                  context and patient history.    >=300 pg/mL - Heart Failure likely. Correlate with clinical                  context and patient history.    BNP testing is  performed using different testing methodology at [facility] than at other [facility]. Direct result comparisons should only be made within the same method.      URINALYSIS WITH REFLEX MICROSCOPIC - Abnormal    Color, Urine Yellow      Appearance, Urine Clear      Specific Gravity, Urine 1.029      pH, Urine 5.0      Protein, Urine 20 (TRACE)      Glucose, Urine Normal      Blood, Urine NEGATIVE      Ketones, Urine NEGATIVE      Bilirubin, Urine NEGATIVE      Urobilinogen, Urine Normal      Nitrite, Urine NEGATIVE      Leukocyte Esterase, Urine 500 Ashly/µL (*)    MICROSCOPIC ONLY, URINE - Abnormal    WBC, Urine >50 (*)     WBC Clumps, Urine RARE      RBC, Urine 6-10 (*)     Squamous Epithelial Cells, Urine 1-9 (SPARSE)      Budding Yeast, Urine        Mucus, Urine FEW      Narrative:     Corrected due to accidental premature release of results from instrument   AMMONIA - Normal    Ammonia 34     TSH WITH REFLEX TO FREE T4 IF ABNORMAL - Normal    Thyroid Stimulating Hormone 1.56      Narrative:     TSH testing is performed using different testing methodology at [facility] than at other [facility]. Direct result comparisons should only be made within the same method.     ACUTE TOXICOLOGY PANEL, BLOOD - Normal    Acetaminophen 12.9      Salicylate  <3      Alcohol <10     SERIAL TROPONIN-INITIAL - Normal    Troponin I, High Sensitivity 8      Narrative:     Less than 99th percentile of normal range cutoff-  Female and children under 18 years old <14 ng/L; Male <21 ng/L: Negative  Repeat testing should be performed if clinically indicated.     Female and children under 18 years old 14-50 ng/L; Male 21-50 ng/L:  Consistent with possible cardiac damage and possible increased clinical   risk. Serial measurements may help to assess extent of myocardial damage.     >50 ng/L: Consistent with cardiac damage, increased clinical risk and  myocardial infarction. Serial measurements may help  assess extent of   myocardial damage.      NOTE: Children less than 1 year old may have higher baseline troponin   levels and results should be interpreted in conjunction with the overall   clinical context.     NOTE: Troponin I testing is performed using a different   testing methodology at [facility] than at other   [facility]. Direct result comparisons should only   be made within the same method.   SERIAL TROPONIN, 1 HOUR - Normal    Troponin I, High Sensitivity 7      Narrative:     Less than 99th percentile of normal range cutoff-  Female and children under 18 years old <14 ng/L; Male <21 ng/L: Negative  Repeat testing should be performed if clinically indicated.     Female and children under 18 years old 14-50 ng/L; Male 21-50 ng/L:  Consistent with possible cardiac damage and possible increased clinical   risk. Serial measurements may help to assess extent of myocardial damage.     >50 ng/L: Consistent with cardiac damage, increased clinical risk and  myocardial infarction. Serial measurements may help assess extent of   myocardial damage.      NOTE: Children less than 1 year old may have higher baseline troponin   levels and results should be interpreted in conjunction with the overall   clinical context.     NOTE: Troponin I testing is performed using a different   testing methodology at [facility] than at other   [facility]. Direct result comparisons should only   be made within the same method.   TROPONIN SERIES- (INITIAL, 1 HR)    Narrative:     The following orders were created for panel order Troponin I Series, High Sensitivity (0, 1 HR).  Procedure                               Abnormality         Status                     ---------                               -----------         ------                     Troponin I, High Sensiti...[[id number]]  Normal              Final result               Troponin, High Sensitivi...[[id number]]  Normal              Final  result                 Please view results for these tests on the individual orders.         Medical Decision Making:     The patient presented for evaluation for confusion.  Differential include but not limited to arrhythmia, infection, intracranial abnormality, ACS.   Lab work imaging performed.  Imaging interpreted by me and confirmed by radiology            External Records Reviewed: I reviewed recent and relevant outside records    ED Course as of 10/21/24 0308   Sun Oct 20, 2024   2232 EKG interpreted by me shows sinus rhythm with premature supraventricular complexes.  No STEMI.  Artifact present at end of EKG limiting interpretation.  Compared to prior EKG changes have occurred.  Rate 71. [WL]   2300 He is alert and oriented x 4 here.  States he has had at times confusion this has been going on for at least 4 months now and his wife was just concerned and urged him to come here to be checked out today.  Otherwise has no complaints and is resting comfortably here. [WL]   2344 Ct head shows 1. No evidence of hemorrhage, CT apparent transcortical infarct, or  other acute intracranial abnormality.  2. Patchy attenuation changes present in the periventricular and  subcortical white matter of bilateral cerebral hemispheres are  nonspecific, but favored to represent sequela of microvascular  disease.   [WL]   2348 Blood pressure low here initially but I believe this inaccurate as cuff is a pediatric when he is wearing.  When I took his blood pressure in room it is 106/78. [WL]   2357 WBC, Urine(!): >50 [WL]   2357 WBC Clumps, Urine: RARE [WL]   Mon Oct 21, 2024   0002 Was given a dose of Rocephin here. [WL]   0053 Were able to contact wife who is comfortable with him going home.  She is working on placement tomorrow for him as she states he has history of dementia and does not want him admitted here for placement at this time.  Will give him Keflex as prescription to go home with. [WL]      ED Course User  Index  [WL] Fredi Kearney DO         Diagnoses as of 10/21/24 0308   Confusion   Cystitis         CT head wo IV contrast   Final Result   1. No evidence of hemorrhage, CT apparent transcortical infarct, or   other acute intracranial abnormality.   2. Patchy attenuation changes present in the periventricular and   subcortical white matter of bilateral cerebral hemispheres are   nonspecific, but favored to represent sequela of microvascular   disease.             MACRO:   None        Signed by: Sean Lema 10/20/2024 11:42 PM   Dictation workstation:   [id number]        Labs Reviewed   CBC WITH AUTO DIFFERENTIAL - Abnormal       Result Value    WBC 12.8 (*)     nRBC 0.0      RBC 3.34 (*)     Hemoglobin 10.2 (*)     Hematocrit 32.1 (*)     MCV 96      MCH 30.5      MCHC 31.8 (*)     RDW 16.2 (*)     Platelets 515 (*)     Neutrophils % 69.4      Immature Granulocytes %, Automated 1.1 (*)     Lymphocytes % 17.9      Monocytes % 7.7      Eosinophils % 3.3      Basophils % 0.6      Neutrophils Absolute 8.88 (*)     Immature Granulocytes Absolute, Automated 0.14      Lymphocytes Absolute 2.29      Monocytes Absolute 0.99 (*)     Eosinophils Absolute 0.42 (*)     Basophils Absolute 0.08     COMPREHENSIVE METABOLIC PANEL - Abnormal    Glucose 102 (*)     Sodium 135 (*)     Potassium 4.1      Chloride 102      Bicarbonate 27      Anion Gap 10      Urea Nitrogen 22      Creatinine 0.89      eGFR 86      Calcium 8.3 (*)     Albumin 2.7 (*)     Alkaline Phosphatase 138 (*)     Total Protein 6.5      AST 27      Bilirubin, Total 0.2      ALT 20     B-TYPE NATRIURETIC PEPTIDE - Abnormal     (*)     Narrative:        <100 pg/mL - Heart failure unlikely  100-299 pg/mL - Intermediate probability of acute heart                  failure exacerbation. Correlate with clinical                  context and patient history.    >=300 pg/mL - Heart Failure likely. Correlate with clinical                  context and patient  history.    BNP testing is performed using different testing methodology at [facility] than at other system \[facility]\"". Direct result comparisons should only be made within the same method.      URINALYSIS WITH REFLEX MICROSCOPIC - Abnormal    Color, Urine Yellow      Appearance, Urine Clear      Specific Gravity, Urine 1.029      pH, Urine 5.0      Protein, Urine 20 (TRACE)      Glucose, Urine Normal      Blood, Urine NEGATIVE      Ketones, Urine NEGATIVE      Bilirubin, Urine NEGATIVE      Urobilinogen, Urine Normal      Nitrite, Urine NEGATIVE      Leukocyte Esterase, Urine 500 Ashly/µL (*)    MICROSCOPIC ONLY, URINE - Abnormal    WBC, Urine >50 (*)     WBC Clumps, Urine RARE      RBC, Urine 6-10 (*)     Squamous Epithelial Cells, Urine 1-9 (SPARSE)      Budding Yeast, Urine        Mucus, Urine FEW      Narrative:     Corrected due to accidental premature release of results from instrument   AMMONIA - Normal    Ammonia 34     TSH WITH REFLEX TO FREE T4 IF ABNORMAL - Normal    Thyroid Stimulating Hormone 1.56      Narrative:     TSH testing is performed using different testing methodology at [facility] than at other [facility]. Direct result comparisons should only be made within the same method.     ACUTE TOXICOLOGY PANEL, BLOOD - Normal    Acetaminophen 12.9      Salicylate  <3      Alcohol <10     SERIAL TROPONIN-INITIAL - Normal    Troponin I, High Sensitivity 8      Narrative:     Less than 99th percentile of normal range cutoff-  Female and children under 18 years old <14 ng/L; Male <21 ng/L: Negative  Repeat testing should be performed if clinically indicated.     Female and children under 18 years old 14-50 ng/L; Male 21-50 ng/L:  Consistent with possible cardiac damage and possible increased clinical   risk. Serial measurements may help to assess extent of myocardial damage.     >50 ng/L: Consistent with cardiac damage, increased clinical risk and  myocardial infarction.  Serial measurements may help assess extent of   myocardial damage.      NOTE: Children less than 1 year old may have higher baseline troponin   levels and results should be interpreted in conjunction with the overall   clinical context.     NOTE: Troponin I testing is performed using a different   testing methodology at [facility] than at other   [facility]. Direct result comparisons should only   be made within the same method.   SERIAL TROPONIN, 1 HOUR - Normal    Troponin I, High Sensitivity 7      Narrative:     Less than 99th percentile of normal range cutoff-  Female and children under 18 years old <14 ng/L; Male <21 ng/L: Negative  Repeat testing should be performed if clinically indicated.     Female and children under 18 years old 14-50 ng/L; Male 21-50 ng/L:  Consistent with possible cardiac damage and possible increased clinical   risk. Serial measurements may help to assess extent of myocardial damage.     >50 ng/L: Consistent with cardiac damage, increased clinical risk and  myocardial infarction. Serial measurements may help assess extent of   myocardial damage.      NOTE: Children less than 1 year old may have higher baseline troponin   levels and results should be interpreted in conjunction with the overall   clinical context.     NOTE: Troponin I testing is performed using a different   testing methodology at [facility] than at other   [facility]. Direct result comparisons should only   be made within the same method.   TROPONIN SERIES- (INITIAL, 1 HR)    Narrative:     The following orders were created for panel order Troponin I Series, High Sensitivity (0, 1 HR).  Procedure                               Abnormality         Status                     ---------                               -----------         ------                     Troponin I, High Sensiti...[[id number]]  Normal              Final result               Troponin, High Sensitivi...[[id number]]   Normal              Final result                 Please view results for these tests on the individual orders.           Procedure  Procedures         The patient  has stable vs and will be discharge home.   The patient and caregiver are in agreement with the plan and given instructions to follow up with their PCP.         I discussed the differential, results and plan with the patient and/or family/friend/caregiver if present.       I emphasized the importance of follow-up with the physician I referred them to in the timeframe recommended.  I explained reasons for the patient to return to the Emergency Department. Additional verbal discharge instructions were also given and discussed with the patient to supplement those generated by the EMR. We also discussed medications that were prescribed (if any) including common side effects and interactions. The patient was advised to abstain from driving, operating heavy machinery or making significant decisions while taking medications such as opiates and muscle relaxers that may impair this. All questions were addressed.  They understand return precautions and discharge instructions. The patient and/or family/friend/caregiver expressed understanding.     Note: This note was dictated by speech recognition. Minor errors in transcription may be present.           Fredi Kearney, DO  10/21/24 0308

## 2024-10-22 ENCOUNTER — HOME CARE VISIT (OUTPATIENT)
Dept: HOME HEALTH SERVICES | Facility: HOME HEALTH | Age: 84
End: 2024-10-22
Payer: MEDICARE

## 2024-10-22 VITALS
SYSTOLIC BLOOD PRESSURE: 128 MMHG | HEART RATE: 76 BPM | RESPIRATION RATE: 18 BRPM | DIASTOLIC BLOOD PRESSURE: 82 MMHG | OXYGEN SATURATION: 93 % | TEMPERATURE: 98 F

## 2024-10-22 PROCEDURE — G0151 HHCP-SERV OF PT,EA 15 MIN: HCPCS

## 2024-10-22 SDOH — HEALTH STABILITY: PHYSICAL HEALTH
EXERCISE COMMENTS: B LE EXER.S IN SUPINE AND SITTING POS., 10 TO 15 REPS X 1 SET EA EXER. WITH INSTR. FOR PROPER PACE AND FULL AVAILABLE ROM FOR EACH EXERCISE.

## 2024-10-22 ASSESSMENT — ACTIVITIES OF DAILY LIVING (ADL)
CURRENT_FUNCTION: MODERATE ASSIST
CURRENT_FUNCTION: ONE PERSON
AMBULATION ASSISTANCE ON FLAT SURFACES: 1
AMBULATION_DISTANCE/DURATION_TOLERATED: 10 FEET X 2
AMBULATION ASSISTANCE: 1
AMBULATION ASSISTANCE: MODERATE ASSIST
PHYSICAL TRANSFERS ASSESSED: 1
AMBULATION ASSISTANCE: ONE PERSON
PHYSICAL_TRANSFERS_DEVICES: WHEELED WALKER

## 2024-10-22 ASSESSMENT — ENCOUNTER SYMPTOMS
OCCASIONAL FEELINGS OF UNSTEADINESS: 0
HIGHEST PAIN SEVERITY IN PAST 24 HOURS: 5/10
PAIN LOCATION: RIGHT KNEE
PERSON REPORTING PAIN: PATIENT
PAIN: 1
MUSCLE WEAKNESS: 1
SUBJECTIVE PAIN PROGRESSION: GRADUALLY IMPROVING
PAIN SEVERITY GOAL: 3/10
LOWEST PAIN SEVERITY IN PAST 24 HOURS: 3/10

## 2024-10-25 ENCOUNTER — HOME CARE VISIT (OUTPATIENT)
Dept: HOME HEALTH SERVICES | Facility: HOME HEALTH | Age: 84
End: 2024-10-25
Payer: MEDICARE

## 2024-10-25 VITALS
SYSTOLIC BLOOD PRESSURE: 126 MMHG | OXYGEN SATURATION: 93 % | RESPIRATION RATE: 18 BRPM | HEART RATE: 80 BPM | TEMPERATURE: 98 F | DIASTOLIC BLOOD PRESSURE: 84 MMHG

## 2024-10-25 PROCEDURE — G0151 HHCP-SERV OF PT,EA 15 MIN: HCPCS

## 2024-10-25 SDOH — HEALTH STABILITY: PHYSICAL HEALTH
EXERCISE COMMENTS: B LE EXER.S IN SUPINE AND SITTING POS., 10 TO 15 REPS X 1 TO 2 SETS EA EXER. WITH INSTR. FOR PROPER PACE AND FULL AVAILABLE ROM FOR EACH EXERCISE.

## 2024-10-25 ASSESSMENT — ACTIVITIES OF DAILY LIVING (ADL)
CURRENT_FUNCTION: ONE PERSON
AMBULATION ASSISTANCE: 1
PHYSICAL TRANSFERS ASSESSED: 1
CURRENT_FUNCTION: MODERATE ASSIST
AMBULATION_DISTANCE/DURATION_TOLERATED: 20 FEET X 2
AMBULATION ASSISTANCE: MODERATE ASSIST
PHYSICAL_TRANSFERS_DEVICES: STANDARD WALKER
AMBULATION ASSISTANCE ON FLAT SURFACES: 1
CURRENT_FUNCTION: MINIMUM ASSIST

## 2024-10-25 ASSESSMENT — ENCOUNTER SYMPTOMS
HIGHEST PAIN SEVERITY IN PAST 24 HOURS: 5/10
LOWEST PAIN SEVERITY IN PAST 24 HOURS: 2/10
OCCASIONAL FEELINGS OF UNSTEADINESS: 0
SUBJECTIVE PAIN PROGRESSION: GRADUALLY IMPROVING
PERSON REPORTING PAIN: PATIENT
MUSCLE WEAKNESS: 1
PAIN LOCATION: LEFT KNEE
PAIN SEVERITY GOAL: 3/10
PAIN: 1

## 2024-10-29 ENCOUNTER — HOME CARE VISIT (OUTPATIENT)
Dept: HOME HEALTH SERVICES | Facility: HOME HEALTH | Age: 84
End: 2024-10-29
Payer: MEDICARE

## 2024-10-29 VITALS
RESPIRATION RATE: 18 BRPM | TEMPERATURE: 98.8 F | OXYGEN SATURATION: 93 % | HEART RATE: 80 BPM | SYSTOLIC BLOOD PRESSURE: 132 MMHG | DIASTOLIC BLOOD PRESSURE: 76 MMHG

## 2024-10-29 PROCEDURE — G0151 HHCP-SERV OF PT,EA 15 MIN: HCPCS

## 2024-10-29 SDOH — HEALTH STABILITY: PHYSICAL HEALTH
EXERCISE COMMENTS: BILAT. LE EXER.S IN SUPINE AND SITTING POS., 10 TO 15 REPS X 1 SET EA EXER. WITH INSTR. FOR PROPER PACE AND FULL AVAILABLE ROM FOR EACH EXERCISE.

## 2024-10-29 ASSESSMENT — ENCOUNTER SYMPTOMS
PAIN: 1
LOWEST PAIN SEVERITY IN PAST 24 HOURS: 2/10
PAIN SEVERITY GOAL: 3/10
OCCASIONAL FEELINGS OF UNSTEADINESS: 1
HIGHEST PAIN SEVERITY IN PAST 24 HOURS: 5/10
MUSCLE WEAKNESS: 1
PERSON REPORTING PAIN: PATIENT
SUBJECTIVE PAIN PROGRESSION: GRADUALLY IMPROVING
PAIN LOCATION: LEFT LEG

## 2024-10-29 ASSESSMENT — ACTIVITIES OF DAILY LIVING (ADL)
CURRENT_FUNCTION: ONE PERSON
AMBULATION ASSISTANCE: CONTACT GUARD ASSIST
CURRENT_FUNCTION: CONTACT GUARD ASSIST
AMBULATION ASSISTANCE: STAND BY ASSIST
CURRENT_FUNCTION: STAND BY ASSIST
PHYSICAL TRANSFERS ASSESSED: 1
PHYSICAL_TRANSFERS_DEVICES: WHEELED WALKER
AMBULATION ASSISTANCE ON FLAT SURFACES: 1
AMBULATION ASSISTANCE: ONE PERSON
AMBULATION ASSISTANCE: 1

## 2024-11-01 ENCOUNTER — HOME CARE VISIT (OUTPATIENT)
Dept: HOME HEALTH SERVICES | Facility: HOME HEALTH | Age: 84
End: 2024-11-01
Payer: MEDICARE

## 2024-11-01 VITALS
OXYGEN SATURATION: 95 % | RESPIRATION RATE: 18 BRPM | SYSTOLIC BLOOD PRESSURE: 126 MMHG | HEART RATE: 86 BPM | TEMPERATURE: 97.8 F | DIASTOLIC BLOOD PRESSURE: 86 MMHG

## 2024-11-01 PROCEDURE — G0151 HHCP-SERV OF PT,EA 15 MIN: HCPCS

## 2024-11-01 ASSESSMENT — ACTIVITIES OF DAILY LIVING (ADL)
PHYSICAL TRANSFERS ASSESSED: 1
CURRENT_FUNCTION: ONE PERSON
AMBULATION ASSISTANCE: CONTACT GUARD ASSIST
AMBULATION ASSISTANCE ON FLAT SURFACES: 1
PHYSICAL_TRANSFERS_DEVICES: WHEELED WALKER
AMBULATION ASSISTANCE: 1
AMBULATION ASSISTANCE: STAND BY ASSIST
AMBULATION ASSISTANCE: ONE PERSON
AMBULATION_DISTANCE/DURATION_TOLERATED: 30 FEET X 2

## 2024-11-01 ASSESSMENT — ENCOUNTER SYMPTOMS
PAIN SEVERITY GOAL: 3/10
LOWEST PAIN SEVERITY IN PAST 24 HOURS: 2/10
PAIN LOCATION: LEFT LEG
SUBJECTIVE PAIN PROGRESSION: GRADUALLY IMPROVING
OCCASIONAL FEELINGS OF UNSTEADINESS: 0
HIGHEST PAIN SEVERITY IN PAST 24 HOURS: 6/10
PERSON REPORTING PAIN: PATIENT
PAIN: 1
MUSCLE WEAKNESS: 1

## 2024-11-05 ENCOUNTER — HOME CARE VISIT (OUTPATIENT)
Dept: HOME HEALTH SERVICES | Facility: HOME HEALTH | Age: 84
End: 2024-11-05
Payer: MEDICARE

## 2024-11-05 VITALS
OXYGEN SATURATION: 92 % | RESPIRATION RATE: 18 BRPM | SYSTOLIC BLOOD PRESSURE: 128 MMHG | TEMPERATURE: 98.2 F | DIASTOLIC BLOOD PRESSURE: 74 MMHG | HEART RATE: 80 BPM

## 2024-11-05 PROCEDURE — G0151 HHCP-SERV OF PT,EA 15 MIN: HCPCS

## 2024-11-05 SDOH — HEALTH STABILITY: PHYSICAL HEALTH
EXERCISE COMMENTS: BILAT. LE EXERCISES IN SUPINE AND SITTING POS., 10 TO 15 REPS X 1 SET EA EXER. WITH INSTR. FOR PROPER PACE AND FULL AVAILABLE ROM FOR EACH EXERCISE WITH INSTR. FOR PROPER PACE AND FULL AVAILABLE ROM FOR EACH EXERCISE.

## 2024-11-05 ASSESSMENT — ENCOUNTER SYMPTOMS
PAIN: 1
PAIN LOCATION: LEFT KNEE
PAIN SEVERITY GOAL: 3/10
HIGHEST PAIN SEVERITY IN PAST 24 HOURS: 5/10
LOWEST PAIN SEVERITY IN PAST 24 HOURS: 2/10
PERSON REPORTING PAIN: PATIENT
OCCASIONAL FEELINGS OF UNSTEADINESS: 1
MUSCLE WEAKNESS: 1
SUBJECTIVE PAIN PROGRESSION: GRADUALLY IMPROVING

## 2024-11-05 ASSESSMENT — ACTIVITIES OF DAILY LIVING (ADL)
AMBULATION ASSISTANCE: 1
AMBULATION ASSISTANCE: CONTACT GUARD ASSIST
PHYSICAL TRANSFERS ASSESSED: 1
AMBULATION_DISTANCE/DURATION_TOLERATED: 30 FEET X 2
CURRENT_FUNCTION: STAND BY ASSIST
PHYSICAL_TRANSFERS_DEVICES: STANDARD WALKER
AMBULATION ASSISTANCE: ONE PERSON
CURRENT_FUNCTION: CONTACT GUARD ASSIST
AMBULATION ASSISTANCE ON FLAT SURFACES: 1
CURRENT_FUNCTION: ONE PERSON
AMBULATION ASSISTANCE: STAND BY ASSIST

## 2024-11-08 ENCOUNTER — HOME CARE VISIT (OUTPATIENT)
Dept: HOME HEALTH SERVICES | Facility: HOME HEALTH | Age: 84
End: 2024-11-08
Payer: MEDICARE

## 2024-11-08 VITALS
TEMPERATURE: 98.8 F | DIASTOLIC BLOOD PRESSURE: 84 MMHG | HEART RATE: 84 BPM | RESPIRATION RATE: 18 BRPM | SYSTOLIC BLOOD PRESSURE: 134 MMHG | OXYGEN SATURATION: 96 %

## 2024-11-08 PROCEDURE — G0151 HHCP-SERV OF PT,EA 15 MIN: HCPCS

## 2024-11-08 SDOH — HEALTH STABILITY: PHYSICAL HEALTH: EXERCISE ACTIVITY: ANKLE PUMPS

## 2024-11-08 SDOH — HEALTH STABILITY: PHYSICAL HEALTH: PHYSICAL EXERCISE: SITTING

## 2024-11-08 SDOH — HEALTH STABILITY: PHYSICAL HEALTH: EXERCISE ACTIVITIES SETS: 1

## 2024-11-08 SDOH — HEALTH STABILITY: PHYSICAL HEALTH

## 2024-11-08 SDOH — HEALTH STABILITY: PHYSICAL HEALTH: EXERCISE ACTIVITY: HIP FLEX.

## 2024-11-08 SDOH — HEALTH STABILITY: PHYSICAL HEALTH: EXERCISE COMMENTS: PT. REQUIRED INSTR. FOR PROPER PACE AND FULL AVAILABLE ROM FOR EACH EXERCISE.

## 2024-11-08 SDOH — HEALTH STABILITY: PHYSICAL HEALTH: PHYSICAL EXERCISE: 10

## 2024-11-08 SDOH — HEALTH STABILITY: PHYSICAL HEALTH: EXERCISE ACTIVITY: KNEE EXT.

## 2024-11-08 ASSESSMENT — ENCOUNTER SYMPTOMS
LOWEST PAIN SEVERITY IN PAST 24 HOURS: 2/10
SUBJECTIVE PAIN PROGRESSION: GRADUALLY IMPROVING
HIGHEST PAIN SEVERITY IN PAST 24 HOURS: 5/10
PAIN LOCATION: LEFT KNEE
MUSCLE WEAKNESS: 1
PAIN: 1
OCCASIONAL FEELINGS OF UNSTEADINESS: 1
PERSON REPORTING PAIN: PATIENT
PAIN SEVERITY GOAL: 3/10

## 2024-11-08 ASSESSMENT — ACTIVITIES OF DAILY LIVING (ADL)
CURRENT_FUNCTION: CONTACT GUARD ASSIST
PHYSICAL TRANSFERS ASSESSED: 1
PHYSICAL_TRANSFERS_DEVICES: WHEELED WALKER
AMBULATION ASSISTANCE: ONE PERSON
AMBULATION ASSISTANCE: STAND BY ASSIST
CURRENT_FUNCTION: STAND BY ASSIST
AMBULATION ASSISTANCE: 1
AMBULATION ASSISTANCE: CONTACT GUARD ASSIST
AMBULATION ASSISTANCE ON FLAT SURFACES: 1
CURRENT_FUNCTION: ONE PERSON

## 2024-11-12 ENCOUNTER — HOME CARE VISIT (OUTPATIENT)
Dept: HOME HEALTH SERVICES | Facility: HOME HEALTH | Age: 84
End: 2024-11-12
Payer: MEDICARE

## 2024-11-12 VITALS
DIASTOLIC BLOOD PRESSURE: 74 MMHG | OXYGEN SATURATION: 94 % | HEART RATE: 72 BPM | TEMPERATURE: 99 F | RESPIRATION RATE: 18 BRPM | SYSTOLIC BLOOD PRESSURE: 116 MMHG

## 2024-11-12 PROCEDURE — G0151 HHCP-SERV OF PT,EA 15 MIN: HCPCS

## 2024-11-12 ASSESSMENT — ENCOUNTER SYMPTOMS
DENIES PAIN: 1
MUSCLE WEAKNESS: 1
OCCASIONAL FEELINGS OF UNSTEADINESS: 0

## 2024-11-12 ASSESSMENT — ACTIVITIES OF DAILY LIVING (ADL)
AMBULATION ASSISTANCE: ONE PERSON
AMBULATION ASSISTANCE ON FLAT SURFACES: 1
CURRENT_FUNCTION: ONE PERSON

## 2024-11-15 ENCOUNTER — HOME CARE VISIT (OUTPATIENT)
Dept: HOME HEALTH SERVICES | Facility: HOME HEALTH | Age: 84
End: 2024-11-15
Payer: MEDICARE

## 2024-11-15 VITALS
RESPIRATION RATE: 18 BRPM | TEMPERATURE: 98.2 F | DIASTOLIC BLOOD PRESSURE: 80 MMHG | HEART RATE: 88 BPM | SYSTOLIC BLOOD PRESSURE: 130 MMHG | OXYGEN SATURATION: 98 %

## 2024-11-15 PROCEDURE — G0151 HHCP-SERV OF PT,EA 15 MIN: HCPCS

## 2024-11-15 SDOH — HEALTH STABILITY: PHYSICAL HEALTH
EXERCISE COMMENTS: BILAT. LE EXERCISES IN SUPINE AND SITTING POS., 10 TO 15 REPS X 1 SET EA EXER. WITH INSTR. FOR PROPER PACE AND FULL AVAILABLE ROM FOR EACH EXERCISE.

## 2024-11-15 ASSESSMENT — ENCOUNTER SYMPTOMS
PAIN SEVERITY GOAL: 3/10
PERSON REPORTING PAIN: PATIENT
OCCASIONAL FEELINGS OF UNSTEADINESS: 1
LOWEST PAIN SEVERITY IN PAST 24 HOURS: 2/10
HIGHEST PAIN SEVERITY IN PAST 24 HOURS: 5/10
PAIN: 1
SUBJECTIVE PAIN PROGRESSION: GRADUALLY IMPROVING
MUSCLE WEAKNESS: 1
PAIN LOCATION: LEFT KNEE

## 2024-11-15 ASSESSMENT — ACTIVITIES OF DAILY LIVING (ADL)
CURRENT_FUNCTION: STAND BY ASSIST
PHYSICAL TRANSFERS ASSESSED: 1
CURRENT_FUNCTION: ONE PERSON
AMBULATION ASSISTANCE: CONTACT GUARD ASSIST
AMBULATION_DISTANCE/DURATION_TOLERATED: 30 FEET X 2
CURRENT_FUNCTION: CONTACT GUARD ASSIST
AMBULATION ASSISTANCE: ONE PERSON
PHYSICAL_TRANSFERS_DEVICES: WHEELED WALKER
AMBULATION ASSISTANCE: STAND BY ASSIST
AMBULATION ASSISTANCE: 1
AMBULATION ASSISTANCE ON FLAT SURFACES: 1

## 2024-11-19 ENCOUNTER — HOME CARE VISIT (OUTPATIENT)
Dept: HOME HEALTH SERVICES | Facility: HOME HEALTH | Age: 84
End: 2024-11-19
Payer: MEDICARE

## 2024-11-19 VITALS
HEART RATE: 80 BPM | SYSTOLIC BLOOD PRESSURE: 122 MMHG | TEMPERATURE: 98 F | OXYGEN SATURATION: 93 % | RESPIRATION RATE: 18 BRPM | DIASTOLIC BLOOD PRESSURE: 82 MMHG

## 2024-11-19 PROCEDURE — G0151 HHCP-SERV OF PT,EA 15 MIN: HCPCS

## 2024-11-19 SDOH — HEALTH STABILITY: PHYSICAL HEALTH
EXERCISE COMMENTS: BILAT. LE EXER.S IN SUPINE AND SITTING POS., 10 TO 15 REPS X 1 SET EA EXER. WITH INSTYR. FOR PROPER PACE AND FULL AVAILABLE ROM FOR EACH EXERCISE.

## 2024-11-19 ASSESSMENT — ENCOUNTER SYMPTOMS
LOWEST PAIN SEVERITY IN PAST 24 HOURS: 2/10
MUSCLE WEAKNESS: 1
HIGHEST PAIN SEVERITY IN PAST 24 HOURS: 4/10
SUBJECTIVE PAIN PROGRESSION: GRADUALLY IMPROVING
OCCASIONAL FEELINGS OF UNSTEADINESS: 1
PAIN LOCATION: LEFT KNEE
PAIN: 1
PAIN SEVERITY GOAL: 3/10
PERSON REPORTING PAIN: PATIENT

## 2024-11-19 ASSESSMENT — ACTIVITIES OF DAILY LIVING (ADL)
CURRENT_FUNCTION: STAND BY ASSIST
AMBULATION ASSISTANCE: 1
PHYSICAL TRANSFERS ASSESSED: 1
AMBULATION ASSISTANCE: ONE PERSON
AMBULATION ASSISTANCE ON FLAT SURFACES: 1
PHYSICAL_TRANSFERS_DEVICES: STANDARD WALKER
AMBULATION ASSISTANCE: CONTACT GUARD ASSIST
CURRENT_FUNCTION: CONTACT GUARD ASSIST
AMBULATION ASSISTANCE: STAND BY ASSIST
CURRENT_FUNCTION: ONE PERSON

## 2024-11-22 ENCOUNTER — APPOINTMENT (OUTPATIENT)
Dept: RADIOLOGY | Facility: HOSPITAL | Age: 84
End: 2024-11-22
Payer: MEDICARE

## 2024-11-22 ENCOUNTER — HOSPITAL ENCOUNTER (INPATIENT)
Facility: HOSPITAL | Age: 84
End: 2024-11-22
Attending: EMERGENCY MEDICINE | Admitting: INTERNAL MEDICINE
Payer: MEDICARE

## 2024-11-22 ENCOUNTER — APPOINTMENT (OUTPATIENT)
Dept: CARDIOLOGY | Facility: HOSPITAL | Age: 84
End: 2024-11-22
Payer: MEDICARE

## 2024-11-22 ENCOUNTER — HOME CARE VISIT (OUTPATIENT)
Dept: HOME HEALTH SERVICES | Facility: HOME HEALTH | Age: 84
End: 2024-11-22

## 2024-11-22 DIAGNOSIS — S02.2XXA CLOSED FRACTURE OF NASAL BONE, INITIAL ENCOUNTER: ICD-10-CM

## 2024-11-22 DIAGNOSIS — R53.1 WEAKNESS: Primary | ICD-10-CM
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
## 2024-11-22 DIAGNOSIS — S22.41XA CLOSED FRACTURE OF MULTIPLE RIBS OF RIGHT SIDE, INITIAL ENCOUNTER: ICD-10-CM

## 2024-11-22 DIAGNOSIS — S22.42XA CLOSED FRACTURE OF MULTIPLE RIBS OF LEFT SIDE, INITIAL ENCOUNTER: ICD-10-CM

## 2024-11-22 LAB
ALBUMIN SERPL BCP-MCNC: 2.8 G/DL (ref 3.4–5)
ALP SERPL-CCNC: 153 U/L (ref 33–136)
ALT SERPL W P-5'-P-CCNC: 13 U/L (ref 10–52)
ANION GAP SERPL CALC-SCNC: 15 MMOL/L (ref 10–20)
AST SERPL W P-5'-P-CCNC: 26 U/L (ref 9–39)
BASOPHILS # BLD AUTO: 0.04 X10*3/UL (ref 0–0.1)
BASOPHILS NFR BLD AUTO: 0.3 %
BILIRUB SERPL-MCNC: 0.2 MG/DL (ref 0–1.2)
BUN SERPL-MCNC: 23 MG/DL (ref 6–23)
CALCIUM SERPL-MCNC: 8.6 MG/DL (ref 8.6–10.3)
CHLORIDE SERPL-SCNC: 101 MMOL/L (ref 98–107)
CK SERPL-CCNC: 80 U/L (ref 0–325)
CO2 SERPL-SCNC: 23 MMOL/L (ref 21–32)
CREAT SERPL-MCNC: 0.95 MG/DL (ref 0.5–1.3)
EGFRCR SERPLBLD CKD-EPI 2021: 80 ML/MIN/1.73M*2
EOSINOPHIL # BLD AUTO: 0.04 X10*3/UL (ref 0–0.4)
EOSINOPHIL NFR BLD AUTO: 0.3 %
ERYTHROCYTE [DISTWIDTH] IN BLOOD BY AUTOMATED COUNT: 15.9 % (ref 11.5–14.5)
FLUAV RNA RESP QL NAA+PROBE: NOT DETECTED
FLUBV RNA RESP QL NAA+PROBE: NOT DETECTED
GLUCOSE SERPL-MCNC: 133 MG/DL (ref 74–99)
HCT VFR BLD AUTO: 32.4 % (ref 41–52)
HGB BLD-MCNC: 10.6 G/DL (ref 13.5–17.5)
IMM GRANULOCYTES # BLD AUTO: 0.1 X10*3/UL (ref 0–0.5)
IMM GRANULOCYTES NFR BLD AUTO: 0.8 % (ref 0–0.9)
LACTATE SERPL-SCNC: 1.6 MMOL/L (ref 0.4–2)
LACTATE SERPL-SCNC: 2.8 MMOL/L (ref 0.4–2)
LYMPHOCYTES # BLD AUTO: 1.1 X10*3/UL (ref 0.8–3)
LYMPHOCYTES NFR BLD AUTO: 8.8 %
MCH RBC QN AUTO: 30.5 PG (ref 26–34)
MCHC RBC AUTO-ENTMCNC: 32.7 G/DL (ref 32–36)
MCV RBC AUTO: 93 FL (ref 80–100)
MONOCYTES # BLD AUTO: 0.97 X10*3/UL (ref 0.05–0.8)
MONOCYTES NFR BLD AUTO: 7.8 %
NEUTROPHILS # BLD AUTO: 10.26 X10*3/UL (ref 1.6–5.5)
NEUTROPHILS NFR BLD AUTO: 82 %
NRBC BLD-RTO: 0 /100 WBCS (ref 0–0)
PLATELET # BLD AUTO: 422 X10*3/UL (ref 150–450)
POTASSIUM SERPL-SCNC: 4 MMOL/L (ref 3.5–5.3)
PROT SERPL-MCNC: 7.3 G/DL (ref 6.4–8.2)
RBC # BLD AUTO: 3.48 X10*6/UL (ref 4.5–5.9)
SARS-COV-2 RNA RESP QL NAA+PROBE: NOT DETECTED
SODIUM SERPL-SCNC: 135 MMOL/L (ref 136–145)
WBC # BLD AUTO: 12.5 X10*3/UL (ref 4.4–11.3)

## 2024-11-22 PROCEDURE — 96365 THER/PROPH/DIAG IV INF INIT: CPT

## 2024-11-22 PROCEDURE — 82550 ASSAY OF CK (CPK): CPT | Performed by: PHYSICIAN ASSISTANT

## 2024-11-22 PROCEDURE — 2500000004 HC RX 250 GENERAL PHARMACY W/ HCPCS (ALT 636 FOR OP/ED): Performed by: PHYSICIAN ASSISTANT

## 2024-11-22 PROCEDURE — 2500000005 HC RX 250 GENERAL PHARMACY W/O HCPCS

## 2024-11-22 PROCEDURE — 70450 CT HEAD/BRAIN W/O DYE: CPT | Performed by: RADIOLOGY

## 2024-11-22 PROCEDURE — 2500000004 HC RX 250 GENERAL PHARMACY W/ HCPCS (ALT 636 FOR OP/ED)

## 2024-11-22 PROCEDURE — 70486 CT MAXILLOFACIAL W/O DYE: CPT

## 2024-11-22 PROCEDURE — 36415 COLL VENOUS BLD VENIPUNCTURE: CPT | Performed by: PHYSICIAN ASSISTANT

## 2024-11-22 PROCEDURE — 93005 ELECTROCARDIOGRAM TRACING: CPT

## 2024-11-22 PROCEDURE — 70486 CT MAXILLOFACIAL W/O DYE: CPT | Performed by: RADIOLOGY

## 2024-11-22 PROCEDURE — 71250 CT THORAX DX C-: CPT

## 2024-11-22 PROCEDURE — 72125 CT NECK SPINE W/O DYE: CPT | Performed by: RADIOLOGY

## 2024-11-22 PROCEDURE — 99223 1ST HOSP IP/OBS HIGH 75: CPT | Performed by: INTERNAL MEDICINE

## 2024-11-22 PROCEDURE — 85025 COMPLETE CBC W/AUTO DIFF WBC: CPT | Performed by: PHYSICIAN ASSISTANT

## 2024-11-22 PROCEDURE — 76377 3D RENDER W/INTRP POSTPROCES: CPT

## 2024-11-22 PROCEDURE — 72125 CT NECK SPINE W/O DYE: CPT

## 2024-11-22 PROCEDURE — 71250 CT THORAX DX C-: CPT | Performed by: RADIOLOGY

## 2024-11-22 PROCEDURE — 83605 ASSAY OF LACTIC ACID: CPT | Performed by: PHYSICIAN ASSISTANT

## 2024-11-22 PROCEDURE — 70450 CT HEAD/BRAIN W/O DYE: CPT

## 2024-11-22 PROCEDURE — 76376 3D RENDER W/INTRP POSTPROCES: CPT | Performed by: RADIOLOGY

## 2024-11-22 PROCEDURE — 96361 HYDRATE IV INFUSION ADD-ON: CPT

## 2024-11-22 PROCEDURE — 87636 SARSCOV2 & INF A&B AMP PRB: CPT | Performed by: PHYSICIAN ASSISTANT

## 2024-11-22 PROCEDURE — 99223 1ST HOSP IP/OBS HIGH 75: CPT

## 2024-11-22 PROCEDURE — 1200000002 HC GENERAL ROOM WITH TELEMETRY DAILY

## 2024-11-22 PROCEDURE — 80053 COMPREHEN METABOLIC PANEL: CPT | Performed by: PHYSICIAN ASSISTANT

## 2024-11-22 PROCEDURE — 99285 EMERGENCY DEPT VISIT HI MDM: CPT | Mod: 25

## 2024-11-22 PROCEDURE — 87040 BLOOD CULTURE FOR BACTERIA: CPT | Mod: GEALAB | Performed by: PHYSICIAN ASSISTANT

## 2024-11-22 RX ORDER — POTASSIUM CHLORIDE 20 MEQ/1
20 TABLET, EXTENDED RELEASE ORAL DAILY
Status: DISCONTINUED | OUTPATIENT
Start: 2024-11-23 | End: 2024-11-28 | Stop reason: HOSPADM

## 2024-11-22 RX ORDER — FLUDROCORTISONE ACETATE 0.1 MG/1
0.05 TABLET ORAL DAILY
Status: DISCONTINUED | OUTPATIENT
Start: 2024-11-23 | End: 2024-11-28 | Stop reason: HOSPADM

## 2024-11-22 RX ORDER — ASPIRIN 81 MG/1
81 TABLET ORAL DAILY
Status: DISCONTINUED | OUTPATIENT
Start: 2024-11-22 | End: 2024-11-28 | Stop reason: HOSPADM

## 2024-11-22 RX ORDER — LIDOCAINE 560 MG/1
1 PATCH PERCUTANEOUS; TOPICAL; TRANSDERMAL DAILY
Status: CANCELLED | OUTPATIENT
Start: 2024-11-22

## 2024-11-22 RX ORDER — ATORVASTATIN CALCIUM 20 MG/1
20 TABLET, FILM COATED ORAL NIGHTLY
Status: DISCONTINUED | OUTPATIENT
Start: 2024-11-22 | End: 2024-11-28 | Stop reason: HOSPADM

## 2024-11-22 RX ORDER — CEFTRIAXONE 1 G/50ML
1 INJECTION, SOLUTION INTRAVENOUS ONCE
Status: COMPLETED | OUTPATIENT
Start: 2024-11-22 | End: 2024-11-22

## 2024-11-22 RX ORDER — VIT C/E/ZN/COPPR/LUTEIN/ZEAXAN 250MG-90MG
250 CAPSULE ORAL DAILY
Status: DISCONTINUED | OUTPATIENT
Start: 2024-11-23 | End: 2024-11-28 | Stop reason: HOSPADM

## 2024-11-22 RX ORDER — METOPROLOL SUCCINATE 25 MG/1
25 TABLET, EXTENDED RELEASE ORAL DAILY
Status: DISCONTINUED | OUTPATIENT
Start: 2024-11-22 | End: 2024-11-28 | Stop reason: HOSPADM

## 2024-11-22 RX ORDER — EAR PLUGS
1 EACH OTIC (EAR) 2 TIMES DAILY PRN
Status: DISCONTINUED | OUTPATIENT
Start: 2024-11-22 | End: 2024-11-28 | Stop reason: HOSPADM

## 2024-11-22 RX ORDER — OXYCODONE HYDROCHLORIDE 10 MG/1
10 TABLET ORAL EVERY 6 HOURS PRN
Status: DISCONTINUED | OUTPATIENT
Start: 2024-11-22 | End: 2024-11-28 | Stop reason: HOSPADM

## 2024-11-22 RX ORDER — DOXYLAMINE SUCCINATE 25 MG
1 TABLET ORAL 3 TIMES DAILY
Status: DISCONTINUED | OUTPATIENT
Start: 2024-11-22 | End: 2024-11-28 | Stop reason: HOSPADM

## 2024-11-22 RX ORDER — ONDANSETRON 4 MG/1
4 TABLET, ORALLY DISINTEGRATING ORAL EVERY 6 HOURS PRN
Status: DISCONTINUED | OUTPATIENT
Start: 2024-11-22 | End: 2024-11-28 | Stop reason: HOSPADM

## 2024-11-22 RX ORDER — LIDOCAINE 560 MG/1
1 PATCH PERCUTANEOUS; TOPICAL; TRANSDERMAL DAILY
Status: DISCONTINUED | OUTPATIENT
Start: 2024-11-22 | End: 2024-11-28 | Stop reason: HOSPADM

## 2024-11-22 RX ORDER — ASCORBIC ACID 500 MG
500 TABLET ORAL DAILY
Status: DISCONTINUED | OUTPATIENT
Start: 2024-11-23 | End: 2024-11-28 | Stop reason: HOSPADM

## 2024-11-22 RX ORDER — CHOLECALCIFEROL (VITAMIN D3) 25 MCG
1000 TABLET ORAL DAILY
Status: DISCONTINUED | OUTPATIENT
Start: 2024-11-23 | End: 2024-11-28 | Stop reason: HOSPADM

## 2024-11-22 RX ORDER — ENOXAPARIN SODIUM 100 MG/ML
40 INJECTION SUBCUTANEOUS EVERY 24 HOURS
Status: CANCELLED | OUTPATIENT
Start: 2024-11-22

## 2024-11-22 RX ORDER — DULOXETIN HYDROCHLORIDE 20 MG/1
40 CAPSULE, DELAYED RELEASE ORAL DAILY
Status: DISCONTINUED | OUTPATIENT
Start: 2024-11-23 | End: 2024-11-28 | Stop reason: HOSPADM

## 2024-11-22 RX ORDER — ONDANSETRON 4 MG/1
4 TABLET, FILM COATED ORAL EVERY 4 HOURS PRN
Status: DISCONTINUED | OUTPATIENT
Start: 2024-11-22 | End: 2024-11-22

## 2024-11-22 RX ORDER — OXYCODONE HYDROCHLORIDE 5 MG/1
5 TABLET ORAL EVERY 6 HOURS PRN
Status: DISCONTINUED | OUTPATIENT
Start: 2024-11-22 | End: 2024-11-28 | Stop reason: HOSPADM

## 2024-11-22 RX ORDER — ONDANSETRON HYDROCHLORIDE 2 MG/ML
4 INJECTION, SOLUTION INTRAVENOUS EVERY 6 HOURS PRN
Status: DISCONTINUED | OUTPATIENT
Start: 2024-11-22 | End: 2024-11-28 | Stop reason: HOSPADM

## 2024-11-22 RX ADMIN — CEFTRIAXONE SODIUM 1 G: 1 INJECTION, SOLUTION INTRAVENOUS at 17:51

## 2024-11-22 RX ADMIN — HYDROMORPHONE HYDROCHLORIDE 0.2 MG: 1 INJECTION, SOLUTION INTRAMUSCULAR; INTRAVENOUS; SUBCUTANEOUS at 21:34

## 2024-11-22 RX ADMIN — LIDOCAINE 4% 1 PATCH: 40 PATCH TOPICAL at 21:35

## 2024-11-22 RX ADMIN — SODIUM CHLORIDE 500 ML: 9 INJECTION, SOLUTION INTRAVENOUS at 16:38

## 2024-11-22 RX ADMIN — SODIUM CHLORIDE 500 ML: 9 INJECTION, SOLUTION INTRAVENOUS at 15:28

## 2024-11-22 SDOH — SOCIAL STABILITY: SOCIAL INSECURITY: WITHIN THE LAST YEAR, HAVE YOU BEEN AFRAID OF YOUR PARTNER OR EX-PARTNER?: NO

## 2024-11-22 SDOH — SOCIAL STABILITY: SOCIAL INSECURITY: ARE YOU OR HAVE YOU BEEN THREATENED OR ABUSED PHYSICALLY, EMOTIONALLY, OR SEXUALLY BY ANYONE?: NO

## 2024-11-22 SDOH — SOCIAL STABILITY: SOCIAL INSECURITY: DOES ANYONE TRY TO KEEP YOU FROM HAVING/CONTACTING OTHER FRIENDS OR DOING THINGS OUTSIDE YOUR HOME?: NO

## 2024-11-22 SDOH — SOCIAL STABILITY: SOCIAL INSECURITY
WITHIN THE LAST YEAR, HAVE YOU BEEN RAPED OR FORCED TO HAVE ANY KIND OF SEXUAL ACTIVITY BY YOUR PARTNER OR EX-PARTNER?: NO

## 2024-11-22 SDOH — ECONOMIC STABILITY: FOOD INSECURITY: WITHIN THE PAST 12 MONTHS, YOU WORRIED THAT YOUR FOOD WOULD RUN OUT BEFORE YOU GOT THE MONEY TO BUY MORE.: NEVER TRUE

## 2024-11-22 SDOH — SOCIAL STABILITY: SOCIAL INSECURITY: WITHIN THE LAST YEAR, HAVE YOU BEEN HUMILIATED OR EMOTIONALLY ABUSED IN OTHER WAYS BY YOUR PARTNER OR EX-PARTNER?: NO

## 2024-11-22 SDOH — SOCIAL STABILITY: SOCIAL INSECURITY: HAVE YOU HAD THOUGHTS OF HARMING ANYONE ELSE?: NO

## 2024-11-22 SDOH — SOCIAL STABILITY: SOCIAL INSECURITY: ABUSE: ADULT

## 2024-11-22 SDOH — SOCIAL STABILITY: SOCIAL INSECURITY: DO YOU FEEL ANYONE HAS EXPLOITED OR TAKEN ADVANTAGE OF YOU FINANCIALLY OR OF YOUR PERSONAL PROPERTY?: NO

## 2024-11-22 SDOH — ECONOMIC STABILITY: FOOD INSECURITY: WITHIN THE PAST 12 MONTHS, THE FOOD YOU BOUGHT JUST DIDN'T LAST AND YOU DIDN'T HAVE MONEY TO GET MORE.: NEVER TRUE

## 2024-11-22 SDOH — SOCIAL STABILITY: SOCIAL INSECURITY: HAS ANYONE EVER THREATENED TO HURT YOUR FAMILY OR YOUR PETS?: NO

## 2024-11-22 SDOH — SOCIAL STABILITY: SOCIAL INSECURITY: HAVE YOU HAD ANY THOUGHTS OF HARMING ANYONE ELSE?: NO

## 2024-11-22 SDOH — SOCIAL STABILITY: SOCIAL INSECURITY: DO YOU FEEL UNSAFE GOING BACK TO THE PLACE WHERE YOU ARE LIVING?: NO

## 2024-11-22 SDOH — SOCIAL STABILITY: SOCIAL INSECURITY
WITHIN THE LAST YEAR, HAVE YOU BEEN KICKED, HIT, SLAPPED, OR OTHERWISE PHYSICALLY HURT BY YOUR PARTNER OR EX-PARTNER?: NO

## 2024-11-22 SDOH — SOCIAL STABILITY: SOCIAL INSECURITY: ARE THERE ANY APPARENT SIGNS OF INJURIES/BEHAVIORS THAT COULD BE RELATED TO ABUSE/NEGLECT?: NO

## 2024-11-22 SDOH — SOCIAL STABILITY: SOCIAL INSECURITY: WERE YOU ABLE TO COMPLETE ALL THE BEHAVIORAL HEALTH SCREENINGS?: YES

## 2024-11-22 SDOH — ECONOMIC STABILITY: INCOME INSECURITY: IN THE PAST 12 MONTHS HAS THE ELECTRIC, GAS, OIL, OR WATER COMPANY THREATENED TO SHUT OFF SERVICES IN YOUR HOME?: NO

## 2024-11-22 ASSESSMENT — PAIN DESCRIPTION - ORIENTATION
ORIENTATION: LEFT
ORIENTATION: LEFT

## 2024-11-22 ASSESSMENT — LIFESTYLE VARIABLES
EVER FELT BAD OR GUILTY ABOUT YOUR DRINKING: NO
HOW OFTEN DO YOU HAVE A DRINK CONTAINING ALCOHOL: NEVER
HAVE YOU EVER FELT YOU SHOULD CUT DOWN ON YOUR DRINKING: NO
HAVE PEOPLE ANNOYED YOU BY CRITICIZING YOUR DRINKING: NO
HOW OFTEN DO YOU HAVE 6 OR MORE DRINKS ON ONE OCCASION: NEVER
SKIP TO QUESTIONS 9-10: 1
AUDIT-C TOTAL SCORE: 0
AUDIT-C TOTAL SCORE: 0
TOTAL SCORE: 0
EVER HAD A DRINK FIRST THING IN THE MORNING TO STEADY YOUR NERVES TO GET RID OF A HANGOVER: NO
HOW MANY STANDARD DRINKS CONTAINING ALCOHOL DO YOU HAVE ON A TYPICAL DAY: PATIENT DOES NOT DRINK

## 2024-11-22 ASSESSMENT — COGNITIVE AND FUNCTIONAL STATUS - GENERAL
PATIENT BASELINE BEDBOUND: NO
DAILY ACTIVITIY SCORE: 15
STANDING UP FROM CHAIR USING ARMS: A LOT
DRESSING REGULAR UPPER BODY CLOTHING: A LOT
HELP NEEDED FOR BATHING: A LOT
MOVING FROM LYING ON BACK TO SITTING ON SIDE OF FLAT BED WITH BEDRAILS: A LOT
MOVING TO AND FROM BED TO CHAIR: A LOT
DRESSING REGULAR LOWER BODY CLOTHING: A LOT
CLIMB 3 TO 5 STEPS WITH RAILING: TOTAL
MOBILITY SCORE: 11
TURNING FROM BACK TO SIDE WHILE IN FLAT BAD: A LOT
WALKING IN HOSPITAL ROOM: A LOT
PERSONAL GROOMING: A LITTLE
TOILETING: A LOT

## 2024-11-22 ASSESSMENT — ACTIVITIES OF DAILY LIVING (ADL)
BATHING: NEEDS ASSISTANCE
JUDGMENT_ADEQUATE_SAFELY_COMPLETE_DAILY_ACTIVITIES: NO
DRESSING YOURSELF: NEEDS ASSISTANCE
PATIENT'S MEMORY ADEQUATE TO SAFELY COMPLETE DAILY ACTIVITIES?: NO
FEEDING YOURSELF: NEEDS ASSISTANCE
WALKS IN HOME: DEPENDENT
GROOMING: NEEDS ASSISTANCE
HEARING - LEFT EAR: HEARING AID
TOILETING: NEEDS ASSISTANCE
LACK_OF_TRANSPORTATION: NO
ADEQUATE_TO_COMPLETE_ADL: YES
HEARING - RIGHT EAR: DEAF

## 2024-11-22 ASSESSMENT — PAIN DESCRIPTION - LOCATION
LOCATION: RIB CAGE
LOCATION: RIB CAGE

## 2024-11-22 ASSESSMENT — PAIN SCALES - GENERAL
PAINLEVEL_OUTOF10: 4
PAINLEVEL_OUTOF10: 5 - MODERATE PAIN
PAINLEVEL_OUTOF10: 2

## 2024-11-22 ASSESSMENT — PAIN DESCRIPTION - DESCRIPTORS: DESCRIPTORS: ACHING

## 2024-11-22 ASSESSMENT — PAIN - FUNCTIONAL ASSESSMENT
PAIN_FUNCTIONAL_ASSESSMENT: 0-10

## 2024-11-22 ASSESSMENT — PAIN DESCRIPTION - PAIN TYPE: TYPE: ACUTE PAIN

## 2024-11-22 NOTE — ED PROVIDER NOTES
The patient was seen by the midlevel/resident.  I have personally saw the patient and made/approved the management plan and take responsibility for the patient management.  I reviewed the EKG's (when done) and agree with the interpretation.  I have seen and examined the patient; agree with the workup, evaluation, MDM, and diagnosis.  The care plan has been discussed with the midlevel/resident; I have reviewed the note and agree with the documented findings.     Seen in the emergency room for weakness.  Patient fell at home and has some rib pain.  I suspect he is fallen more than once at home based on his exam.  He is having difficulty getting around.  He has a history of A-fib but was unaware of it.  He is also very hard of hearing.  He is having difficulty after he fell.  Plan is to work him up including CK and consider possible hospitalization.  He has a high white count unsure exactly why may be traumatic.  He does not appear septic at this blood pressure was initially low he did respond to fluids.  Found to have a number of rib fractures.  Patient will be hospitalized and further evaluated treated.  Diagnoses as of 11/22/24 1819   Weakness   Fall, initial encounter   Closed fracture of nasal bone, initial encounter   Closed fracture of multiple ribs of left side, initial encounter     MD Julius Brunner MD  11/22/24 1819

## 2024-11-22 NOTE — ED PROVIDER NOTES
HPI   Chief Complaint   Patient presents with    Fall       History of present illness:  84-year-old male presents emergency room for complaints of recurrent falls and left-sided rib pain.  The patient is not a very good historian.  The patient states that he was trying to get out of bed today when he apparently slipped on the ground causing himself to fall and land on his left side.  He is having persistent pain in his left ribs since this occurred.  He states he is unsure of how long he was laying on the ground but apparently EMS states that they were called to the home after home health aid who found him laying on the ground.  The patient believes had been laying there for about 45 minutes and possibly past hour.  He states his ribs on the left side hurt especially and hurts when he takes a deep breath.  He denies any other injuries at this time.  Per the charting system patient has a history of drug use which is in remission as well as a history of alcohol abuse history of cataracts.     Social history: Negative for alcohol and drug use.    Review of systems:   Gen.: No weight loss, , fever.   Eyes: No vision loss, double vision, drainage, eye pain.   ENT: No pharyngitis, neck pain  Cardiac: No  palpitations, syncope, near syncope.   Pulmonary: No shortness of breath, cough, hemoptysis.   Heme/lymph: No swollen glands, fever, bleeding.   GI: No abdominal pain, change in bowel habits, melena, hematemesis, hematochezia, nausea, vomiting, diarrhea.   : No discharge, dysuria, frequency, urgency, hematuria.   Musculoskeletal: No limb pain, joint pain, joint swelling.   Skin: No rashes.   Review of systems is otherwise negative unless stated above or in history of present illness.        Physical exam:  General: Vitals noted, no distress. Afebrile.   EENT: No lymphadenopathy appreciated   Cardiac: Regular, rate, rhythm, no murmur.   Pulmonary: Lungs clear bilaterally with good aeration. No adventitious breath  sounds.   Abdomen: Soft, nonsurgical. Nontender. No peritoneal signs. Normoactive bowel sounds.   Extremities: No peripheral edema.   Skin: No rash.   Neuro: No focal neurologic deficits      Medical decision making:   Testing: CBC showed white count 12.5 CMP was unremarkable lactate 1.6 CK 80 flu and COVID-negative urinalysis  Treatment: IV antibiotics Rocephin started at this time urine culture sent blood culture sent as well  Reevaluation:   Plan:  84-year-old male presents emergency room for complaints of recurrent falls and left-sided rib pain.  The patient is not a very good historian.  The patient states that he was trying to get out of bed today when he apparently slipped on the ground causing himself to fall and land on his left side.  He is having persistent pain in his left ribs since this occurred.  He states he is unsure of how long he was laying on the ground but apparently EMS states that they were called to the home after home health aid who found him laying on the ground.  The patient believes had been laying there for about 45 minutes and possibly past hour.  He states his ribs on the left side hurt especially and hurts when he takes a deep breath.  He denies any other injuries at this time.  Per the charting system patient has a history of drug use which is in remission as well as a history of alcohol abuse history of cataracts.  Cardiac: Regular, rate, rhythm, no murmur.   Pulmonary: Lungs clear bilaterally with good aeration. No adventitious breath sounds.   Abdomen: Soft, nonsurgical. Nontender. No peritoneal signs. Normoactive bowel sounds.  There are multiple bruises present across the patient's face as well as his left ribs at this time and is tender to touch over the left chest but the lung sounds are present throughout, no pain to palpation over the sternum.  EKG was unremarkable troponin was negative.  I explained to the patient that I do not believe he can go open this time the patient  is agreeable this plan as well as stating that he was unable to sit up because he felt so weak all over.  I spoke with the hospital staff excepted the patient this time and he will be admitted to the floor for further care and treatment.  Impression:   1.  Fall  2.  Nasal bone fracture  3.  Rib fractures  4.  Generalized weakness              EKG taken on November 22, 2024 at 1450 shows A-fib at rate of 91, no STEMI no T wave inversion  EKG taken on November 22, 2024 at 1450-shows A-fib with a rate of 86, no STEMI no T wave inversion              Patient History   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    History of alcohol abuse    Chronic shortness of breath 06/28/2024    Drug abuse (Multi)     in remission    Edema, unspecified 11/26/2019    Dependent edema    Encounter for immunization 04/08/2016    Need for Zostavax administration    Pain in left knee 08/08/2018    Left knee pain    Personal history of other diseases of the nervous system and sense organs 09/18/2019    History of cataract    Personal history of other drug therapy 04/08/2016    History of pneumococcal vaccination    Tinea pedis 10/10/2018    Tinea pedis of both feet    Venous stasis ulcer of right calf (Multi) 06/28/2024    Vitiligo 06/28/2024     Past Surgical History:   Procedure Laterality Date    CARDIAC CATHETERIZATION  05/14/2018    Cardiac Cath Procedure Summary    INNER EAR SURGERY  01/06/2016    Inner Ear Surgery    OTHER SURGICAL HISTORY  03/16/2020    Cataract surgery     No family history on file.  Social History     Tobacco Use    Smoking status: Former     Types: Cigarettes    Smokeless tobacco: Never   Vaping Use    Vaping status: Never Used   Substance Use Topics    Alcohol use: Not Currently    Drug use: Never       Physical Exam   ED Triage Vitals [11/22/24 1452]   Temperature Heart Rate Respirations BP   36.4 °C (97.5 °F) 96 20 (!) 120/110      Pulse Ox Temp Source Heart Rate Source  Patient Position   100 % Temporal Monitor --      BP Location FiO2 (%)     -- --       Physical Exam      ED Course & MDM   Diagnoses as of 11/22/24 1722   Weakness   Fall, initial encounter   Closed fracture of nasal bone, initial encounter   Closed fracture of multiple ribs of left side, initial encounter                 No data recorded     Wendy Coma Scale Score: 15 (11/22/24 1453 : Mateo Dueñas RN)                           Medical Decision Making      Procedure  Procedures     Bobby Kingston PA-C  11/23/24 4619

## 2024-11-22 NOTE — ED TRIAGE NOTES
Patient fell out of his bed onto the floor about 1 hour PTA, patient was found on the floor by his home health aid, EMS states he was on the ground for approximately 15 minutes. Patient has bruising to his forehead and nose, he has a skin tear to the left elbow and is c/o left rib pain. Patient denies LOC, states that he is currently not on blood thinners.

## 2024-11-23 ENCOUNTER — APPOINTMENT (OUTPATIENT)
Dept: RADIOLOGY | Facility: HOSPITAL | Age: 84
End: 2024-11-23
Payer: MEDICARE

## 2024-11-23 LAB
ALBUMIN SERPL BCP-MCNC: 2.4 G/DL (ref 3.4–5)
ANION GAP SERPL CALC-SCNC: 13 MMOL/L (ref 10–20)
APPEARANCE UR: CLEAR
BILIRUB UR STRIP.AUTO-MCNC: NEGATIVE MG/DL
BUN SERPL-MCNC: 24 MG/DL (ref 6–23)
CALCIUM SERPL-MCNC: 8 MG/DL (ref 8.6–10.3)
CHLORIDE SERPL-SCNC: 107 MMOL/L (ref 98–107)
CO2 SERPL-SCNC: 22 MMOL/L (ref 21–32)
COLOR UR: ABNORMAL
CREAT SERPL-MCNC: 0.77 MG/DL (ref 0.5–1.3)
CRP SERPL-MCNC: 11.71 MG/DL
EGFRCR SERPLBLD CKD-EPI 2021: 89 ML/MIN/1.73M*2
ERYTHROCYTE [DISTWIDTH] IN BLOOD BY AUTOMATED COUNT: 15.9 % (ref 11.5–14.5)
ERYTHROCYTE [SEDIMENTATION RATE] IN BLOOD BY WESTERGREN METHOD: 68 MM/H (ref 0–20)
GGT SERPL-CCNC: 15 U/L (ref 5–64)
GLUCOSE SERPL-MCNC: 79 MG/DL (ref 74–99)
GLUCOSE UR STRIP.AUTO-MCNC: NORMAL MG/DL
HCT VFR BLD AUTO: 28 % (ref 41–52)
HGB BLD-MCNC: 9.1 G/DL (ref 13.5–17.5)
HOLD SPECIMEN: NORMAL
KETONES UR STRIP.AUTO-MCNC: ABNORMAL MG/DL
LEUKOCYTE ESTERASE UR QL STRIP.AUTO: ABNORMAL
MAGNESIUM SERPL-MCNC: 1.74 MG/DL (ref 1.6–2.4)
MCH RBC QN AUTO: 30.2 PG (ref 26–34)
MCHC RBC AUTO-ENTMCNC: 32.5 G/DL (ref 32–36)
MCV RBC AUTO: 93 FL (ref 80–100)
MUCOUS THREADS #/AREA URNS AUTO: ABNORMAL /LPF
NITRITE UR QL STRIP.AUTO: NEGATIVE
NRBC BLD-RTO: 0 /100 WBCS (ref 0–0)
PH UR STRIP.AUTO: 5.5 [PH]
PHOSPHATE SERPL-MCNC: 3.4 MG/DL (ref 2.5–4.9)
PLATELET # BLD AUTO: 405 X10*3/UL (ref 150–450)
POTASSIUM SERPL-SCNC: 3.9 MMOL/L (ref 3.5–5.3)
PROT UR STRIP.AUTO-MCNC: NEGATIVE MG/DL
RBC # BLD AUTO: 3.01 X10*6/UL (ref 4.5–5.9)
RBC # UR STRIP.AUTO: NEGATIVE /UL
RBC #/AREA URNS AUTO: ABNORMAL /HPF
SODIUM SERPL-SCNC: 138 MMOL/L (ref 136–145)
SP GR UR STRIP.AUTO: 1.02
SQUAMOUS #/AREA URNS AUTO: ABNORMAL /HPF
UROBILINOGEN UR STRIP.AUTO-MCNC: NORMAL MG/DL
WBC # BLD AUTO: 9.2 X10*3/UL (ref 4.4–11.3)
WBC #/AREA URNS AUTO: ABNORMAL /HPF

## 2024-11-23 PROCEDURE — 87086 URINE CULTURE/COLONY COUNT: CPT | Mod: GEALAB | Performed by: PHYSICIAN ASSISTANT

## 2024-11-23 PROCEDURE — 82977 ASSAY OF GGT: CPT

## 2024-11-23 PROCEDURE — 9420000001 HC RT PATIENT EDUCATION 5 MIN

## 2024-11-23 PROCEDURE — 94669 MECHANICAL CHEST WALL OSCILL: CPT

## 2024-11-23 PROCEDURE — 2500000004 HC RX 250 GENERAL PHARMACY W/ HCPCS (ALT 636 FOR OP/ED)

## 2024-11-23 PROCEDURE — 99221 1ST HOSP IP/OBS SF/LOW 40: CPT | Performed by: SURGERY

## 2024-11-23 PROCEDURE — 80069 RENAL FUNCTION PANEL: CPT

## 2024-11-23 PROCEDURE — 73630 X-RAY EXAM OF FOOT: CPT | Mod: LEFT SIDE | Performed by: RADIOLOGY

## 2024-11-23 PROCEDURE — 36415 COLL VENOUS BLD VENIPUNCTURE: CPT

## 2024-11-23 PROCEDURE — 87899 AGENT NOS ASSAY W/OPTIC: CPT | Mod: GEALAB | Performed by: INTERNAL MEDICINE

## 2024-11-23 PROCEDURE — 1200000002 HC GENERAL ROOM WITH TELEMETRY DAILY

## 2024-11-23 PROCEDURE — 2500000005 HC RX 250 GENERAL PHARMACY W/O HCPCS

## 2024-11-23 PROCEDURE — 2500000001 HC RX 250 WO HCPCS SELF ADMINISTERED DRUGS (ALT 637 FOR MEDICARE OP)

## 2024-11-23 PROCEDURE — 87449 NOS EACH ORGANISM AG IA: CPT | Mod: GEALAB | Performed by: INTERNAL MEDICINE

## 2024-11-23 PROCEDURE — 73701 CT LOWER EXTREMITY W/DYE: CPT | Mod: LEFT SIDE | Performed by: RADIOLOGY

## 2024-11-23 PROCEDURE — 81001 URINALYSIS AUTO W/SCOPE: CPT | Performed by: PHYSICIAN ASSISTANT

## 2024-11-23 PROCEDURE — 86140 C-REACTIVE PROTEIN: CPT

## 2024-11-23 PROCEDURE — 73701 CT LOWER EXTREMITY W/DYE: CPT | Mod: LT,RSC

## 2024-11-23 PROCEDURE — 83735 ASSAY OF MAGNESIUM: CPT

## 2024-11-23 PROCEDURE — 73701 CT LOWER EXTREMITY W/DYE: CPT | Mod: LT

## 2024-11-23 PROCEDURE — 85027 COMPLETE CBC AUTOMATED: CPT

## 2024-11-23 PROCEDURE — 2500000004 HC RX 250 GENERAL PHARMACY W/ HCPCS (ALT 636 FOR OP/ED): Mod: JZ

## 2024-11-23 PROCEDURE — 84145 PROCALCITONIN (PCT): CPT | Mod: GEALAB

## 2024-11-23 PROCEDURE — 73630 X-RAY EXAM OF FOOT: CPT | Mod: LT

## 2024-11-23 PROCEDURE — 2500000002 HC RX 250 W HCPCS SELF ADMINISTERED DRUGS (ALT 637 FOR MEDICARE OP, ALT 636 FOR OP/ED)

## 2024-11-23 PROCEDURE — 85652 RBC SED RATE AUTOMATED: CPT

## 2024-11-23 PROCEDURE — 2550000001 HC RX 255 CONTRASTS

## 2024-11-23 PROCEDURE — 94760 N-INVAS EAR/PLS OXIMETRY 1: CPT

## 2024-11-23 PROCEDURE — 99232 SBSQ HOSP IP/OBS MODERATE 35: CPT

## 2024-11-23 RX ORDER — CEFEPIME HYDROCHLORIDE 2 G/50ML
2 INJECTION, SOLUTION INTRAVENOUS EVERY 8 HOURS
Status: DISCONTINUED | OUTPATIENT
Start: 2024-11-23 | End: 2024-11-24

## 2024-11-23 RX ORDER — VANCOMYCIN HYDROCHLORIDE 1 G/20ML
INJECTION, POWDER, LYOPHILIZED, FOR SOLUTION INTRAVENOUS DAILY PRN
Status: DISCONTINUED | OUTPATIENT
Start: 2024-11-23 | End: 2024-11-24

## 2024-11-23 RX ADMIN — Medication 1 APPLICATION: at 00:10

## 2024-11-23 RX ADMIN — OXYCODONE HYDROCHLORIDE AND ACETAMINOPHEN 500 MG: 500 TABLET ORAL at 08:23

## 2024-11-23 RX ADMIN — HYDROMORPHONE HYDROCHLORIDE 0.2 MG: 1 INJECTION, SOLUTION INTRAMUSCULAR; INTRAVENOUS; SUBCUTANEOUS at 11:30

## 2024-11-23 RX ADMIN — VANCOMYCIN HYDROCHLORIDE 1500 MG: 10 INJECTION, POWDER, LYOPHILIZED, FOR SOLUTION INTRAVENOUS at 12:15

## 2024-11-23 RX ADMIN — LIDOCAINE 4% 1 PATCH: 40 PATCH TOPICAL at 08:16

## 2024-11-23 RX ADMIN — MICONAZOLE NITRATE 1 APPLICATION: 20 CREAM TOPICAL at 06:24

## 2024-11-23 RX ADMIN — CEFEPIME HYDROCHLORIDE 2 G: 2 INJECTION, SOLUTION INTRAVENOUS at 17:48

## 2024-11-23 RX ADMIN — IOHEXOL 75 ML: 350 INJECTION, SOLUTION INTRAVENOUS at 19:26

## 2024-11-23 RX ADMIN — HYDROMORPHONE HYDROCHLORIDE 0.4 MG: 0.5 INJECTION, SOLUTION INTRAMUSCULAR; INTRAVENOUS; SUBCUTANEOUS at 21:02

## 2024-11-23 RX ADMIN — ATORVASTATIN CALCIUM 20 MG: 20 TABLET, FILM COATED ORAL at 21:02

## 2024-11-23 RX ADMIN — Medication 1000 UNITS: at 08:23

## 2024-11-23 RX ADMIN — DULOXETINE HYDROCHLORIDE 40 MG: 20 CAPSULE, DELAYED RELEASE ORAL at 08:23

## 2024-11-23 RX ADMIN — HYDROMORPHONE HYDROCHLORIDE 0.2 MG: 1 INJECTION, SOLUTION INTRAMUSCULAR; INTRAVENOUS; SUBCUTANEOUS at 02:28

## 2024-11-23 RX ADMIN — HYDROMORPHONE HYDROCHLORIDE 0.4 MG: 0.5 INJECTION, SOLUTION INTRAMUSCULAR; INTRAVENOUS; SUBCUTANEOUS at 17:23

## 2024-11-23 RX ADMIN — FLUDROCORTISONE ACETATE 0.05 MG: 0.1 TABLET ORAL at 08:23

## 2024-11-23 RX ADMIN — HYDROMORPHONE HYDROCHLORIDE 0.2 MG: 1 INJECTION, SOLUTION INTRAMUSCULAR; INTRAVENOUS; SUBCUTANEOUS at 06:24

## 2024-11-23 RX ADMIN — Medication 1 CAPSULE: at 08:24

## 2024-11-23 RX ADMIN — HYDROMORPHONE HYDROCHLORIDE 0.2 MG: 1 INJECTION, SOLUTION INTRAMUSCULAR; INTRAVENOUS; SUBCUTANEOUS at 14:12

## 2024-11-23 RX ADMIN — SODIUM CHLORIDE, POTASSIUM CHLORIDE, SODIUM LACTATE AND CALCIUM CHLORIDE 500 ML: 600; 310; 30; 20 INJECTION, SOLUTION INTRAVENOUS at 06:55

## 2024-11-23 RX ADMIN — CEFEPIME HYDROCHLORIDE 2 G: 2 INJECTION, SOLUTION INTRAVENOUS at 11:31

## 2024-11-23 ASSESSMENT — PAIN SCALES - PAIN ASSESSMENT IN ADVANCED DEMENTIA (PAINAD)
BODYLANGUAGE: RELAXED
FACIALEXPRESSION: SAD, FRIGHTENED, FROWN
TOTALSCORE: 1
FACIALEXPRESSION: SAD, FRIGHTENED, FROWN
BREATHING: NORMAL
TOTALSCORE: 5
CONSOLABILITY: NO NEED TO CONSOLE
CONSOLABILITY: DISTRACTED OR REASSURED BY VOICE/TOUCH
FACIALEXPRESSION: SMILING OR INEXPRESSIVE
NEGVOCALIZATION: OCCASIONAL MOAN/GROAN, LOW SPEECH, NEGATIVE/DISAPPROVING QUALITY
NEGVOCALIZATION: REPEATED TROUBLED CALLING OUT, LOUD MOANING/GROANING, CRYING
TOTALSCORE: 5
TOTALSCORE: 1
TOTALSCORE: MEDICATION (SEE MAR);REPOSITIONED
BODYLANGUAGE: RELAXED
BREATHING: NORMAL
CONSOLABILITY: DISTRACTED OR REASSURED BY VOICE/TOUCH
BREATHING: NORMAL
BODYLANGUAGE: TENSE, DISTRESSED PACING, FIDGETING
BODYLANGUAGE: TENSE, DISTRESSED PACING, FIDGETING
CONSOLABILITY: NO NEED TO CONSOLE
BREATHING: NORMAL
FACIALEXPRESSION: SMILING OR INEXPRESSIVE
NEGVOCALIZATION: OCCASIONAL MOAN/GROAN, LOW SPEECH, NEGATIVE/DISAPPROVING QUALITY
NEGVOCALIZATION: REPEATED TROUBLED CALLING OUT, LOUD MOANING/GROANING, CRYING

## 2024-11-23 ASSESSMENT — COGNITIVE AND FUNCTIONAL STATUS - GENERAL
MOVING TO AND FROM BED TO CHAIR: A LOT
DRESSING REGULAR UPPER BODY CLOTHING: A LOT
STANDING UP FROM CHAIR USING ARMS: A LOT
PERSONAL GROOMING: A LITTLE
WALKING IN HOSPITAL ROOM: TOTAL
HELP NEEDED FOR BATHING: A LOT
DAILY ACTIVITIY SCORE: 15
TURNING FROM BACK TO SIDE WHILE IN FLAT BAD: A LITTLE
MOBILITY SCORE: 13
CLIMB 3 TO 5 STEPS WITH RAILING: TOTAL
TOILETING: A LOT
DRESSING REGULAR LOWER BODY CLOTHING: A LOT

## 2024-11-23 ASSESSMENT — PAIN SCALES - GENERAL
PAINLEVEL_OUTOF10: 5 - MODERATE PAIN
PAINLEVEL_OUTOF10: 0 - NO PAIN
PAINLEVEL_OUTOF10: 1
PAINLEVEL_OUTOF10: 7
PAINLEVEL_OUTOF10: 4
PAINLEVEL_OUTOF10: 6
PAINLEVEL_OUTOF10: 4

## 2024-11-23 ASSESSMENT — PAIN - FUNCTIONAL ASSESSMENT
PAIN_FUNCTIONAL_ASSESSMENT: PAINAD (PAIN ASSESSMENT IN ADVANCED DEMENTIA SCALE)
PAIN_FUNCTIONAL_ASSESSMENT: 0-10
PAIN_FUNCTIONAL_ASSESSMENT: 0-10

## 2024-11-23 ASSESSMENT — PAIN DESCRIPTION - LOCATION
LOCATION: FOOT
LOCATION: RIB CAGE

## 2024-11-23 ASSESSMENT — PAIN DESCRIPTION - ORIENTATION
ORIENTATION: LEFT
ORIENTATION: LEFT

## 2024-11-23 NOTE — PROGRESS NOTES
11/23/24 0962   Discharge Planning   Living Arrangements Spouse/significant other  (s/o Karissa)   Support Systems Spouse/significant other;Home care staff   Assistance Needed A&Ox2-3, assistance with ADLs, utilizes a wheeled walker, active with [facility](SN/PT/OT). PCP Dr. Babita Blackburn   Type of Residence Private residence   Number of Stairs to Enter Residence 3   Number of Stairs Within Residence 17   Do you have animals or pets at home? Yes   Type of Animals or Pets dog   Home or Post Acute Services In home services   Type of Home Care Services Home nursing visits;Home OT;Home PT   Expected Discharge Disposition Home Health  (resumed [facility] (SN/PT/OT))   Does the patient need discharge transport arranged? Yes   RoundTrip coordination needed? Yes   Has discharge transport been arranged? No   Patient Choice   Provider Choice list and CMS website (https://medicare.gov/care-compare#search) for post-acute Quality and Resource Measure Data were provided and reviewed with: Patient   Patient / Family choosing to utilize agency / facility established prior to hospitalization Yes   Intensity of Service   Intensity of Service 0-30 min

## 2024-11-23 NOTE — PROGRESS NOTES
Occupational Therapy                 Therapy Communication Note    Patient Name: Chao Thao  MRN: [id number]  Department: [address]  Room: [address] [place] [address]  Today's Date: 11/23/2024     Discipline: Occupational Therapy    Missed Visit Reason: Missed Visit Reason: Patient placed on medical hold (Per RN pt complaining of increased rib pain and foot pain, to be sent for imaging of foot to r/o fxs or patologies. Hold OT evaluation until cleared/medically appropriate.)    Missed Time: Attempt    Comment: [id number]

## 2024-11-23 NOTE — CONSULTS
Reason For Consult  Rib fractures    History Of Present Illness  Chao Thao is a 84 y.o. male presenting with rib fractures.  History is as per the chart.  Patient with frequent falls and multiple medical issues.  Patient is very hard of hearing.  Minimally verbal though he does shake his head yes or no for questions.     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    Surgical History  He has a past surgical history that includes Inner ear surgery (01/06/2016); Other surgical history (03/16/2020); and Cardiac catheterization (05/14/2018).     Social History  He reports that he has quit smoking. His smoking use included cigarettes. He has never used smokeless tobacco. He reports that he does not currently use alcohol. He reports that he does not use drugs.    Family History  No family history on file.     Allergies  Gabapentin and Penicillin    Review of Systems  As per chart     Physical Exam  Patient sleeping upon entering room.  He is arousable.  He is hard of hearing.  He is cachectic in appearance.  He is lying supine.  Chest with symmetric inhalation and exhalation.  Lidocaine patch noted along anterior inferior rib cage.  Upon removal and palpation there is some tenderness though I do not appreciate any significant crepitus.  Palpation of the contralateral rib cage also creates pain.  Abdomen does remain soft.  Extremities with no obvious deformities.  Sensation intact.  Capillary refill brisk.     Last Recorded Vitals  Blood pressure 115/70,  "pulse 89, temperature 37.5 °C (99.5 °F), temperature source Temporal, resp. rate 17, height 1.676 m (5' 6\"), weight 71.3 kg (157 lb 3 oz), SpO2 92%.    Relevant Results  XR foot left 3+ views    Result Date: 11/23/2024  Interpreted By:  Michael Torres, STUDY: Left foot dated  11/23/2024.   INDICATION: Signs/Symptoms:L second toe tender w ulcer   COMPARISON: Radiographs dated 07/28/2024.   ACCESSION NUMBER(S): [id number]   ORDERING CLINICIAN: ESTUARDO GONZALEZ   TECHNIQUE: Three views of the left foot.   FINDINGS: No fracture or dislocation is evident.  There is posterior and plantar calcaneal enthesophyte formation. Mild polyarticular degenerative changes seen in the midfoot. Mild degenerative changes seen of the ankle and subtalar joint. Mild degenerative changes seen of the 1st digit metatarsophalangeal joint. No ankle joint effusion is evident. No soft tissue gas or radiopaque foreign body is evident.       Degenerative change without osseous injury evident. Given the ordering indication, MRI may be helpful for further assessment.   MACRO: None   Signed by: Michael Torres 11/23/2024 9:37 AM Dictation workstation:   [id number]    CT cervical spine wo IV contrast    Result Date: 11/22/2024  Interpreted By:  Fidelina Horowitz, STUDY: CT CERVICAL SPINE WO IV CONTRAST; ;  11/22/2024 4:14 pm   INDICATION: Signs/Symptoms:fall with head injury.     COMPARISON: 06/11/2024   ACCESSION NUMBER(S): [id number]   ORDERING CLINICIAN: PANCHO AGUILAR   TECHNIQUE: Serial axial images of the cervical spine were obtained. Sagittal and coronal reconstructions were generated.   FINDINGS: There is a cervical scoliosis.   The alignment of spine is otherwise unremarkable.   There is vertebral body endplate spurring. There is facet hypertrophy.   There is no obvious fracture.   The prevertebral soft tissues are unremarkable.   There are carotid artery calcifications.   The cervical spine is similar to the prior exam.       No acute fracture. " Osteoarthritis. Carotid artery calcifications.     MACRO: None   Signed by: Fidelina Horowitz 11/22/2024 4:56 PM Dictation workstation:   [id number]    CT chest wo IV contrast    Addendum Date: 11/22/2024    Interpreted By:  Fidelina Horowitz, ADDENDUM: There is a question of old sternal fracture.   Signed by: Fidelina Horowitz 11/22/2024 4:54 PM   -------- ORIGINAL REPORT -------- Dictation workstation:   [id number]    Result Date: 11/22/2024  Interpreted By:  Fidelina Horowitz, STUDY: CT CHEST WO IV CONTRAST;  11/22/2024 4:14 pm   INDICATION: Signs/Symptoms:left sided rib pain after fall today.   COMPARISON: 06/28/2024   ACCESSION NUMBER(S): [id number]   ORDERING CLINICIAN: PANCHO AGUILAR   TECHNIQUE: Serial axial CT images of the chest obtained without intravenous contrast. Sagittal and coronal reconstructions were generated.   FINDINGS: VESSELS: There are atherosclerotic changes of the aorta. The cava and main pulmonary arteries are unremarkable.   HEART:  The heart is normal in size. There are dense coronary artery calcifications.   MEDIASTINUM AND MICHELLE: There are numerous slightly prominent mediastinal nodes.   There are bilateral small axillary lymph nodes.   LUNG, PLEURA, AND LARGE AIRWAYS: There is lower lobe bronchiectasis. There is peribronchial thickening.   There is no discrete consolidation or pleural fluid. There is motion artifact.   There are small lymph nodes bilaterally along the fissures.   CHEST WALL AND LOWER NECK: The thyroid as visualized is unremarkable.   BONES: There are no gross bony abnormalities.   There are fractures of the 7th and 9th left ribs. The 10th, 11th and 12th ribs are not seen in their entirety.   There are old left rib fractures.   UPPER ABDOMEN: Unremarkable.   COMPARISON TO THE PRIOR EXAM: There are new left rib fractures when compared to the prior exam. Some left rib fractures were present previously.       There are new fractures of the 7th and 9th left ribs. There are old left rib  fractures. Lower left ribs are not entirely included on the examination.   There are dense coronary artery calcifications.   Signed by: Fidelina Horowitz 11/22/2024 4:48 PM Dictation workstation:   [id number]    CT maxillofacial bones wo IV contrast    Result Date: 11/22/2024  Interpreted By:  Fidelina Horowitz, STUDY: CT FACIAL BONES WO IV CONTRAST; CT 3D RECONSTRUCTION  11/22/2024 4:14 pm   INDICATION: Signs/Symptoms:fall with head injury     COMPARISON: None.   ACCESSION NUMBER(S): [id number]; [id number]   ORDERING CLINICIAN: PANCHO AGUILAR   TECHNIQUE: Thin cut axial CT images through the facial bones were obtained. Sagittal coronal and 3 dimensional reconstructions were generated.   FINDINGS: Orbits: The bony orbits are intact. The orbital contents are unremarkable.   Facial Bones: There are nasal bone fractures. The zygomatic arches are unremarkable.   Mandible/Temporomandibular Joints: Visualized portions of mandible and bilateral temporomandibular joints are intact.   Paranasal Sinuses/Mastoids: The paranasal sinuses are unremarkable. Mastoid air cells are not well pneumatized.   Soft tissues: Unremarkable.       Comminuted nasal bone fracture with deviation.   MACRO: None   Signed by: Fidelina Horowitz 11/22/2024 4:52 PM Dictation workstation:   [id number]    CT 3D reconstruction    Result Date: 11/22/2024  Interpreted By:  Fidelina Horowitz, STUDY: CT FACIAL BONES WO IV CONTRAST; CT 3D RECONSTRUCTION  11/22/2024 4:14 pm   INDICATION: Signs/Symptoms:fall with head injury     COMPARISON: None.   ACCESSION NUMBER(S): [id number]; [id number]   ORDERING CLINICIAN: PANCHO AGUILAR   TECHNIQUE: Thin cut axial CT images through the facial bones were obtained. Sagittal coronal and 3 dimensional reconstructions were generated.   FINDINGS: Orbits: The bony orbits are intact. The orbital contents are unremarkable.   Facial Bones: There are nasal bone fractures. The zygomatic arches are unremarkable.   Mandible/Temporomandibular  Joints: Visualized portions of mandible and bilateral temporomandibular joints are intact.   Paranasal Sinuses/Mastoids: The paranasal sinuses are unremarkable. Mastoid air cells are not well pneumatized.   Soft tissues: Unremarkable.       Comminuted nasal bone fracture with deviation.   MACRO: None   Signed by: Fidelina Horowitz 11/22/2024 4:52 PM Dictation workstation:   [id number]    CT head wo IV contrast    Result Date: 11/22/2024  Interpreted By:  Fidelina Horowitz, STUDY: CT HEAD WO IV CONTRAST; ;  11/22/2024 4:14 pm   INDICATION: Signs/Symptoms:fall with head injury.   COMPARISON: 10/20/2024   ACCESSION NUMBER(S): [id number]   ORDERING CLINICIAN: PANCHO AGUILAR   TECHNIQUE: Serial axial images of the head were obtained without intravenous contrast. Sagittal and coronal reconstructions were generated.   FINDINGS: The ventricles are midline and slightly enlarged. There is prominence of the cortical sulci and superior cerebellar cisterns.   There is prominent extra-axial space in the left frontal region. This was present previously and is unchanged.   There are no acute parenchymal abnormalities. There is no acute hemorrhage.   There is no obvious skull fracture or scalp hematoma.   The paranasal sinuses are unremarkable. The mastoid air cells are not well pneumatized.   The patient appears to be status post cataract surgery   COMPARISON OF FINDINGS: The brain is similar appearance to the prior exam.       Generalized cerebral and cerebellar atrophy. No acute findings.     MACRO: none   Signed by: Fidelina Horowitz 11/22/2024 4:32 PM Dictation workstation:   [id number]    Scheduled medications  ascorbic acid, 500 mg, oral, Daily  [Held by provider] aspirin, 81 mg, oral, Daily  atorvastatin, 20 mg, oral, Nightly  cefepime, 2 g, intravenous, q8h  cholecalciferol, 1,000 Units, oral, Daily  cyanocobalamin, 250 mcg, oral, Daily  DULoxetine, 40 mg, oral, Daily  fludrocortisone, 0.05 mg, oral, Daily  lactobacillus acidophilus, 1  capsule, oral, Daily  lidocaine, 1 patch, transdermal, Daily  [Held by provider] metoprolol succinate XL, 25 mg, oral, Daily  miconazole, 1 Application, Topical, TID  potassium chloride CR, 20 mEq, oral, Daily  vancomycin, 1,500 mg, intravenous, q24h      Continuous medications     PRN medications  PRN medications: HYDROmorphone, HYDROmorphone, ondansetron ODT **OR** ondansetron, [Held by provider] oxyCODONE, [Held by provider] oxyCODONE, vancomycin, zinc oxide  Results for orders placed or performed during the hospital encounter of 11/22/24 (from the past 24 hours)   CBC and Auto Differential   Result Value Ref Range    WBC 12.5 (H) 4.4 - 11.3 x10*3/uL    nRBC 0.0 0.0 - 0.0 /100 WBCs    RBC 3.48 (L) 4.50 - 5.90 x10*6/uL    Hemoglobin 10.6 (L) 13.5 - 17.5 g/dL    Hematocrit 32.4 (L) 41.0 - 52.0 %    MCV 93 80 - 100 fL    MCH 30.5 26.0 - 34.0 pg    MCHC 32.7 32.0 - 36.0 g/dL    RDW 15.9 (H) 11.5 - 14.5 %    Platelets 422 150 - 450 x10*3/uL    Neutrophils % 82.0 40.0 - 80.0 %    Immature Granulocytes %, Automated 0.8 0.0 - 0.9 %    Lymphocytes % 8.8 13.0 - 44.0 %    Monocytes % 7.8 2.0 - 10.0 %    Eosinophils % 0.3 0.0 - 6.0 %    Basophils % 0.3 0.0 - 2.0 %    Neutrophils Absolute 10.26 (H) 1.60 - 5.50 x10*3/uL    Immature Granulocytes Absolute, Automated 0.10 0.00 - 0.50 x10*3/uL    Lymphocytes Absolute 1.10 0.80 - 3.00 x10*3/uL    Monocytes Absolute 0.97 (H) 0.05 - 0.80 x10*3/uL    Eosinophils Absolute 0.04 0.00 - 0.40 x10*3/uL    Basophils Absolute 0.04 0.00 - 0.10 x10*3/uL   Comprehensive metabolic panel   Result Value Ref Range    Glucose 133 (H) 74 - 99 mg/dL    Sodium 135 (L) 136 - 145 mmol/L    Potassium 4.0 3.5 - 5.3 mmol/L    Chloride 101 98 - 107 mmol/L    Bicarbonate 23 21 - 32 mmol/L    Anion Gap 15 10 - 20 mmol/L    Urea Nitrogen 23 6 - 23 mg/dL    Creatinine 0.95 0.50 - 1.30 mg/dL    eGFR 80 >60 mL/min/1.73m*2    Calcium 8.6 8.6 - 10.3 mg/dL    Albumin 2.8 (L) 3.4 - 5.0 g/dL    Alkaline Phosphatase 153  (H) 33 - 136 U/L    Total Protein 7.3 6.4 - 8.2 g/dL    AST 26 9 - 39 U/L    Bilirubin, Total 0.2 0.0 - 1.2 mg/dL    ALT 13 10 - 52 U/L   Lactate   Result Value Ref Range    Lactate 2.8 (H) 0.4 - 2.0 mmol/L   Creatine Kinase   Result Value Ref Range    Creatine Kinase 80 0 - 325 U/L   Lactate   Result Value Ref Range    Lactate 1.6 0.4 - 2.0 mmol/L   Sars-CoV-2 PCR   Result Value Ref Range    Coronavirus 2019, PCR Not Detected Not Detected   Influenza A, and B PCR   Result Value Ref Range    Flu A Result Not Detected Not Detected    Flu B Result Not Detected Not Detected   Blood Culture    Specimen: Peripheral Venipuncture; Blood culture   Result Value Ref Range    Blood Culture Loaded on Instrument - Culture in progress    Blood Culture    Specimen: Peripheral Venipuncture; Blood culture   Result Value Ref Range    Blood Culture Loaded on Instrument - Culture in progress    Urinalysis with Reflex Culture and Microscopic   Result Value Ref Range    Color, Urine Light-Yellow Light-Yellow, Yellow, Dark-Yellow    Appearance, Urine Clear Clear    Specific Gravity, Urine 1.024 1.005 - 1.035    pH, Urine 5.5 5.0, 5.5, 6.0, 6.5, 7.0, 7.5, 8.0    Protein, Urine NEGATIVE NEGATIVE, 10 (TRACE), 20 (TRACE) mg/dL    Glucose, Urine Normal Normal mg/dL    Blood, Urine NEGATIVE NEGATIVE    Ketones, Urine 10 (1+) (A) NEGATIVE mg/dL    Bilirubin, Urine NEGATIVE NEGATIVE    Urobilinogen, Urine Normal Normal mg/dL    Nitrite, Urine NEGATIVE NEGATIVE    Leukocyte Esterase, Urine 250 Ashly/µL (A) NEGATIVE   Microscopic Only, Urine   Result Value Ref Range    WBC, Urine 6-10 (A) 1-5, NONE /HPF    RBC, Urine 1-2 NONE, 1-2, 3-5 /HPF    Squamous Epithelial Cells, Urine 1-9 (SPARSE) Reference range not established. /HPF    Mucus, Urine FEW Reference range not established. /LPF   CBC   Result Value Ref Range    WBC 9.2 4.4 - 11.3 x10*3/uL    nRBC 0.0 0.0 - 0.0 /100 WBCs    RBC 3.01 (L) 4.50 - 5.90 x10*6/uL    Hemoglobin 9.1 (L) 13.5 - 17.5 g/dL     Hematocrit 28.0 (L) 41.0 - 52.0 %    MCV 93 80 - 100 fL    MCH 30.2 26.0 - 34.0 pg    MCHC 32.5 32.0 - 36.0 g/dL    RDW 15.9 (H) 11.5 - 14.5 %    Platelets 405 150 - 450 x10*3/uL   Renal Function Panel   Result Value Ref Range    Glucose 79 74 - 99 mg/dL    Sodium 138 136 - 145 mmol/L    Potassium 3.9 3.5 - 5.3 mmol/L    Chloride 107 98 - 107 mmol/L    Bicarbonate 22 21 - 32 mmol/L    Anion Gap 13 10 - 20 mmol/L    Urea Nitrogen 24 (H) 6 - 23 mg/dL    Creatinine 0.77 0.50 - 1.30 mg/dL    eGFR 89 >60 mL/min/1.73m*2    Calcium 8.0 (L) 8.6 - 10.3 mg/dL    Phosphorus 3.4 2.5 - 4.9 mg/dL    Albumin 2.4 (L) 3.4 - 5.0 g/dL   Magnesium   Result Value Ref Range    Magnesium 1.74 1.60 - 2.40 mg/dL   C-reactive protein   Result Value Ref Range    C-Reactive Protein 11.71 (H) <1.00 mg/dL   Sedimentation rate, automated   Result Value Ref Range    Sedimentation Rate 68 (H) 0 - 20 mm/h   Gamma-Glutamyl Transferase   Result Value Ref Range    GGT 15 5 - 64 U/L        Assessment/Plan     Acute left-sided 7 and 9 rib fractures in addition to chronic findings of old healed fractures-there is no surgical intervention necessary for the rib fractures at this point in time.  Analgesics as needed per the primary team.  Encourage incentive spirometry as well as aggressive pulmonary toilet to prevent pneumonia.  Orthopedics to sign off.    Viral Fraser, DO

## 2024-11-23 NOTE — H&P
HPI    Chao Thao is a 84 y.o. male w/ PMH of HTN, HLD, MDD, CAD, frequent falls, dementia, A-fib (not on AC), HFpEF (EF 50% 02/2024) who is presenting from home due to fall.  Patient states that he was putting on his clothes getting ready for aghast when he fell forward.  He denies any lightheadedness dizziness chest pain or other prodrome symptoms prior to falling.  Patient states that he felt like he lost his balance when he was putting on his shirt.  There was no one at home to witness this fall.  Patient states that he was lying on the ground for roughly 2 hours as he was unable to get himself back up due to weakness.  He currently lives with a partner at home.  He uses a walker to ambulate.  Patient was previously on Eliquis for A-fib however was stopped on it several months ago by cardiology due to recurrent falls and bleeding risk.  Patient states that he has been eating and drinking well for the past few days.  He denies any fevers, chills, nausea, vomiting, abdominal pain, urinary symptoms or headaches. He does not use oxygen at home.     ED course:     Vitals: Afebrile, HR 96, RR 20, /110, SpO2 100% on room air.    Labs:   -CBC -WBC 12.5, Hgb 10.6, HCT 32.4, platelets 422  -CMP -glucose 133, , K4.0, , bicarb 23, creatinine 0.95, GFR 80 alk phos 153, ALT 13, AST 26, T. bili 0.2  -Lactate: 2.8-->1.6    Imaging:   CT Head: Generalized cerebral and cerebellar atrophy. No acute findings.  CT cervical spine: No acute fracture. Osteoarthritis. Carotid artery calcifications.  CT maxillofacial bones: Comminuted nasal bone fracture with deviation  CT chest: There are new fractures of the 7th and 9th left ribs. There are old left rib fractures. Lower left ribs are not entirely included on the examination.    Micro:   - Blood culture: collected  - UA: ordered    Interventions: NS bolus 500 ml x2, rocephin 1 g    ROS: 12 points review of system is negative except as stated in the HPI above.    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    History of alcohol abuse    Chronic shortness of breath 06/28/2024    Drug abuse (Multi)     in remission    Edema, unspecified 11/26/2019    Dependent edema    Encounter for immunization 04/08/2016    Need for Zostavax administration    Pain in left knee 08/08/2018    Left knee pain    Personal history of other diseases of the nervous system and sense organs 09/18/2019    History of cataract    Personal history of other drug therapy 04/08/2016    History of pneumococcal vaccination    Tinea pedis 10/10/2018    Tinea pedis of both feet    Venous stasis ulcer of right calf (Multi) 06/28/2024    Vitiligo 06/28/2024      Surgical History     Past Surgical History:   Procedure Laterality Date    CARDIAC CATHETERIZATION  05/14/2018    Cardiac Cath Procedure Summary    INNER EAR SURGERY  01/06/2016    Inner Ear Surgery    OTHER SURGICAL HISTORY  03/16/2020    Cataract surgery     Family History   No family history on file.  Social History     Social History     Socioeconomic History    Marital status:      Spouse name: Not on file    Number of children: Not on file    Years of education: Not on file    Highest education level: Not on file   Occupational History    Not on file   Tobacco Use    Smoking status: Former     Types: Cigarettes    Smokeless tobacco: Never   Vaping Use    Vaping status: Never Used   Substance and Sexual Activity    Alcohol use: Not Currently    Drug use: Never    Sexual activity: Not on file   Other Topics Concern    Not on file   Social History Narrative    Not on file     Social Drivers of Health     Financial Resource Strain: Patient Unable To Answer (7/28/2024)    Overall Financial Resource Strain (CARDIA)     Difficulty of Paying Living Expenses: Patient unable to answer   Food Insecurity: Not on file   Transportation Needs: No Transportation Needs (9/30/2024)    OASIS :  "Transportation     Lack of Transportation (Medical): No     Lack of Transportation (Non-Medical): No     Patient Unable or Declines to Respond: No   Physical Activity: Not on file   Stress: Not on file   Social Connections: Feeling Socially Integrated (9/30/2024)    OASIS : Social Isolation     Frequency of experiencing loneliness or isolation: Never   Intimate Partner Violence: Not on file   Housing Stability: Patient Unable To Answer (7/28/2024)    Housing Stability Vital Sign     Unable to Pay for Housing in the Last Year: Patient unable to answer     Number of Times Moved in the Last Year: 1     Homeless in the Last Year: Patient unable to answer       Tobacco Use: Medium Risk (11/22/2024)    Patient History     Smoking Tobacco Use: Former     Smokeless Tobacco Use: Never     Passive Exposure: Not on file        Social History     Substance and Sexual Activity   Alcohol Use Not Currently      Allergies     Allergies   Allergen Reactions    Gabapentin Other    Penicillin Unknown      Meds    Scheduled medications  [START ON 11/23/2024] ascorbic acid, 500 mg, oral, Daily  [Held by provider] aspirin, 81 mg, oral, Daily  atorvastatin, 20 mg, oral, Nightly  [START ON 11/23/2024] cholecalciferol, 1,000 Units, oral, Daily  [START ON 11/23/2024] cyanocobalamin, 250 mcg, oral, Daily  [START ON 11/23/2024] DULoxetine, 40 mg, oral, Daily  [START ON 11/23/2024] fludrocortisone, 0.05 mg, oral, Daily  [START ON 11/23/2024] lactobacillus acidophilus, 1 capsule, oral, Daily  lidocaine, 1 patch, transdermal, Daily  metoprolol succinate XL, 25 mg, oral, Daily  miconazole, 1 Application, Topical, TID  [START ON 11/23/2024] potassium chloride CR, 20 mEq, oral, Daily      Continuous medications     PRN medications  PRN medications: HYDROmorphone, ondansetron, oxyCODONE, oxyCODONE, zinc oxide   Objective     Vitals  Visit Vitals  /82   Pulse 88   Temp 36.4 °C (97.5 °F) (Temporal)   Resp 16   Ht 1.727 m (5' 8\")   Wt 73.2 kg " "(161 lb 6 oz)   SpO2 97%   BMI 24.54 kg/m²   Smoking Status Former   BSA 1.87 m²        Physical Examination:  Gen: NAD  HEENT: EOMI, ecchymosis noted on nasal bridge  Chest: CTAB, no wheezing / labored respirations. Pain in L side of ribs  CVS: RRR, no murmurs  Abd: soft, flat, NT/ND  Ext: xeroderma on bl LE, no edema  Neuro: AOx3, no focal neuro deficits    I/Os  No intake or output data in the 24 hours ending 11/22/24 1937    Labs:   Results from last 72 hours   Lab Units 11/22/24  1521   SODIUM mmol/L 135*   POTASSIUM mmol/L 4.0   CHLORIDE mmol/L 101   CO2 mmol/L 23   BUN mg/dL 23   CREATININE mg/dL 0.95   GLUCOSE mg/dL 133*   CALCIUM mg/dL 8.6   ANION GAP mmol/L 15   EGFR mL/min/1.73m*2 80      Results from last 72 hours   Lab Units 11/22/24  1521   WBC AUTO x10*3/uL 12.5*   HEMOGLOBIN g/dL 10.6*   HEMATOCRIT % 32.4*   PLATELETS AUTO x10*3/uL 422   NEUTROS PCT AUTO % 82.0   LYMPHS PCT AUTO % 8.8   MONOS PCT AUTO % 7.8   EOS PCT AUTO % 0.3      Lab Results   Component Value Date    CALCIUM 8.6 11/22/2024    PHOS 3.4 07/02/2024      Lab Results   Component Value Date    CRP 2.23 (H) 08/12/2024      [unfilled]     Micro/ID:   No results found for the last 90 days.                   No lab exists for component: \"AGALPCRNB\"   .ID  Lab Results   Component Value Date    URINECULTURE No significant growth 07/29/2024    BLOODCULT No growth at 4 days -  FINAL REPORT 07/28/2024    BLOODCULT No growth at 4 days -  FINAL REPORT 07/28/2024     Images    CT cervical spine wo IV contrast  Narrative: Interpreted By:  Fidelina Horowitz,   STUDY:  CT CERVICAL SPINE WO IV CONTRAST; ;  11/22/2024 4:14 pm      INDICATION:  Signs/Symptoms:fall with head injury.          COMPARISON:  06/11/2024      ACCESSION NUMBER(S):  [id number]      ORDERING CLINICIAN:  PANCHO AGUILAR      TECHNIQUE:  Serial axial images of the cervical spine were obtained. Sagittal and  coronal reconstructions were generated.      FINDINGS:  There is a cervical " scoliosis.      The alignment of spine is otherwise unremarkable.      There is vertebral body endplate spurring. There is facet hypertrophy.      There is no obvious fracture.      The prevertebral soft tissues are unremarkable.      There are carotid artery calcifications.      The cervical spine is similar to the prior exam.      Impression: No acute fracture. Osteoarthritis. Carotid artery calcifications.          MACRO:  None      Signed by: Fidelina Horowitz 11/22/2024 4:56 PM  Dictation workstation:   [id number]  CT chest wo IV contrast  Addendum: Interpreted By:  Fidelina Horowitz,    ADDENDUM:   There is a question of old sternal fracture.        Signed by: Fidelina Horowitz 11/22/2024 4:54 PM        -------- ORIGINAL REPORT --------   Dictation workstation:   [id number]  Narrative: Interpreted By:  Fidelina Horowitz,   STUDY:  CT CHEST WO IV CONTRAST;  11/22/2024 4:14 pm      INDICATION:  Signs/Symptoms:left sided rib pain after fall today.      COMPARISON:  06/28/2024      ACCESSION NUMBER(S):  [id number]      ORDERING CLINICIAN:  PANCHO AGUILAR      TECHNIQUE:  Serial axial CT images of the chest obtained without intravenous  contrast. Sagittal and coronal reconstructions were generated.      FINDINGS:  VESSELS: There are atherosclerotic changes of the aorta. The cava and  main pulmonary arteries are unremarkable.      HEART:  The heart is normal in size. There are dense coronary artery  calcifications.      MEDIASTINUM AND MICHELLE: There are numerous slightly prominent  mediastinal nodes.      There are bilateral small axillary lymph nodes.      LUNG, PLEURA, AND LARGE AIRWAYS: There is lower lobe bronchiectasis.  There is peribronchial thickening.      There is no discrete consolidation or pleural fluid. There is motion  artifact.      There are small lymph nodes bilaterally along the fissures.      CHEST WALL AND LOWER NECK: The thyroid as visualized is unremarkable.      BONES: There are no gross bony abnormalities.       There are fractures of the 7th and 9th left ribs. The 10th, 11th and  12th ribs are not seen in their entirety.      There are old left rib fractures.      UPPER ABDOMEN: Unremarkable.      COMPARISON TO THE PRIOR EXAM:  There are new left rib fractures when compared to the prior exam.  Some left rib fractures were present previously.      Impression: There are new fractures of the 7th and 9th left ribs. There are old  left rib fractures. Lower left ribs are not entirely included on the  examination.      There are dense coronary artery calcifications.      Signed by: Fidelina Horowitz 11/22/2024 4:48 PM  Dictation workstation:   [id number]  CT maxillofacial bones wo IV contrast, CT 3D reconstruction  Narrative: Interpreted By:  Fidelina Horowitz,   STUDY:  CT FACIAL BONES WO IV CONTRAST; CT 3D RECONSTRUCTION  11/22/2024 4:14  pm      INDICATION:  Signs/Symptoms:fall with head injury          COMPARISON:  None.      ACCESSION NUMBER(S):  [id number]; [id number]      ORDERING CLINICIAN:  PANCHO AGUILAR      TECHNIQUE:  Thin cut axial CT images through the facial bones were obtained.  Sagittal coronal and 3 dimensional reconstructions were generated.      FINDINGS:  Orbits: The bony orbits are intact. The orbital contents are  unremarkable.      Facial Bones: There are nasal bone fractures. The zygomatic arches  are unremarkable.      Mandible/Temporomandibular Joints: Visualized portions of mandible  and bilateral temporomandibular joints are intact.      Paranasal Sinuses/Mastoids: The paranasal sinuses are unremarkable.  Mastoid air cells are not well pneumatized.      Soft tissues: Unremarkable.      Impression: Comminuted nasal bone fracture with deviation.      MACRO:  None      Signed by: Fidelina Horowitz 11/22/2024 4:52 PM  Dictation workstation:   [id number]  CT head wo IV contrast  Narrative: Interpreted By:  Fidelina Horowitz,   STUDY:  CT HEAD WO IV CONTRAST; ;  11/22/2024 4:14 pm      INDICATION:  Signs/Symptoms:fall  with head injury.      COMPARISON:  10/20/2024      ACCESSION NUMBER(S):  [id number]      ORDERING CLINICIAN:  PANCHO AGUILAR      TECHNIQUE:  Serial axial images of the head were obtained without intravenous  contrast. Sagittal and coronal reconstructions were generated.      FINDINGS:  The ventricles are midline and slightly enlarged. There is prominence  of the cortical sulci and superior cerebellar cisterns.      There is prominent extra-axial space in the left frontal region. This  was present previously and is unchanged.      There are no acute parenchymal abnormalities. There is no acute  hemorrhage.      There is no obvious skull fracture or scalp hematoma.      The paranasal sinuses are unremarkable. The mastoid air cells are not  well pneumatized.      The patient appears to be status post cataract surgery      COMPARISON OF FINDINGS:  The brain is similar appearance to the prior exam.      Impression: Generalized cerebral and cerebellar atrophy. No acute findings.          MACRO:  none      Signed by: Fidelina Horowitz 11/22/2024 4:32 PM  Dictation workstation:   [id number]    Assessment and Plan    Chao Thao is a 84 y.o. male w/ PMH of HTN, HLD, MDD, CAD, frequent falls, dementia, A-fib (not on AC), HFpEF (EF 50% 02/2024) who is admitted for multiple rib fx, nasal bone fx s/p mechanical fall.     Acute problems:  # s/p mechanical fall  # L rib fx  # nasal bone fx   # Hx of recurrent falls/ fracutres  - CT maxillofacial bones: Comminuted nasal bone fracture with deviation  - CT chest: There are new fractures of the 7th and 9th left ribs. There are old left rib fractures. Lower left ribs are not entirely included on the examination.  -Ortho consulted  -Trauma consulted  -Pain control: Tylenol 650 mg for mild pain, Oxycodone 5 mg, Oxycodone 10 mg for moderate pain and dilaudid 0.5 mg for breakthrough pain. Lidocaine patch overlying fractured ribs.  -PT/OT consulted  -incentive spirometry  -palliative care  consulted for multiple falls.  -N.p.o.    Chronic problems:  # afib: continue metoprolol succinate 25 mg, not on AC due to recurrent falls and high bleeding risk  # HFpEF: continue toprol, statin  # CAD s/p PTCA: continue ASA  # MDD: continue duloxetine  # Primary adrenocortical insufficiency: continue fludrocortisone 0.05 mg daily    F: PO intake & IVF PRN   E: Replete as needed   N: NPO pending surgery recs  GI ppx: None  DVT ppx: SCD  Antibiotics: None  Tubes/Lines/Drains: PIV    Code Status: Full Code   Emergency Contact: Extended Emergency Contact Information  Primary Emergency Contact: Karissa Winn  [place] Phone: [phone]  Relation: Spouse     Disposition: 84 y.o.male admitted for multiple rib fx, nasal bone fx s/p mechanical fall. Estimated length of stay > 48 hrs.      Piotr Horowitz DO  PGY-2  Internal Medicine

## 2024-11-23 NOTE — CONSULTS
Trauma Survey / History and Physical    Referring Provider:  Babita Blackburn MD  No ref. provider found       Primary Survey:  Time of Activation:    Time of Downgrade:    Time of Assessment:        Airway: Intact   Breathing: Equal bilaterally   Circulation: Distal pulses palpable in radials, femorals   bilaterally   Disability:  strength intact bilaterally,   GCS: Eyes: 4; Verbal: 5; Motor: 6. Total: 15 dementia   Exposure: The patient is exposed head to toe       Secondary Survey:   Head: No gross palpable skull deformities   Face: Midface stable to palpation, no hemotympanum, no nasal bleeding. Hematoma on the nose bridge    Eyes: Pupils equal reactive to light. Extraocular motions intact  Cervical spine: No step offs/deformities, non tender  Chest:  no crepitus, equal chest movement  Abdomen: Soft, non distended, non tender, no masses   Pelvis: Stable to palpation   Genitalia: Normal external genitalia, no blood at the meatus   Rectal: Not examined   Extremities: No gross deformities, no bleeding   Spine: No stepoffs/deformities or tenderness to palpation   Neuro: dementia        Vital Signs:  Vitals:    11/23/24 0631   BP: (!) 87/45   Pulse: 97   Resp:    Temp:    SpO2:       Body mass index is 25.37 kg/m².      Chief Complaint:  Chief Complaint   Patient presents with    Fall       History of Present Illness:  Chao Thao is a 84 y.o. male    w/ PMH of HTN, HLD, MDD, CAD, frequent falls, dementia, A-fib (not on AC), HFpEF (EF 50% 02/2024)   Was evaluated in ED after fall. Per report,  he was putting on his clothes getting ready for aghast when he fell forward.  He denies any lightheadedness dizziness chest pain or other prodrome symptoms prior to falling.  Patient states that he felt like he lost his balance when he was putting on his shirt.  There was no one at home to witness this fall.  Patient states that he was lying on the ground for roughly 2 hours as he was unable to get himself back up due to  weakness.  He currently lives with a partner at home.  He uses a walker to ambulate.  Patient was previously on Eliquis for A-fib however was stopped on it several months ago by cardiology due to recurrent falls and bleeding risk.     Imaging study showed:   Comminuted nasal bone fracture with deviation.   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    History of alcohol abuse    Chronic shortness of breath 06/28/2024    Drug abuse (Multi)     in remission    Edema, unspecified 11/26/2019    Dependent edema    Encounter for immunization 04/08/2016    Need for Zostavax administration    Pain in left knee 08/08/2018    Left knee pain    Personal history of other diseases of the nervous system and sense organs 09/18/2019    History of cataract    Personal history of other drug therapy 04/08/2016    History of pneumococcal vaccination    Tinea pedis 10/10/2018    Tinea pedis of both feet    Venous stasis ulcer of right calf (Multi) 06/28/2024    Vitiligo 06/28/2024        Past Surgical History:  Past Surgical History:   Procedure Laterality Date    CARDIAC CATHETERIZATION  05/14/2018    Cardiac Cath Procedure Summary    INNER EAR SURGERY  01/06/2016    Inner Ear Surgery    OTHER SURGICAL HISTORY  03/16/2020    Cataract surgery        Medications:  Current Outpatient Medications   Medication Instructions    acetaminophen (TYLENOL) 1,000 mg, oral, Every 8 hours    ascorbic acid (Vitamin C) 500 mg tablet 1 tablet, Daily    aspirin 81 mg EC tablet 1 tablet, oral, Daily    atorvastatin (Lipitor) 20 mg tablet 1 tablet, oral, Daily    cholecalciferol (Vitamin D-3) 25 MCG (1000 UT) capsule 1 capsule, oral, Daily    cyanocobalamin (Vitamin B-12) 250 mcg tablet 1 tablet, oral, Daily    DULoxetine (Cymbalta) 20 mg DR capsule 2 capsules, oral, Daily, Do not crush or chew.    fludrocortisone (FLORINEF) 0.05 mg, oral,  Daily    lactobacillus acidophilus (Acidophilus) capsule 1 capsule, oral, Daily    lidocaine 4 % patch 1 patch, transdermal, Daily, Remove & discard patch within 12 hours or as directed by MD.    magnesium hydroxide (Milk of Magnesia) 400 mg/5 mL suspension 30 mL, oral, Daily PRN    metoprolol succinate XL (TOPROL-XL) 25 mg, oral, Daily, Swallow whole. Do not chew or crush    miconazole (Micotin) 2 % cream 1 Application, Topical, 3 times daily    OLANZapine (ZYPREXA) 5 mg, oral, Nightly    ondansetron (Zofran) 4 mg tablet 1 tablet, oral, Every 4 hours PRN    oxygen (O2) gas therapy 1 each, inhalation, Every 24 hours    potassium chloride CR 20 mEq ER tablet 1 tablet, oral, Daily, Do not crush or chew.    zinc acetate 50 mg (zinc) capsule 1 capsule, oral, Daily    zinc oxide 40 % ointment ointment 1 Application, Topical, 2 times daily PRN        Allergies:  Allergies   Allergen Reactions    Gabapentin Other    Penicillin Unknown        Family History:  No family history on file.     Social History:  Social History     Socioeconomic History    Marital status:    Tobacco Use    Smoking status: Former     Types: Cigarettes    Smokeless tobacco: Never   Vaping Use    Vaping status: Never Used   Substance and Sexual Activity    Alcohol use: Not Currently    Drug use: Never     Social Drivers of Health     Financial Resource Strain: Low Risk  (11/22/2024)    Overall Financial Resource Strain (CARDIA)     Difficulty of Paying Living Expenses: Not very hard   Food Insecurity: No Food Insecurity (11/22/2024)    Hunger Vital Sign     Worried About Running Out of Food in the Last Year: Never true     Ran Out of Food in the Last Year: Never true   Transportation Needs: No Transportation Needs (11/22/2024)    PRAPARE - Transportation     Lack of Transportation (Medical): No     Lack of Transportation (Non-Medical): No   Social Connections: Feeling Socially Integrated (9/30/2024)    OASIS : Social Isolation      Frequency of experiencing loneliness or isolation: Never   Intimate Partner Violence: Not At Risk (11/22/2024)    Humiliation, Afraid, Rape, and Kick questionnaire     Fear of Current or Ex-Partner: No     Emotionally Abused: No     Physically Abused: No     Sexually Abused: No   Housing Stability: Low Risk  (11/22/2024)    Housing Stability Vital Sign     Unable to Pay for Housing in the Last Year: No     Number of Times Moved in the Last Year: 0     Homeless in the Last Year: No        Review of Systems:  A complete 12 point review of systems was performed and is negative except as noted in the history of present illness.       Laboratory Values:  CBC:   Lab Results   Component Value Date    WBC 9.2 11/23/2024    RBC 3.01 (L) 11/23/2024    HGB 9.1 (L) 11/23/2024    HCT 28.0 (L) 11/23/2024     11/23/2024       RFP:   Lab Results   Component Value Date     11/23/2024    K 3.9 11/23/2024     11/23/2024    CO2 22 11/23/2024    BUN 24 (H) 11/23/2024    CREATININE 0.77 11/23/2024    CALCIUM 8.0 (L) 11/23/2024    MG 1.74 11/23/2024    PHOS 3.4 11/23/2024        LFTs:   Lab Results   Component Value Date    PROT 7.3 11/22/2024    ALBUMIN 2.4 (L) 11/23/2024    BILITOT 0.2 11/22/2024    ALKPHOS 153 (H) 11/22/2024    AST 26 11/22/2024    ALT 13 11/22/2024        Pain Management Panel          Latest Ref Rng & Units 11/2/2020   Pain Management Panel   Amphetamine Screen, Urine NEGATIVE PRESUMPTIVE NEGATIVE    Barbiturate Screen, Urine NEGATIVE PRESUMPTIVE NEGATIVE    Codeine IgE Cutoff <50 ng/mL <50    Hydromorphone Urine Cutoff <25 ng/mL <25    Morphine  Cutoff <50 ng/mL <50       Details                     Imaging:  I have personally reviewed the images and the radiologist's report.  CT cervical spine wo IV contrast    Result Date: 11/22/2024  Interpreted By:  Fidelina Horowitz, STUDY: CT CERVICAL SPINE WO IV CONTRAST; ;  11/22/2024 4:14 pm   INDICATION: Signs/Symptoms:fall with head injury.     COMPARISON:  06/11/2024   ACCESSION NUMBER(S): [id number]   ORDERING CLINICIAN: PANCHO AGUILAR   TECHNIQUE: Serial axial images of the cervical spine were obtained. Sagittal and coronal reconstructions were generated.   FINDINGS: There is a cervical scoliosis.   The alignment of spine is otherwise unremarkable.   There is vertebral body endplate spurring. There is facet hypertrophy.   There is no obvious fracture.   The prevertebral soft tissues are unremarkable.   There are carotid artery calcifications.   The cervical spine is similar to the prior exam.       No acute fracture. Osteoarthritis. Carotid artery calcifications.     MACRO: None   Signed by: Fidelina Horowitz 11/22/2024 4:56 PM Dictation workstation:   [id number]    CT chest wo IV contrast    Addendum Date: 11/22/2024    Interpreted By:  Fidelina Horowitz, ADDENDUM: There is a question of old sternal fracture.   Signed by: Fidelina Horowitz 11/22/2024 4:54 PM   -------- ORIGINAL REPORT -------- Dictation workstation:   [id number]    Result Date: 11/22/2024  Interpreted By:  Fidelina Horowitz, STUDY: CT CHEST WO IV CONTRAST;  11/22/2024 4:14 pm   INDICATION: Signs/Symptoms:left sided rib pain after fall today.   COMPARISON: 06/28/2024   ACCESSION NUMBER(S): [id number]   ORDERING CLINICIAN: PANCHO AGUILAR   TECHNIQUE: Serial axial CT images of the chest obtained without intravenous contrast. Sagittal and coronal reconstructions were generated.   FINDINGS: VESSELS: There are atherosclerotic changes of the aorta. The cava and main pulmonary arteries are unremarkable.   HEART:  The heart is normal in size. There are dense coronary artery calcifications.   MEDIASTINUM AND MICHELLE: There are numerous slightly prominent mediastinal nodes.   There are bilateral small axillary lymph nodes.   LUNG, PLEURA, AND LARGE AIRWAYS: There is lower lobe bronchiectasis. There is peribronchial thickening.   There is no discrete consolidation or pleural fluid. There is motion artifact.   There are small  lymph nodes bilaterally along the fissures.   CHEST WALL AND LOWER NECK: The thyroid as visualized is unremarkable.   BONES: There are no gross bony abnormalities.   There are fractures of the 7th and 9th left ribs. The 10th, 11th and 12th ribs are not seen in their entirety.   There are old left rib fractures.   UPPER ABDOMEN: Unremarkable.   COMPARISON TO THE PRIOR EXAM: There are new left rib fractures when compared to the prior exam. Some left rib fractures were present previously.       There are new fractures of the 7th and 9th left ribs. There are old left rib fractures. Lower left ribs are not entirely included on the examination.   There are dense coronary artery calcifications.   Signed by: Fidelina Horowitz 11/22/2024 4:48 PM Dictation workstation:   [id number]    CT maxillofacial bones wo IV contrast    Result Date: 11/22/2024  Interpreted By:  Fidelina Horowitz, STUDY: CT FACIAL BONES WO IV CONTRAST; CT 3D RECONSTRUCTION  11/22/2024 4:14 pm   INDICATION: Signs/Symptoms:fall with head injury     COMPARISON: None.   ACCESSION NUMBER(S): [id number]; [id number]   ORDERING CLINICIAN: PANCHO AGUILAR   TECHNIQUE: Thin cut axial CT images through the facial bones were obtained. Sagittal coronal and 3 dimensional reconstructions were generated.   FINDINGS: Orbits: The bony orbits are intact. The orbital contents are unremarkable.   Facial Bones: There are nasal bone fractures. The zygomatic arches are unremarkable.   Mandible/Temporomandibular Joints: Visualized portions of mandible and bilateral temporomandibular joints are intact.   Paranasal Sinuses/Mastoids: The paranasal sinuses are unremarkable. Mastoid air cells are not well pneumatized.   Soft tissues: Unremarkable.       Comminuted nasal bone fracture with deviation.   MACRO: None   Signed by: Fidelina Horowitz 11/22/2024 4:52 PM Dictation workstation:   [id number]    CT 3D reconstruction    Result Date: 11/22/2024  Interpreted By:  Fidelina Horowitz, STUDY: CT FACIAL  BONES WO IV CONTRAST; CT 3D RECONSTRUCTION  11/22/2024 4:14 pm   INDICATION: Signs/Symptoms:fall with head injury     COMPARISON: None.   ACCESSION NUMBER(S): [id number]; [id number]   ORDERING CLINICIAN: PANCHO AGUILAR   TECHNIQUE: Thin cut axial CT images through the facial bones were obtained. Sagittal coronal and 3 dimensional reconstructions were generated.   FINDINGS: Orbits: The bony orbits are intact. The orbital contents are unremarkable.   Facial Bones: There are nasal bone fractures. The zygomatic arches are unremarkable.   Mandible/Temporomandibular Joints: Visualized portions of mandible and bilateral temporomandibular joints are intact.   Paranasal Sinuses/Mastoids: The paranasal sinuses are unremarkable. Mastoid air cells are not well pneumatized.   Soft tissues: Unremarkable.       Comminuted nasal bone fracture with deviation.   MACRO: None   Signed by: Fidelina Horowitz 11/22/2024 4:52 PM Dictation workstation:   [id number]    CT head wo IV contrast    Result Date: 11/22/2024  Interpreted By:  Fidelina Horowitz, STUDY: CT HEAD WO IV CONTRAST; ;  11/22/2024 4:14 pm   INDICATION: Signs/Symptoms:fall with head injury.   COMPARISON: 10/20/2024   ACCESSION NUMBER(S): [id number]   ORDERING CLINICIAN: PANCHO AGUILAR   TECHNIQUE: Serial axial images of the head were obtained without intravenous contrast. Sagittal and coronal reconstructions were generated.   FINDINGS: The ventricles are midline and slightly enlarged. There is prominence of the cortical sulci and superior cerebellar cisterns.   There is prominent extra-axial space in the left frontal region. This was present previously and is unchanged.   There are no acute parenchymal abnormalities. There is no acute hemorrhage.   There is no obvious skull fracture or scalp hematoma.   The paranasal sinuses are unremarkable. The mastoid air cells are not well pneumatized.   The patient appears to be status post cataract surgery   COMPARISON OF FINDINGS: The  brain is similar appearance to the prior exam.       Generalized cerebral and cerebellar atrophy. No acute findings.     MACRO: none   Signed by: Fidelina Horowitz 11/22/2024 4:32 PM Dictation workstation:   [id number]      Assessment:  This is a 84 y.o. male who sustained below injuries   new fractures of the 7th and 9th left ribs   Comminuted nasal bone fracture with deviation.   Comorbidities as above     Plan:  Supportive care per medicine   Consult OMFS/ENT for nasal bone fracture   Pain control  Respiratory care   No further imaging from trauma aspect  Call if any concern or questions   Consult podiary if foot injury     Marvin Malik MD [facility]  General Surgery  Office: [phone]  Fax:     [phone]  8:38 AM   11/23/24

## 2024-11-23 NOTE — CONSULTS
Consults  Referred by LEONOR Bonilla MD: Babita Blackburn MD    Reason For Consult  Pneumonia / toe infection    History Of Present Illness  Chao Thao is a 84 y.o. male, .hxof HTN, hx of AF, hx of CAD, hx of CHF, hx of hearing loss, hx of alcohol abuse, hx of recurrent falls, hx of recent rib fracture, he was admitted following a fall, his work up showed Lt 7th / 9th rib fractures, naal fracture, lung bronchiectasis with peribronchial thickening, his WBC were slightly up, the ua with pyuria, he needed some fluid bolus for low BP, no fever, no emesis or diarrhea, no cough or sob, he is unable to provide a hx     Past Medical History  He has a past medical history of Alcohol abuse, 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    Surgical History  He has a past surgical history that includes Inner ear surgery (01/06/2016); Other surgical history (03/16/2020); and Cardiac catheterization (05/14/2018).     Social History     Occupational History    Not on file   Tobacco Use    Smoking status: Former     Types: Cigarettes    Smokeless tobacco: Never   Vaping Use    Vaping status: Never Used   Substance and Sexual Activity    Alcohol use: Not Currently    Drug use: Never    Sexual activity: Not on file     Travel History   Travel since 10/23/24    No documented travel since 10/23/24       Family History  No family history on file., no immunodeficiency  Allergies  Gabapentin and Penicillin     Immunization History   Administered Date(s) Administered    Flu vaccine, trivalent, preservative free, HIGH-DOSE, age 65y+  (Fluzone) 09/18/2019    Pneumococcal conjugate vaccine, 13-valent (PREVNAR 13) 08/08/2018    Tdap vaccine, age 7 year and older (BOOSTRIX, ADACEL) 06/11/2024    Zoster vaccine, recombinant, adult (SHINGRIX) 03/16/2020    Zoster, live 03/16/2020   Pneumonia vaccine is up to date, influenza vaccine is planned   Burt fall risk 75, preventive protocol was iimplemented  Depression screen is negative    Medications  Home medications:  Medications Prior to Admission   Medication Sig Dispense Refill Last Dose/Taking    acetaminophen (Tylenol) 500 mg tablet Take 2 tablets (1,000 mg) by mouth every 8 hours.   11/22/2024    ascorbic acid (Vitamin C) 500 mg tablet Take 1 tablet (500 mg) by mouth once daily.   11/22/2024    aspirin 81 mg EC tablet Take 1 tablet (81 mg) by mouth once daily.       atorvastatin (Lipitor) 20 mg tablet Take 1 tablet by mouth once daily.       cholecalciferol (Vitamin D-3) 25 MCG (1000 UT) capsule Take 1 capsule by mouth once daily.       cyanocobalamin (Vitamin B-12) 250 mcg tablet Take 1 tablet by mouth once daily.       DULoxetine (Cymbalta) 20 mg DR capsule Take 2 capsules by mouth once daily. Do not crush or chew.       fludrocortisone (Florinef) 0.1 mg tablet Take 0.5 tablets (0.05 mg) by mouth once daily. (Patient taking differently: Take 0.5 tablets by mouth once daily. 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       lidocaine 4 % patch Place 1 patch over 12 hours on the skin once daily. Remove & discard patch within 12 hours or as directed by MD. (Patient taking differently: Place 1 patch on the skin once daily. Remove & discard patch within 12 hours or as directed by MD.  Indications: nerve pain after herpes)       magnesium hydroxide (Milk of Magnesia) 400 mg/5 mL suspension Take 30 mL by mouth once daily as needed for constipation.        metoprolol succinate XL (Toprol-XL) 25 mg 24 hr tablet TAKE 1 TABLET (25 MG) BY MOUTH DAILY DO NOT CRUSH OR CHEW (Patient taking differently: Take 1 tablet by mouth once daily. Swallow whole. Do not chew or crush  Indications: high blood pressure) 90 tablet 1     miconazole (Micotin) 2 % cream Apply 1 Application topically 3 times a day.       OLANZapine (ZyPREXA) 5 mg tablet Take 1 tablet (5 mg) by mouth once daily at bedtime.       ondansetron (Zofran) 4 mg tablet Take 1 tablet by mouth every 4 hours if needed for nausea or vomiting.       oxygen (O2) gas therapy Inhale 1 each once every 24 hours.       potassium chloride CR 20 mEq ER tablet Take 1 tablet by mouth once daily. Do not crush or chew.       zinc acetate 50 mg (zinc) capsule Take 1 capsule by mouth once daily.       zinc oxide 40 % ointment ointment Apply 1 Application topically 2 times a day as needed (apply to groin). (Patient taking differently: Apply 1 Application topically 2 times a day as needed (apply to groin). Indications: skin irritation)        Current medications:  Scheduled medications  ascorbic acid, 500 mg, oral, Daily  [Held by provider] aspirin, 81 mg, oral, Daily  atorvastatin, 20 mg, oral, Nightly  cefepime, 2 g, intravenous, q8h  cholecalciferol, 1,000 Units, oral, Daily  cyanocobalamin, 250 mcg, oral, Daily  DULoxetine, 40 mg, oral, Daily  fludrocortisone, 0.05 mg, oral, Daily  lactobacillus acidophilus, 1 capsule, oral, Daily  lidocaine, 1 patch, transdermal, Daily  [Held by provider] metoprolol succinate XL, 25 mg, oral, Daily  miconazole, 1 Application, Topical, TID  potassium chloride CR, 20 mEq, oral, Daily  vancomycin, 1,500 mg, intravenous, q24h      Continuous medications       PRN medications  PRN medications: HYDROmorphone, HYDROmorphone, ondansetron ODT **OR** ondansetron, [Held by provider] oxyCODONE, [Held by provider] oxyCODONE, vancomycin, zinc oxide    Review of Systems   All other systems reviewed and are  "negative.       Objective  Range of Vitals (last 24 hours)  Heart Rate:  []   Temp:  [36.4 °C (97.5 °F)-37.5 °C (99.5 °F)]   Resp:  [15-20]   BP: ()/()   Height:  [167.6 cm (5' 6\")-172.7 cm (5' 8\")]   Weight:  [71.3 kg (157 lb 3 oz)-73.2 kg (161 lb 6 oz)]   SpO2:  [91 %-100 %]   Daily Weight  11/22/24 : 71.3 kg (157 lb 3 oz)    Body mass index is 25.37 kg/m².   Nutritional consult    Physical Exam  Constitutional:       Appearance: Normal appearance.   HENT:      Head: Normocephalic and atraumatic.      Nose:      Comments: nasal bridge bruise      Mouth/Throat:      Mouth: Mucous membranes are moist.      Pharynx: Oropharynx is clear.   Eyes:      Pupils: Pupils are equal, round, and reactive to light.   Cardiovascular:      Rate and Rhythm: Normal rate and regular rhythm.      Heart sounds: Normal heart sounds.   Pulmonary:      Effort: Pulmonary effort is normal.      Breath sounds: Rales present.   Abdominal:      General: Abdomen is flat. Bowel sounds are normal.      Palpations: Abdomen is soft.   Musculoskeletal:         General: Swelling present.      Cervical back: Normal range of motion.      Comments: Lt 2nd toe wound, dry scab, no cellulitis  Lt elbow laceration   Neurological:      Mental Status: He is alert.          Relevant Results  Outside Hospital Results  reviewed  Labs  Results from last 72 hours   Lab Units 11/23/24  0638 11/22/24  1521   WBC AUTO x10*3/uL 9.2 12.5*   HEMOGLOBIN g/dL 9.1* 10.6*   HEMATOCRIT % 28.0* 32.4*   PLATELETS AUTO x10*3/uL 405 422   NEUTROS PCT AUTO %  --  82.0   LYMPHS PCT AUTO %  --  8.8   MONOS PCT AUTO %  --  7.8   EOS PCT AUTO %  --  0.3     Results from last 72 hours   Lab Units 11/23/24  0638 11/22/24  1521   SODIUM mmol/L 138 135*   POTASSIUM mmol/L 3.9 4.0   CHLORIDE mmol/L 107 101   CO2 mmol/L 22 23   BUN mg/dL 24* 23   CREATININE mg/dL 0.77 0.95   GLUCOSE mg/dL 79 133*   CALCIUM mg/dL 8.0* 8.6   ANION GAP mmol/L 13 15   EGFR mL/min/1.73m*2 89 " "80   PHOSPHORUS mg/dL 3.4  --      Results from last 72 hours   Lab Units 11/23/24  0638 11/22/24  1521   ALK PHOS U/L  --  153*   BILIRUBIN TOTAL mg/dL  --  0.2   PROTEIN TOTAL g/dL  --  7.3   ALT U/L  --  13   AST U/L  --  26   ALBUMIN g/dL 2.4* 2.8*     Estimated Creatinine Clearance: 66.7 mL/min (by C-G formula based on SCr of 0.77 mg/dL).  C-Reactive Protein   Date Value Ref Range Status   11/23/2024 11.71 (H) <1.00 mg/dL Final   08/12/2024 2.23 (H) <1.00 mg/dL Final   06/30/2024 9.28 (H) <1.00 mg/dL Final     Sedimentation Rate   Date Value Ref Range Status   11/23/2024 68 (H) 0 - 20 mm/h Final   08/12/2024 77 (H) 0 - 20 mm/h Final   06/30/2024 78 (H) 0 - 20 mm/h Final     No results found for: \"HIV1X2\", \"HIVCONF\", \"MAVYLL8MD\"  No results found for: \"HEPCABINIT\", \"HEPCAB\", \"HCVPCRQUANT\"  Microbiology  Reviewed  Imaging  Reviewed       Assessment/Plan   Hypotension, likely volume deplesion  Bronchiectasis / rib fracture / atelectasis   Left 2nd toe wound, does not look infected  Encephalopathy    Recommendations :  No need for antibiotics  Chest PT / incentive spirometry  Wound care    I spent minutes in the professional and overall care of this patient.      Wale Townsend MD  "

## 2024-11-23 NOTE — SIGNIFICANT EVENT
"Talked about plan at bedside with wife Karissa and her friend who worked with anesthesia.   Karissa says pt has been having falls and intermittent confusion coming in and out of being A&O x3 and then A&O x0 since \"8 months ago\". He has been to the hospital multiple times. Karissa would \"like an answer\" to why he is confused. Pt is noted to be deaf and his hearing aides are not working; but pt also does not respond to written questions with is abnormal per wife.   Discussed workup. Pt was tender in his LLE, especially left foot. Attempted MRI of left foot but pt did not tolerate, refused to move it. Will do a CT of his left foot and ankle for workup. ID on board; discussed with family that if infectious workup is negative and we are not suspecting another reason for the encephalopathy, then we may pursue a another MOCA (reviewed prior).   She appears agreeable with this plan and agrees to talk with palliative and social work about discharge planning.     "

## 2024-11-23 NOTE — PROGRESS NOTES
Chao Thao is a 84 y.o. male on day 1 of admission presenting with Weakness.      Subjective   This morning pt is A&O x0. Per RN he is deaf in his right ear and left hearing aid is not working well in his left. Pt is alert, does not follow commands or answer questions. He appears to have pain to his left foot.          Objective     Last Recorded Vitals  /70 (BP Location: Right arm, Patient Position: Lying)   Pulse 89   Temp 37.5 °C (99.5 °F) (Temporal)   Resp 17   Wt 71.3 kg (157 lb 3 oz)   SpO2 92%   Intake/Output last 3 Shifts:    Intake/Output Summary (Last 24 hours) at 11/23/2024 1415  Last data filed at 11/23/2024 1254  Gross per 24 hour   Intake 35.6 ml   Output 200 ml   Net -164.4 ml       Admission Weight  Weight: 73.2 kg (161 lb 6 oz) (11/22/24 1452)    Daily Weight  11/22/24 : 71.3 kg (157 lb 3 oz)    Image Results  XR foot left 3+ views  Narrative: Interpreted By:  Michael Torres,   STUDY:  Left foot dated  11/23/2024.      INDICATION:  Signs/Symptoms:L second toe tender w ulcer      COMPARISON:  Radiographs dated 07/28/2024.      ACCESSION NUMBER(S):  [id number]      ORDERING CLINICIAN:  ESTUARDO GONZALEZ      TECHNIQUE:  Three views of the left foot.      FINDINGS:  No fracture or dislocation is evident.  There is posterior and  plantar calcaneal enthesophyte formation. Mild polyarticular  degenerative changes seen in the midfoot. Mild degenerative changes  seen of the ankle and subtalar joint. Mild degenerative changes seen  of the 1st digit metatarsophalangeal joint. No ankle joint effusion  is evident. No soft tissue gas or radiopaque foreign body is evident.      Impression: Degenerative change without osseous injury evident. Given the  ordering indication, MRI may be helpful for further assessment.      MACRO:  None      Signed by: Michael Torres 11/23/2024 9:37 AM  Dictation workstation:   [id number]      Physical Exam  Vitals reviewed.   HENT:      Head: Normocephalic and  atraumatic.   Eyes:      Extraocular Movements: Extraocular movements intact.      Conjunctiva/sclera: Conjunctivae normal.   Cardiovascular:      Rate and Rhythm: Normal rate and regular rhythm.      Heart sounds: No murmur heard.     No friction rub. No gallop.   Pulmonary:      Effort: Pulmonary effort is normal.      Comments: Coarse lung sounds R>L     Abdominal:      General: Bowel sounds are normal.      Palpations: Abdomen is soft. There is no mass.      Tenderness: There is abdominal tenderness. There is no guarding or rebound.      Comments: Diffuse abd tenderness, mild    Musculoskeletal:         General: Tenderness present.      Cervical back: Neck supple.      Right lower leg: No edema.      Left lower leg: No edema.      Comments: L     Skin:     General: Skin is warm and dry.      Findings: No bruising or rash.   Neurological:      Mental Status: He is alert. He is disoriented.      Comments: A&O x0 does not follow commands  Very hard of hearing            Relevant Results               Scheduled medications  ascorbic acid, 500 mg, oral, Daily  [Held by provider] aspirin, 81 mg, oral, Daily  atorvastatin, 20 mg, oral, Nightly  cefepime, 2 g, intravenous, q8h  cholecalciferol, 1,000 Units, oral, Daily  cyanocobalamin, 250 mcg, oral, Daily  DULoxetine, 40 mg, oral, Daily  fludrocortisone, 0.05 mg, oral, Daily  lactobacillus acidophilus, 1 capsule, oral, Daily  lidocaine, 1 patch, transdermal, Daily  [Held by provider] metoprolol succinate XL, 25 mg, oral, Daily  miconazole, 1 Application, Topical, TID  potassium chloride CR, 20 mEq, oral, Daily  vancomycin, 1,500 mg, intravenous, q24h      Continuous medications     PRN medications  PRN medications: HYDROmorphone, HYDROmorphone, ondansetron ODT **OR** ondansetron, [Held by provider] oxyCODONE, [Held by provider] oxyCODONE, vancomycin, zinc oxide  XR foot left 3+ views    Result Date: 11/23/2024  Interpreted By:  Michael Torres, STUDY: Left  foot dated  11/23/2024.   INDICATION: Signs/Symptoms:L second toe tender w ulcer   COMPARISON: Radiographs dated 07/28/2024.   ACCESSION NUMBER(S): [id number]   ORDERING CLINICIAN: ESTUARDO GONZALEZ   TECHNIQUE: Three views of the left foot.   FINDINGS: No fracture or dislocation is evident.  There is posterior and plantar calcaneal enthesophyte formation. Mild polyarticular degenerative changes seen in the midfoot. Mild degenerative changes seen of the ankle and subtalar joint. Mild degenerative changes seen of the 1st digit metatarsophalangeal joint. No ankle joint effusion is evident. No soft tissue gas or radiopaque foreign body is evident.       Degenerative change without osseous injury evident. Given the ordering indication, MRI may be helpful for further assessment.   MACRO: None   Signed by: Michael Torres 11/23/2024 9:37 AM Dictation workstation:   [id number]    CT cervical spine wo IV contrast    Result Date: 11/22/2024  Interpreted By:  Fidelina Horowitz, STUDY: CT CERVICAL SPINE WO IV CONTRAST; ;  11/22/2024 4:14 pm   INDICATION: Signs/Symptoms:fall with head injury.     COMPARISON: 06/11/2024   ACCESSION NUMBER(S): [id number]   ORDERING CLINICIAN: PANCHO AGUILAR   TECHNIQUE: Serial axial images of the cervical spine were obtained. Sagittal and coronal reconstructions were generated.   FINDINGS: There is a cervical scoliosis.   The alignment of spine is otherwise unremarkable.   There is vertebral body endplate spurring. There is facet hypertrophy.   There is no obvious fracture.   The prevertebral soft tissues are unremarkable.   There are carotid artery calcifications.   The cervical spine is similar to the prior exam.       No acute fracture. Osteoarthritis. Carotid artery calcifications.     MACRO: None   Signed by: Fidelina Horowitz 11/22/2024 4:56 PM Dictation workstation:   [id number]    CT chest wo IV contrast    Addendum Date: 11/22/2024    Interpreted By:  Fidelina Horowitz, ADDENDUM: There is a question  of old sternal fracture.   Signed by: Fidelina Horowitz 11/22/2024 4:54 PM   -------- ORIGINAL REPORT -------- Dictation workstation:   [id number]    Result Date: 11/22/2024  Interpreted By:  Fidelina Horowitz, STUDY: CT CHEST WO IV CONTRAST;  11/22/2024 4:14 pm   INDICATION: Signs/Symptoms:left sided rib pain after fall today.   COMPARISON: 06/28/2024   ACCESSION NUMBER(S): [id number]   ORDERING CLINICIAN: PANCHO AGUILAR   TECHNIQUE: Serial axial CT images of the chest obtained without intravenous contrast. Sagittal and coronal reconstructions were generated.   FINDINGS: VESSELS: There are atherosclerotic changes of the aorta. The cava and main pulmonary arteries are unremarkable.   HEART:  The heart is normal in size. There are dense coronary artery calcifications.   MEDIASTINUM AND MICHELLE: There are numerous slightly prominent mediastinal nodes.   There are bilateral small axillary lymph nodes.   LUNG, PLEURA, AND LARGE AIRWAYS: There is lower lobe bronchiectasis. There is peribronchial thickening.   There is no discrete consolidation or pleural fluid. There is motion artifact.   There are small lymph nodes bilaterally along the fissures.   CHEST WALL AND LOWER NECK: The thyroid as visualized is unremarkable.   BONES: There are no gross bony abnormalities.   There are fractures of the 7th and 9th left ribs. The 10th, 11th and 12th ribs are not seen in their entirety.   There are old left rib fractures.   UPPER ABDOMEN: Unremarkable.   COMPARISON TO THE PRIOR EXAM: There are new left rib fractures when compared to the prior exam. Some left rib fractures were present previously.       There are new fractures of the 7th and 9th left ribs. There are old left rib fractures. Lower left ribs are not entirely included on the examination.   There are dense coronary artery calcifications.   Signed by: Fidelina Horowitz 11/22/2024 4:48 PM Dictation workstation:   [id number]    CT maxillofacial bones wo IV contrast    Result Date:  11/22/2024  Interpreted By:  Fidelina Horowitz, STUDY: CT FACIAL BONES WO IV CONTRAST; CT 3D RECONSTRUCTION  11/22/2024 4:14 pm   INDICATION: Signs/Symptoms:fall with head injury     COMPARISON: None.   ACCESSION NUMBER(S): [id number]; [id number]   ORDERING CLINICIAN: PANCHO AGUILAR   TECHNIQUE: Thin cut axial CT images through the facial bones were obtained. Sagittal coronal and 3 dimensional reconstructions were generated.   FINDINGS: Orbits: The bony orbits are intact. The orbital contents are unremarkable.   Facial Bones: There are nasal bone fractures. The zygomatic arches are unremarkable.   Mandible/Temporomandibular Joints: Visualized portions of mandible and bilateral temporomandibular joints are intact.   Paranasal Sinuses/Mastoids: The paranasal sinuses are unremarkable. Mastoid air cells are not well pneumatized.   Soft tissues: Unremarkable.       Comminuted nasal bone fracture with deviation.   MACRO: None   Signed by: Fidelina Horowitz 11/22/2024 4:52 PM Dictation workstation:   [id number]    CT 3D reconstruction    Result Date: 11/22/2024  Interpreted By:  Fidelina Horowitz, STUDY: CT FACIAL BONES WO IV CONTRAST; CT 3D RECONSTRUCTION  11/22/2024 4:14 pm   INDICATION: Signs/Symptoms:fall with head injury     COMPARISON: None.   ACCESSION NUMBER(S): [id number]; [id number]   ORDERING CLINICIAN: PANCHO AGUILAR   TECHNIQUE: Thin cut axial CT images through the facial bones were obtained. Sagittal coronal and 3 dimensional reconstructions were generated.   FINDINGS: Orbits: The bony orbits are intact. The orbital contents are unremarkable.   Facial Bones: There are nasal bone fractures. The zygomatic arches are unremarkable.   Mandible/Temporomandibular Joints: Visualized portions of mandible and bilateral temporomandibular joints are intact.   Paranasal Sinuses/Mastoids: The paranasal sinuses are unremarkable. Mastoid air cells are not well pneumatized.   Soft tissues: Unremarkable.       Comminuted nasal bone  fracture with deviation.   MACRO: None   Signed by: Fidelina Horowitz 11/22/2024 4:52 PM Dictation workstation:   [id number]    CT head wo IV contrast    Result Date: 11/22/2024  Interpreted By:  Fidelina Horowitz, STUDY: CT HEAD WO IV CONTRAST; ;  11/22/2024 4:14 pm   INDICATION: Signs/Symptoms:fall with head injury.   COMPARISON: 10/20/2024   ACCESSION NUMBER(S): [id number]   ORDERING CLINICIAN: PANCHO AGUILAR   TECHNIQUE: Serial axial images of the head were obtained without intravenous contrast. Sagittal and coronal reconstructions were generated.   FINDINGS: The ventricles are midline and slightly enlarged. There is prominence of the cortical sulci and superior cerebellar cisterns.   There is prominent extra-axial space in the left frontal region. This was present previously and is unchanged.   There are no acute parenchymal abnormalities. There is no acute hemorrhage.   There is no obvious skull fracture or scalp hematoma.   The paranasal sinuses are unremarkable. The mastoid air cells are not well pneumatized.   The patient appears to be status post cataract surgery   COMPARISON OF FINDINGS: The brain is similar appearance to the prior exam.       Generalized cerebral and cerebellar atrophy. No acute findings.     MACRO: none   Signed by: Fidelina Horowitz 11/22/2024 4:32 PM Dictation workstation:   [id number]   Results for orders placed or performed during the hospital encounter of 11/22/24 (from the past 24 hours)   CBC and Auto Differential   Result Value Ref Range    WBC 12.5 (H) 4.4 - 11.3 x10*3/uL    nRBC 0.0 0.0 - 0.0 /100 WBCs    RBC 3.48 (L) 4.50 - 5.90 x10*6/uL    Hemoglobin 10.6 (L) 13.5 - 17.5 g/dL    Hematocrit 32.4 (L) 41.0 - 52.0 %    MCV 93 80 - 100 fL    MCH 30.5 26.0 - 34.0 pg    MCHC 32.7 32.0 - 36.0 g/dL    RDW 15.9 (H) 11.5 - 14.5 %    Platelets 422 150 - 450 x10*3/uL    Neutrophils % 82.0 40.0 - 80.0 %    Immature Granulocytes %, Automated 0.8 0.0 - 0.9 %    Lymphocytes % 8.8 13.0 - 44.0 %     Monocytes % 7.8 2.0 - 10.0 %    Eosinophils % 0.3 0.0 - 6.0 %    Basophils % 0.3 0.0 - 2.0 %    Neutrophils Absolute 10.26 (H) 1.60 - 5.50 x10*3/uL    Immature Granulocytes Absolute, Automated 0.10 0.00 - 0.50 x10*3/uL    Lymphocytes Absolute 1.10 0.80 - 3.00 x10*3/uL    Monocytes Absolute 0.97 (H) 0.05 - 0.80 x10*3/uL    Eosinophils Absolute 0.04 0.00 - 0.40 x10*3/uL    Basophils Absolute 0.04 0.00 - 0.10 x10*3/uL   Comprehensive metabolic panel   Result Value Ref Range    Glucose 133 (H) 74 - 99 mg/dL    Sodium 135 (L) 136 - 145 mmol/L    Potassium 4.0 3.5 - 5.3 mmol/L    Chloride 101 98 - 107 mmol/L    Bicarbonate 23 21 - 32 mmol/L    Anion Gap 15 10 - 20 mmol/L    Urea Nitrogen 23 6 - 23 mg/dL    Creatinine 0.95 0.50 - 1.30 mg/dL    eGFR 80 >60 mL/min/1.73m*2    Calcium 8.6 8.6 - 10.3 mg/dL    Albumin 2.8 (L) 3.4 - 5.0 g/dL    Alkaline Phosphatase 153 (H) 33 - 136 U/L    Total Protein 7.3 6.4 - 8.2 g/dL    AST 26 9 - 39 U/L    Bilirubin, Total 0.2 0.0 - 1.2 mg/dL    ALT 13 10 - 52 U/L   Lactate   Result Value Ref Range    Lactate 2.8 (H) 0.4 - 2.0 mmol/L   Creatine Kinase   Result Value Ref Range    Creatine Kinase 80 0 - 325 U/L   Lactate   Result Value Ref Range    Lactate 1.6 0.4 - 2.0 mmol/L   Sars-CoV-2 PCR   Result Value Ref Range    Coronavirus 2019, PCR Not Detected Not Detected   Influenza A, and B PCR   Result Value Ref Range    Flu A Result Not Detected Not Detected    Flu B Result Not Detected Not Detected   Blood Culture    Specimen: Peripheral Venipuncture; Blood culture   Result Value Ref Range    Blood Culture Loaded on Instrument - Culture in progress    Blood Culture    Specimen: Peripheral Venipuncture; Blood culture   Result Value Ref Range    Blood Culture Loaded on Instrument - Culture in progress    Urinalysis with Reflex Culture and Microscopic   Result Value Ref Range    Color, Urine Light-Yellow Light-Yellow, Yellow, Dark-Yellow    Appearance, Urine Clear Clear    Specific Gravity,  Urine 1.024 1.005 - 1.035    pH, Urine 5.5 5.0, 5.5, 6.0, 6.5, 7.0, 7.5, 8.0    Protein, Urine NEGATIVE NEGATIVE, 10 (TRACE), 20 (TRACE) mg/dL    Glucose, Urine Normal Normal mg/dL    Blood, Urine NEGATIVE NEGATIVE    Ketones, Urine 10 (1+) (A) NEGATIVE mg/dL    Bilirubin, Urine NEGATIVE NEGATIVE    Urobilinogen, Urine Normal Normal mg/dL    Nitrite, Urine NEGATIVE NEGATIVE    Leukocyte Esterase, Urine 250 [place]/µL (A) NEGATIVE   Extra Urine Gray Tube   Result Value Ref Range    Extra Tube Hold for add-ons.    Microscopic Only, Urine   Result Value Ref Range    WBC, Urine 6-10 (A) 1-5, NONE /HPF    RBC, Urine 1-2 NONE, 1-2, 3-5 /HPF    Squamous Epithelial Cells, Urine 1-9 (SPARSE) Reference range not established. /HPF    Mucus, Urine FEW Reference range not established. /LPF   CBC   Result Value Ref Range    WBC 9.2 4.4 - 11.3 x10*3/uL    nRBC 0.0 0.0 - 0.0 /100 WBCs    RBC 3.01 (L) 4.50 - 5.90 x10*6/uL    Hemoglobin 9.1 (L) 13.5 - 17.5 g/dL    Hematocrit 28.0 (L) 41.0 - 52.0 %    MCV 93 80 - 100 fL    MCH 30.2 26.0 - 34.0 pg    MCHC 32.5 32.0 - 36.0 g/dL    RDW 15.9 (H) 11.5 - 14.5 %    Platelets 405 150 - 450 x10*3/uL   Renal Function Panel   Result Value Ref Range    Glucose 79 74 - 99 mg/dL    Sodium 138 136 - 145 mmol/L    Potassium 3.9 3.5 - 5.3 mmol/L    Chloride 107 98 - 107 mmol/L    Bicarbonate 22 21 - 32 mmol/L    Anion Gap 13 10 - 20 mmol/L    Urea Nitrogen 24 (H) 6 - 23 mg/dL    Creatinine 0.77 0.50 - 1.30 mg/dL    eGFR 89 >60 mL/min/1.73m*2    Calcium 8.0 (L) 8.6 - 10.3 mg/dL    Phosphorus 3.4 2.5 - 4.9 mg/dL    Albumin 2.4 (L) 3.4 - 5.0 g/dL   Magnesium   Result Value Ref Range    Magnesium 1.74 1.60 - 2.40 mg/dL   C-reactive protein   Result Value Ref Range    C-Reactive Protein 11.71 (H) <1.00 mg/dL   Sedimentation rate, automated   Result Value Ref Range    Sedimentation Rate 68 (H) 0 - 20 mm/h   Gamma-Glutamyl Transferase   Result Value Ref Range    GGT 15 5 - 64 U/L       Assessment/Plan         Assessment & Plan  Weakness      Chao Thao is a 84 y.o. male w/ PMH of HTN, HLD, MDD, CAD, frequent falls, dementia, A-fib (not on AC), HFpEF (EF 50% 02/2024) who is admitted for multiple rib fx, nasal bone fx s/p mechanical fall.      Acute problems:  # mechanical fall  # L rib fx  # nasal bone fx   # Hx of recurrent falls/ fracutres  - CT maxillofacial bones: Comminuted nasal bone fracture with deviation  - CT chest: There are new fractures of the 7th and 9th left ribs. There are old left rib fractures. Lower left ribs are not entirely included on the examination.  -Ortho consulted  -Trauma consulted  -Pain control: Tylenol 650 mg for mild pain, Oxycodone 5 mg, Oxycodone 10 mg for moderate pain and dilaudid 0.5 mg for breakthrough pain. Lidocaine patch overlying fractured ribs.  -PT/OT consulted  -incentive spirometry  -palliative care consulted for multiple falls.    #acute metabolic encephalopathy   #hard of hearing   -A&O x0 11/23, unclear if due to [place]   -monitor, workup source if any     #hypotension due to dehydration vs infection   #left second toe tenderness  #bronchiectasis   -due to some concern for infectious process (elevated wbc, crp) with hx L second toe ulcer and bronchiectasis, ID consulted   -iv vanc zosyn. Mrsa screen . Ua largely unremarkable   -500ml bolus 11/23 morning   -MRI L foot w w/o, pna workup   -likely discontinue abx once results are negative      Chronic problems:  # afib: hold metoprolol succinate 25 mg bid due to hypotension, not on AC due to recurrent falls and high bleeding risk.   #HFpEF: 2/2024 echo 50% ef. monitor   #HLD continue statin  # CAD s/p PTCA: continue ASA  # MDD: continue duloxetine  # Primary adrenal insufficiency: continue fludrocortisone 0.05 mg daily     F: PO intake & IVF PRN   E: Replete as needed   N: NPO pending surgery recs  GI ppx: None  DVT ppx: SCD  Antibiotics: None  Tubes/Lines/Drains: PIV     Code Status: Full Code   Emergency Contact:  Extended Emergency Contact Information  Primary Emergency Contact: Karissa Winn Phone: [phone]  Relation: Spouse      Disposition: 84 y.o.male admitted for multiple rib fx, nasal bone fx s/p mechanical fall.              Tiera Betancur DO PGY3 IM     Discussed with Dr. Bonilla

## 2024-11-23 NOTE — CONSULTS
Vancomycin Dosing by Pharmacy- INITIAL    Chao Thao is a 84 y.o. year old male who Pharmacy has been consulted for vancomycin dosing for pneumonia. Based on the patient's indication and renal status this patient will be dosed based on a goal AUC of 400-600.     Renal function is currently stable.    Visit Vitals  BP (!) 87/45 (BP Location: Right arm, Patient Position: Lying)   Pulse 97   Temp 37.5 °C (99.5 °F) (Temporal)   Resp 17        Lab Results   Component Value Date    CREATININE 0.77 2024    CREATININE 0.95 2024    CREATININE 0.89 10/20/2024    CREATININE 0.70 2024        Patient weight is as follows:   Vitals:    24   Weight: 71.3 kg (157 lb 3 oz)       Cultures:  No results found for the encounter in last 14 days.        I/O last 3 completed shifts:  In: 35.6 (0.5 mL/kg) [I.V.:35.6 (0.5 mL/kg)]  Out: 200 (2.8 mL/kg) [Urine:200 (0.1 mL/kg/hr)]  Weight: 71.3 kg   I/O during current shift:  No intake/output data recorded.    Temp (24hrs), Av.9 °C (98.4 °F), Min:36.4 °C (97.5 °F), Max:37.5 °C (99.5 °F)         Assessment/Plan     Patient will not be given a loading dose.  Will initiate vancomycin maintenance,  1500 mg every 24 hours.    This dosing regimen is predicted by [employer] to result in the following pharmacokinetic parameters:  Regimen: 1500 mg IV every 24 hours.  Start time: 09:02 on 2024  Exposure target: AUC24 (range)400-600 mg/L.hr   TFX39-02: 377 mg/L.hr  AUC24,ss: 464 mg/L.hr  Probability of AUC24 > 400: 68 %  Ctrough,ss: 12.5 mg/L  Probability of Ctrough,ss > 20: 13 %      Follow-up level will be ordered on  at 6am unless clinically indicated sooner.  Will continue to monitor renal function daily while on vancomycin and order serum creatinine at least every 48 hours if not already ordered.  Follow for continued vancomycin needs, clinical response, and signs/symptoms of toxicity.       Scarlet Helms, RPh

## 2024-11-24 VITALS
BODY MASS INDEX: 25.26 KG/M2 | SYSTOLIC BLOOD PRESSURE: 109 MMHG | HEIGHT: 66 IN | DIASTOLIC BLOOD PRESSURE: 63 MMHG | TEMPERATURE: 98.2 F | RESPIRATION RATE: 15 BRPM | OXYGEN SATURATION: 92 % | WEIGHT: 157.19 LBS | HEART RATE: 81 BPM

## 2024-11-24 PROBLEM — E11.42 TYPE 2 DIABETES MELLITUS WITH DIABETIC POLYNEUROPATHY: Status: ACTIVE | Noted: 2024-11-24

## 2024-11-24 LAB
ALBUMIN SERPL BCP-MCNC: 2.3 G/DL (ref 3.4–5)
ALP SERPL-CCNC: 107 U/L (ref 33–136)
ALT SERPL W P-5'-P-CCNC: 10 U/L (ref 10–52)
ANION GAP SERPL CALC-SCNC: 11 MMOL/L (ref 10–20)
AST SERPL W P-5'-P-CCNC: 18 U/L (ref 9–39)
BACTERIA BLD CULT: NORMAL
BACTERIA BLD CULT: NORMAL
BACTERIA UR CULT: NORMAL
BILIRUB SERPL-MCNC: 0.3 MG/DL (ref 0–1.2)
BUN SERPL-MCNC: 19 MG/DL (ref 6–23)
CALCIUM SERPL-MCNC: 8 MG/DL (ref 8.6–10.3)
CHLORIDE SERPL-SCNC: 103 MMOL/L (ref 98–107)
CO2 SERPL-SCNC: 24 MMOL/L (ref 21–32)
CREAT SERPL-MCNC: 0.78 MG/DL (ref 0.5–1.3)
EGFRCR SERPLBLD CKD-EPI 2021: 89 ML/MIN/1.73M*2
ERYTHROCYTE [DISTWIDTH] IN BLOOD BY AUTOMATED COUNT: 15.9 % (ref 11.5–14.5)
GLUCOSE SERPL-MCNC: 85 MG/DL (ref 74–99)
HCT VFR BLD AUTO: 25 % (ref 41–52)
HCT VFR BLD AUTO: 25.3 % (ref 41–52)
HGB BLD-MCNC: 8.1 G/DL (ref 13.5–17.5)
HGB BLD-MCNC: 8.1 G/DL (ref 13.5–17.5)
LEGIONELLA AG UR QL: NEGATIVE
MAGNESIUM SERPL-MCNC: 1.55 MG/DL (ref 1.6–2.4)
MCH RBC QN AUTO: 30.1 PG (ref 26–34)
MCHC RBC AUTO-ENTMCNC: 32.4 G/DL (ref 32–36)
MCV RBC AUTO: 93 FL (ref 80–100)
NRBC BLD-RTO: 0 /100 WBCS (ref 0–0)
PHOSPHATE SERPL-MCNC: 3.5 MG/DL (ref 2.5–4.9)
PLATELET # BLD AUTO: 381 X10*3/UL (ref 150–450)
POTASSIUM SERPL-SCNC: 3.6 MMOL/L (ref 3.5–5.3)
PROCALCITONIN SERPL-MCNC: 0.24 NG/ML
PROT SERPL-MCNC: 6 G/DL (ref 6.4–8.2)
RBC # BLD AUTO: 2.69 X10*6/UL (ref 4.5–5.9)
S PNEUM AG UR QL: NEGATIVE
SODIUM SERPL-SCNC: 134 MMOL/L (ref 136–145)
VANCOMYCIN SERPL-MCNC: 13.9 UG/ML (ref 5–20)
WBC # BLD AUTO: 9.9 X10*3/UL (ref 4.4–11.3)

## 2024-11-24 PROCEDURE — 36415 COLL VENOUS BLD VENIPUNCTURE: CPT

## 2024-11-24 PROCEDURE — 2500000002 HC RX 250 W HCPCS SELF ADMINISTERED DRUGS (ALT 637 FOR MEDICARE OP, ALT 636 FOR OP/ED)

## 2024-11-24 PROCEDURE — 2500000004 HC RX 250 GENERAL PHARMACY W/ HCPCS (ALT 636 FOR OP/ED)

## 2024-11-24 PROCEDURE — 1200000002 HC GENERAL ROOM WITH TELEMETRY DAILY

## 2024-11-24 PROCEDURE — 85027 COMPLETE CBC AUTOMATED: CPT

## 2024-11-24 PROCEDURE — 83735 ASSAY OF MAGNESIUM: CPT

## 2024-11-24 PROCEDURE — 84100 ASSAY OF PHOSPHORUS: CPT

## 2024-11-24 PROCEDURE — 85018 HEMOGLOBIN: CPT

## 2024-11-24 PROCEDURE — 80053 COMPREHEN METABOLIC PANEL: CPT

## 2024-11-24 PROCEDURE — 94669 MECHANICAL CHEST WALL OSCILL: CPT

## 2024-11-24 PROCEDURE — 80202 ASSAY OF VANCOMYCIN: CPT

## 2024-11-24 PROCEDURE — 94668 MNPJ CHEST WALL SBSQ: CPT

## 2024-11-24 PROCEDURE — 2500000005 HC RX 250 GENERAL PHARMACY W/O HCPCS

## 2024-11-24 PROCEDURE — 99232 SBSQ HOSP IP/OBS MODERATE 35: CPT

## 2024-11-24 PROCEDURE — 2500000004 HC RX 250 GENERAL PHARMACY W/ HCPCS (ALT 636 FOR OP/ED): Mod: JZ

## 2024-11-24 PROCEDURE — 99232 SBSQ HOSP IP/OBS MODERATE 35: CPT | Performed by: INTERNAL MEDICINE

## 2024-11-24 PROCEDURE — 2500000001 HC RX 250 WO HCPCS SELF ADMINISTERED DRUGS (ALT 637 FOR MEDICARE OP)

## 2024-11-24 PROCEDURE — 94760 N-INVAS EAR/PLS OXIMETRY 1: CPT

## 2024-11-24 RX ORDER — MAGNESIUM SULFATE HEPTAHYDRATE 40 MG/ML
2 INJECTION, SOLUTION INTRAVENOUS ONCE
Status: COMPLETED | OUTPATIENT
Start: 2024-11-24 | End: 2024-11-24

## 2024-11-24 RX ORDER — ACETAMINOPHEN 325 MG/1
975 TABLET ORAL EVERY 6 HOURS
Status: DISCONTINUED | OUTPATIENT
Start: 2024-11-24 | End: 2024-11-26

## 2024-11-24 RX ORDER — ENOXAPARIN SODIUM 100 MG/ML
40 INJECTION SUBCUTANEOUS EVERY 24 HOURS
Status: DISCONTINUED | OUTPATIENT
Start: 2024-11-24 | End: 2024-11-28 | Stop reason: HOSPADM

## 2024-11-24 RX ADMIN — MICONAZOLE NITRATE 1 APPLICATION: 20 CREAM TOPICAL at 21:58

## 2024-11-24 RX ADMIN — MAGNESIUM SULFATE HEPTAHYDRATE 2 G: 2 INJECTION, SOLUTION INTRAVENOUS at 12:52

## 2024-11-24 RX ADMIN — DULOXETINE HYDROCHLORIDE 40 MG: 20 CAPSULE, DELAYED RELEASE ORAL at 09:06

## 2024-11-24 RX ADMIN — CEFEPIME HYDROCHLORIDE 2 G: 2 INJECTION, SOLUTION INTRAVENOUS at 02:23

## 2024-11-24 RX ADMIN — ACETAMINOPHEN 975 MG: 325 TABLET ORAL at 16:56

## 2024-11-24 RX ADMIN — ACETAMINOPHEN 975 MG: 325 TABLET ORAL at 12:52

## 2024-11-24 RX ADMIN — FLUDROCORTISONE ACETATE 0.05 MG: 0.1 TABLET ORAL at 09:06

## 2024-11-24 RX ADMIN — OXYCODONE HYDROCHLORIDE AND ACETAMINOPHEN 500 MG: 500 TABLET ORAL at 09:06

## 2024-11-24 RX ADMIN — CYANOCOBALAMIN TAB 500 MCG 250 MCG: 500 TAB at 09:06

## 2024-11-24 RX ADMIN — MICONAZOLE NITRATE 1 APPLICATION: 20 CREAM TOPICAL at 14:56

## 2024-11-24 RX ADMIN — LIDOCAINE 4% 1 PATCH: 40 PATCH TOPICAL at 09:05

## 2024-11-24 RX ADMIN — HYDROMORPHONE HYDROCHLORIDE 0.2 MG: 1 INJECTION, SOLUTION INTRAMUSCULAR; INTRAVENOUS; SUBCUTANEOUS at 11:17

## 2024-11-24 RX ADMIN — ENOXAPARIN SODIUM 40 MG: 40 INJECTION SUBCUTANEOUS at 14:56

## 2024-11-24 RX ADMIN — Medication 1 CAPSULE: at 09:06

## 2024-11-24 RX ADMIN — OXYCODONE 5 MG: 5 TABLET ORAL at 16:56

## 2024-11-24 RX ADMIN — POTASSIUM CHLORIDE 20 MEQ: 1500 TABLET, EXTENDED RELEASE ORAL at 09:06

## 2024-11-24 RX ADMIN — Medication 1000 UNITS: at 09:06

## 2024-11-24 RX ADMIN — ATORVASTATIN CALCIUM 20 MG: 20 TABLET, FILM COATED ORAL at 21:58

## 2024-11-24 RX ADMIN — HYDROMORPHONE HYDROCHLORIDE 0.4 MG: 0.5 INJECTION, SOLUTION INTRAMUSCULAR; INTRAVENOUS; SUBCUTANEOUS at 02:23

## 2024-11-24 ASSESSMENT — PAIN SCALES - PAIN ASSESSMENT IN ADVANCED DEMENTIA (PAINAD)
BREATHING: NORMAL
TOTALSCORE: 3
NEGVOCALIZATION: REPEATED TROUBLED CALLING OUT, LOUD MOANING/GROANING, CRYING
BREATHING: NORMAL
TOTALSCORE: 4
BODYLANGUAGE: TENSE, DISTRESSED PACING, FIDGETING
TOTALSCORE: 2
BODYLANGUAGE: TENSE, DISTRESSED PACING, FIDGETING
CONSOLABILITY: NO NEED TO CONSOLE
FACIALEXPRESSION: SMILING OR INEXPRESSIVE
BREATHING: NORMAL
FACIALEXPRESSION: SMILING OR INEXPRESSIVE
NEGVOCALIZATION: OCCASIONAL MOAN/GROAN, LOW SPEECH, NEGATIVE/DISAPPROVING QUALITY
CONSOLABILITY: DISTRACTED OR REASSURED BY VOICE/TOUCH
NEGVOCALIZATION: REPEATED TROUBLED CALLING OUT, LOUD MOANING/GROANING, CRYING
BREATHING: NORMAL
FACIALEXPRESSION: SMILING OR INEXPRESSIVE
NEGVOCALIZATION: REPEATED TROUBLED CALLING OUT, LOUD MOANING/GROANING, CRYING
FACIALEXPRESSION: SMILING OR INEXPRESSIVE
CONSOLABILITY: DISTRACTED OR REASSURED BY VOICE/TOUCH
TOTALSCORE: 3
BODYLANGUAGE: RELAXED
BODYLANGUAGE: TENSE, DISTRESSED PACING, FIDGETING
CONSOLABILITY: NO NEED TO CONSOLE

## 2024-11-24 ASSESSMENT — COGNITIVE AND FUNCTIONAL STATUS - GENERAL
PERSONAL GROOMING: A LOT
TOILETING: A LOT
DRESSING REGULAR UPPER BODY CLOTHING: A LOT
CLIMB 3 TO 5 STEPS WITH RAILING: TOTAL
DRESSING REGULAR LOWER BODY CLOTHING: A LOT
TURNING FROM BACK TO SIDE WHILE IN FLAT BAD: A LOT
MOVING TO AND FROM BED TO CHAIR: A LOT
DAILY ACTIVITIY SCORE: 12
STANDING UP FROM CHAIR USING ARMS: TOTAL
EATING MEALS: A LOT
MOBILITY SCORE: 10
MOVING FROM LYING ON BACK TO SITTING ON SIDE OF FLAT BED WITH BEDRAILS: A LITTLE
HELP NEEDED FOR BATHING: A LOT
WALKING IN HOSPITAL ROOM: TOTAL

## 2024-11-24 ASSESSMENT — PAIN DESCRIPTION - LOCATION: LOCATION: GENERALIZED

## 2024-11-24 ASSESSMENT — PAIN SCALES - GENERAL
PAINLEVEL_OUTOF10: 4
PAINLEVEL_OUTOF10: 3
PAINLEVEL_OUTOF10: 3
PAINLEVEL_OUTOF10: 4
PAINLEVEL_OUTOF10: 9

## 2024-11-24 ASSESSMENT — PAIN SCALES - WONG BAKER: WONGBAKER_NUMERICALRESPONSE: HURTS WHOLE LOT

## 2024-11-24 NOTE — PROGRESS NOTES
Vancomycin Dosing by Pharmacy- Cessation of Therapy    Consult to pharmacy for vancomycin dosing has been discontinued by the prescriber, pharmacy will sign off at this time.    Please call pharmacy if there are further questions or re-enter a consult if vancomycin is resumed.     Deandre Blakely, PharmD

## 2024-11-24 NOTE — PROGRESS NOTES
"  Subjective    Overnight Events: NAEO    Patient seen and examined at bedside.  He is awake and alert however is not responding to questions or following commands.    ROS: 12 points review of system unable to be performed as pt not answering questions    Objective    Vitals  Visit Vitals  /53   Pulse 79   Temp 36.8 °C (98.2 °F) (Temporal)   Resp 18   Ht 1.676 m (5' 6\")   Wt 71.3 kg (157 lb 3 oz)   SpO2 91%   BMI 25.37 kg/m²   Smoking Status Former   BSA 1.82 m²       Physical Exam    --Vital signs reviewed in nursing triage note, EMR flow sheets, and at patient's bedside  Constitutional: NAD  HEENT: Ecchymosis on nasal bridge.   CV: RRR, No M/R/G  PULM: Coarse breath sounds, normal respiratory effort  ABDOMEN: Soft, NT/ND. No TTP  SKIN: Normal Color, Warm, Dry. BL LE with xerderma  EXTREMITIES: Bl LE contracted and rotated to R side. No Pedal Edema  NEURO: A&O x O  PSYCH: Non verbal     IOs    Intake/Output Summary (Last 24 hours) at 11/24/2024 1233  Last data filed at 11/24/2024 0949  Gross per 24 hour   Intake 270 ml   Output 475 ml   Net -205 ml       Labs:   Results from last 72 hours   Lab Units 11/24/24  0635 11/23/24  0638 11/22/24  1521   SODIUM mmol/L 134* 138 135*   POTASSIUM mmol/L 3.6 3.9 4.0   CHLORIDE mmol/L 103 107 101   CO2 mmol/L 24 22 23   BUN mg/dL 19 24* 23   CREATININE mg/dL 0.78 0.77 0.95   GLUCOSE mg/dL 85 79 133*   CALCIUM mg/dL 8.0* 8.0* 8.6   ANION GAP mmol/L 11 13 15   EGFR mL/min/1.73m*2 89 89 80   PHOSPHORUS mg/dL 3.5 3.4  --       Results from last 72 hours   Lab Units 11/24/24  0635 11/23/24  0638 11/22/24  1521   WBC AUTO x10*3/uL 9.9 9.2 12.5*   HEMOGLOBIN g/dL 8.1* 9.1* 10.6*   HEMATOCRIT % 25.0* 28.0* 32.4*   PLATELETS AUTO x10*3/uL 381 405 422   NEUTROS PCT AUTO %  --   --  82.0   LYMPHS PCT AUTO %  --   --  8.8   MONOS PCT AUTO %  --   --  7.8   EOS PCT AUTO %  --   --  0.3      Lab Results   Component Value Date    CALCIUM 8.0 (L) 11/24/2024    PHOS 3.5 11/24/2024    " "  Lab Results   Component Value Date    CRP 11.71 (H) 11/23/2024      [unfilled]     Micro/ID:   No results found for the last 90 days.                   No lab exists for component: \"AGALPCRNB\"   .ID  Lab Results   Component Value Date    URINECULTURE No significant growth 11/23/2024    BLOODCULT No growth at 1 day 11/22/2024    BLOODCULT No growth at 1 day 11/22/2024       Images  XR foot left 3+ views  Narrative: Interpreted By:  Michael Torres,   STUDY:  Left foot dated  11/23/2024.      INDICATION:  Signs/Symptoms:L second toe tender w ulcer      COMPARISON:  Radiographs dated 07/28/2024.      ACCESSION NUMBER(S):  [id number]      ORDERING CLINICIAN:  ESTUARDO GONZALEZ      TECHNIQUE:  Three views of the left foot.      FINDINGS:  No fracture or dislocation is evident.  There is posterior and  plantar calcaneal enthesophyte formation. Mild polyarticular  degenerative changes seen in the midfoot. Mild degenerative changes  seen of the ankle and subtalar joint. Mild degenerative changes seen  of the 1st digit metatarsophalangeal joint. No ankle joint effusion  is evident. No soft tissue gas or radiopaque foreign body is evident.      Impression: Degenerative change without osseous injury evident. Given the  ordering indication, MRI may be helpful for further assessment.      MACRO:  None      Signed by: Michael Torres 11/23/2024 9:37 AM  Dictation workstation:   [id number]      Meds  Scheduled medications  acetaminophen, 975 mg, oral, q6h  ascorbic acid, 500 mg, oral, Daily  [Held by provider] aspirin, 81 mg, oral, Daily  atorvastatin, 20 mg, oral, Nightly  cholecalciferol, 1,000 Units, oral, Daily  cyanocobalamin, 250 mcg, oral, Daily  DULoxetine, 40 mg, oral, Daily  fludrocortisone, 0.05 mg, oral, Daily  lactobacillus acidophilus, 1 capsule, oral, Daily  lidocaine, 1 patch, transdermal, Daily  magnesium sulfate, 2 g, intravenous, Once  [Held by provider] metoprolol succinate XL, 25 mg, oral, " Daily  miconazole, 1 Application, Topical, TID  potassium chloride CR, 20 mEq, oral, Daily      Continuous medications     PRN medications  PRN medications: HYDROmorphone, ondansetron ODT **OR** ondansetron, oxyCODONE, oxyCODONE, zinc oxide     Assessment and Plan    Chao Thao is a 84 y.o. male w/ PMH of HTN, HLD, MDD, CAD, frequent falls, dementia, A-fib (not on AC), HFpEF (EF 50% 02/2024) who is admitted for multiple rib fx, nasal bone fx s/p mechanical fall.      Acute problems:  # mechanical fall  # L rib fx  # nasal bone fx   # Hx of recurrent falls/ fracutres  - CT maxillofacial bones: Comminuted nasal bone fracture with deviation  - CT chest: There are new fractures of the 7th and 9th left ribs. There are old left rib fractures. Lower left ribs are not entirely included on the examination.  -Ortho consulted, no acute intervention planned  -Trauma consulted, no acute intervention recommended  - OMFS consulted  -Pain control: Tylenol 950 scheduled, Oxycodone 5 mg PRN for mod pain, Oxycodone 10 mg PRN for severe pain and dilaudid 0.5 mg for breakthrough pain. Lidocaine patch overlying fractured ribs.  -PT/OT consulted  -incentive spirometry  -palliative care following     #acute metabolic encephalopathy  #hard of hearing   -A&O x0 11/23 and 11/24, unclear if due to [place]   -Pt is afebrile, no leukocytosis- infectious etiology less likely  - will continue to monitor, can consider head imaging if patient is not responding.      #left second toe tenderness  -due to some concern for infectious process (elevated wbc, crp) with hx L second toe ulcer and bronchiectasis  -ID consulted, no indication for abx at this time  -Ua largely unremarkable   -MRI L foot w w/o pending  -monitor off abx    # Anemia, appears chronic  - Bl hgb ~10, now 8.1  - No obvious signs of bleeding  - CTM, repeat H&H this evening  - Transfuse Hgb <7     Chronic problems:  # afib: hold metoprolol succinate 25 mg bid due to hypotension, not on  AC due to recurrent falls and high bleeding risk.   #HFpEF: 2/2024 echo 50% EF, continue statin, holding metoprolol   #HLD continue statin  # CAD s/p PTCA: continue ASA  # MDD: continue duloxetine  # Primary adrenal insufficiency: continue fludrocortisone 0.05 mg daily     F: PO intake & IVF PRN   E: Replete as needed   N: Regular  GI ppx: None  DVT ppx: SCD, lovenox  Antibiotics: None  Tubes/Lines/Drains: PIV     Code Status: Full Code   Emergency Contact: Extended Emergency Contact Information  Primary Emergency Contact: Karissa Winn  Home Phone: [phone]  Relation: Spouse      Disposition: 84 y.o.male admitted for multiple rib fx, nasal bone fx s/p mechanical fall. Pending PT/OT eval.    Piotr Horowitz, DO  PGY-2  Internal Medicine

## 2024-11-24 NOTE — PROGRESS NOTES
Occupational Therapy                 Therapy Communication Note    Patient Name: Chao Thao  MRN: [id number]  Department: [address]  Room: [address] [place] [address]  Today's Date: 11/24/2024     Discipline: Occupational Therapy    Missed Visit Reason: Missed Visit Reason:  (Pt A&O x0 and [place]. Pt unable to follow basic commands in order to participate in OT evaluation. Pt not appropriate for OT evaluation on this date. RN made aware.)    Missed Time: Attempt

## 2024-11-24 NOTE — HOSPITAL COURSE
Chao Thao is a 84 y.o. male w/ PMH of HTN, HLD, MDD, CAD, frequent falls, dementia, A-fib (not on AC), HFpEF (EF 50% 02/2024) who is presenting from home due to fall. He had imaging showing new rib fractures on L and old rib fractures as well as bronchiectasis. Xray left foot without obvious acute process, CT head, face bones. With a nasal bone fx w deviation. Surgery and ortho evaluated pt, advises no surgical intervention, recommended OMFS consult for nasal bone fx which was placed.     Wife notes pt has had multiple hospital admissions. She says pt sometimes gets very confused, like calling the police because of a burglar who is not there, but other times is very lucid. Pt appeared to have severe L leg pain on exam and yells out when we approach to touch it. He had labs showing mild leukocytosis and elevated inflammatory markers; abx started. ID on board, recommends watching off abx. Talked with wife who would like CT imaging of his left foot. Palliative consulted.     11/25 CT foot w/o signs of osteomyelitis. Pt has become progressively unresponsive. Now is A&O x0 and not following commands. CT head w/ contrast was poor study d/t motion but otherwise no acute findings. Electrolyte levels have been stable, no obvious infection despite mild leukocytosis. Obtained AM cortisol.    11/26 Pt became hypoxic requiring 3L nc overnight. Also had fever to 100.6 which resolved w/ tylenol. CXR showed possible lower lobe infiltrates and pt had slightly worsened leukocytosis. Blood cultures and inflammatory markers were obtained. Pt started on empiric antibiotics.     11/27 Pt more alert but remained intermittently following commands. Remains complaining of chronic knee pain. Blood cultures with no growth after 1 day. PT/OT unable to assess patient d/t inability to follow commands. Pt's wife was interested in discharging him tomorrow.    11/28 Pt was stable overnight, he remains intermittently responsive to verbal stimuli  but will react to touch. This may be an element of hypoactive delirium compounded by underlying dementia as pt's wife has noted a decline in function over the past year. He completed a total of 6 days of IV antibiotics while inpatient, though infectious disease believes he may not have had pneumonia and may just be atelectasis. Pt's spouse would like him to continue home healthcare and home PT. Unfortunately PT and OT were unable to evaluate the pt in hospital due to his inability to follow commands.    Discussed findings and plan with patient's spouse and she is agreeable to discharge home today. Med changes at discharge include home oxycodone prescription and holding home metoprolol d/t hypotension. Pt will need to follow up with PCP in 7-10 days for hospital follow up. Instructions provided. He is in stable condition for discharge today.     Please see final recommendations from consultants below:  Infectious disease       -supportive care       -Incentive spirometry    Oral surgery       -OMFS follow up in 7-10 days for nasal fracture repair

## 2024-11-24 NOTE — CONSULTS
Consults    PALLIATIVE CARE SOCIAL WORK CONSULT:  Sarah MAK  CURRENT ATTENDING PROVIDER: Chuy Bonilla,      Reason for Consult    [facility]    Introduction to Palliative Care  Met with pt and spouse at bedside  Pt was alert but unable to participate in visit.  Pt is very [place], unable to read questions or speak.   Staff present:  Sarah Christensen  Palliative care was introduced as a service for patients with serious illness to help with symptoms, assist with goals of care conversations, navigate complex decision making, improve quality of life for patients, and provide support to patients and families.  Support and empathy was provided throughout the encounter.    History of Present Illness  Chao Thao is a 84 y.o. male with past medical history of CAD, frequent falls, dementia, a fib. Pt is presenting with fall.    Caregiving/Caregiver Support  Does the patient require assistance in some or all components of his care, including coordination of medical care?  yes  If Yes, which person serves that role?   spouse    Medical History  Per EMR.  Per spouse, pt has had five admission, SNF stays since February 2024.    Personal History  Pt and spouse are from [place].  Pt has two children from first marriage, estranged.  Pt has been  32 years.  No other family.  Pt was an .  Pt enjoys Ham radios.     Alevism and Importance of Alevism:  [employer]     Depression   no per spouse    Anxiety no per spouse        Advance Directives  Surrogate Health Care Decision Maker:  spouse  HCPOA  no  Living Will  no  Spouse brought in financial POA and insists it be put in EMR.      Code Status                    FULL CODE.  Reviewed risks of CPR, including trauma, death, and intubation.  Spouse wants to discuss with several friends who are in health care before making a change.  Spouse agreed to revisit code status tomorrow when she is at the hospital.     Serious Illness Conversation        Life  Limiting Disease:  dementia, falls  Disease Specific Information Provided:  spouse is angry that pt has never been officially diagnosed with dementia and wants to know for sure pt has dementia.  Reviewed pt's behavior as indication of dementia.   What is your understanding now of where you are with your illness:  spouse appears to lack understanding and insight.  Spouse states pt was making 'fantastic progress' with home PT.   What are your fears, worries, or concerns about the future:  spouse wants to care for pt at home.  Spouse does not consider that she may not be able to care for pt due to his decline.   What are you hoping for:  recovery   What brings you neymar:  music  How much does your family know about your priorities and wishes:  spouse feels she knows what is best for pt    Other distressing Symptoms        Pain.  Discussed with team.  Spouse does not feel rib pain is an issue as pt has fractured ribs in the past.  Pt has chronic knee pain.  Spouse feels pt may have had another TIA.  Spouse insists pt was doing well prior to fall 11/21.    Plan and Social Considerations  Spouse wants pt home but if SNF is necessary wants [facility].  Discussed that pt first needs to be assessed by PT (and then SNF recommended) and currently pt cannot participate in therapy.  Pt was active with [facility] for a few weeks 9/24.  Spouse did not feel hospice was helpful and she felt/feels PT is more beneficial.  Will reconvene tomorrow to further discuss goals of care.     Plan of Care discussed with: team    Thank you for asking Palliative Care to assist with care of this patient.  We will continue to follow  Please contact us for additional questions or concerns.    AUBREE Beyer  Palliative Care   Contact Via Epic Secure chat

## 2024-11-24 NOTE — PROGRESS NOTES
"Vancomycin Dosing by Pharmacy- FOLLOW UP    Chao Thao 84YM  Ht 5'6\" Wt 157# (71kg)   Hospital Day #2/Vancomycin Day #1  Pharmacy has been consulted for vancomycin dosing for diabetic foot infection.  Based on the patient's indication and renal status this patient is being dosed based on a goal AUC of 400-600.     Renal function is currently stable. Clcr 66 mL/min    Current vancomycin dose: 1500 mg given every 24 hours (doses at 0930)    Most recent random level: 13.9 mcg/mL today w/ am labs    Visit Vitals  BP 91/54 (BP Location: Right arm, Patient Position: Lying) Comment: resident aware   Pulse 82   Temp 36.9 °C (98.4 °F) (Temporal)   Resp 15        Lab Results   Component Value Date    CREATININE 0.78 11/24/2024    CREATININE 0.77 11/23/2024    CREATININE 0.95 11/22/2024    CREATININE 0.89 10/20/2024        Patient weight is as follows:   Vitals:    11/22/24 2022   Weight: 71.3 kg (157 lb 3 oz)       ABX include cefepime 2g IV q8h        Assessment/Plan    Above goal AUC. Orders placed for new vancomcyin regimen of 1250 every 24 hours to begin at 11/25 0900.    This dosing regimen is predicted by [employer] to result in the following pharmacokinetic parameters:  Regimen: 1250 mg IV every 24 hours.  Start time: 0900 on 11/25/2024  Exposure target: AUC24 (range)400-600 mg/L.hr   [id number]: 544 mg/L.hr  AUC24,ss: 573 mg/L.hr  Probability of AUC24 > 400: 99 %  Ctrough,ss: 15.6 mg/L  Probability of Ctrough,ss > 20: 15 %    The next level will be obtained on 11/27 am labs. May be obtained sooner if indicated.   Will continue to monitor renal function daily while on vancomycin and order serum creatinine at least every 48 hours if not already ordered.  Follow for continued vancomycin needs, clinical response, and signs/symptoms of toxicity.       Deandre Blakely, PharmD           "

## 2024-11-24 NOTE — PROGRESS NOTES
Chao Thao is a 84 y.o. male on day 2 of admission presenting with Weakness.    Subjective   Interval History: no fever, the hx is limited        Review of Systems    Objective   Range of Vitals (last 24 hours)  Heart Rate:  [77-89]   Temp:  [36.6 °C (97.9 °F)-36.9 °C (98.4 °F)]   Resp:  [14-16]   BP: ()/(54-70)   SpO2:  [90 %-93 %]   Daily Weight  11/22/24 : 71.3 kg (157 lb 3 oz)    Body mass index is 25.37 kg/m².    Physical Exam  Constitutional:       Appearance: Normal appearance.   HENT:      Head: Normocephalic and atraumatic.      Mouth/Throat:      Mouth: Mucous membranes are moist.      Pharynx: Oropharynx is clear.   Eyes:      Pupils: Pupils are equal, round, and reactive to light.   Cardiovascular:      Rate and Rhythm: Normal rate and regular rhythm.      Heart sounds: Normal heart sounds.   Pulmonary:      Effort: Pulmonary effort is normal.      Breath sounds: Normal breath sounds.   Abdominal:      General: Abdomen is flat. Bowel sounds are normal.      Palpations: Abdomen is soft.   Musculoskeletal:      Cervical back: Normal range of motion.      Comments: Lt 2nd toe dries eschar   Neurological:      Mental Status: He is alert.         Antibiotics  This patient does not have an active medication from one of the medication groupers.    Relevant Results  Labs  Results from last 72 hours   Lab Units 11/24/24  0635 11/23/24  0638 11/22/24  1521   WBC AUTO x10*3/uL 9.9 9.2 12.5*   HEMOGLOBIN g/dL 8.1* 9.1* 10.6*   HEMATOCRIT % 25.0* 28.0* 32.4*   PLATELETS AUTO x10*3/uL 381 405 422   NEUTROS PCT AUTO %  --   --  82.0   LYMPHS PCT AUTO %  --   --  8.8   MONOS PCT AUTO %  --   --  7.8   EOS PCT AUTO %  --   --  0.3     Results from last 72 hours   Lab Units 11/24/24  0635 11/23/24  0638 11/22/24  1521   SODIUM mmol/L 134* 138 135*   POTASSIUM mmol/L 3.6 3.9 4.0   CHLORIDE mmol/L 103 107 101   CO2 mmol/L 24 22 23   BUN mg/dL 19 24* 23   CREATININE mg/dL 0.78 0.77 0.95   GLUCOSE mg/dL 85 79 133*    CALCIUM mg/dL 8.0* 8.0* 8.6   ANION GAP mmol/L 11 13 15   EGFR mL/min/1.73m*2 89 89 80   PHOSPHORUS mg/dL 3.5 3.4  --      Results from last 72 hours   Lab Units 11/24/24  0635 11/23/24  0638 11/22/24  1521   ALK PHOS U/L 107  --  153*   BILIRUBIN TOTAL mg/dL 0.3  --  0.2   PROTEIN TOTAL g/dL 6.0*  --  7.3   ALT U/L 10  --  13   AST U/L 18  --  26   ALBUMIN g/dL 2.3* 2.4* 2.8*     Estimated Creatinine Clearance: 65.9 mL/min (by C-G formula based on SCr of 0.78 mg/dL).  C-Reactive Protein   Date Value Ref Range Status   11/23/2024 11.71 (H) <1.00 mg/dL Final   08/12/2024 2.23 (H) <1.00 mg/dL Final   06/30/2024 9.28 (H) <1.00 mg/dL Final     Microbiology  Reviewed  Imaging  Reviewed        Assessment/Plan   Hypotension, likely volume deplesion, BC is negative  Bronchiectasis / rib fracture / atelectasis   Left 2nd toe wound, does not look infected  Encephalopathy, likely metabolic     Recommendations :  Supportive care  Chest PT     I spent minutes in the professional and overall care of this patient.      Wale Townsend MD

## 2024-11-24 NOTE — PROGRESS NOTES
Physical Therapy                 Therapy Communication Note    Patient Name: Chao Thao  MRN: [id number]  Department: [address]  Room: [address] [place] [address]  Today's Date: 11/24/2024     Discipline: Physical Therapy    Missed Visit Reason: Missed Visit Reason: Patient placed on medical hold (Pt is A&O x 0. Pt is deaf and non verbal. Pt is unable to follow verbal commands. Pt is not appropriate for therapy at this time. RN was made aware and is in agreement)    Missed Time: Attempt    Comment:

## 2024-11-25 ENCOUNTER — APPOINTMENT (OUTPATIENT)
Dept: RADIOLOGY | Facility: HOSPITAL | Age: 84
End: 2024-11-25
Payer: MEDICARE

## 2024-11-25 ENCOUNTER — HOME CARE VISIT (OUTPATIENT)
Dept: HOME HEALTH SERVICES | Facility: HOME HEALTH | Age: 84
End: 2024-11-25
Payer: MEDICARE

## 2024-11-25 LAB
ALBUMIN SERPL BCP-MCNC: 2.2 G/DL (ref 3.4–5)
ALP SERPL-CCNC: 108 U/L (ref 33–136)
ALT SERPL W P-5'-P-CCNC: 11 U/L (ref 10–52)
ANION GAP SERPL CALC-SCNC: 13 MMOL/L
AST SERPL W P-5'-P-CCNC: 20 U/L (ref 9–39)
BILIRUB SERPL-MCNC: 0.3 MG/DL (ref 0–1.2)
BUN SERPL-MCNC: 22 MG/DL (ref 6–23)
CALCIUM SERPL-MCNC: 7.9 MG/DL (ref 8.6–10.3)
CHLORIDE SERPL-SCNC: 101 MMOL/L (ref 98–107)
CO2 SERPL-SCNC: 23 MMOL/L (ref 21–32)
CORTIS SERPL-MCNC: 16 UG/DL (ref 2.5–20)
CREAT SERPL-MCNC: 0.78 MG/DL (ref 0.5–1.3)
EGFRCR SERPLBLD CKD-EPI 2021: 89 ML/MIN/1.73M*2
ERYTHROCYTE [DISTWIDTH] IN BLOOD BY AUTOMATED COUNT: 15.8 % (ref 11.5–14.5)
GLUCOSE SERPL-MCNC: 96 MG/DL (ref 74–99)
HCT VFR BLD AUTO: 24.8 % (ref 41–52)
HGB BLD-MCNC: 8 G/DL (ref 13.5–17.5)
MAGNESIUM SERPL-MCNC: 1.95 MG/DL (ref 1.6–2.4)
MCH RBC QN AUTO: 30.3 PG (ref 26–34)
MCHC RBC AUTO-ENTMCNC: 32.3 G/DL (ref 32–36)
MCV RBC AUTO: 94 FL (ref 80–100)
NRBC BLD-RTO: 0 /100 WBCS (ref 0–0)
PHOSPHATE SERPL-MCNC: 3.5 MG/DL (ref 2.5–4.9)
PLATELET # BLD AUTO: 328 X10*3/UL (ref 150–450)
POTASSIUM SERPL-SCNC: 3.9 MMOL/L (ref 3.5–5.3)
PROT SERPL-MCNC: 5.7 G/DL (ref 6.4–8.2)
Q ONSET: 221 MS
QRS COUNT: 15 BEATS
QRS DURATION: 86 MS
QT INTERVAL: 342 MS
QTC CALCULATION(BAZETT): 420 MS
QTC FREDERICIA: 393 MS
R AXIS: 61 DEGREES
RBC # BLD AUTO: 2.64 X10*6/UL (ref 4.5–5.9)
SODIUM SERPL-SCNC: 133 MMOL/L (ref 136–145)
T AXIS: 248 DEGREES
T OFFSET: 392 MS
VENTRICULAR RATE: 91 BPM
WBC # BLD AUTO: 12.2 X10*3/UL (ref 4.4–11.3)

## 2024-11-25 PROCEDURE — 2500000004 HC RX 250 GENERAL PHARMACY W/ HCPCS (ALT 636 FOR OP/ED)

## 2024-11-25 PROCEDURE — 70460 CT HEAD/BRAIN W/DYE: CPT

## 2024-11-25 PROCEDURE — 99232 SBSQ HOSP IP/OBS MODERATE 35: CPT

## 2024-11-25 PROCEDURE — 83735 ASSAY OF MAGNESIUM: CPT

## 2024-11-25 PROCEDURE — 94668 MNPJ CHEST WALL SBSQ: CPT

## 2024-11-25 PROCEDURE — 2550000001 HC RX 255 CONTRASTS: Performed by: INTERNAL MEDICINE

## 2024-11-25 PROCEDURE — 85027 COMPLETE CBC AUTOMATED: CPT

## 2024-11-25 PROCEDURE — 36415 COLL VENOUS BLD VENIPUNCTURE: CPT

## 2024-11-25 PROCEDURE — 84100 ASSAY OF PHOSPHORUS: CPT

## 2024-11-25 PROCEDURE — 94760 N-INVAS EAR/PLS OXIMETRY 1: CPT

## 2024-11-25 PROCEDURE — 82533 TOTAL CORTISOL: CPT | Mod: GEALAB

## 2024-11-25 PROCEDURE — 99232 SBSQ HOSP IP/OBS MODERATE 35: CPT | Performed by: INTERNAL MEDICINE

## 2024-11-25 PROCEDURE — 2500000005 HC RX 250 GENERAL PHARMACY W/O HCPCS

## 2024-11-25 PROCEDURE — 80053 COMPREHEN METABOLIC PANEL: CPT

## 2024-11-25 PROCEDURE — 2500000001 HC RX 250 WO HCPCS SELF ADMINISTERED DRUGS (ALT 637 FOR MEDICARE OP)

## 2024-11-25 PROCEDURE — 2500000002 HC RX 250 W HCPCS SELF ADMINISTERED DRUGS (ALT 637 FOR MEDICARE OP, ALT 636 FOR OP/ED)

## 2024-11-25 PROCEDURE — 1200000002 HC GENERAL ROOM WITH TELEMETRY DAILY

## 2024-11-25 RX ADMIN — ACETAMINOPHEN 975 MG: 325 TABLET ORAL at 21:52

## 2024-11-25 RX ADMIN — MICONAZOLE NITRATE 1 APPLICATION: 20 CREAM TOPICAL at 21:43

## 2024-11-25 RX ADMIN — OXYCODONE HYDROCHLORIDE 10 MG: 10 TABLET ORAL at 00:26

## 2024-11-25 RX ADMIN — ACETAMINOPHEN 975 MG: 325 TABLET ORAL at 05:53

## 2024-11-25 RX ADMIN — OXYCODONE 5 MG: 5 TABLET ORAL at 23:53

## 2024-11-25 RX ADMIN — Medication 1000 UNITS: at 09:14

## 2024-11-25 RX ADMIN — IOHEXOL 50 ML: 350 INJECTION, SOLUTION INTRAVENOUS at 11:22

## 2024-11-25 RX ADMIN — LIDOCAINE 4% 1 PATCH: 40 PATCH TOPICAL at 09:16

## 2024-11-25 RX ADMIN — ATORVASTATIN CALCIUM 20 MG: 20 TABLET, FILM COATED ORAL at 21:42

## 2024-11-25 RX ADMIN — ASPIRIN 81 MG: 81 TABLET, COATED ORAL at 09:15

## 2024-11-25 RX ADMIN — MICONAZOLE NITRATE 1 APPLICATION: 20 CREAM TOPICAL at 05:54

## 2024-11-25 RX ADMIN — CYANOCOBALAMIN TAB 500 MCG 250 MCG: 500 TAB at 09:15

## 2024-11-25 RX ADMIN — POTASSIUM CHLORIDE 20 MEQ: 1500 TABLET, EXTENDED RELEASE ORAL at 09:14

## 2024-11-25 RX ADMIN — OXYCODONE HYDROCHLORIDE 10 MG: 10 TABLET ORAL at 13:20

## 2024-11-25 RX ADMIN — Medication 1 CAPSULE: at 09:14

## 2024-11-25 RX ADMIN — OXYCODONE HYDROCHLORIDE 10 MG: 10 TABLET ORAL at 05:54

## 2024-11-25 RX ADMIN — FLUDROCORTISONE ACETATE 0.05 MG: 0.1 TABLET ORAL at 09:16

## 2024-11-25 RX ADMIN — ENOXAPARIN SODIUM 40 MG: 40 INJECTION SUBCUTANEOUS at 13:32

## 2024-11-25 RX ADMIN — ACETAMINOPHEN 975 MG: 325 TABLET ORAL at 13:21

## 2024-11-25 RX ADMIN — DULOXETINE HYDROCHLORIDE 40 MG: 20 CAPSULE, DELAYED RELEASE ORAL at 09:14

## 2024-11-25 RX ADMIN — OXYCODONE HYDROCHLORIDE AND ACETAMINOPHEN 500 MG: 500 TABLET ORAL at 09:13

## 2024-11-25 ASSESSMENT — COGNITIVE AND FUNCTIONAL STATUS - GENERAL
STANDING UP FROM CHAIR USING ARMS: A LOT
MOBILITY SCORE: 12
MOVING TO AND FROM BED TO CHAIR: A LOT
TOILETING: A LOT
DRESSING REGULAR UPPER BODY CLOTHING: A LOT
TOILETING: A LOT
MOBILITY SCORE: 12
MOVING TO AND FROM BED TO CHAIR: A LOT
HELP NEEDED FOR BATHING: A LOT
PERSONAL GROOMING: A LOT
CLIMB 3 TO 5 STEPS WITH RAILING: A LOT
MOVING FROM LYING ON BACK TO SITTING ON SIDE OF FLAT BED WITH BEDRAILS: A LOT
EATING MEALS: A LOT
HELP NEEDED FOR BATHING: A LOT
WALKING IN HOSPITAL ROOM: A LOT
EATING MEALS: TOTAL
TURNING FROM BACK TO SIDE WHILE IN FLAT BAD: A LOT
DRESSING REGULAR LOWER BODY CLOTHING: A LOT
DRESSING REGULAR LOWER BODY CLOTHING: A LOT
DAILY ACTIVITIY SCORE: 10
TURNING FROM BACK TO SIDE WHILE IN FLAT BAD: A LOT
DAILY ACTIVITIY SCORE: 12
DRESSING REGULAR UPPER BODY CLOTHING: A LOT
STANDING UP FROM CHAIR USING ARMS: A LOT
MOVING FROM LYING ON BACK TO SITTING ON SIDE OF FLAT BED WITH BEDRAILS: A LOT
WALKING IN HOSPITAL ROOM: A LOT
CLIMB 3 TO 5 STEPS WITH RAILING: A LOT
PERSONAL GROOMING: TOTAL

## 2024-11-25 ASSESSMENT — PAIN SCALES - PAIN ASSESSMENT IN ADVANCED DEMENTIA (PAINAD)
CONSOLABILITY: NO NEED TO CONSOLE
FACIALEXPRESSION: SMILING OR INEXPRESSIVE
BREATHING: NORMAL
NEGVOCALIZATION: OCCASIONAL MOAN/GROAN, LOW SPEECH, NEGATIVE/DISAPPROVING QUALITY
FACIALEXPRESSION: FACIAL GRIMACING
FACIALEXPRESSION: SMILING OR INEXPRESSIVE
CONSOLABILITY: NO NEED TO CONSOLE
BREATHING: NORMAL
TOTALSCORE: MEDICATION (SEE MAR);REPOSITIONED
TOTALSCORE: 1
TOTALSCORE: 5
BODYLANGUAGE: RELAXED
NEGVOCALIZATION: OCCASIONAL MOAN/GROAN, LOW SPEECH, NEGATIVE/DISAPPROVING QUALITY
TOTALSCORE: 0
BREATHING: OCCASIONAL LABORED BREATHING, SHORT PERIOD OF HYPERVENTILATION
TOTALSCORE: MEDICATION (SEE MAR)
BODYLANGUAGE: TENSE, DISTRESSED PACING, FIDGETING
TOTALSCORE: 1
FACIALEXPRESSION: SMILING OR INEXPRESSIVE
CONSOLABILITY: NO NEED TO CONSOLE
BODYLANGUAGE: RELAXED
CONSOLABILITY: NO NEED TO CONSOLE
BODYLANGUAGE: RELAXED
BREATHING: NORMAL
NEGVOCALIZATION: OCCASIONAL MOAN/GROAN, LOW SPEECH, NEGATIVE/DISAPPROVING QUALITY

## 2024-11-25 ASSESSMENT — PAIN SCALES - GENERAL
PAINLEVEL_OUTOF10: 0 - NO PAIN
PAINLEVEL_OUTOF10: 9
PAINLEVEL_OUTOF10: 1
PAINLEVEL_OUTOF10: 1

## 2024-11-25 ASSESSMENT — PAIN - FUNCTIONAL ASSESSMENT
PAIN_FUNCTIONAL_ASSESSMENT: PAINAD (PAIN ASSESSMENT IN ADVANCED DEMENTIA SCALE)

## 2024-11-25 ASSESSMENT — PAIN SCALES - WONG BAKER: WONGBAKER_NUMERICALRESPONSE: HURTS WHOLE LOT

## 2024-11-25 NOTE — PROGRESS NOTES
Physical Therapy                 Therapy Communication Note    Patient Name: Chao Thao  MRN: [id number]  Department: [address]  Room: [address] [place] [address]  Today's Date: 11/25/2024     Discipline: Physical Therapy    Missed Visit Reason: Missed Visit Reason: Patient placed on medical hold (Spoke with Pt's RN to clear Pt for PT, RN stating that deaf, non verbal  and not able to follow commands. RN states that Pt is not appropriate for therapy. No evaluation.)    Missed Time: Attempt    Comment:

## 2024-11-25 NOTE — CONSULTS
"Reason For Consult  Nasal fracture    History Of Present Illness  Chao Thao is a 84 y.o. male w/ PMH of HTN, HLD, MDD, CAD, frequent falls, dementia, A-fib (not on AC), HFpEF (EF 50% 02/2024) who is admitted for multiple rib fx, nasal bone fx s/p mechanical fall.         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    Surgical History  He has a past surgical history that includes Inner ear surgery (01/06/2016); Other surgical history (03/16/2020); and Cardiac catheterization (05/14/2018).     Social History  He reports that he has quit smoking. His smoking use included cigarettes. He has never used smokeless tobacco. He reports that he does not currently use alcohol. He reports that he does not use drugs.    Family History  No family history on file.     Allergies  Gabapentin and Penicillin    Review of Systems  12 points review of system is negative except as stated in the HPI above.      Physical Exam  Gen: NAD    HEENT:   EOMI, PERRL  Ecchymosis of nasal bridge  Displaced nasal bones to the right  Nasal obstruction  No evidence of septal hematoma    Chest: CTAB, no wheezing / labored respirations. Pain in L side of ribs    CVS: RRR, no murmurs    Abd: soft, flat, NT/ND    Ext: xeroderma on bl LE, no edema    Neuro: AOx3, no focal neuro deficits     Last Recorded Vitals  Blood pressure 101/76, pulse 74, temperature 37 °C (98.6 °F), resp. rate 18, height 1.676 m (5' 6\"), weight 71.3 kg (157 lb 3 oz), SpO2 91%.    Relevant Results  Lab Results "   Component Value Date    WBC 12.2 (H) 11/25/2024    HGB 8.0 (L) 11/25/2024    HCT 24.8 (L) 11/25/2024    MCV 94 11/25/2024     11/25/2024     Lab Results   Component Value Date    GLUCOSE 96 11/25/2024    CALCIUM 7.9 (L) 11/25/2024     (L) 11/25/2024    K 3.9 11/25/2024    CO2 23 11/25/2024     11/25/2024    BUN 22 11/25/2024    CREATININE 0.78 11/25/2024     CT Maxillofacial w/o contrast  IMPRESSION:  Comminuted nasal bone fracture with deviation.      MACRO:  None      Signed by: Fidelina Horowitz 11/22/2024 4:52 PM         Assessment/Plan   # mechanical fall  # L rib fx  # nasal bone fx   # Hx of recurrent falls/ fractures  #acute metabolic encephalopathy  #hard of hearing   #left second toe tenderness   # Anemia, appears chronic   # afib: hold metoprolol succinate 25 mg bid due to hypotension, not on AC due to recurrent falls and high bleeding risk.   #HFpEF: 2/2024 echo 50% EF  #HLD continue statin  # CAD s/p PTCA: continue ASA  # MDD: continue duloxetine  # Primary adrenal insufficiency: continue fludrocortisone 0.05 mg daily     No emergent surgical intervention.  Recommend reduction of nasal fractures in 7-10 days once swelling resolves  Afrin nasal spray as needed for bleeding  [place]cean nasal spray to maintain patency  If patient discharged, ok for outpatient scheduling of procedure.  Have patient/family call my office to coordinate.    I spent 45 minutes in the professional and overall care of this patient.      Hany Rao, DMD

## 2024-11-25 NOTE — PROGRESS NOTES
Chao Thao is a 84 y.o. male on day 3 of admission presenting with Weakness.    Subjective   Interval History: no fever, the hx is limited        Review of Systems    Objective   Range of Vitals (last 24 hours)  Heart Rate:  [75-91]   Temp:  [36.6 °C (97.9 °F)-37.3 °C (99.1 °F)]   Resp:  [15-18]   BP: ()/(48-63)   SpO2:  [91 %-92 %]   Daily Weight  11/22/24 : 71.3 kg (157 lb 3 oz)    Body mass index is 25.37 kg/m².    Physical Exam  Constitutional:       Appearance: Normal appearance.   HENT:      Head: Normocephalic and atraumatic.      Mouth/Throat:      Mouth: Mucous membranes are moist.      Pharynx: Oropharynx is clear.   Eyes:      Pupils: Pupils are equal, round, and reactive to light.   Cardiovascular:      Rate and Rhythm: Normal rate and regular rhythm.      Heart sounds: Normal heart sounds.   Pulmonary:      Effort: Pulmonary effort is normal.      Breath sounds: Normal breath sounds.   Abdominal:      General: Abdomen is flat. Bowel sounds are normal.      Palpations: Abdomen is soft.   Musculoskeletal:      Cervical back: Normal range of motion.      Comments: Lt 2nd toe dries eschar   Neurological:      Mental Status: He is alert.         Antibiotics  This patient does not have an active medication from one of the medication groupers.    Relevant Results  Labs  Results from last 72 hours   Lab Units 11/25/24  0736 11/24/24  1702 11/24/24  0635 11/23/24  0638 11/22/24  1521   WBC AUTO x10*3/uL 12.2*  --  9.9 9.2 12.5*   HEMOGLOBIN g/dL 8.0* 8.1* 8.1* 9.1* 10.6*   HEMATOCRIT % 24.8* 25.3* 25.0* 28.0* 32.4*   PLATELETS AUTO x10*3/uL 328  --  381 405 422   NEUTROS PCT AUTO %  --   --   --   --  82.0   LYMPHS PCT AUTO %  --   --   --   --  8.8   MONOS PCT AUTO %  --   --   --   --  7.8   EOS PCT AUTO %  --   --   --   --  0.3     Results from last 72 hours   Lab Units 11/24/24  0635 11/23/24  0638 11/22/24  1521   SODIUM mmol/L 134* 138 135*   POTASSIUM mmol/L 3.6 3.9 4.0   CHLORIDE mmol/L 103  107 101   CO2 mmol/L 24 22 23   BUN mg/dL 19 24* 23   CREATININE mg/dL 0.78 0.77 0.95   GLUCOSE mg/dL 85 79 133*   CALCIUM mg/dL 8.0* 8.0* 8.6   ANION GAP mmol/L 11 13 15   EGFR mL/min/1.73m*2 89 89 80   PHOSPHORUS mg/dL 3.5 3.4  --      Results from last 72 hours   Lab Units 11/24/24  0635 11/23/24  0638 11/22/24  1521   ALK PHOS U/L 107  --  153*   BILIRUBIN TOTAL mg/dL 0.3  --  0.2   PROTEIN TOTAL g/dL 6.0*  --  7.3   ALT U/L 10  --  13   AST U/L 18  --  26   ALBUMIN g/dL 2.3* 2.4* 2.8*     Estimated Creatinine Clearance: 65.9 mL/min (by C-G formula based on SCr of 0.78 mg/dL).  C-Reactive Protein   Date Value Ref Range Status   11/23/2024 11.71 (H) <1.00 mg/dL Final   08/12/2024 2.23 (H) <1.00 mg/dL Final   06/30/2024 9.28 (H) <1.00 mg/dL Final     Microbiology  Reviewed  Imaging  Reviewed        Assessment/Plan   Hypotension, likely volume deplesion, BC is negative  Bronchiectasis / rib fracture / atelectasis, procalcitonin 0.24  Left 2nd toe wound, does not look infected, CT without bony erosions  Encephalopathy, likely metabolic     Recommendations :  Supportive care  Up in the chair     I spent minutes in the professional and overall care of this patient.      Wale Townsend MD

## 2024-11-25 NOTE — PROGRESS NOTES
"  Subjective    Overnight Events:   - NAEON  - pt resting comfortably in bed  - Pt is nonverbal and not reacting to verbal commands  - A&O x0    ROS: 12 points review of system unable to be performed as pt not answering questions    Objective    Vitals  Visit Vitals  /76   Pulse 74   Temp 37 °C (98.6 °F)   Resp 18   Ht 1.676 m (5' 6\")   Wt 71.3 kg (157 lb 3 oz)   SpO2 91%   BMI 25.37 kg/m²   Smoking Status Former   BSA 1.82 m²       Physical Exam    --Vital signs reviewed in nursing triage note, EMR flow sheets, and at patient's bedside  Constitutional: NAD  HEENT: Ecchymosis on nasal bridge.   CV: RRR, No M/R/G  PULM: Lungs clear to auscultation, normal respiratory effort  ABDOMEN: Soft, NT/ND. No TTP  SKIN: Normal Color, Warm, Dry. BL LE with xerderma  EXTREMITIES: No Pedal Edema, not moving extremities.  NEURO: A&O x O  PSYCH: Non verbal     IOs    Intake/Output Summary (Last 24 hours) at 11/25/2024 1000  Last data filed at 11/25/2024 0932  Gross per 24 hour   Intake 1280 ml   Output 550 ml   Net 730 ml       Labs:   Results from last 72 hours   Lab Units 11/25/24  0736 11/24/24  0635 11/23/24  0638   SODIUM mmol/L 133* 134* 138   POTASSIUM mmol/L 3.9 3.6 3.9   CHLORIDE mmol/L 101 103 107   CO2 mmol/L 23 24 22   BUN mg/dL 22 19 24*   CREATININE mg/dL 0.78 0.78 0.77   GLUCOSE mg/dL 96 85 79   CALCIUM mg/dL 7.9* 8.0* 8.0*   ANION GAP mmol/L 13 11 13   EGFR mL/min/1.73m*2 89 89 89   PHOSPHORUS mg/dL 3.5 3.5 3.4      Results from last 72 hours   Lab Units 11/25/24  0736 11/24/24  1702 11/24/24  0635 11/23/24  0638 11/22/24  1521   WBC AUTO x10*3/uL 12.2*  --  9.9 9.2 12.5*   HEMOGLOBIN g/dL 8.0* 8.1* 8.1* 9.1* 10.6*   HEMATOCRIT % 24.8* 25.3* 25.0* 28.0* 32.4*   PLATELETS AUTO x10*3/uL 328  --  381 405 422   NEUTROS PCT AUTO %  --   --   --   --  82.0   LYMPHS PCT AUTO %  --   --   --   --  8.8   MONOS PCT AUTO %  --   --   --   --  7.8   EOS PCT AUTO %  --   --   --   --  0.3      Lab Results   Component Value " "Date    CALCIUM 7.9 (L) 11/25/2024    PHOS 3.5 11/25/2024      Lab Results   Component Value Date    CRP 11.71 (H) 11/23/2024      [unfilled]     Micro/ID:   No results found for the last 90 days.                   No lab exists for component: \"AGALPCRNB\"   .ID  Lab Results   Component Value Date    URINECULTURE No significant growth 11/23/2024    BLOODCULT No growth at 2 days 11/22/2024    BLOODCULT No growth at 2 days 11/22/2024       Images  CT foot left w IV contrast, CT ankle left with IV contrast  Narrative: Interpreted By:  Tru Cadet,   STUDY:  CT FOOT LEFT W IV CONTRAST; CT ANKLE LEFT W IV CONTRAST;  11/23/2024  7:24 pm      INDICATION:  Signs/Symptoms:look for any signs of osteomyelitis in left foot ,  second toe; Signs/Symptoms:LLE pain tenderness look for infection  osteo septic joint.      COMPARISON:  None.      ACCESSION NUMBER(S):  [id number]; [id number]      ORDERING CLINICIAN:  ESTUARDO GONZALEZ      TECHNIQUE:  Helical trans axial images were obtained with additional orthogonal  reconstructions with bone technique.      FINDINGS:  Bony structures:  The bony structures appear intact without acute  fracture or suspicious osseous lesion. There is no lysis of the 2nd  toe to indicate osteomyelitis. No joint space destruction to indicate  septic arthritis. However, if such remains of clinical suspicion  radionuclide imaging would be recommended for earlier detection.  There does appear to be small soft tissue defect dorsally overlying  the head of the proximal phalanx may be a soft tissue ulceration.  Otherwise no evidence of a soft tissue abscess. No emphysema. There  appears to be fusion of the middle and distal 5th phalanges as a  variant.      Joint spaces:  Mild dorsal osteophytosis at the tarsus and  particularly talonavicular joint, as well as at the 2nd and 3rd  tarsal-metatarsal joints. Hammertoe deformities.      Tendons and ligaments:  There is thickening of the deltoid " ligament,  which may be due to fibrosis from previous injury, although partial  tear and tendinopathy is possible.      Musculature and fascia:  Fatty atrophy of the intrinsic foot  musculature      Subcutaneous tissue:  Unremarkable without significant edema.      Vasculature:  Extensive diffuse atherosclerotic calcification of the  posterior tibial artery.      ADDITIONAL FINDINGS:  None significant      Impression: No imaging evidence of osteomyelitis or septic arthritis of the 2nd  toe. However there is probably a dorsal ulceration. Mild  osteoarthritis and hammertoe deformities. Nonspecific thickening of  the deltoid ligament.      Signed by: Tru Cadet 11/24/2024 1:49 PM  Dictation workstation:   [id number]      Meds  Scheduled medications  acetaminophen, 975 mg, oral, q6h  ascorbic acid, 500 mg, oral, Daily  aspirin, 81 mg, oral, Daily  atorvastatin, 20 mg, oral, Nightly  cholecalciferol, 1,000 Units, oral, Daily  cyanocobalamin, 250 mcg, oral, Daily  DULoxetine, 40 mg, oral, Daily  enoxaparin, 40 mg, subcutaneous, q24h  fludrocortisone, 0.05 mg, oral, Daily  lactobacillus acidophilus, 1 capsule, oral, Daily  lidocaine, 1 patch, transdermal, Daily  [Held by provider] metoprolol succinate XL, 25 mg, oral, Daily  miconazole, 1 Application, Topical, TID  potassium chloride CR, 20 mEq, oral, Daily      Continuous medications     PRN medications  PRN medications: HYDROmorphone, ondansetron ODT **OR** ondansetron, oxyCODONE, oxyCODONE, zinc oxide     Assessment and Plan    Chao Thao is a 84 y.o. male w/ PMH of HTN, HLD, MDD, CAD, frequent falls, dementia, A-fib (not on AC), HFpEF (EF 50% 02/2024) who is admitted for multiple rib fx, nasal bone fx s/p mechanical fall.      Acute problems:  # mechanical fall  # L rib fx  # nasal bone fx   # Hx of recurrent falls/ fracutres  - CT maxillofacial bones: Comminuted nasal bone fracture with deviation  - CT chest: There are new fractures of the 7th and 9th left  ribs. There are old left rib fractures. Lower left ribs are not entirely included on the examination.  Plan:  -Ortho consulted, no acute intervention planned  -Trauma consulted, no acute intervention recommended  - OMFS consulted  -Pain control: Tylenol 950 scheduled, Oxycodone 5 mg PRN for mod pain, Oxycodone 10 mg PRN for severe pain and dilaudid 0.5 mg for breakthrough pain. Lidocaine patch overlying fractured ribs.  -PT/OT consulted  -incentive spirometry  -palliative care following     #acute metabolic encephalopathy  #hard of hearing   -A&O x0 11/23-today, unclear if due to [place]   -Pt is afebrile, WBC 12.2 today but previously nonelevated- infectious etiology less likely  - prior UA w/ + leuk esterase but no obvious infection  -Ua largely unremarkable   Plan:  - will continue to monitor, can consider head imaging if patient is not responding.   - CT head w/ contrast  - AM cortisol     #left second toe tenderness  -due to some concern for infectious process (elevated wbc, crp) with hx L second toe ulcer and bronchiectasis  - CT foot w/o signs of osteomyelitis.  Plan  -ID consulted, no indication for abx at this time  -monitor off abx    # Anemia, appears chronic  - Bl hgb ~10, now 8.0  - No obvious signs of bleeding  Plan:  - CTM, repeat CBC in AM  - Transfuse Hgb <7     Chronic problems:  # afib: hold metoprolol succinate 25 mg bid due to hypotension, not on AC due to recurrent falls and high bleeding risk.   #HFpEF: 2/2024 echo 50% EF, continue statin, holding metoprolol   #HLD continue statin  # CAD s/p PTCA: continue ASA  # MDD: continue duloxetine  # Primary adrenal insufficiency: continue fludrocortisone 0.05 mg daily     F: PO intake & IVF PRN   E: Replete as needed   N: Regular  GI ppx: None  DVT ppx: SCD, lovenox  Antibiotics: None  Tubes/Lines/Drains: PIV     Code Status: Full Code   Emergency Contact: Extended Emergency Contact Information  Primary Emergency Contact: Karissa Winn Phone:  [phone]  Relation: Spouse      Disposition: 84 y.o.male admitted for multiple rib fx, nasal bone fx s/p mechanical fall. Pending PT/OT eval.    Best Serrato MD PGY-1

## 2024-11-25 NOTE — PROGRESS NOTES
Occupational Therapy                 Therapy Communication Note    Patient Name: Chao Thao  MRN: [id number]  Department: [address]  Room: [address] [place] [address]  Today's Date: 11/25/2024     Discipline: Occupational Therapy    Missed Visit Reason: Missed Visit Reason: Other (Comment) (per PT, spoke with RN, pt not following commands or approrpriate to participate at this time.)    Missed Time: Attempt

## 2024-11-25 NOTE — CARE PLAN
The patient's goals for the shift include  use call light for assistance    The clinical goals for the shift include patient will turn every two hours today

## 2024-11-25 NOTE — PROGRESS NOTES
Palliative Care Social Work Note     Met with pt and spouse with Chaya Camejo, Palliative Care NP.  Dr Serrato joined part way through the visit.  Pt remains altered, awake, unable to answer questions (either verbal in his good ear or written), speech garbled.   Spouse immediately stated pt to remain FULL CODE because she spoke with some people and was told of an 83 year old who survived CPR and is doing 'great',  Attempted to point out pt's health issues, spouse continues to maintain pt was doing 'fabulous' prior to fall last week.  Spouse stated she does not want pt to suffer, does not feel CPR is traumatic or induces suffering.  Spouse states pt was a .   CT results discussed by Chaya and Dr Serrato.  Spouse continues to state she has never been told pt has dementia.  Spouse feels pt can receive more services if he has a diagnosis of dementia.   Advised spouse unaware that pt can receive additional assistance through Medicare because he has dementia.   Advised spouse that diagnosis of dementia is on pt's H&P.  Discussed pt's inability to participate in therapy due to [place], unable to read instructions, and pain, as well as dementia.   Spouse feels it is up to the team to get pt better so he can rehabilitate.    Dr Serrato advised spouse that pt should be stable to dc in the next 1-2 days.  Spouse feels she can care for pt at home despite his being bed bound and her own health limitations.  Team updated.  Palliative care will sign off at this time as goals are clear.

## 2024-11-25 NOTE — PROGRESS NOTES
"Music Therapy Note    Chao Thao was referred by Addis Mccallum, SHORTY.    Therapy Session  Referral Type: New referral this admission  Visit Type: New visit  Session Start Time: 1007  Session End Time: 1020  Intervention Delivery: In-person  Conflict of Service: None  Family Present for Session: None     Pre-assessment  Unable to Assess Reason: Physical limitation  Mood/Affect: Calm, Confused         Treatment/Interventions  Areas of Focus: Emotional support, Socialization  Music Therapy Interventions: Live music listening  Interruption: No  Patient Fell Asleep at End of Session: Yes    Post-assessment  Unable to Assess Reason: Patient sleeping  Mood/Affect: Tired/lethargic  Continue Visiting: Yes  Total Session Time (min): 13 minutes    Narrative  Assessment Detail: Upon MT's arrival, pt was lying in bed moaning and making small noises. He initially did not appear aware of MT. Pt is extremely [place], but it has been indicated that music brings him neymar. He watched MT bring out her guitar and did not appear agitated by this.  Plan: MT will engage pt in preferred music for relaxation and as an emotional support.  Intervention: As indicated in the chart, pt is a member of a [employer] community. As pt was unable to verbally share his preferred music, MT presented a variety of hymns, including \"How Great Thou Art,\" \"Holy, Holy, Holy,\" and \"Blessed Assurance.\" Pt made eye contact and smiled at MT several times, and he also watched MT's strumming of the guitar. Pt eventually appeared to fall asleep and did not respond to MT's departure.  Evaluation: Pt appeared to be able to relax, as evidenced by smiling and eventually falling asleep. It is unclear how much benefit the actual music is to him, due to his severe hearing loss, but he did appear comforted by MT's presence. He did make some groaning noises during the first parts of the session, but appeared calm and relaxed by the end.  Follow-up: MT will follow up as able and " appropriate.    Education Documentation  No documentation found.

## 2024-11-25 NOTE — PROGRESS NOTES
11/25/24 0948   Discharge Planning   Living Arrangements Spouse/significant other  (s/o Karissa)   Support Systems Spouse/significant other;Home care staff   Assistance Needed A&Ox2-3, assistance with ADLs, utilizes a wheeled walker, active with [facility](SN/PT/OT) PCP Dr. Babita Blackburn   Type of Residence Private residence   Number of Stairs to Enter Residence 3   Number of Stairs Within Residence 17   Do you have animals or pets at home? Yes   Type of Animals or Pets dog   Home or Post Acute Services Post acute facilities (Rehab/SNF/etc)   Type of Post Acute Facility Services Skilled nursing   Expected Discharge Disposition SNF  (PT/OT evals pending, anticipate pt will need SNF. Preference received for [facility] (lynn Cerrato). Referral sent, awaiting response. Will need precert.)   Patient Choice   Provider Choice list and CMS website (https://medicare.gov/care-compare#search) for post-acute Quality and Resource Measure Data were provided and reviewed with: Family;Patient   Patient / Family choosing to utilize agency / facility established prior to hospitalization No   Intensity of Service   Intensity of Service 0-30 min

## 2024-11-26 ENCOUNTER — APPOINTMENT (OUTPATIENT)
Dept: RADIOLOGY | Facility: HOSPITAL | Age: 84
End: 2024-11-26
Payer: MEDICARE

## 2024-11-26 LAB
ALBUMIN SERPL BCP-MCNC: 2.3 G/DL (ref 3.4–5)
ALP SERPL-CCNC: 120 U/L (ref 33–136)
ALT SERPL W P-5'-P-CCNC: 12 U/L (ref 10–52)
ANION GAP SERPL CALC-SCNC: 13 MMOL/L (ref 10–20)
AST SERPL W P-5'-P-CCNC: 17 U/L (ref 9–39)
BILIRUB SERPL-MCNC: 0.3 MG/DL (ref 0–1.2)
BUN SERPL-MCNC: 20 MG/DL (ref 6–23)
CALCIUM SERPL-MCNC: 8 MG/DL (ref 8.6–10.3)
CHLORIDE SERPL-SCNC: 102 MMOL/L (ref 98–107)
CO2 SERPL-SCNC: 22 MMOL/L (ref 21–32)
CORTIS AM PEAK SERPL-MSCNC: 28 UG/DL (ref 5–20)
CREAT SERPL-MCNC: 0.79 MG/DL (ref 0.5–1.3)
EGFRCR SERPLBLD CKD-EPI 2021: 89 ML/MIN/1.73M*2
ERYTHROCYTE [DISTWIDTH] IN BLOOD BY AUTOMATED COUNT: 15.8 % (ref 11.5–14.5)
GLUCOSE SERPL-MCNC: 112 MG/DL (ref 74–99)
HCT VFR BLD AUTO: 27.4 % (ref 41–52)
HGB BLD-MCNC: 8.6 G/DL (ref 13.5–17.5)
MAGNESIUM SERPL-MCNC: 1.85 MG/DL (ref 1.6–2.4)
MCH RBC QN AUTO: 29.7 PG (ref 26–34)
MCHC RBC AUTO-ENTMCNC: 31.4 G/DL (ref 32–36)
MCV RBC AUTO: 95 FL (ref 80–100)
MRSA DNA SPEC QL NAA+PROBE: DETECTED
NRBC BLD-RTO: 0 /100 WBCS (ref 0–0)
PHOSPHATE SERPL-MCNC: 3.2 MG/DL (ref 2.5–4.9)
PLATELET # BLD AUTO: 414 X10*3/UL (ref 150–450)
POTASSIUM SERPL-SCNC: 4.1 MMOL/L (ref 3.5–5.3)
PROT SERPL-MCNC: 6.2 G/DL (ref 6.4–8.2)
Q ONSET: 216 MS
QRS COUNT: 15 BEATS
QRS DURATION: 90 MS
QT INTERVAL: 364 MS
QTC CALCULATION(BAZETT): 435 MS
QTC FREDERICIA: 410 MS
R AXIS: 42 DEGREES
RBC # BLD AUTO: 2.9 X10*6/UL (ref 4.5–5.9)
SODIUM SERPL-SCNC: 133 MMOL/L (ref 136–145)
T AXIS: 195 DEGREES
T OFFSET: 398 MS
VENTRICULAR RATE: 86 BPM
WBC # BLD AUTO: 14.4 X10*3/UL (ref 4.4–11.3)

## 2024-11-26 PROCEDURE — 36415 COLL VENOUS BLD VENIPUNCTURE: CPT

## 2024-11-26 PROCEDURE — 2500000005 HC RX 250 GENERAL PHARMACY W/O HCPCS

## 2024-11-26 PROCEDURE — 1200000002 HC GENERAL ROOM WITH TELEMETRY DAILY

## 2024-11-26 PROCEDURE — 87040 BLOOD CULTURE FOR BACTERIA: CPT | Mod: GEALAB

## 2024-11-26 PROCEDURE — 87641 MR-STAPH DNA AMP PROBE: CPT

## 2024-11-26 PROCEDURE — 2500000004 HC RX 250 GENERAL PHARMACY W/ HCPCS (ALT 636 FOR OP/ED)

## 2024-11-26 PROCEDURE — 71045 X-RAY EXAM CHEST 1 VIEW: CPT | Performed by: RADIOLOGY

## 2024-11-26 PROCEDURE — 85027 COMPLETE CBC AUTOMATED: CPT

## 2024-11-26 PROCEDURE — 71045 X-RAY EXAM CHEST 1 VIEW: CPT

## 2024-11-26 PROCEDURE — 87449 NOS EACH ORGANISM AG IA: CPT | Mod: GEALAB

## 2024-11-26 PROCEDURE — 99233 SBSQ HOSP IP/OBS HIGH 50: CPT

## 2024-11-26 PROCEDURE — 84075 ASSAY ALKALINE PHOSPHATASE: CPT

## 2024-11-26 PROCEDURE — 94669 MECHANICAL CHEST WALL OSCILL: CPT

## 2024-11-26 PROCEDURE — 87899 AGENT NOS ASSAY W/OPTIC: CPT | Mod: GEALAB

## 2024-11-26 PROCEDURE — 2500000004 HC RX 250 GENERAL PHARMACY W/ HCPCS (ALT 636 FOR OP/ED): Mod: JZ

## 2024-11-26 PROCEDURE — 94668 MNPJ CHEST WALL SBSQ: CPT

## 2024-11-26 PROCEDURE — 99233 SBSQ HOSP IP/OBS HIGH 50: CPT | Performed by: STUDENT IN AN ORGANIZED HEALTH CARE EDUCATION/TRAINING PROGRAM

## 2024-11-26 PROCEDURE — 87081 CULTURE SCREEN ONLY: CPT | Mod: GEALAB | Performed by: STUDENT IN AN ORGANIZED HEALTH CARE EDUCATION/TRAINING PROGRAM

## 2024-11-26 PROCEDURE — 82533 TOTAL CORTISOL: CPT | Mod: GEALAB

## 2024-11-26 PROCEDURE — 2500000002 HC RX 250 W HCPCS SELF ADMINISTERED DRUGS (ALT 637 FOR MEDICARE OP, ALT 636 FOR OP/ED)

## 2024-11-26 PROCEDURE — 94760 N-INVAS EAR/PLS OXIMETRY 1: CPT

## 2024-11-26 PROCEDURE — 2500000001 HC RX 250 WO HCPCS SELF ADMINISTERED DRUGS (ALT 637 FOR MEDICARE OP)

## 2024-11-26 PROCEDURE — 83735 ASSAY OF MAGNESIUM: CPT

## 2024-11-26 PROCEDURE — 2500000004 HC RX 250 GENERAL PHARMACY W/ HCPCS (ALT 636 FOR OP/ED): Performed by: INTERNAL MEDICINE

## 2024-11-26 PROCEDURE — 84100 ASSAY OF PHOSPHORUS: CPT

## 2024-11-26 PROCEDURE — 84145 PROCALCITONIN (PCT): CPT | Mod: GEALAB

## 2024-11-26 RX ORDER — CEFEPIME HYDROCHLORIDE 2 G/50ML
2 INJECTION, SOLUTION INTRAVENOUS EVERY 8 HOURS
Status: DISCONTINUED | OUTPATIENT
Start: 2024-11-26 | End: 2024-11-28 | Stop reason: HOSPADM

## 2024-11-26 RX ORDER — VANCOMYCIN HYDROCHLORIDE 1 G/20ML
INJECTION, POWDER, LYOPHILIZED, FOR SOLUTION INTRAVENOUS DAILY PRN
Status: DISCONTINUED | OUTPATIENT
Start: 2024-11-26 | End: 2024-11-28 | Stop reason: HOSPADM

## 2024-11-26 RX ORDER — VANCOMYCIN HYDROCHLORIDE
1250 EVERY 24 HOURS
Status: DISCONTINUED | OUTPATIENT
Start: 2024-11-26 | End: 2024-11-28 | Stop reason: HOSPADM

## 2024-11-26 RX ORDER — ACETAMINOPHEN 325 MG/1
975 TABLET ORAL EVERY 6 HOURS PRN
Status: DISCONTINUED | OUTPATIENT
Start: 2024-11-26 | End: 2024-11-28 | Stop reason: HOSPADM

## 2024-11-26 RX ORDER — AZITHROMYCIN 500 MG/1
500 TABLET, FILM COATED ORAL
Status: DISCONTINUED | OUTPATIENT
Start: 2024-11-26 | End: 2024-11-28 | Stop reason: HOSPADM

## 2024-11-26 RX ADMIN — OXYCODONE 5 MG: 5 TABLET ORAL at 14:43

## 2024-11-26 RX ADMIN — Medication 1 CAPSULE: at 08:13

## 2024-11-26 RX ADMIN — Medication 1250 MG: at 12:05

## 2024-11-26 RX ADMIN — ENOXAPARIN SODIUM 40 MG: 40 INJECTION SUBCUTANEOUS at 13:54

## 2024-11-26 RX ADMIN — CYANOCOBALAMIN TAB 500 MCG 250 MCG: 500 TAB at 08:12

## 2024-11-26 RX ADMIN — Medication 3 L/MIN: at 21:55

## 2024-11-26 RX ADMIN — ACETAMINOPHEN 975 MG: 325 TABLET ORAL at 05:33

## 2024-11-26 RX ADMIN — Medication 3 L/MIN: at 20:11

## 2024-11-26 RX ADMIN — CEFEPIME HYDROCHLORIDE 2 G: 2 INJECTION, SOLUTION INTRAVENOUS at 10:40

## 2024-11-26 RX ADMIN — POTASSIUM CHLORIDE 20 MEQ: 1500 TABLET, EXTENDED RELEASE ORAL at 08:13

## 2024-11-26 RX ADMIN — Medication 3 L/MIN: at 06:49

## 2024-11-26 RX ADMIN — MICONAZOLE NITRATE 1 APPLICATION: 20 CREAM TOPICAL at 05:48

## 2024-11-26 RX ADMIN — CEFEPIME HYDROCHLORIDE 2 G: 2 INJECTION, SOLUTION INTRAVENOUS at 18:25

## 2024-11-26 RX ADMIN — Medication 1000 UNITS: at 08:13

## 2024-11-26 RX ADMIN — MICONAZOLE NITRATE 1 APPLICATION: 20 CREAM TOPICAL at 21:55

## 2024-11-26 RX ADMIN — SODIUM CHLORIDE, POTASSIUM CHLORIDE, SODIUM LACTATE AND CALCIUM CHLORIDE 500 ML: 600; 310; 30; 20 INJECTION, SOLUTION INTRAVENOUS at 06:43

## 2024-11-26 RX ADMIN — ACETAMINOPHEN 975 MG: 325 TABLET ORAL at 12:09

## 2024-11-26 RX ADMIN — LIDOCAINE 4% 1 PATCH: 40 PATCH TOPICAL at 08:12

## 2024-11-26 RX ADMIN — Medication 3 L/MIN: at 08:45

## 2024-11-26 RX ADMIN — OXYCODONE 5 MG: 5 TABLET ORAL at 21:57

## 2024-11-26 RX ADMIN — FLUDROCORTISONE ACETATE 0.05 MG: 0.1 TABLET ORAL at 08:17

## 2024-11-26 RX ADMIN — ASPIRIN 81 MG: 81 TABLET, COATED ORAL at 08:13

## 2024-11-26 RX ADMIN — AZITHROMYCIN DIHYDRATE 500 MG: 500 TABLET ORAL at 08:31

## 2024-11-26 RX ADMIN — DULOXETINE HYDROCHLORIDE 40 MG: 20 CAPSULE, DELAYED RELEASE ORAL at 08:14

## 2024-11-26 RX ADMIN — ATORVASTATIN CALCIUM 20 MG: 20 TABLET, FILM COATED ORAL at 21:53

## 2024-11-26 RX ADMIN — OXYCODONE HYDROCHLORIDE AND ACETAMINOPHEN 500 MG: 500 TABLET ORAL at 08:13

## 2024-11-26 RX ADMIN — OXYCODONE 5 MG: 5 TABLET ORAL at 08:17

## 2024-11-26 ASSESSMENT — COGNITIVE AND FUNCTIONAL STATUS - GENERAL
MOVING FROM LYING ON BACK TO SITTING ON SIDE OF FLAT BED WITH BEDRAILS: TOTAL
WALKING IN HOSPITAL ROOM: TOTAL
HELP NEEDED FOR BATHING: TOTAL
MOBILITY SCORE: 6
TURNING FROM BACK TO SIDE WHILE IN FLAT BAD: TOTAL
DRESSING REGULAR LOWER BODY CLOTHING: TOTAL
DAILY ACTIVITIY SCORE: 6
DRESSING REGULAR UPPER BODY CLOTHING: TOTAL
MOVING TO AND FROM BED TO CHAIR: A LOT
PERSONAL GROOMING: A LOT
DAILY ACTIVITIY SCORE: 8
DRESSING REGULAR UPPER BODY CLOTHING: TOTAL
MOVING TO AND FROM BED TO CHAIR: TOTAL
TOILETING: TOTAL
EATING MEALS: A LOT
MOBILITY SCORE: 9
TURNING FROM BACK TO SIDE WHILE IN FLAT BAD: A LOT
CLIMB 3 TO 5 STEPS WITH RAILING: TOTAL
TOILETING: TOTAL
WALKING IN HOSPITAL ROOM: TOTAL
MOVING FROM LYING ON BACK TO SITTING ON SIDE OF FLAT BED WITH BEDRAILS: A LOT
STANDING UP FROM CHAIR USING ARMS: TOTAL
CLIMB 3 TO 5 STEPS WITH RAILING: TOTAL
HELP NEEDED FOR BATHING: TOTAL
PERSONAL GROOMING: TOTAL
EATING MEALS: TOTAL
STANDING UP FROM CHAIR USING ARMS: TOTAL
DRESSING REGULAR LOWER BODY CLOTHING: TOTAL

## 2024-11-26 ASSESSMENT — PAIN SCALES - GENERAL
PAINLEVEL_OUTOF10: 4
PAINLEVEL_OUTOF10: 4
PAINLEVEL_OUTOF10: 0 - NO PAIN

## 2024-11-26 ASSESSMENT — PAIN SCALES - PAIN ASSESSMENT IN ADVANCED DEMENTIA (PAINAD)
BODYLANGUAGE: RELAXED
NEGVOCALIZATION: REPEATED TROUBLED CALLING OUT, LOUD MOANING/GROANING, CRYING
TOTALSCORE: MEDICATION (SEE MAR)
BODYLANGUAGE: TENSE, DISTRESSED PACING, FIDGETING
TOTALSCORE: 0
FACIALEXPRESSION: SMILING OR INEXPRESSIVE
CONSOLABILITY: DISTRACTED OR REASSURED BY VOICE/TOUCH
TOTALSCORE: 4
BREATHING: NORMAL
CONSOLABILITY: DISTRACTED OR REASSURED BY VOICE/TOUCH
BREATHING: NORMAL
TOTALSCORE: 5
CONSOLABILITY: NO NEED TO CONSOLE
CONSOLABILITY: NO NEED TO CONSOLE
BREATHING: NORMAL
NEGVOCALIZATION: OCCASIONAL MOAN/GROAN, LOW SPEECH, NEGATIVE/DISAPPROVING QUALITY
FACIALEXPRESSION: SAD, FRIGHTENED, FROWN
FACIALEXPRESSION: SAD, FRIGHTENED, FROWN
BODYLANGUAGE: RELAXED
BREATHING: NORMAL
BREATHING: NORMAL
TOTALSCORE: 0
BODYLANGUAGE: TENSE, DISTRESSED PACING, FIDGETING
FACIALEXPRESSION: SAD, FRIGHTENED, FROWN
BODYLANGUAGE: RELAXED
TOTALSCORE: 4
TOTALSCORE: 0
BODYLANGUAGE: RELAXED
FACIALEXPRESSION: SMILING OR INEXPRESSIVE
BREATHING: NORMAL
CONSOLABILITY: DISTRACTED OR REASSURED BY VOICE/TOUCH
TOTALSCORE: MEDICATION (SEE MAR)
FACIALEXPRESSION: SMILING OR INEXPRESSIVE
CONSOLABILITY: NO NEED TO CONSOLE
NEGVOCALIZATION: REPEATED TROUBLED CALLING OUT, LOUD MOANING/GROANING, CRYING

## 2024-11-26 ASSESSMENT — PAIN - FUNCTIONAL ASSESSMENT
PAIN_FUNCTIONAL_ASSESSMENT: PAINAD (PAIN ASSESSMENT IN ADVANCED DEMENTIA SCALE)

## 2024-11-26 NOTE — PROGRESS NOTES
"  Subjective    Overnight Events:   - Pt hypoxic to 80s overnight, started on 3L nc. Fever to 100.6 which improved after an hour. CXR w/ volume loss on R base, possible bilateral lower infiltration.  - pt resting in bed, legs contracted but no ttp. Mild ttp over R chest wall.  - Pt intermittently following commands, denies pain or difficulty breathing  - A&O x0    ROS: 12 points review of system unable to be performed as pt not answering questions    Objective    Vitals  Visit Vitals  BP 97/59   Pulse 94   Temp 37 °C (98.6 °F)   Resp 16   Ht 1.676 m (5' 6\")   Wt 71.3 kg (157 lb 3 oz)   SpO2 95%   BMI 25.37 kg/m²   Smoking Status Former   BSA 1.82 m²       Physical Exam    --Vital signs reviewed in nursing triage note, EMR flow sheets, and at patient's bedside  Constitutional: NAD  HEENT: Ecchymosis on nasal bridge.   CV: RRR, No M/R/G  PULM: Poor air exchange in R lungs, clear to auscultation. However, pt was sitting w/ poor posture which may have contributed.  ABDOMEN: Soft, NT/ND. No TTP  SKIN: Normal Color, Warm, Dry. BL LE with xerderma  EXTREMITIES: No Pedal Edema, not moving extremities.  NEURO: A&O x O, intermittently following commands  PSYCH: Intermittently following commands, denies pain     IOs    Intake/Output Summary (Last 24 hours) at 11/26/2024 0903  Last data filed at 11/26/2024 0600  Gross per 24 hour   Intake 1006 ml   Output 825 ml   Net 181 ml       Labs:   Results from last 72 hours   Lab Units 11/26/24  0725 11/25/24  0736 11/24/24  0635   SODIUM mmol/L 133* 133* 134*   POTASSIUM mmol/L 4.1 3.9 3.6   CHLORIDE mmol/L 102 101 103   CO2 mmol/L 22 23 24   BUN mg/dL 20 22 19   CREATININE mg/dL 0.79 0.78 0.78   GLUCOSE mg/dL 112* 96 85   CALCIUM mg/dL 8.0* 7.9* 8.0*   ANION GAP mmol/L 13 13 11   EGFR mL/min/1.73m*2 89 89 89   PHOSPHORUS mg/dL 3.2 3.5 3.5      Results from last 72 hours   Lab Units 11/26/24  0726 11/25/24  0736 11/24/24  1702 11/24/24  0635   WBC AUTO x10*3/uL 14.4* 12.2*  --  9.9 " "  HEMOGLOBIN g/dL 8.6* 8.0* 8.1* 8.1*   HEMATOCRIT % 27.4* 24.8* 25.3* 25.0*   PLATELETS AUTO x10*3/uL 414 328  --  381      Lab Results   Component Value Date    CALCIUM 8.0 (L) 11/26/2024    PHOS 3.2 11/26/2024      Lab Results   Component Value Date    CRP 11.71 (H) 11/23/2024      [unfilled]     Micro/ID:   No results found for the last 90 days.                   No lab exists for component: \"AGALPCRNB\"   .ID  Lab Results   Component Value Date    URINECULTURE No significant growth 11/23/2024    BLOODCULT No growth at 3 days 11/22/2024    BLOODCULT No growth at 3 days 11/22/2024       Images  XR chest 1 view  Narrative: Interpreted By:  Fidelina Horowitz,   STUDY:  XR CHEST 1 VIEW;  11/26/2024 6:19 am      INDICATION:  Signs/Symptoms:Increased oxygen requirement.      COMPARISON:  07/28/2024      ACCESSION NUMBER(S):  [id number]      ORDERING CLINICIAN:  ESTUARDO GONZALEZ      FINDINGS:  The patient is rotated. The cardiac silhouette may be normal in size.      There is bilateral bases lower infiltration. There appears to be loss  of volume at the right base.      Mediastinum and bones as visualized are unremarkable.      COMPARISON OF FINDING:  The infiltrates may be somewhat improved.      Impression: Somewhat limited exam. Parenchymal infiltration. Probable loss of  volume at the right base.      MACRO:  none      Signed by: Fidelina Horowitz 11/26/2024 7:47 AM  Dictation workstation:   [id number]      Meds  Scheduled medications  acetaminophen, 975 mg, oral, q6h  ascorbic acid, 500 mg, oral, Daily  aspirin, 81 mg, oral, Daily  atorvastatin, 20 mg, oral, Nightly  azithromycin, 500 mg, oral, q24h JIGNESH  cefepime, 2 g, intravenous, q8h  cholecalciferol, 1,000 Units, oral, Daily  cyanocobalamin, 250 mcg, oral, Daily  DULoxetine, 40 mg, oral, Daily  enoxaparin, 40 mg, subcutaneous, q24h  fludrocortisone, 0.05 mg, oral, Daily  lactobacillus acidophilus, 1 capsule, oral, Daily  lidocaine, 1 patch, transdermal, " Daily  [Held by provider] metoprolol succinate XL, 25 mg, oral, Daily  miconazole, 1 Application, Topical, TID  oxygen, , inhalation, Continuous - Inhalation  potassium chloride CR, 20 mEq, oral, Daily  vancomycin, 1,250 mg, intravenous, q24h      Continuous medications     PRN medications  PRN medications: HYDROmorphone, ondansetron ODT **OR** ondansetron, oxyCODONE, oxyCODONE, vancomycin, zinc oxide     Assessment and Plan    Chao Thao is a 84 y.o. male w/ PMH of HTN, HLD, MDD, CAD, frequent falls, dementia, A-fib (not on AC), HFpEF (EF 50% 02/2024) who is admitted for multiple rib fx, nasal bone fx s/p mechanical fall.      Acute problems:  # mechanical fall  # L rib fx  # nasal bone fx   # Hx of recurrent falls/ fracutres  - CT maxillofacial bones: Comminuted nasal bone fracture with deviation  - CT chest: There are new fractures of the 7th and 9th left ribs. There are old left rib fractures. Lower left ribs are not entirely included on the examination.  Plan:  -Ortho consulted, no acute intervention planned  -Trauma consulted, no acute intervention recommended  - OMFS consulted  -Pain control: Oxycodone 5 mg PRN for mod pain, Oxycodone 10 mg PRN for severe pain and dilaudid 0.5 mg for breakthrough pain. Lidocaine patch overlying fractured ribs.  -PT/OT consulted  -incentive spirometry  -palliative care no longer following    #hypoxia  #pneumonia vs atelectasis?  - pt hypoxic to 88% overnight  - fever to 100.6 which improved with tylenol  - CXR 11/26- Somewhat limited exam. Parenchymal infiltration. Probable loss of  volume at the right base.  - wbc 12.2->14.4  Plan:  - blood cultures pending  - change tylenol to prn to not mask fever  - empiric antibiotics: azithromycin, cefepime, vancomycin  - ID following, appreciate recs     #acute metabolic encephalopathy  #hard of hearing   -A&O x0 11/23-today, unclear if due to [place]   - prior UA w/ + leuk esterase but no obvious infection  -Ua largely unremarkable   -  CT head w/ contrast - no acute process/mass  Plan:  - will continue to monitor, may be a component of hypoactive delirium  - AM cortisol pending     #left second toe tenderness  -due to some concern for infectious process (elevated wbc, crp) with hx L second toe ulcer and bronchiectasis  - CT foot w/o signs of osteomyelitis.  Plan  -ID consulted, no indication for abx at this time    # Anemia, appears chronic  - Bl hgb ~10, now 8.0  - No obvious signs of bleeding  Plan:  - CTM, repeat CBC in AM  - Transfuse Hgb <7     Chronic problems:  # afib: hold metoprolol succinate 25 mg bid due to hypotension, not on AC due to recurrent falls and high bleeding risk.   #HFpEF: 2/2024 echo 50% EF, continue statin, holding metoprolol   #HLD continue statin  # CAD s/p PTCA: continue ASA  # MDD: continue duloxetine  # Chronic hypotension: continue fludrocortisone 0.05 mg daily     F: PO intake & IVF PRN   E: Replete as needed   N: Regular  GI ppx: None  DVT ppx: SCD, lovenox  Antibiotics: vanc, cefepime, azithromycin  Tubes/Lines/Drains: PIV     Code Status: Full Code   Emergency Contact: Extended Emergency Contact Information  Primary Emergency Contact: Karissa Winn  Home Phone: [phone]  Relation: Spouse      Disposition: 84 y.o.male admitted for multiple rib fx, nasal bone fx s/p mechanical fall. Pending PT/OT eval.    Best Serrato MD PGY-1

## 2024-11-26 NOTE — CONSULTS
Vancomycin Dosing by Pharmacy- INITIAL    Chao Thao is a 84 y.o. year old male who Pharmacy has been consulted for vancomycin dosing for pneumonia. Based on the patient's indication and renal status this patient will be dosed based on a goal AUC of 400-600.     Renal function is currently stable.    Visit Vitals  /62 (BP Location: Right arm, Patient Position: Lying)   Pulse 103   Temp 36.4 °C (97.6 °F) (Temporal)   Resp 20        Lab Results   Component Value Date    CREATININE 0.79 2024    CREATININE 0.78 2024    CREATININE 0.78 2024    CREATININE 0.77 2024        Patient weight is as follows:   Vitals:    24   Weight: 71.3 kg (157 lb 3 oz)       Cultures:  No results found for the encounter in last 14 days.        I/O last 3 completed shifts:  In: 1006 (14.1 mL/kg) [P.O.:1006]  Out: 1125 (15.8 mL/kg) [Urine:1125 (0.4 mL/kg/hr)]  Weight: 71.3 kg   I/O during current shift:  No intake/output data recorded.    Temp (24hrs), Av.3 °C (99.1 °F), Min:36.4 °C (97.6 °F), Max:38.1 °C (100.6 °F)         Assessment/Plan     Patient will not be given a loading dose.  Will initiate vancomycin maintenance,  1250 mg every 24 hours.    This dosing regimen is predicted by [employer]ight[employer] to result in the following pharmacokinetic parameters:  Regimen: 1250 mg IV every 24 hours.  Start time: 08:02 on 2024  Exposure target: AUC24 (range)400-600 mg/L.hr   [id number]: 475 mg/L.hr  AUC24,ss: 561 mg/L.hr  Probability of AUC24 > 400: 99 %  Ctrough,ss: 15.3 mg/L  Probability of Ctrough,ss > 20: 11 %    Follow-up level will be ordered on  at 6am unless clinically indicated sooner.  Will continue to monitor renal function daily while on vancomycin and order serum creatinine at least every 48 hours if not already ordered.  Follow for continued vancomycin needs, clinical response, and signs/symptoms of toxicity.       Scarlet Helms, [facility]

## 2024-11-26 NOTE — PROGRESS NOTES
Occupational Therapy                 Therapy Communication Note    Patient Name: Chao Thao  MRN: [id number]  Department: [address]  Room: [address] [place] [address]  Today's Date: 11/26/2024     Discipline: Occupational Therapy    Missed Visit Reason: Missed Visit Reason: Cancel (Per PT pt remains inappropriate for therapy participation, will discontinue OT orders this date. Please re-consult if pt improves.)    Missed Time: Cancel    Comment: [id number]

## 2024-11-26 NOTE — PROGRESS NOTES
Chao Thao is a 84 y.o. male on day 4 of admission presenting with Weakness.    Subjective   Interval History: no fever, the hx is limited, had a low grade temp       Review of Systems    Objective   Range of Vitals (last 24 hours)  Heart Rate:  []   Temp:  [36.4 °C (97.6 °F)-38.1 °C (100.6 °F)]   Resp:  [16-21]   BP: ()/(56-70)   SpO2:  [88 %-95 %]   Daily Weight  11/22/24 : 71.3 kg (157 lb 3 oz)    Body mass index is 25.37 kg/m².    Physical Exam  Constitutional:       Appearance: Normal appearance.   HENT:      Head: Normocephalic and atraumatic.      Mouth/Throat:      Mouth: Mucous membranes are moist.      Pharynx: Oropharynx is clear.   Eyes:      Pupils: Pupils are equal, round, and reactive to light.   Cardiovascular:      Rate and Rhythm: Normal rate and regular rhythm.      Heart sounds: Normal heart sounds.   Pulmonary:      Effort: Pulmonary effort is normal.      Breath sounds: Normal breath sounds.   Abdominal:      General: Abdomen is flat. Bowel sounds are normal.      Palpations: Abdomen is soft.   Musculoskeletal:      Cervical back: Normal range of motion.      Comments: Lt 2nd toe dries eschar   Neurological:      Mental Status: He is alert.         Antibiotics  azithromycin - 500 mg  cefepime - 2 gram/50 mL  vancomycin - 1.25 gram/250 mL    Relevant Results  Labs  Results from last 72 hours   Lab Units 11/26/24  0726 11/25/24  0736 11/24/24  1702 11/24/24  0635   WBC AUTO x10*3/uL 14.4* 12.2*  --  9.9   HEMOGLOBIN g/dL 8.6* 8.0* 8.1* 8.1*   HEMATOCRIT % 27.4* 24.8* 25.3* 25.0*   PLATELETS AUTO x10*3/uL 414 328  --  381     Results from last 72 hours   Lab Units 11/26/24  0725 11/25/24  0736 11/24/24  0635   SODIUM mmol/L 133* 133* 134*   POTASSIUM mmol/L 4.1 3.9 3.6   CHLORIDE mmol/L 102 101 103   CO2 mmol/L 22 23 24   BUN mg/dL 20 22 19   CREATININE mg/dL 0.79 0.78 0.78   GLUCOSE mg/dL 112* 96 85   CALCIUM mg/dL 8.0* 7.9* 8.0*   ANION GAP mmol/L 13 13 11   EGFR mL/min/1.73m*2  89 89 89   PHOSPHORUS mg/dL 3.2 3.5 3.5     Results from last 72 hours   Lab Units 11/26/24  0725 11/25/24  0736 11/24/24  0635   ALK PHOS U/L 120 108 107   BILIRUBIN TOTAL mg/dL 0.3 0.3 0.3   PROTEIN TOTAL g/dL 6.2* 5.7* 6.0*   ALT U/L 12 11 10   AST U/L 17 20 18   ALBUMIN g/dL 2.3* 2.2* 2.3*     Estimated Creatinine Clearance: 65.1 mL/min (by C-G formula based on SCr of 0.79 mg/dL).  C-Reactive Protein   Date Value Ref Range Status   11/23/2024 11.71 (H) <1.00 mg/dL Final   08/12/2024 2.23 (H) <1.00 mg/dL Final   06/30/2024 9.28 (H) <1.00 mg/dL Final     Microbiology  Reviewed  Imaging  Reviewed        Assessment/Plan   Hypotension, likely volume deplesion, BC is negative  Bronchiectasis / rib fracture / atelectasis, procalcitonin 0.24, low grade likely atelectasis  Left 2nd toe wound, does not look infected, CT without bony erosions  Encephalopathy, likely metabolic     Recommendations :  Supportive care  Up in the chair / chest PT  COVID screen  Discussed with the medical team     I spent minutes in the professional and overall care of this patient.      Wale Townsend MD

## 2024-11-26 NOTE — CONSULTS
Wound Care Consult     Visit Date: 11/25/2024      Patient Name: Chao Thao         MRN: [id number]           YOB: 1940     Reason for Consult: wound        Wound History: POA     Pertinent Labs:   Albumin   Date Value Ref Range Status   11/25/2024 2.2 (L) 3.4 - 5.0 g/dL Final       Wound Assessment:  Wound 02/02/24 Pressure Injury Dorsal foot;Left (Active)   Wound Image   11/23/24 0002   Site Assessment Painful 11/23/24 0830   Drainage Description None 11/23/24 0830   Drainage Amount None 11/23/24 0830   Dressing Open to air 11/23/24 0830       Wound 11/23/24 Pretibial Right (Active)   Wound Image   11/23/24 0003   Drainage Description None 11/23/24 0830   Drainage Amount None 11/23/24 0830   Dressing Open to air 11/24/24 0857       Wound 11/23/24 Pretibial Left (Active)   Wound Image   11/23/24 0003   Drainage Description None 11/23/24 0830   Drainage Amount None 11/23/24 0830   Dressing Open to air 11/24/24 0857       Wound 11/23/24 Buttock Left (Active)   Wound Image   11/23/24 0008   Shape CAITIE d/t dsg 11/23/24 0830   Drainage Description Clear 11/23/24 0008   Drainage Amount Scant 11/23/24 0008   Dressing Foam 11/23/24 0008   Dressing Changed New 11/23/24 0008   Dressing Status Dry;Clean 11/24/24 0857       Wound 11/23/24 Nose (Active)   Wound Image   11/23/24 0016   Drainage Description None 11/23/24 0830   Drainage Amount None 11/23/24 0830   Dressing Open to air 11/24/24 0857       Wound 11/23/24 Elbow Dorsal;Left (Active)   Wound Image   11/23/24 0016   Shape CAITIE d/t dsg 11/23/24 0830   Drainage Amount Small 11/23/24 0016   Dressing Xeroform;Foam 11/23/24 0016   Dressing Changed Changed 11/24/24 0857   Dressing Status Clean;Dry 11/23/24 0830       Wound Team Summary Assessment: Patient seen in bed, body very rigid, [place], yells out in pain with turning.  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  Left elbow skin tear category 2: 4 x 2.5 cm with small amount  bloody drainage.  Buttock 0.5 cm purple bruise  Right shin 4 small scabs  Left 2nd toe thick scab 0.5 x 0.5 cm   Bilateral heels red, barely blanching, boggy  Face tiny scabs noted.     Wound Team Plan: Goal moist wound healing with Xeroform, offloading with waffle boots, q 2 hour turns.  Purewick external catheter in use.  Please message with any questions.     Addis Mccallum RN, [facility]  11/25/2024  7:11 PM

## 2024-11-26 NOTE — PROGRESS NOTES
Physical Therapy                 Therapy Communication Note    Patient Name: Chao Thao  MRN: [id number]  Department: [address]  Room: [address] [place] [address]  Today's Date: 11/26/2024     Discipline: Physical Therapy    Missed Visit Reason: Missed Visit Reason: Cancel (Spoke with Pt's RN to clear Pt for PT, RN states that Pt continues to be unable to follow any commands and that he remains inappropriate for therapy participation. 2' to 3rd attempt Pt will cancel orders at RN's agreement. Re-consult if Pt's improves.)    Missed Time: Cancel    Comment:

## 2024-11-26 NOTE — CARE PLAN
The patient's goals for the shift include  turn every 2 hours    The clinical goals for the shift include pt will remain safe this shift

## 2024-11-26 NOTE — PROGRESS NOTES
"Music Therapy Note    Chao Thao was referred by Addis Mccallum, [facility].    Therapy Session  Referral Type: New referral this admission  Visit Type: New visit  Session Start Time: 1530  Session End Time: 1600  Intervention Delivery: In-person  Conflict of Service: None  Number of family members present: 1  Family Present for Session: Spouse/Significant Other  Family Participation: Interactive     Pre-assessment  Unable to Assess Reason: Physical limitation  Mood/Affect: Confused         Treatment/Interventions  Areas of Focus: Agitation reduction, Family/caregiver support, Coping, Family bonding  Music Therapy Interventions: Live music listening  Interruption: No  Patient Fell Asleep at End of Session: No    Post-assessment  Unable to Assess Reason: Physical limitation  Mood/Affect: Calm  Continue Visiting: Yes  Total Session Time (min): 30 minutes    Narrative  Assessment Detail: Upon MT's arrival, pt was lying in bed moaning with his wife at the bedside. 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 Pt played at [email or web address] in the past. She shared that pt loves jazz and blues music. Pt and wife have a music studio attached to their house and have a group of friends who all play together over [employer] radio. Pt's wife is also a musician and .  Plan: MT will engage pt in preferred music to reduce agitation (moaning, restlessness) and to provide a supportive environment for both pt and spouse.  Intervention: Pt's wife requested to support on the piano. MT facilitated several duets including \"Fly Me To The Moon,\" \"Amazing Chayo,\" \"Leaving on a Jet Plane,\" and \"[place].\" Pt made eye contact and watched both his wife and MT intermittently throughout the session. MT placed pt's hand on the guitar so that he could feel vibrations. Pt nodded, smiled, and closed his eyes during this " "time.  Evaluation: Pt responded positively to the duet environment, watching and responding to both his spouse and to MT. Pt smiled, nodded, closed his eyes in apparent relaxation, and decreased his moaning throughout the session. Most notable was his experience touching the guitar. Pt's spouse also laughed and smiled, stating \"I needed that! I need music therapy!\"  Follow-up: MT will follow up as able and appropriate.    Education Documentation  Resources, taught by Kim Cruz at 11/26/2024  4:30 PM.  Learner: Family  Readiness: Acceptance  Method: Explanation  Response: Verbalizes Understanding            "

## 2024-11-27 ENCOUNTER — PHARMACY VISIT (OUTPATIENT)
Dept: PHARMACY | Facility: CLINIC | Age: 84
End: 2024-11-27
Payer: COMMERCIAL

## 2024-11-27 LAB
ALBUMIN SERPL BCP-MCNC: 2.2 G/DL (ref 3.4–5)
ALP SERPL-CCNC: 108 U/L (ref 33–136)
ALT SERPL W P-5'-P-CCNC: 11 U/L (ref 10–52)
ANION GAP SERPL CALC-SCNC: 10 MMOL/L (ref 10–20)
AST SERPL W P-5'-P-CCNC: 19 U/L (ref 9–39)
BACTERIA BLD CULT: NORMAL
BACTERIA BLD CULT: NORMAL
BILIRUB SERPL-MCNC: 0.3 MG/DL (ref 0–1.2)
BUN SERPL-MCNC: 19 MG/DL (ref 6–23)
CALCIUM SERPL-MCNC: 7.7 MG/DL (ref 8.6–10.3)
CHLORIDE SERPL-SCNC: 101 MMOL/L (ref 98–107)
CO2 SERPL-SCNC: 24 MMOL/L (ref 21–32)
CREAT SERPL-MCNC: 0.63 MG/DL (ref 0.5–1.3)
EGFRCR SERPLBLD CKD-EPI 2021: >90 ML/MIN/1.73M*2
ERYTHROCYTE [DISTWIDTH] IN BLOOD BY AUTOMATED COUNT: 15.3 % (ref 11.5–14.5)
GLUCOSE SERPL-MCNC: 99 MG/DL (ref 74–99)
HCT VFR BLD AUTO: 26.9 % (ref 41–52)
HGB BLD-MCNC: 8.4 G/DL (ref 13.5–17.5)
LEGIONELLA AG UR QL: NEGATIVE
MAGNESIUM SERPL-MCNC: 1.64 MG/DL (ref 1.6–2.4)
MCH RBC QN AUTO: 29.4 PG (ref 26–34)
MCHC RBC AUTO-ENTMCNC: 31.2 G/DL (ref 32–36)
MCV RBC AUTO: 94 FL (ref 80–100)
NRBC BLD-RTO: 0 /100 WBCS (ref 0–0)
PHOSPHATE SERPL-MCNC: 2.6 MG/DL (ref 2.5–4.9)
PLATELET # BLD AUTO: 389 X10*3/UL (ref 150–450)
POTASSIUM SERPL-SCNC: 4.1 MMOL/L (ref 3.5–5.3)
PROCALCITONIN SERPL-MCNC: 0.17 NG/ML
PROT SERPL-MCNC: 6.2 G/DL (ref 6.4–8.2)
RBC # BLD AUTO: 2.86 X10*6/UL (ref 4.5–5.9)
S PNEUM AG UR QL: NEGATIVE
SARS-COV-2 RNA RESP QL NAA+PROBE: NOT DETECTED
SODIUM SERPL-SCNC: 131 MMOL/L (ref 136–145)
VANCOMYCIN SERPL-MCNC: 12.7 UG/ML (ref 5–20)
WBC # BLD AUTO: 13.8 X10*3/UL (ref 4.4–11.3)

## 2024-11-27 PROCEDURE — 80053 COMPREHEN METABOLIC PANEL: CPT

## 2024-11-27 PROCEDURE — 1200000002 HC GENERAL ROOM WITH TELEMETRY DAILY

## 2024-11-27 PROCEDURE — 2500000001 HC RX 250 WO HCPCS SELF ADMINISTERED DRUGS (ALT 637 FOR MEDICARE OP)

## 2024-11-27 PROCEDURE — 94760 N-INVAS EAR/PLS OXIMETRY 1: CPT

## 2024-11-27 PROCEDURE — 83735 ASSAY OF MAGNESIUM: CPT

## 2024-11-27 PROCEDURE — 36415 COLL VENOUS BLD VENIPUNCTURE: CPT

## 2024-11-27 PROCEDURE — 2500000005 HC RX 250 GENERAL PHARMACY W/O HCPCS

## 2024-11-27 PROCEDURE — 2500000004 HC RX 250 GENERAL PHARMACY W/ HCPCS (ALT 636 FOR OP/ED)

## 2024-11-27 PROCEDURE — 84100 ASSAY OF PHOSPHORUS: CPT

## 2024-11-27 PROCEDURE — 99233 SBSQ HOSP IP/OBS HIGH 50: CPT | Performed by: STUDENT IN AN ORGANIZED HEALTH CARE EDUCATION/TRAINING PROGRAM

## 2024-11-27 PROCEDURE — 85027 COMPLETE CBC AUTOMATED: CPT

## 2024-11-27 PROCEDURE — 99233 SBSQ HOSP IP/OBS HIGH 50: CPT

## 2024-11-27 PROCEDURE — 2500000004 HC RX 250 GENERAL PHARMACY W/ HCPCS (ALT 636 FOR OP/ED): Performed by: INTERNAL MEDICINE

## 2024-11-27 PROCEDURE — 94668 MNPJ CHEST WALL SBSQ: CPT

## 2024-11-27 PROCEDURE — 2500000002 HC RX 250 W HCPCS SELF ADMINISTERED DRUGS (ALT 637 FOR MEDICARE OP, ALT 636 FOR OP/ED)

## 2024-11-27 PROCEDURE — 2500000004 HC RX 250 GENERAL PHARMACY W/ HCPCS (ALT 636 FOR OP/ED): Mod: JZ

## 2024-11-27 PROCEDURE — 80202 ASSAY OF VANCOMYCIN: CPT | Performed by: INTERNAL MEDICINE

## 2024-11-27 PROCEDURE — RXMED WILLOW AMBULATORY MEDICATION CHARGE

## 2024-11-27 PROCEDURE — 87635 SARS-COV-2 COVID-19 AMP PRB: CPT | Performed by: INTERNAL MEDICINE

## 2024-11-27 RX ORDER — LIDOCAINE 560 MG/1
1 PATCH PERCUTANEOUS; TOPICAL; TRANSDERMAL DAILY
Status: DISCONTINUED | OUTPATIENT
Start: 2024-11-27 | End: 2024-11-28 | Stop reason: HOSPADM

## 2024-11-27 RX ORDER — LIDOCAINE 560 MG/1
2 PATCH PERCUTANEOUS; TOPICAL; TRANSDERMAL DAILY
Qty: 30 PATCH | Refills: 3 | Status: SHIPPED | OUTPATIENT
Start: 2024-11-27

## 2024-11-27 RX ORDER — OXYCODONE HYDROCHLORIDE 10 MG/1
10 TABLET ORAL EVERY 6 HOURS PRN
Qty: 20 TABLET | Refills: 0 | Status: SHIPPED | OUTPATIENT
Start: 2024-11-27

## 2024-11-27 RX ADMIN — FLUDROCORTISONE ACETATE 0.05 MG: 0.1 TABLET ORAL at 09:08

## 2024-11-27 RX ADMIN — HYDROMORPHONE HYDROCHLORIDE 0.4 MG: 0.5 INJECTION, SOLUTION INTRAMUSCULAR; INTRAVENOUS; SUBCUTANEOUS at 15:48

## 2024-11-27 RX ADMIN — MICONAZOLE NITRATE 1 APPLICATION: 20 CREAM TOPICAL at 15:49

## 2024-11-27 RX ADMIN — ATORVASTATIN CALCIUM 20 MG: 20 TABLET, FILM COATED ORAL at 20:23

## 2024-11-27 RX ADMIN — Medication 1000 UNITS: at 09:07

## 2024-11-27 RX ADMIN — DULOXETINE HYDROCHLORIDE 40 MG: 20 CAPSULE, DELAYED RELEASE ORAL at 09:07

## 2024-11-27 RX ADMIN — MICONAZOLE NITRATE 1 APPLICATION: 20 CREAM TOPICAL at 06:06

## 2024-11-27 RX ADMIN — CYANOCOBALAMIN TAB 500 MCG 250 MCG: 500 TAB at 09:07

## 2024-11-27 RX ADMIN — Medication 1 CAPSULE: at 09:08

## 2024-11-27 RX ADMIN — ENOXAPARIN SODIUM 40 MG: 40 INJECTION SUBCUTANEOUS at 12:39

## 2024-11-27 RX ADMIN — LIDOCAINE 4% 1 PATCH: 40 PATCH TOPICAL at 12:40

## 2024-11-27 RX ADMIN — CEFEPIME HYDROCHLORIDE 2 G: 2 INJECTION, SOLUTION INTRAVENOUS at 16:17

## 2024-11-27 RX ADMIN — OXYCODONE HYDROCHLORIDE 10 MG: 10 TABLET ORAL at 20:22

## 2024-11-27 RX ADMIN — HYDROMORPHONE HYDROCHLORIDE 0.4 MG: 0.5 INJECTION, SOLUTION INTRAMUSCULAR; INTRAVENOUS; SUBCUTANEOUS at 03:47

## 2024-11-27 RX ADMIN — Medication 2 L/MIN: at 07:43

## 2024-11-27 RX ADMIN — OXYCODONE HYDROCHLORIDE AND ACETAMINOPHEN 500 MG: 500 TABLET ORAL at 09:06

## 2024-11-27 RX ADMIN — AZITHROMYCIN DIHYDRATE 500 MG: 500 TABLET ORAL at 09:06

## 2024-11-27 RX ADMIN — ASPIRIN 81 MG: 81 TABLET, COATED ORAL at 09:06

## 2024-11-27 RX ADMIN — LIDOCAINE 4% 1 PATCH: 40 PATCH TOPICAL at 09:09

## 2024-11-27 RX ADMIN — CEFEPIME HYDROCHLORIDE 2 G: 2 INJECTION, SOLUTION INTRAVENOUS at 09:00

## 2024-11-27 RX ADMIN — OXYCODONE 5 MG: 5 TABLET ORAL at 12:39

## 2024-11-27 RX ADMIN — POTASSIUM CHLORIDE 20 MEQ: 1500 TABLET, EXTENDED RELEASE ORAL at 09:08

## 2024-11-27 RX ADMIN — CEFEPIME HYDROCHLORIDE 2 G: 2 INJECTION, SOLUTION INTRAVENOUS at 00:45

## 2024-11-27 RX ADMIN — MICONAZOLE NITRATE 1 APPLICATION: 20 CREAM TOPICAL at 20:23

## 2024-11-27 RX ADMIN — Medication 1250 MG: at 09:53

## 2024-11-27 ASSESSMENT — PAIN SCALES - PAIN ASSESSMENT IN ADVANCED DEMENTIA (PAINAD)
CONSOLABILITY: NO NEED TO CONSOLE
CONSOLABILITY: NO NEED TO CONSOLE
CONSOLABILITY: UNABLE TO CONSOLE, DISTRACT OR REASSURE
BREATHING: NORMAL
CONSOLABILITY: NO NEED TO CONSOLE
TOTALSCORE: MEDICATION (SEE MAR)
NEGVOCALIZATION: OCCASIONAL MOAN/GROAN, LOW SPEECH, NEGATIVE/DISAPPROVING QUALITY
BREATHING: OCCASIONAL LABORED BREATHING, SHORT PERIOD OF HYPERVENTILATION
NEGVOCALIZATION: OCCASIONAL MOAN/GROAN, LOW SPEECH, NEGATIVE/DISAPPROVING QUALITY
TOTALSCORE: MEDICATION (SEE MAR)
TOTALSCORE: 3
NEGVOCALIZATION: OCCASIONAL MOAN/GROAN, LOW SPEECH, NEGATIVE/DISAPPROVING QUALITY
TOTALSCORE: MEDICATION (SEE MAR)
BREATHING: NORMAL
BODYLANGUAGE: TENSE, DISTRESSED PACING, FIDGETING
TOTALSCORE: MEDICATION (SEE MAR)
TOTALSCORE: 4
TOTALSCORE: 3
TOTALSCORE: 7
FACIALEXPRESSION: SAD, FRIGHTENED, FROWN
BODYLANGUAGE: RELAXED
BREATHING: NORMAL
FACIALEXPRESSION: FACIAL GRIMACING
BREATHING: NORMAL
BODYLANGUAGE: RELAXED
TOTALSCORE: 1
NEGVOCALIZATION: REPEATED TROUBLED CALLING OUT, LOUD MOANING/GROANING, CRYING
BODYLANGUAGE: TENSE, DISTRESSED PACING, FIDGETING
NEGVOCALIZATION: OCCASIONAL MOAN/GROAN, LOW SPEECH, NEGATIVE/DISAPPROVING QUALITY
FACIALEXPRESSION: FACIAL GRIMACING
CONSOLABILITY: NO NEED TO CONSOLE
FACIALEXPRESSION: FACIAL GRIMACING
BODYLANGUAGE: RELAXED
FACIALEXPRESSION: SMILING OR INEXPRESSIVE

## 2024-11-27 ASSESSMENT — COGNITIVE AND FUNCTIONAL STATUS - GENERAL
MOVING TO AND FROM BED TO CHAIR: A LOT
HELP NEEDED FOR BATHING: TOTAL
CLIMB 3 TO 5 STEPS WITH RAILING: TOTAL
TURNING FROM BACK TO SIDE WHILE IN FLAT BAD: A LOT
DRESSING REGULAR UPPER BODY CLOTHING: TOTAL
STANDING UP FROM CHAIR USING ARMS: TOTAL
MOBILITY SCORE: 9
DRESSING REGULAR LOWER BODY CLOTHING: TOTAL
PERSONAL GROOMING: A LOT
WALKING IN HOSPITAL ROOM: TOTAL
MOVING FROM LYING ON BACK TO SITTING ON SIDE OF FLAT BED WITH BEDRAILS: A LOT
HELP NEEDED FOR BATHING: TOTAL
CLIMB 3 TO 5 STEPS WITH RAILING: TOTAL
TURNING FROM BACK TO SIDE WHILE IN FLAT BAD: A LOT
MOBILITY SCORE: 9
DAILY ACTIVITIY SCORE: 8
TOILETING: TOTAL
PERSONAL GROOMING: A LOT
MOVING TO AND FROM BED TO CHAIR: A LOT
STANDING UP FROM CHAIR USING ARMS: TOTAL
TOILETING: TOTAL
MOVING FROM LYING ON BACK TO SITTING ON SIDE OF FLAT BED WITH BEDRAILS: A LOT
DRESSING REGULAR LOWER BODY CLOTHING: TOTAL
DRESSING REGULAR UPPER BODY CLOTHING: TOTAL
DAILY ACTIVITIY SCORE: 8
EATING MEALS: A LOT
EATING MEALS: A LOT
WALKING IN HOSPITAL ROOM: TOTAL

## 2024-11-27 ASSESSMENT — PAIN SCALES - GENERAL
PAINLEVEL_OUTOF10: 5 - MODERATE PAIN
PAINLEVEL_OUTOF10: 0 - NO PAIN

## 2024-11-27 ASSESSMENT — PAIN - FUNCTIONAL ASSESSMENT
PAIN_FUNCTIONAL_ASSESSMENT: PAINAD (PAIN ASSESSMENT IN ADVANCED DEMENTIA SCALE)

## 2024-11-27 ASSESSMENT — PAIN DESCRIPTION - ORIENTATION
ORIENTATION: LEFT
ORIENTATION: LEFT

## 2024-11-27 ASSESSMENT — PAIN SCALES - WONG BAKER: WONGBAKER_NUMERICALRESPONSE: HURTS WHOLE LOT

## 2024-11-27 ASSESSMENT — PAIN DESCRIPTION - LOCATION
LOCATION: GENERALIZED
LOCATION: GENERALIZED

## 2024-11-27 NOTE — CONSULTS
Vancomycin Dosing by Pharmacy- FOLLOW UP    Chao Thao is a 84 y.o. year old male who Pharmacy has been consulted for vancomycin dosing for pneumonia. Based on the patient's indication and renal status this patient is being dosed based on a goal AUC of 400-600.     Renal function is currently stable.    Current vancomycin dose: 1250 mg given every 24 hours    Estimated vancomycin AUC on current dose: 538 mg/L.hr     Visit Vitals  /80 (BP Location: Left arm, Patient Position: Lying) Comment: pt is off floor   Pulse 80   Temp 36.5 °C (97.7 °F) (Temporal)   Resp 18        Lab Results   Component Value Date    CREATININE 0.63 2024    CREATININE 0.79 2024    CREATININE 0.78 2024    CREATININE 0.78 2024        Patient weight is as follows:   Vitals:    24   Weight: 71.3 kg (157 lb 3 oz)       Cultures:  No results found for the encounter in last 14 days.       I/O last 3 completed shifts:  In: 1650 (23.1 mL/kg) [P.O.:700; I.V.:50 (0.7 mL/kg); IV Piggyback:900]  Out: 1500 (21 mL/kg) [Urine:1500 (0.6 mL/kg/hr)]  Weight: 71.3 kg   I/O during current shift:  I/O this shift:  In: 250 [P.O.:200; IV Piggyback:50]  Out: -     Temp (24hrs), Av.7 °C (98 °F), Min:36.5 °C (97.7 °F), Max:36.9 °C (98.4 °F)      Assessment/Plan    Within goal AUC range. Continue current vancomycin regimen.    This dosing regimen is predicted by Insight[employer] to result in the following pharmacokinetic parameters:    Regimen: 1250 mg IV every 24 hours.  Start time: 09:53 on 2024  Exposure target: AUC24 (range)400-600 mg/L.hr   [id number]: 538 mg/L.hr  AUC24,ss: 557 mg/L.hr  Probability of AUC24 > 400: 100 %  Ctrough,ss: 15.6 mg/L  Probability of Ctrough,ss > 20: 10 %      The next level will be obtained on  at 0500. May be obtained sooner if clinically indicated.   Will continue to monitor renal function daily while on vancomycin and order serum creatinine at least every 48 hours if not already  ordered.  Follow for continued vancomycin needs, clinical response, and signs/symptoms of toxicity.     Elie Dove, PharmD, BCPS, BCCCP  Clinical Pharmacy Specialist- Internal Medicine  Available via EPIC Secure Chat  Phone: [phone]

## 2024-11-27 NOTE — DOCUMENTATION CLARIFICATION NOTE
"    PATIENT:               KRISHNA CHEN  ACCT #:                  [id number]  MRN:                       [id number]  :                       1940  ADMIT DATE:       2024 2:45 PM  DISCH DATE:  RESPONDING PROVIDER #:        [id number]          PROVIDER RESPONSE TEXT:    I agree with dietician diagnosis of \"Severe malnutrition related to acute disease or injury\" on 24.    CDI QUERY TEXT:    Clarification    Instruction:    Based on your assessment of the patient and the clinical information, please provide the requested documentation by clicking on the appropriate radio button and enter any additional information if prompted.    Question: Please further clarify this patient nutritional status as    When answering this query, please exercise your independent professional judgment. The fact that a question is being asked, does not imply that any particular answer is desired or expected.    The patient's clinical indicators include:  Clinical Information:  84 year old male, BMI 24.54, being treated for a fractured nasal bone and fractured ribs following a fall; with a recent history of weight loss.    Clinical Indicators:  Per  Nutrition Consult Note: \"Severe malnutrition related to acute disease or injury  As Evidenced by: significant 7.5 percent weight loss/1 month; 17.5 percent/5 months; loss of muscle and adipose stores...    Weight History / Percent Weight Change: 71.3 kg (24) down 7.5 percent/1 month; 77.1 kg (10/20/24); 78.5 kg (24); 86.2 kg (24) down 17 percent/5 months; 88.5 kg (24) down 19.4 percent/10 months...    Subcutaneous Fat Loss  Orbital Fat Pads: Mild-Moderate (slight dark circles and slight hollowing)  Buccal Fat Pads: Mild-Moderate (flat cheeks, minimal bounce)...    Muscle Wasting  Temporalis: Mild-Moderate (slight depression)  Quadriceps: Mild-Moderate (mild depression on inner and outer thigh)  Gastrocnemius: Mild-Moderate (not well developed " "muscle)...    Edema  Edema: +2 mild, +3 moderate  Edema Location: +2 RUE; +3 LUE  Physical Findings (Nutrition Deficiency/Toxicity)  Skin: Positive (wounds: L foot dorsal PI; R and L pretibial wounds; L buttocks, nose, L elbow)\"    Treatment:  Per 11/27 Nutrition Consult Note: \"Recommendations:  1. Encourage oral intakes, feed pt as needed  2. Weights: 2-3 x/week  3. RFP, Mg daily; replete lytes prn...    Nutrition Monitoring and Evaluation  Food/Nutrient Related History Monitoring  Monitoring and Evaluation Plan: Amount of food  Criteria: Pt will consume 50-75 percent of meals and ONS provided\"    Latest Diet Order: \"Adult diet Regular\" ordered 11/23/24.    Risk Factors: 84 year old male with a recent history of weight loss.  Options provided:  -- I agree with dietician diagnosis of \"Severe malnutrition related to acute disease or injury\" on 11/27/24.  -- Other - I will add my own diagnosis  -- Refer to Clinical Documentation Reviewer    Query created by: Melanie Flynn on 11/27/2024 1:13 PM      Electronically signed by:  EDILSON DOUGHERTY MD 11/27/2024 1:58 PM          "

## 2024-11-27 NOTE — PROGRESS NOTES
11/27/24 1402   Discharge Planning   Who is requesting discharge planning? Provider   Home or Post Acute Services In home services   Type of Home Care Services DME or oxygen;Home health aide;Home OT;Home PT;Home nursing visits   Has discharge transport been arranged? Yes   What day is the transport expected? 11/28/24   What time is the transport expected? 1330     SW met with pt's spouse at bedside.  Plan is to discharge pt home on 11/28 with [facility].  Spouse in agreement; would like for pt to have transportation home between [id number]; home meds, O2 if needed and pain to be controlled. Spouse in agreement for [facility].  SW updated physician & RN.  Transport arranged and confirmed.  SW will follow as needed.

## 2024-11-27 NOTE — PROGRESS NOTES
"  Subjective    Overnight Events:   - NAEON  - MRSA nasal swab positive  - pt resting in bed, legs contracted, moaning in pain when attempting to straighten  - Pt intermittently following commands, was able to verbalize \"good morning\" and then would not answer. denies pain or difficulty breathing  - A&O x0    ROS: 12 points review of system unable to be performed as pt not answering questions    Objective    Vitals  Visit Vitals  /78 (BP Location: Right arm, Patient Position: Lying)   Pulse 89   Temp 36.7 °C (98.1 °F) (Temporal)   Resp 16   Ht 1.676 m (5' 6\")   Wt 71.3 kg (157 lb 3 oz)   SpO2 94%   BMI 25.37 kg/m²   Smoking Status Former   BSA 1.82 m²       Physical Exam    --Vital signs reviewed in nursing triage note, EMR flow sheets, and at patient's bedside  Constitutional: NAD, rotated in bed, sitting with legs contracted  HEENT: Ecchymosis on nasal bridge.   CV: RRR, No M/R/G  PULM: Poor air exchange in R lungs, clear to auscultation. ABDOMEN: Soft, NT/ND. No TTP  Genital: No notable swelling or erythema in scrotum.  SKIN: Normal Color, Warm, Dry. BL LE with xerderma  EXTREMITIES: No Pedal Edema, not moving extremities.  NEURO: A&O x O, intermittently following commands  PSYCH: Intermittently following commands, denies pain     IOs    Intake/Output Summary (Last 24 hours) at 11/27/2024 0651  Last data filed at 11/27/2024 0600  Gross per 24 hour   Intake 1490 ml   Output 825 ml   Net 665 ml       Labs:   Results from last 72 hours   Lab Units 11/26/24  0725 11/25/24  0736   SODIUM mmol/L 133* 133*   POTASSIUM mmol/L 4.1 3.9   CHLORIDE mmol/L 102 101   CO2 mmol/L 22 23   BUN mg/dL 20 22   CREATININE mg/dL 0.79 0.78   GLUCOSE mg/dL 112* 96   CALCIUM mg/dL 8.0* 7.9*   ANION GAP mmol/L 13 13   EGFR mL/min/1.73m*2 89 89   PHOSPHORUS mg/dL 3.2 3.5      Results from last 72 hours   Lab Units 11/26/24  0726 11/25/24  0736 11/24/24  1702   WBC AUTO x10*3/uL 14.4* 12.2*  --    HEMOGLOBIN g/dL 8.6* 8.0* 8.1* " "  HEMATOCRIT % 27.4* 24.8* 25.3*   PLATELETS AUTO x10*3/uL 414 328  --       Lab Results   Component Value Date    CALCIUM 8.0 (L) 11/26/2024    PHOS 3.2 11/26/2024      Lab Results   Component Value Date    CRP 11.71 (H) 11/23/2024      [unfilled]     Micro/ID:   No results found for the last 90 days.                   No lab exists for component: \"AGALPCRNB\"   .ID  Lab Results   Component Value Date    URINECULTURE No significant growth 11/23/2024    BLOODCULT Loaded on Instrument - Culture in progress 11/26/2024    BLOODCULT Loaded on Instrument - Culture in progress 11/26/2024       Images  Electrocardiogram, 12-lead PRN ACS symptoms  Atrial fibrillation  Nonspecific T wave abnormality  Abnormal ECG  When compared with ECG of 22-NOV-2024 14:50, (unconfirmed)  No significant change was found  See ED provider note for full interpretation and clinical correlation  Confirmed by Niyah Duval ([id number]) on 11/26/2024 2:36:32 PM  XR chest 1 view  Narrative: Interpreted By:  Fidelina Horowitz,   STUDY:  XR CHEST 1 VIEW;  11/26/2024 6:19 am      INDICATION:  Signs/Symptoms:Increased oxygen requirement.      COMPARISON:  07/28/2024      ACCESSION NUMBER(S):  [id number]      ORDERING CLINICIAN:  ESTUARDO GONZALEZ      FINDINGS:  The patient is rotated. The cardiac silhouette may be normal in size.      There is bilateral bases lower infiltration. There appears to be loss  of volume at the right base.      Mediastinum and bones as visualized are unremarkable.      COMPARISON OF FINDING:  The infiltrates may be somewhat improved.      Impression: Somewhat limited exam. Parenchymal infiltration. Probable loss of  volume at the right base.      MACRO:  none      Signed by: Fidelina Horowitz 11/26/2024 7:47 AM  Dictation workstation:   [id number]      Meds  Scheduled medications  ascorbic acid, 500 mg, oral, Daily  aspirin, 81 mg, oral, Daily  atorvastatin, 20 mg, oral, Nightly  azithromycin, 500 mg, oral, q24h JIGNESH  cefepime, 2 g, " intravenous, q8h  cholecalciferol, 1,000 Units, oral, Daily  cyanocobalamin, 250 mcg, oral, Daily  DULoxetine, 40 mg, oral, Daily  enoxaparin, 40 mg, subcutaneous, q24h  fludrocortisone, 0.05 mg, oral, Daily  lactobacillus acidophilus, 1 capsule, oral, Daily  lidocaine, 1 patch, transdermal, Daily  [Held by provider] metoprolol succinate XL, 25 mg, oral, Daily  miconazole, 1 Application, Topical, TID  oxygen, , inhalation, Continuous - Inhalation  potassium chloride CR, 20 mEq, oral, Daily  vancomycin, 1,250 mg, intravenous, q24h      Continuous medications     PRN medications  PRN medications: acetaminophen, HYDROmorphone, ondansetron ODT **OR** ondansetron, oxyCODONE, oxyCODONE, vancomycin, zinc oxide     Assessment and Plan    Chao Thao is a 84 y.o. male w/ PMH of HTN, HLD, MDD, CAD, frequent falls, dementia, A-fib (not on AC), HFpEF (EF 50% 02/2024) who is admitted for multiple rib fx, nasal bone fx s/p mechanical fall.      Acute problems:  # mechanical fall  # L rib fx  # nasal bone fx   # Hx of recurrent falls/ fracutres  - CT maxillofacial bones: Comminuted nasal bone fracture with deviation  - CT chest: There are new fractures of the 7th and 9th left ribs. There are old left rib fractures. Lower left ribs are not entirely included on the examination.  Plan:  -Ortho consulted, no acute intervention planned  -Trauma consulted, no acute intervention recommended  - OMFS consulted, pt will need OMFS follow up in 7-10 days for nasal fracture repair  -Pain control: Oxycodone 5 mg PRN for mod pain, Oxycodone 10 mg PRN for severe pain and dilaudid 0.5 mg for breakthrough pain. Lidocaine patch overlying fractured ribs.  -PT/OT consulted, unable to participate d/t lack of following commands  -incentive spirometry  -palliative care no longer following    #hypoxia  #pneumonia vs atelectasis?  - pt hypoxic to 88% overnight  - fever to 100.6 which improved with tylenol  - CXR 11/26- Somewhat limited exam. Parenchymal  infiltration. Probable loss of  volume at the right base.  - wbc 12.2->14.4  - MRSA + on nasal swab  - Blood culture no growth after 1 day  Plan:  - sputum culture pending  - strep and legionella pending  - change tylenol to prn to not mask fever  - empiric antibiotics: azithromycin, cefepime, vancomycin  - ID following, appreciate recs    #Leg pain  - patient sitting with legs contracted, moans when attempt to straighten them  - Pt was previously ambulating independently prior to admission  Plan:  - attempt to straighten if able  - consider hip imaging, not done in the ED     #acute metabolic encephalopathy  #hard of hearing   -A&O x0 11/23-today, unclear if due to [place]   - prior UA w/ + leuk esterase but no obvious infection  - Ua largely unremarkable   - CT head w/ contrast - no acute process/mass  - Pt receiving frequent opioid pain medications but remains in pain, no pinpoint pupils on exam.  Plan:  - will continue to monitor, may be a component of hypoactive delirium  - AM cortisol pending     #left second toe tenderness  -due to some concern for infectious process (elevated wbc, crp) with hx L second toe ulcer and bronchiectasis  - CT foot w/o signs of osteomyelitis.  Plan  -ID consulted, no indication for abx at this time    # Anemia, appears chronic  - Bl hgb ~10, now 8.0  - No obvious signs of bleeding  Plan:  - CTM, repeat CBC in AM  - use pediatric tubing to reduce blood loss  - Transfuse Hgb <7     Chronic problems:  # afib: hold metoprolol succinate 25 mg bid due to hypotension, not on AC due to recurrent falls and high bleeding risk.   #HFpEF: 2/2024 echo 50% EF, continue statin, holding metoprolol given hypotension  #HLD continue statin  # CAD s/p PTCA: continue ASA  # MDD: continue duloxetine  # Chronic hypotension: continue fludrocortisone 0.05 mg daily     F: PO intake & IVF PRN   E: Replete as needed   N: Regular  GI ppx: None  DVT ppx: SCD, lovenox  Antibiotics: vanc, cefepime,  azithromycin  Tubes/Lines/Drains: PIV     Code Status: Full Code   Emergency Contact: Extended Emergency Contact Information  Primary Emergency Contact: Karissa Winn Phone: [phone]  Relation: Spouse      Disposition: 84 y.o.male admitted for multiple rib fx, nasal bone fx s/p mechanical fall, now with altered mentation. Estimated length of stay >48 hours.    Best Serrato MD PGY-1

## 2024-11-27 NOTE — CONSULTS
Patient has Malnutrition Diagnosis: Yes  Diagnosis Status: New  Malnutrition Diagnosis: Severe malnutrition related to acute disease or injury  As Evidenced by: significant 7.5% weight loss/1 month; 17.5%/5 months; loss of muscle and adipose stores  Additional Assessment Information: ONS added for additional kcal and protein  Nutrition Assessment    Reason for Assessment: Provider consult order    Patient is a 84 y.o. male presenting with: Pneumonia of left lower lobe due to infectious organism,   Pt with multiple rib fx, facial fractures s/p mechanical fall, altered mental status (A&O x 0). Pt not able to participate with PT/OT 2/2 not able to follow commands. Noted contracted LE; MRSA nasal swab +. Discharge planning in progress.    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    History of alcohol abuse    Chronic shortness of breath 06/28/2024    Drug abuse (Multi)     in remission    Edema, unspecified 11/26/2019    Dependent edema    Encounter for immunization 04/08/2016    Need for Zostavax administration    Pain in left knee 08/08/2018    Left knee pain    Personal history of other diseases of the nervous system and sense organs 09/18/2019    History of cataract    Personal history of other drug therapy 04/08/2016    History of pneumococcal vaccination    Tinea pedis 10/10/2018    Tinea pedis of both feet    Venous stasis ulcer of right calf (Multi) 06/28/2024    Vitiligo 06/28/2024      Past Surgical History:   Procedure Laterality Date    CARDIAC CATHETERIZATION  05/14/2018    Cardiac Cath Procedure Summary    INNER EAR SURGERY  01/06/2016    Inner Ear Surgery    OTHER SURGICAL HISTORY  03/16/2020    Cataract surgery      Nutrition History:  Food and Nutrient History: Pt seen, nurse feeding pt breakfast at time of visit. Pt is non-verbal, total feed. Wife has requested for pt to not receive red meat.  Energy Intake: Fair 50-75 %  Allergies   Allergen Reactions     "Gabapentin Other    Penicillin Unknown      GI Symptoms: None     Oral Problems: None          Anthropometrics:  Height: 167.6 cm (5' 6\")   Weight: 71.3 kg (157 lb 3 oz)   BMI (Calculated): 25.38  IBW/kg (Dietitian Calculated): 64.5 kg  Percent of IBW: 111 %       Weight History:     Daily Weight  11/22/24 : 71.3 kg (157 lb 3 oz)  10/20/24 : 77.1 kg (170 lb)  09/30/24 : 78.5 kg (173 lb)  07/28/24 : 88 kg (194 lb 0.1 oz)  07/06/24 : 83.5 kg (184 lb)  07/02/24 : 82.6 kg (182 lb 0.9 oz)  06/12/24 : 86.4 kg (190 lb 7.6 oz)  02/02/24 : 84.5 kg (186 lb 4.6 oz)  01/09/24 : 88.5 kg (195 lb)  07/05/22 : 90.5 kg (199 lb 8 oz)    Weight         11/22/2024  1452 11/22/2024 2022          Weight: 73.2 kg (161 lb 6 oz) 71.3 kg (157 lb 3 oz)              Weight Change %:  Weight History / % Weight Change: 71.3 kg (11/22/24) down 7.5%/1 month; 77.1 kg (10/20/24); 78.5 kg (9/30/24); 86.2 kg (6/1/24) down 17%/5 months; 88.5 kg (1/9/24) down 19.4%/10 months  Significant Weight Loss: Yes (down 7.5%/1 month, 17.5%/5 months)          Nutrition Focused Physical Exam Findings:    Subcutaneous Fat Loss  Orbital Fat Pads: Mild-Moderate (slight dark circles and slight hollowing)  Buccal Fat Pads: Mild-Moderate (flat cheeks, minimal bounce)  Muscle Wasting  Temporalis: Mild-Moderate (slight depression)  Quadriceps: Mild-Moderate (mild depression on inner and outer thigh)  Gastrocnemius: Mild-Moderate (not well developed muscle)  Edema  Edema: +2 mild, +3 moderate  Edema Location: +2 RUE; +3 LUE  Physical Findings (Nutrition Deficiency/Toxicity)  Skin: Positive (wounds: L foot dorsal PI; R and L pretibial wounds; L buttocks, nose, L elbow)    Nutrition Significant Labs:    Results from last 7 days   Lab Units 11/27/24  0705 11/26/24  0725 11/25/24  0736   GLUCOSE mg/dL 99 112* 96   SODIUM mmol/L 131* 133* 133*   POTASSIUM mmol/L 4.1 4.1 3.9   CHLORIDE mmol/L 101 102 101   CO2 mmol/L 24 22 23   BUN mg/dL 19 20 22   CREATININE mg/dL 0.63 0.79 " 0.78   EGFR mL/min/1.73m*2 >90 89 89   CALCIUM mg/dL 7.7* 8.0* 7.9*   PHOSPHORUS mg/dL 2.6 3.2 3.5   MAGNESIUM mg/dL 1.64 1.85 1.95     Lab Results   Component Value Date    HGBA1C 5.9 03/16/2020         Lab Results   Component Value Date    ALBUMIN 2.2 (L) 11/27/2024      Lab Results   Component Value Date    CRP 11.71 (H) 11/23/2024       Nutrition Specific Medications:   Scheduled medications:  ascorbic acid, 500 mg, oral, Daily  aspirin, 81 mg, oral, Daily  atorvastatin, 20 mg, oral, Nightly  azithromycin, 500 mg, oral, q24h JIGNESH  cefepime, 2 g, intravenous, q8h  cholecalciferol, 1,000 Units, oral, Daily  cyanocobalamin, 250 mcg, oral, Daily  DULoxetine, 40 mg, oral, Daily  enoxaparin, 40 mg, subcutaneous, q24h  fludrocortisone, 0.05 mg, oral, Daily  lactobacillus acidophilus, 1 capsule, oral, Daily  lidocaine, 1 patch, transdermal, Daily  [Held by provider] metoprolol succinate XL, 25 mg, oral, Daily  miconazole, 1 Application, Topical, TID  potassium chloride CR, 20 mEq, oral, Daily  vancomycin, 1,250 mg, intravenous, q24h      Continuous medications:     PRN medications:  PRN medications: acetaminophen, HYDROmorphone, ondansetron ODT **OR** ondansetron, oxyCODONE, oxyCODONE, oxygen, vancomycin, zinc oxide     Nursing Data Per flowsheet:      Gastrointestinal  Gastrointestinal (WDL): Within Defined Limits  Feeding assistance level:      Intake/Output Summary (Last 24 hours) at 11/27/2024 1122  Last data filed at 11/27/2024 0951  Gross per 24 hour   Intake 870 ml   Output 825 ml   Net 45 ml      0-10 (Numeric) Pain Score: 4  PAINAD Score:  [0-7]    Dietary Orders (From admission, onward)       Start     Ordered    11/27/24 1043  Oral nutritional supplements  Until discontinued        Question Answer Comment   Deliver with Breakfast    Deliver with Dinner    Select supplement: Magic Cup        11/27/24 1042    11/27/24 1043  Oral nutritional supplements  Until discontinued        Question Answer Comment   Deliver  with Lunch    Select supplement: Ensure Plus High Protein        11/27/24 1042    11/23/24 1747  Adult diet Regular  Diet effective now        Comments: Wife would not like red meat in diet for pt   Question:  Diet type  Answer:  Regular    11/23/24 1746    11/22/24 2038  May Participate in Room Service  ( ROOM SERVICE MAY PARTICIPATE)  Once        Question:  .  Answer:  Yes    11/22/24 2037                     Estimated Needs:   Total Energy Estimated Needs (kCal): 1782 kCal  Method for Estimating Needs: 25 kcal/kg  Total Protein Estimated Needs (g): 85 g  Method for Estimating Needs: 1.2 g/kg  Total Fluid Estimated Needs (mL):  (1 ml/kcal)          Nutrition Diagnosis   Malnutrition Diagnosis  Patient has Malnutrition Diagnosis: Yes  Diagnosis Status: New  Malnutrition Diagnosis: Severe malnutrition related to acute disease or injury  As Evidenced by: significant 7.5% weight loss/1 month; 17.5%/5 months; loss of muscle and adipose stores  Additional Assessment Information: ONS added for additional kcal and protein            Nutrition Interventions/Recommendations   Nutrition Prescription:  diet, fluids    Nutrition Interventions:   Interventions: Medical food supplement  Goal: Magic cup 2 x/day= 580 kcal, 18 g protein; Ensure Plus HP daily= 350 kcal, 20 g protein      Coordination of Care: SHORTY Domingo  Nutrition Education:   N/A  Recommendations:  Encourage oral intakes, feed pt as needed  Weights: 2-3 x/week  RFP, Mg daily; replete lytes prn          Nutrition Monitoring and Evaluation   Food/Nutrient Related History Monitoring  Monitoring and Evaluation Plan: Amount of food  Criteria: Pt will consume 50-75% of meals and ONS provided    Body Composition/Growth/Weight History  Monitoring and Evaluation Plan: Weight  Weight: Measured weight  Criteria: Monitor    Biochemical Data, Medical Tests and Procedures  Monitoring and Evaluation Plan: Electrolyte/renal panel, Glucose/endocrine profile  Criteria:  Monitor      Time Spent (min): 60 minutes

## 2024-11-27 NOTE — NURSING NOTE
1930: assumed pt care    11-26-24/0550: pt had a sepsis alert, fever 38.1,  with metoprolol XL on hold still, 88% on RA, placed pt on 3 L NC, oxygen 92% and gave 975 mg oral tylenol, let residents Dr. York & Dr. Alexis know. Let RT know pt needs oxygen.   
1930: assumed pt care    2218: results from nares sample came back positive for MRSA and MAPCR, let residents know    11-27-24/0340: changed pt's drsgs as ordered to right shin, left toe, left elbow. Removed old drsgs, cleansed with normal saline, pat dry, applied xerofom then covered with DSDs. Pt tolerated procedure yelling at times.   
Discussed viral illness with parents. Return precautions discussed. Will follow up with PCP. Shira MANRIQUEZ

## 2024-11-27 NOTE — PROGRESS NOTES
11/27/24 1040   Discharge Planning   Living Arrangements Spouse/significant other  (s/o Karissa)   Support Systems Spouse/significant other;Home care staff   Assistance Needed A&Ox2-3, assistance with ADLs, utilizes a wheeled walker, active with [facility](SN/PT/OT) PCP Dr. Babita Blackburn   Type of Residence Private residence   Number of Stairs to Enter Residence 3   Number of Stairs Within Residence 17   Do you have animals or pets at home? Yes   Type of Animals or Pets dog   Home or Post Acute Services Post acute facilities (Rehab/SNF/etc)   Type of Post Acute Facility Services Skilled nursing   Expected Discharge Disposition SNF  (PT/OT eval pending (will need reconsult as patient imrpoves), anticipate pt will need SNF. Preference received for [facility] (lynn Cerrato). Referral sent, awaiting response. Will need precert.)   Does the patient need discharge transport arranged? Yes   RoundTrip coordination needed? Yes   Has discharge transport been arranged? No          11/27/24 1159   Discharge Planning   Expected Discharge Disposition Home Health  (patient to resume [facility] services upon DC)   Does the patient need discharge transport arranged? Yes   RoundTrip coordination needed? Yes   Has discharge transport been arranged? No

## 2024-11-27 NOTE — PROGRESS NOTES
Chao Thao is a 84 y.o. male on day 5 of admission presenting with Pneumonia of left lower lobe due to infectious organism.    Subjective   Interval History: no fever, the hx is limited, had a low grade temp       Review of Systems    Objective   Range of Vitals (last 24 hours)  Heart Rate:  [74-89]   Temp:  [36.5 °C (97.7 °F)-37.3 °C (99.1 °F)]   Resp:  [16-18]   BP: (101-118)/(63-80)   SpO2:  [94 %-98 %]   Daily Weight  11/22/24 : 71.3 kg (157 lb 3 oz)    Body mass index is 25.37 kg/m².    Physical Exam  Constitutional:       Appearance: Normal appearance.   HENT:      Head: Normocephalic and atraumatic.      Mouth/Throat:      Mouth: Mucous membranes are moist.      Pharynx: Oropharynx is clear.   Eyes:      Pupils: Pupils are equal, round, and reactive to light.   Cardiovascular:      Rate and Rhythm: Normal rate and regular rhythm.      Heart sounds: Normal heart sounds.   Pulmonary:      Effort: Pulmonary effort is normal.      Breath sounds: Normal breath sounds.   Abdominal:      General: Abdomen is flat. Bowel sounds are normal.      Palpations: Abdomen is soft.   Musculoskeletal:      Cervical back: Normal range of motion.      Comments: Lt 2nd toe dries eschar   Neurological:      Mental Status: He is alert.         Antibiotics  azithromycin - 500 mg  cefepime - 2 gram/50 mL  vancomycin - 1.25 gram/250 mL    Relevant Results  Labs  Results from last 72 hours   Lab Units 11/27/24  0705 11/26/24 0726 11/25/24  0736   WBC AUTO x10*3/uL 13.8* 14.4* 12.2*   HEMOGLOBIN g/dL 8.4* 8.6* 8.0*   HEMATOCRIT % 26.9* 27.4* 24.8*   PLATELETS AUTO x10*3/uL 389 414 328     Results from last 72 hours   Lab Units 11/27/24 0705 11/26/24 0725 11/25/24  0736   SODIUM mmol/L 131* 133* 133*   POTASSIUM mmol/L 4.1 4.1 3.9   CHLORIDE mmol/L 101 102 101   CO2 mmol/L 24 22 23   BUN mg/dL 19 20 22   CREATININE mg/dL 0.63 0.79 0.78   GLUCOSE mg/dL 99 112* 96   CALCIUM mg/dL 7.7* 8.0* 7.9*   ANION GAP mmol/L 10 13 13   EGFR  mL/min/1.73m*2 >90 89 89   PHOSPHORUS mg/dL 2.6 3.2 3.5     Results from last 72 hours   Lab Units 11/27/24  0705 11/26/24  0725 11/25/24  0736   ALK PHOS U/L 108 120 108   BILIRUBIN TOTAL mg/dL 0.3 0.3 0.3   PROTEIN TOTAL g/dL 6.2* 6.2* 5.7*   ALT U/L 11 12 11   AST U/L 19 17 20   ALBUMIN g/dL 2.2* 2.3* 2.2*     Estimated Creatinine Clearance: 81.6 mL/min (by C-G formula based on SCr of 0.63 mg/dL).  C-Reactive Protein   Date Value Ref Range Status   11/23/2024 11.71 (H) <1.00 mg/dL Final   08/12/2024 2.23 (H) <1.00 mg/dL Final   06/30/2024 9.28 (H) <1.00 mg/dL Final     Microbiology  Reviewed  Imaging  Reviewed        Assessment/Plan   Hypotension, likely volume deplesion, BC is negative  Bronchiectasis / rib fracture / atelectasis, procalcitonin 0.24, low grade likely atelectasis, the repeat procalcitonin 0.17  Left 2nd toe wound, does not look infected, CT without bony erosions  Encephalopathy, likely metabolic     Recommendations :  Supportive care  Up in the chair / chest PT  Discussed with the medical team     I spent minutes in the professional and overall care of this patient.      Wale Townsend MD

## 2024-11-28 ENCOUNTER — HOME HEALTH ADMISSION (OUTPATIENT)
Dept: HOME HEALTH SERVICES | Facility: HOME HEALTH | Age: 84
End: 2024-11-28
Payer: MEDICARE

## 2024-11-28 ENCOUNTER — DOCUMENTATION (OUTPATIENT)
Dept: HOME HEALTH SERVICES | Facility: HOME HEALTH | Age: 84
End: 2024-11-28
Payer: MEDICARE

## 2024-11-28 VITALS
BODY MASS INDEX: 25.26 KG/M2 | HEIGHT: 66 IN | RESPIRATION RATE: 18 BRPM | HEART RATE: 90 BPM | SYSTOLIC BLOOD PRESSURE: 126 MMHG | DIASTOLIC BLOOD PRESSURE: 74 MMHG | WEIGHT: 157.19 LBS | TEMPERATURE: 97.7 F | OXYGEN SATURATION: 94 %

## 2024-11-28 LAB
ALBUMIN SERPL BCP-MCNC: 2.1 G/DL (ref 3.4–5)
ALP SERPL-CCNC: 115 U/L (ref 33–136)
ALT SERPL W P-5'-P-CCNC: 12 U/L (ref 10–52)
ANION GAP SERPL CALC-SCNC: 12 MMOL/L (ref 10–20)
AST SERPL W P-5'-P-CCNC: 18 U/L (ref 9–39)
BILIRUB SERPL-MCNC: 0.3 MG/DL (ref 0–1.2)
BUN SERPL-MCNC: 24 MG/DL (ref 6–23)
CALCIUM SERPL-MCNC: 8 MG/DL (ref 8.6–10.3)
CHLORIDE SERPL-SCNC: 100 MMOL/L (ref 98–107)
CO2 SERPL-SCNC: 24 MMOL/L (ref 21–32)
CREAT SERPL-MCNC: 0.71 MG/DL (ref 0.5–1.3)
EGFRCR SERPLBLD CKD-EPI 2021: >90 ML/MIN/1.73M*2
ERYTHROCYTE [DISTWIDTH] IN BLOOD BY AUTOMATED COUNT: 15.4 % (ref 11.5–14.5)
GLUCOSE SERPL-MCNC: 96 MG/DL (ref 74–99)
HCT VFR BLD AUTO: 27.7 % (ref 41–52)
HGB BLD-MCNC: 8.8 G/DL (ref 13.5–17.5)
MAGNESIUM SERPL-MCNC: 1.7 MG/DL (ref 1.6–2.4)
MCH RBC QN AUTO: 29.4 PG (ref 26–34)
MCHC RBC AUTO-ENTMCNC: 31.8 G/DL (ref 32–36)
MCV RBC AUTO: 93 FL (ref 80–100)
NRBC BLD-RTO: 0 /100 WBCS (ref 0–0)
PHOSPHATE SERPL-MCNC: 3 MG/DL (ref 2.5–4.9)
PLATELET # BLD AUTO: 349 X10*3/UL (ref 150–450)
POTASSIUM SERPL-SCNC: 4.3 MMOL/L (ref 3.5–5.3)
PROT SERPL-MCNC: 6 G/DL (ref 6.4–8.2)
RBC # BLD AUTO: 2.99 X10*6/UL (ref 4.5–5.9)
SODIUM SERPL-SCNC: 132 MMOL/L (ref 136–145)
STAPHYLOCOCCUS SPEC CULT: ABNORMAL
WBC # BLD AUTO: 15.3 X10*3/UL (ref 4.4–11.3)

## 2024-11-28 PROCEDURE — 2500000004 HC RX 250 GENERAL PHARMACY W/ HCPCS (ALT 636 FOR OP/ED): Performed by: INTERNAL MEDICINE

## 2024-11-28 PROCEDURE — 94760 N-INVAS EAR/PLS OXIMETRY 1: CPT

## 2024-11-28 PROCEDURE — 99239 HOSP IP/OBS DSCHRG MGMT >30: CPT

## 2024-11-28 PROCEDURE — 85027 COMPLETE CBC AUTOMATED: CPT

## 2024-11-28 PROCEDURE — 80053 COMPREHEN METABOLIC PANEL: CPT

## 2024-11-28 PROCEDURE — 84100 ASSAY OF PHOSPHORUS: CPT

## 2024-11-28 PROCEDURE — 36415 COLL VENOUS BLD VENIPUNCTURE: CPT

## 2024-11-28 PROCEDURE — 2500000001 HC RX 250 WO HCPCS SELF ADMINISTERED DRUGS (ALT 637 FOR MEDICARE OP)

## 2024-11-28 PROCEDURE — 83735 ASSAY OF MAGNESIUM: CPT

## 2024-11-28 PROCEDURE — 2500000004 HC RX 250 GENERAL PHARMACY W/ HCPCS (ALT 636 FOR OP/ED): Mod: JZ

## 2024-11-28 PROCEDURE — 99239 HOSP IP/OBS DSCHRG MGMT >30: CPT | Performed by: STUDENT IN AN ORGANIZED HEALTH CARE EDUCATION/TRAINING PROGRAM

## 2024-11-28 PROCEDURE — 2500000005 HC RX 250 GENERAL PHARMACY W/O HCPCS

## 2024-11-28 RX ADMIN — CYANOCOBALAMIN TAB 500 MCG 250 MCG: 500 TAB at 10:15

## 2024-11-28 RX ADMIN — Medication 1000 UNITS: at 10:15

## 2024-11-28 RX ADMIN — AZITHROMYCIN DIHYDRATE 500 MG: 500 TABLET ORAL at 10:15

## 2024-11-28 RX ADMIN — Medication 1250 MG: at 11:58

## 2024-11-28 RX ADMIN — DULOXETINE HYDROCHLORIDE 40 MG: 20 CAPSULE, DELAYED RELEASE ORAL at 10:17

## 2024-11-28 RX ADMIN — CEFEPIME HYDROCHLORIDE 2 G: 2 INJECTION, SOLUTION INTRAVENOUS at 10:16

## 2024-11-28 RX ADMIN — OXYCODONE 5 MG: 5 TABLET ORAL at 06:02

## 2024-11-28 RX ADMIN — CEFEPIME HYDROCHLORIDE 2 G: 2 INJECTION, SOLUTION INTRAVENOUS at 00:02

## 2024-11-28 RX ADMIN — FLUDROCORTISONE ACETATE 0.05 MG: 0.1 TABLET ORAL at 10:17

## 2024-11-28 RX ADMIN — ASPIRIN 81 MG: 81 TABLET, COATED ORAL at 10:15

## 2024-11-28 RX ADMIN — LIDOCAINE 4% 1 PATCH: 40 PATCH TOPICAL at 10:18

## 2024-11-28 RX ADMIN — OXYCODONE HYDROCHLORIDE AND ACETAMINOPHEN 500 MG: 500 TABLET ORAL at 10:15

## 2024-11-28 RX ADMIN — Medication 1 CAPSULE: at 10:15

## 2024-11-28 RX ADMIN — MICONAZOLE NITRATE 1 APPLICATION: 20 CREAM TOPICAL at 05:44

## 2024-11-28 ASSESSMENT — COGNITIVE AND FUNCTIONAL STATUS - GENERAL
HELP NEEDED FOR BATHING: TOTAL
MOBILITY SCORE: 9
TOILETING: TOTAL
EATING MEALS: A LOT
MOVING FROM LYING ON BACK TO SITTING ON SIDE OF FLAT BED WITH BEDRAILS: A LOT
WALKING IN HOSPITAL ROOM: TOTAL
DRESSING REGULAR LOWER BODY CLOTHING: TOTAL
MOVING TO AND FROM BED TO CHAIR: A LOT
DRESSING REGULAR UPPER BODY CLOTHING: TOTAL
PERSONAL GROOMING: A LOT
TURNING FROM BACK TO SIDE WHILE IN FLAT BAD: A LOT
DAILY ACTIVITIY SCORE: 8
CLIMB 3 TO 5 STEPS WITH RAILING: TOTAL
STANDING UP FROM CHAIR USING ARMS: TOTAL

## 2024-11-28 ASSESSMENT — PAIN SCALES - PAIN ASSESSMENT IN ADVANCED DEMENTIA (PAINAD)
TOTALSCORE: MEDICATION (SEE MAR)
BODYLANGUAGE: RELAXED
FACIALEXPRESSION: FACIAL GRIMACING
TOTALSCORE: 4
TOTALSCORE: MEDICATION (SEE MAR)
CONSOLABILITY: NO NEED TO CONSOLE
BREATHING: OCCASIONAL LABORED BREATHING, SHORT PERIOD OF HYPERVENTILATION
NEGVOCALIZATION: OCCASIONAL MOAN/GROAN, LOW SPEECH, NEGATIVE/DISAPPROVING QUALITY

## 2024-11-28 ASSESSMENT — PAIN SCALES - GENERAL: PAINLEVEL_OUTOF10: 0 - NO PAIN

## 2024-11-28 ASSESSMENT — PAIN - FUNCTIONAL ASSESSMENT: PAIN_FUNCTIONAL_ASSESSMENT: PAINAD (PAIN ASSESSMENT IN ADVANCED DEMENTIA SCALE)

## 2024-11-28 NOTE — PROGRESS NOTES
11/28/24 1006   Discharge Planning   Living Arrangements Spouse/significant other  (s/o Karissa)   Support Systems Spouse/significant other;Home care staff   Assistance Needed A&Ox2-3, assistance with ADLs, utilizes a wheeled walker, [facility](SN[facility] PCP Dr. Babita Blackburn   Type of Residence Private residence   Number of Stairs to Enter Residence 3   Number of Stairs Within Residence 17   Do you have animals or pets at home? Yes   Type of Animals or Pets dog   Home or Post Acute Services Post acute facilities (Rehab/SNF/etc)   Type of Post Acute Facility Services Skilled nursing   Expected Discharge Disposition Home Health  (resume [facility])   Does the patient need discharge transport arranged? Yes   RoundTrip coordination needed? Yes   Has discharge transport been arranged? Yes   What day is the transport expected? 11/28/24   What time is the transport expected? 1330     11/28/2024 1007: [facility] arranged for 1330 for transport- VM left for Karissa to make sure she is aware (previously told yesterday of arrangements).

## 2024-11-28 NOTE — HH CARE COORDINATION
Home Care received a Referral for Nursing, Physical Therapy, Occupational Therapy, and Medical Social Work. We have processed the referral for a Start of Care on 11/30/24.     If you have any questions or concerns, please feel free to contact us at [phone]. Follow the prompts, enter your five digit zip code, and you will be directed to your care team on [facility].

## 2024-11-28 NOTE — PROGRESS NOTES
Chao Thao is a 84 y.o. male on day 6 of admission presenting with Pneumonia of left lower lobe due to infectious organism.    Subjective   Interval History: no fever, the hx is limited      Review of Systems    Objective   Range of Vitals (last 24 hours)  Heart Rate:  [85-99]   Temp:  [37.1 °C (98.8 °F)]   Resp:  [18-26]   BP: (128)/(76)   SpO2:  [90 %-94 %]   Daily Weight  11/22/24 : 71.3 kg (157 lb 3 oz)    Body mass index is 25.37 kg/m².    Physical Exam  Constitutional:       Appearance: Normal appearance.   HENT:      Head: Normocephalic and atraumatic.      Mouth/Throat:      Mouth: Mucous membranes are moist.      Pharynx: Oropharynx is clear.   Eyes:      Pupils: Pupils are equal, round, and reactive to light.   Cardiovascular:      Rate and Rhythm: Normal rate and regular rhythm.      Heart sounds: Normal heart sounds.   Pulmonary:      Effort: Pulmonary effort is normal.      Breath sounds: Normal breath sounds.   Abdominal:      General: Abdomen is flat. Bowel sounds are normal.      Palpations: Abdomen is soft.   Musculoskeletal:      Cervical back: Normal range of motion.      Comments: Lt 2nd toe dries eschar   Neurological:      Mental Status: He is alert.         Antibiotics  azithromycin - 500 mg  cefepime - 2 gram/50 mL  vancomycin - 1.25 gram/250 mL    Relevant Results  Labs  Results from last 72 hours   Lab Units 11/28/24  0639 11/27/24  0705 11/26/24  0726   WBC AUTO x10*3/uL 15.3* 13.8* 14.4*   HEMOGLOBIN g/dL 8.8* 8.4* 8.6*   HEMATOCRIT % 27.7* 26.9* 27.4*   PLATELETS AUTO x10*3/uL 349 389 414     Results from last 72 hours   Lab Units 11/28/24  0639 11/27/24  0705 11/26/24  0725   SODIUM mmol/L 132* 131* 133*   POTASSIUM mmol/L 4.3 4.1 4.1   CHLORIDE mmol/L 100 101 102   CO2 mmol/L 24 24 22   BUN mg/dL 24* 19 20   CREATININE mg/dL 0.71 0.63 0.79   GLUCOSE mg/dL 96 99 112*   CALCIUM mg/dL 8.0* 7.7* 8.0*   ANION GAP mmol/L 12 10 13   EGFR mL/min/1.73m*2 >90 >90 89   PHOSPHORUS mg/dL 3.0  2.6 3.2     Results from last 72 hours   Lab Units 11/28/24  0639 11/27/24  0705 11/26/24  0725   ALK PHOS U/L 115 108 120   BILIRUBIN TOTAL mg/dL 0.3 0.3 0.3   PROTEIN TOTAL g/dL 6.0* 6.2* 6.2*   ALT U/L 12 11 12   AST U/L 18 19 17   ALBUMIN g/dL 2.1* 2.2* 2.3*     Estimated Creatinine Clearance: 72.4 mL/min (by C-G formula based on SCr of 0.71 mg/dL).  C-Reactive Protein   Date Value Ref Range Status   11/23/2024 11.71 (H) <1.00 mg/dL Final   08/12/2024 2.23 (H) <1.00 mg/dL Final   06/30/2024 9.28 (H) <1.00 mg/dL Final     Microbiology  Reviewed  Imaging  Reviewed        Assessment/Plan   Hypotension, likely volume deplesion, BC is negative  Bronchiectasis / rib fracture / atelectasis, procalcitonin 0.24, low grade likely atelectasis, the repeat procalcitonin 0.17  Left 2nd toe wound, does not look infected, CT without bony erosions  Encephalopathy, likely metabolic     Recommendations :  Supportive care  Incentive spirometry  Discussed with the medical team     I spent minutes in the professional and overall care of this patient.      Wale Townsend MD

## 2024-11-28 NOTE — DISCHARGE SUMMARY
Discharge Diagnosis  Pneumonia of left lower lobe due to infectious organism    Issues Requiring Follow-Up  Nasal fracture  Blood pressure    Discharge Meds     Medication List      START taking these medications     oxyCODONE 10 mg immediate release tablet; Commonly known as: Roxicodone;   Take 1 tablet (10 mg) by mouth every 6 hours if needed for moderate pain   (4 - 6) or severe pain (7 - 10).     CHANGE how you take these medications     fludrocortisone 0.1 mg tablet; Commonly known as: Florinef; Take 0.5   tablets (0.05 mg) by mouth once daily.   * lidocaine 4 % patch; Place 1 patch over 12 hours on the skin once   daily. Remove & discard patch within 12 hours or as directed by MD.; What   changed: Another medication with the same name was added. Make sure you   understand how and when to take each.   * lidocaine 4 % patch; Place 2 patches over 12 hours on the skin once   daily. Apply to left knee. Remove & discard patch within 12 hours.; What   changed: You were already taking a medication with the same name, and this   prescription was added. Make sure you understand how and when to take   each.  * This list has 2 medication(s) that are the same as other medications   prescribed for you. Read the directions carefully, and ask your doctor or   other care provider to review them with you.     CONTINUE taking these medications     acetaminophen 500 mg tablet; Commonly known as: Tylenol; Take 2 tablets   (1,000 mg) by mouth every 8 hours.   Acidophilus capsule; Generic drug: lactobacillus acidophilus   ascorbic acid 500 mg tablet; Commonly known as: Vitamin C   aspirin 81 mg EC tablet   atorvastatin 20 mg tablet; Commonly known as: Lipitor   cholecalciferol 25 MCG (1000 UT) capsule; Commonly known as: Vitamin D-3   cyanocobalamin 250 mcg tablet; Commonly known as: Vitamin B-12   DULoxetine 20 mg DR capsule; Commonly known as: Cymbalta   magnesium hydroxide 400 mg/5 mL suspension; Commonly known as: Milk of    Magnesia   miconazole 2 % cream; Commonly known as: Micotin   ondansetron 4 mg tablet; Commonly known as: Zofran   potassium chloride CR 20 mEq ER tablet; Commonly known as: Klor-Con M20   zinc oxide 40 % ointment ointment; Apply 1 Application topically 2 times   a day as needed (apply to groin).     STOP taking these medications     metoprolol succinate XL 25 mg 24 hr tablet; Commonly known as: Toprol-XL   OLANZapine 5 mg tablet; Commonly known as: ZyPREXA   zinc acetate 50 mg (zinc) capsule       Test Results Pending At Discharge  Pending Labs       Order Current Status    Blood Culture Preliminary result    Blood Culture Preliminary result            Hospital Course  Chao Thao is a 84 y.o. male w/ PMH of HTN, HLD, MDD, CAD, frequent falls, dementia, A-fib (not on AC), HFpEF (EF 50% 02/2024) who is presenting from home due to fall. He had imaging showing new rib fractures on L and old rib fractures as well as bronchiectasis. Xray left foot without obvious acute process, CT head, face bones. With a nasal bone fx w deviation. Surgery and ortho evaluated pt, advises no surgical intervention, recommended OMFS consult for nasal bone fx which was placed.     Wife notes pt has had multiple hospital admissions. She says pt sometimes gets very confused, like calling the police because of a burglar who is not there, but other times is very lucid. Pt appeared to have severe L leg pain on exam and yells out when we approach to touch it. He had labs showing mild leukocytosis and elevated inflammatory markers; abx started. ID on board, recommends watching off abx. Talked with wife who would like CT imaging of his left foot. Palliative consulted.     11/25 CT foot w/o signs of osteomyelitis. Pt has become progressively unresponsive. Now is A&O x0 and not following commands. CT head w/ contrast was poor study d/t motion but otherwise no acute findings. Electrolyte levels have been stable, no obvious infection despite mild  leukocytosis. Obtained AM cortisol.    11/26 Pt became hypoxic requiring 3L nc overnight. Also had fever to 100.6 which resolved w/ tylenol. CXR showed possible lower lobe infiltrates and pt had slightly worsened leukocytosis. Blood cultures and inflammatory markers were obtained. Pt started on empiric antibiotics.     11/27 Pt more alert but remained intermittently following commands. Remains complaining of chronic knee pain. Blood cultures with no growth after 1 day. PT/OT unable to assess patient d/t inability to follow commands. Pt's wife was interested in discharging him tomorrow.    11/28 Pt was stable overnight, he remains intermittently responsive to verbal stimuli but will react to touch. This may be an element of hypoactive delirium compounded by underlying dementia as pt's wife has noted a decline in function over the past year. He completed a total of 6 days of IV antibiotics while inpatient, though infectious disease believes he may not have had pneumonia and may just be atelectasis. Pt's spouse would like him to continue home healthcare and home PT. Unfortunately PT and OT were unable to evaluate the pt in hospital due to his inability to follow commands.    Discussed findings and plan with patient's spouse and she is agreeable to discharge home today. Med changes at discharge include home oxycodone prescription and holding home metoprolol d/t hypotension. Pt will need to follow up with PCP in 7-10 days for hospital follow up. Instructions provided. He is in stable condition for discharge today.     Please see final recommendations from consultants below:  Infectious disease       -supportive care       -Incentive spirometry    Oral surgery       -OMFS follow up in 7-10 days for nasal fracture repair     Pertinent Physical Exam At Time of Discharge  Constitutional: NAD, rotated in bed, sitting with legs contracted  HEENT: Ecchymosis on nasal bridge.   CV: RRR, No M/R/G  PULM: clear to auscultation,  remains on supplemental o2  ABDOMEN: Soft, NT/ND. No TTP  Genital: No notable swelling or erythema in scrotum.  SKIN: Normal Color, Warm, Dry. BL LE with xerderma  EXTREMITIES: No Pedal Edema, not moving extremities.  NEURO: A&O x O, intermittently following commands  PSYCH: Intermittently following commands, denies pain      Outpatient Follow-Up  No future appointments.      Best Serrato MD

## 2024-11-28 NOTE — CARE PLAN
The patient's goals for the shift include      The clinical goals for the shift include pt will eat meals and be reposit ioned q 2 hrs.

## 2024-11-28 NOTE — CARE PLAN
The patient's goals for the shift include      The clinical goals for the shift include Patient will remain free from injury throughout shift    Over the shift, the patient did not make progress toward the following goals. Barriers to progression include . Recommendations to address these barriers include   Problem: Skin  Goal: Participates in plan/prevention/treatment measures  Outcome: Progressing  Flowsheets (Taken 11/27/2024 2126)  Participates in plan/prevention/treatment measures: Elevate heels     Problem: Skin  Goal: Prevent/manage excess moisture  Outcome: Progressing  Flowsheets (Taken 11/27/2024 2126)  Prevent/manage excess moisture: Cleanse incontinence/protect with barrier cream     Problem: Safety - Adult  Goal: Free from fall injury  Outcome: Progressing  Flowsheets (Taken 11/27/2024 2126)  Free from fall injury:   Instruct family/caregiver on patient safety   Based on caregiver fall risk screen, instruct family/caregiver to ask for assistance with transferring infant if caregiver noted to have fall risk factors   .

## 2024-11-30 LAB
BACTERIA BLD CULT: NORMAL
BACTERIA BLD CULT: NORMAL

## 2024-11-30 NOTE — SIGNIFICANT EVENT
Follow Up Phone Call    Outgoing phone call    Spoke to: Chao Thao Relationship:self   Phone number: [phone]      Outcome: No answer/busy signal   Chief Complaint   Patient presents with    Fall          Diagnosis:Not applicable